# Patient Record
Sex: MALE | Race: WHITE | Employment: UNEMPLOYED | URBAN - METROPOLITAN AREA
[De-identification: names, ages, dates, MRNs, and addresses within clinical notes are randomized per-mention and may not be internally consistent; named-entity substitution may affect disease eponyms.]

---

## 2019-07-22 ENCOUNTER — HOSPITAL ENCOUNTER (INPATIENT)
Age: 69
LOS: 28 days | Discharge: NURSING, REHAB OR LONG-TERM CARE | DRG: 682 | End: 2019-08-20
Attending: EMERGENCY MEDICINE | Admitting: INTERNAL MEDICINE
Payer: MEDICARE

## 2019-07-22 DIAGNOSIS — E83.52 HYPERCALCEMIA: ICD-10-CM

## 2019-07-22 DIAGNOSIS — J96.01 ACUTE RESPIRATORY FAILURE WITH HYPOXIA (HCC): ICD-10-CM

## 2019-07-22 DIAGNOSIS — N17.9 AKI (ACUTE KIDNEY INJURY) (HCC): Primary | ICD-10-CM

## 2019-07-22 DIAGNOSIS — E87.5 HYPERKALEMIA: ICD-10-CM

## 2019-07-22 DIAGNOSIS — G93.40 ACUTE ENCEPHALOPATHY: ICD-10-CM

## 2019-07-22 LAB
ALBUMIN SERPL-MCNC: 4.8 G/DL (ref 3.4–5)
ALP BLD-CCNC: 88 U/L (ref 40–129)
ALT SERPL-CCNC: 19 U/L (ref 10–40)
ANION GAP SERPL CALCULATED.3IONS-SCNC: 22 MMOL/L (ref 3–16)
AST SERPL-CCNC: 17 U/L (ref 15–37)
BASE EXCESS VENOUS: 5 (ref -3–3)
BASOPHILS ABSOLUTE: 0 K/UL (ref 0–0.2)
BASOPHILS RELATIVE PERCENT: 0.2 %
BILIRUB SERPL-MCNC: <0.2 MG/DL (ref 0–1)
BILIRUBIN DIRECT: <0.2 MG/DL (ref 0–0.3)
BILIRUBIN, INDIRECT: ABNORMAL MG/DL (ref 0–1)
BUN BLDV-MCNC: 104 MG/DL (ref 7–20)
CALCIUM SERPL-MCNC: 13.1 MG/DL (ref 8.3–10.6)
CHLORIDE BLD-SCNC: 86 MMOL/L (ref 99–110)
CO2: 28 MMOL/L (ref 21–32)
CREAT SERPL-MCNC: 6.8 MG/DL (ref 0.8–1.3)
EOSINOPHILS ABSOLUTE: 0 K/UL (ref 0–0.6)
EOSINOPHILS RELATIVE PERCENT: 0 %
GFR AFRICAN AMERICAN: 10
GFR NON-AFRICAN AMERICAN: 8
GLUCOSE BLD-MCNC: 150 MG/DL (ref 70–99)
HCO3 VENOUS: 29.9 MMOL/L (ref 23–29)
HCT VFR BLD CALC: 36.7 % (ref 40.5–52.5)
HEMOGLOBIN: 12.4 G/DL (ref 13.5–17.5)
LACTATE: 1.47 MMOL/L (ref 0.4–2)
LIPASE: 32 U/L (ref 13–60)
LYMPHOCYTES ABSOLUTE: 0.6 K/UL (ref 1–5.1)
LYMPHOCYTES RELATIVE PERCENT: 5.8 %
MCH RBC QN AUTO: 32.3 PG (ref 26–34)
MCHC RBC AUTO-ENTMCNC: 33.7 G/DL (ref 31–36)
MCV RBC AUTO: 95.9 FL (ref 80–100)
MONOCYTES ABSOLUTE: 0.6 K/UL (ref 0–1.3)
MONOCYTES RELATIVE PERCENT: 5.7 %
NEUTROPHILS ABSOLUTE: 8.6 K/UL (ref 1.7–7.7)
NEUTROPHILS RELATIVE PERCENT: 88.3 %
O2 SAT, VEN: 24 %
PCO2, VEN: 51.2 MM HG (ref 40–50)
PDW BLD-RTO: 13.8 % (ref 12.4–15.4)
PERFORMED ON: ABNORMAL
PH VENOUS: 7.38 (ref 7.35–7.45)
PLATELET # BLD: 287 K/UL (ref 135–450)
PMV BLD AUTO: 7.5 FL (ref 5–10.5)
PO2, VEN: 18 MM HG
POC SAMPLE TYPE: ABNORMAL
POTASSIUM REFLEX MAGNESIUM: 5.9 MMOL/L (ref 3.5–5.1)
RBC # BLD: 3.83 M/UL (ref 4.2–5.9)
SODIUM BLD-SCNC: 136 MMOL/L (ref 136–145)
TCO2 CALC VENOUS: 31 MMOL/L
TOTAL PROTEIN: 8.5 G/DL (ref 6.4–8.2)
TROPONIN: <0.01 NG/ML
WBC # BLD: 9.7 K/UL (ref 4–11)

## 2019-07-22 PROCEDURE — 83605 ASSAY OF LACTIC ACID: CPT

## 2019-07-22 PROCEDURE — 83690 ASSAY OF LIPASE: CPT

## 2019-07-22 PROCEDURE — 93005 ELECTROCARDIOGRAM TRACING: CPT | Performed by: PHYSICIAN ASSISTANT

## 2019-07-22 PROCEDURE — 96361 HYDRATE IV INFUSION ADD-ON: CPT

## 2019-07-22 PROCEDURE — 82803 BLOOD GASES ANY COMBINATION: CPT

## 2019-07-22 PROCEDURE — 85025 COMPLETE CBC W/AUTO DIFF WBC: CPT

## 2019-07-22 PROCEDURE — 96375 TX/PRO/DX INJ NEW DRUG ADDON: CPT

## 2019-07-22 PROCEDURE — 80069 RENAL FUNCTION PANEL: CPT

## 2019-07-22 PROCEDURE — 84484 ASSAY OF TROPONIN QUANT: CPT

## 2019-07-22 PROCEDURE — 6360000002 HC RX W HCPCS: Performed by: PHYSICIAN ASSISTANT

## 2019-07-22 PROCEDURE — 36415 COLL VENOUS BLD VENIPUNCTURE: CPT

## 2019-07-22 PROCEDURE — 80076 HEPATIC FUNCTION PANEL: CPT

## 2019-07-22 PROCEDURE — 80048 BASIC METABOLIC PNL TOTAL CA: CPT

## 2019-07-22 PROCEDURE — 2580000003 HC RX 258: Performed by: PHYSICIAN ASSISTANT

## 2019-07-22 PROCEDURE — 99291 CRITICAL CARE FIRST HOUR: CPT

## 2019-07-22 RX ORDER — ONDANSETRON 2 MG/ML
4 INJECTION INTRAMUSCULAR; INTRAVENOUS EVERY 30 MIN PRN
Status: DISCONTINUED | OUTPATIENT
Start: 2019-07-22 | End: 2019-08-20 | Stop reason: HOSPADM

## 2019-07-22 RX ORDER — SODIUM CHLORIDE, SODIUM LACTATE, POTASSIUM CHLORIDE, AND CALCIUM CHLORIDE .6; .31; .03; .02 G/100ML; G/100ML; G/100ML; G/100ML
30 INJECTION, SOLUTION INTRAVENOUS ONCE
Status: DISCONTINUED | OUTPATIENT
Start: 2019-07-22 | End: 2019-07-23

## 2019-07-22 RX ADMIN — SODIUM CHLORIDE, POTASSIUM CHLORIDE, SODIUM LACTATE AND CALCIUM CHLORIDE 1000 ML: 600; 310; 30; 20 INJECTION, SOLUTION INTRAVENOUS at 23:37

## 2019-07-22 RX ADMIN — ONDANSETRON 4 MG: 2 INJECTION INTRAMUSCULAR; INTRAVENOUS at 22:24

## 2019-07-23 ENCOUNTER — APPOINTMENT (OUTPATIENT)
Dept: CT IMAGING | Age: 69
DRG: 682 | End: 2019-07-23
Payer: MEDICARE

## 2019-07-23 ENCOUNTER — APPOINTMENT (OUTPATIENT)
Dept: GENERAL RADIOLOGY | Age: 69
DRG: 682 | End: 2019-07-23
Payer: MEDICARE

## 2019-07-23 PROBLEM — N17.9 ACUTE KIDNEY FAILURE (HCC): Status: ACTIVE | Noted: 2019-07-23

## 2019-07-23 LAB
ALBUMIN SERPL-MCNC: 2.6 G/DL (ref 3.4–5)
ALBUMIN SERPL-MCNC: 3 G/DL (ref 3.4–5)
ALBUMIN SERPL-MCNC: 3.1 G/DL (ref 3.4–5)
ALBUMIN SERPL-MCNC: 3.5 G/DL (ref 3.4–5)
ALBUMIN SERPL-MCNC: 3.5 G/DL (ref 3.4–5)
ALBUMIN SERPL-MCNC: 3.8 G/DL (ref 3.4–5)
AMPHETAMINE SCREEN, URINE: ABNORMAL
ANION GAP SERPL CALCULATED.3IONS-SCNC: 11 MMOL/L (ref 3–16)
ANION GAP SERPL CALCULATED.3IONS-SCNC: 11 MMOL/L (ref 3–16)
ANION GAP SERPL CALCULATED.3IONS-SCNC: 13 MMOL/L (ref 3–16)
ANION GAP SERPL CALCULATED.3IONS-SCNC: 16 MMOL/L (ref 3–16)
ANION GAP SERPL CALCULATED.3IONS-SCNC: 18 MMOL/L (ref 3–16)
ANION GAP SERPL CALCULATED.3IONS-SCNC: 19 MMOL/L (ref 3–16)
BARBITURATE SCREEN URINE: ABNORMAL
BASE EXCESS ARTERIAL: -4 MMOL/L (ref -3–3)
BASOPHILS ABSOLUTE: 0 K/UL (ref 0–0.2)
BASOPHILS RELATIVE PERCENT: 0.3 %
BENZODIAZEPINE SCREEN, URINE: ABNORMAL
BILIRUBIN URINE: ABNORMAL
BLOOD, URINE: ABNORMAL
BUN BLDV-MCNC: 62 MG/DL (ref 7–20)
BUN BLDV-MCNC: 86 MG/DL (ref 7–20)
BUN BLDV-MCNC: 92 MG/DL (ref 7–20)
BUN BLDV-MCNC: 94 MG/DL (ref 7–20)
BUN BLDV-MCNC: 95 MG/DL (ref 7–20)
BUN BLDV-MCNC: 99 MG/DL (ref 7–20)
CALCIUM IONIZED: 1.44 MMOL/L (ref 1.12–1.32)
CALCIUM SERPL-MCNC: 10.5 MG/DL (ref 8.3–10.6)
CALCIUM SERPL-MCNC: 10.8 MG/DL (ref 8.3–10.6)
CALCIUM SERPL-MCNC: 10.9 MG/DL (ref 8.3–10.6)
CALCIUM SERPL-MCNC: 7 MG/DL (ref 8.3–10.6)
CALCIUM SERPL-MCNC: 9.3 MG/DL (ref 8.3–10.6)
CALCIUM SERPL-MCNC: 9.8 MG/DL (ref 8.3–10.6)
CANNABINOID SCREEN URINE: ABNORMAL
CARBOXYHEMOGLOBIN ARTERIAL: 2.4 % (ref 0–1.5)
CHLORIDE BLD-SCNC: 107 MMOL/L (ref 99–110)
CHLORIDE BLD-SCNC: 118 MMOL/L (ref 99–110)
CHLORIDE BLD-SCNC: 90 MMOL/L (ref 99–110)
CHLORIDE BLD-SCNC: 94 MMOL/L (ref 99–110)
CHLORIDE BLD-SCNC: 97 MMOL/L (ref 99–110)
CHLORIDE BLD-SCNC: 99 MMOL/L (ref 99–110)
CHLORIDE URINE RANDOM: <20 MMOL/L
CLARITY: CLEAR
CO2: 18 MMOL/L (ref 21–32)
CO2: 23 MMOL/L (ref 21–32)
CO2: 24 MMOL/L (ref 21–32)
CO2: 24 MMOL/L (ref 21–32)
CO2: 25 MMOL/L (ref 21–32)
CO2: 26 MMOL/L (ref 21–32)
COCAINE METABOLITE SCREEN URINE: ABNORMAL
COLOR: YELLOW
CREAT SERPL-MCNC: 2.3 MG/DL (ref 0.8–1.3)
CREAT SERPL-MCNC: 3.8 MG/DL (ref 0.8–1.3)
CREAT SERPL-MCNC: 4.1 MG/DL (ref 0.8–1.3)
CREAT SERPL-MCNC: 4.5 MG/DL (ref 0.8–1.3)
CREAT SERPL-MCNC: 4.6 MG/DL (ref 0.8–1.3)
CREAT SERPL-MCNC: 5.4 MG/DL (ref 0.8–1.3)
CREATININE URINE: 175.2 MG/DL (ref 39–259)
CRYSTALS, UA: ABNORMAL /HPF
D DIMER: 323 NG/ML DDU (ref 0–229)
D DIMER: 332 NG/ML DDU (ref 0–229)
EKG ATRIAL RATE: 95 BPM
EKG DIAGNOSIS: NORMAL
EKG P AXIS: 64 DEGREES
EKG P-R INTERVAL: 114 MS
EKG Q-T INTERVAL: 322 MS
EKG QRS DURATION: 90 MS
EKG QTC CALCULATION (BAZETT): 404 MS
EKG R AXIS: -28 DEGREES
EKG T AXIS: 52 DEGREES
EKG VENTRICULAR RATE: 95 BPM
EOSINOPHIL,URINE: NORMAL
EOSINOPHILS ABSOLUTE: 0 K/UL (ref 0–0.6)
EOSINOPHILS RELATIVE PERCENT: 0.1 %
EPITHELIAL CELLS, UA: ABNORMAL /HPF
ETHANOL: NORMAL MG/DL (ref 0–0.08)
GFR AFRICAN AMERICAN: 13
GFR AFRICAN AMERICAN: 15
GFR AFRICAN AMERICAN: 16
GFR AFRICAN AMERICAN: 18
GFR AFRICAN AMERICAN: 19
GFR AFRICAN AMERICAN: 34
GFR NON-AFRICAN AMERICAN: 11
GFR NON-AFRICAN AMERICAN: 13
GFR NON-AFRICAN AMERICAN: 13
GFR NON-AFRICAN AMERICAN: 15
GFR NON-AFRICAN AMERICAN: 16
GFR NON-AFRICAN AMERICAN: 28
GLUCOSE BLD-MCNC: 60 MG/DL (ref 70–99)
GLUCOSE BLD-MCNC: 72 MG/DL (ref 70–99)
GLUCOSE BLD-MCNC: 74 MG/DL (ref 70–99)
GLUCOSE BLD-MCNC: 75 MG/DL (ref 70–99)
GLUCOSE BLD-MCNC: 77 MG/DL (ref 70–99)
GLUCOSE BLD-MCNC: 87 MG/DL (ref 70–99)
GLUCOSE BLD-MCNC: 89 MG/DL (ref 70–99)
GLUCOSE BLD-MCNC: 92 MG/DL (ref 70–99)
GLUCOSE BLD-MCNC: 95 MG/DL (ref 70–99)
GLUCOSE URINE: NEGATIVE MG/DL
HCO3 ARTERIAL: 22 MMOL/L (ref 21–29)
HCT VFR BLD CALC: 27.6 % (ref 40.5–52.5)
HCT VFR BLD CALC: 29.6 % (ref 40.5–52.5)
HEMOGLOBIN, ART, EXTENDED: 9.2 G/DL
HEMOGLOBIN: 9.3 G/DL (ref 13.5–17.5)
HEMOGLOBIN: 9.8 G/DL (ref 13.5–17.5)
KETONES, URINE: ABNORMAL MG/DL
LACTIC ACID: 0.8 MMOL/L (ref 0.4–2)
LEUKOCYTE ESTERASE, URINE: NEGATIVE
LYMPHOCYTES ABSOLUTE: 0.8 K/UL (ref 1–5.1)
LYMPHOCYTES RELATIVE PERCENT: 7.4 %
Lab: ABNORMAL
MCH RBC QN AUTO: 31.5 PG (ref 26–34)
MCHC RBC AUTO-ENTMCNC: 33.1 G/DL (ref 31–36)
MCV RBC AUTO: 95.2 FL (ref 80–100)
METHADONE SCREEN, URINE: ABNORMAL
METHEMOGLOBIN ARTERIAL: 0.3 % (ref 0–1.4)
MICROSCOPIC EXAMINATION: YES
MONOCYTES ABSOLUTE: 1.1 K/UL (ref 0–1.3)
MONOCYTES RELATIVE PERCENT: 10.8 %
NEUTROPHILS ABSOLUTE: 8.5 K/UL (ref 1.7–7.7)
NEUTROPHILS RELATIVE PERCENT: 81.4 %
NITRITE, URINE: NEGATIVE
O2 SAT, ARTERIAL: 97 % (ref 93–100)
OPIATE SCREEN URINE: POSITIVE
OXYCODONE URINE: ABNORMAL
PARATHYROID HORMONE INTACT: 20.9 PG/ML (ref 14–72)
PCO2 ARTERIAL: 43.6 MMHG (ref 35–45)
PDW BLD-RTO: 13.8 % (ref 12.4–15.4)
PERFORMED ON: NORMAL
PH ARTERIAL: 7.31 (ref 7.35–7.45)
PH UA: 5
PH UA: 6 (ref 5–8)
PH VENOUS: 7.32 (ref 7.35–7.45)
PHENCYCLIDINE SCREEN URINE: ABNORMAL
PHOSPHORUS: 2.9 MG/DL (ref 2.5–4.9)
PHOSPHORUS: 3.8 MG/DL (ref 2.5–4.9)
PHOSPHORUS: 4 MG/DL (ref 2.5–4.9)
PHOSPHORUS: 4.4 MG/DL (ref 2.5–4.9)
PHOSPHORUS: 4.9 MG/DL (ref 2.5–4.9)
PHOSPHORUS: 5.1 MG/DL (ref 2.5–4.9)
PLATELET # BLD: 208 K/UL (ref 135–450)
PMV BLD AUTO: 7.2 FL (ref 5–10.5)
PO2 ARTERIAL: 91.8 MMHG (ref 75–108)
POTASSIUM SERPL-SCNC: 4.2 MMOL/L (ref 3.5–5.1)
POTASSIUM SERPL-SCNC: 4.9 MMOL/L (ref 3.5–5.1)
POTASSIUM SERPL-SCNC: 5.1 MMOL/L (ref 3.5–5.1)
POTASSIUM SERPL-SCNC: 5.1 MMOL/L (ref 3.5–5.1)
POTASSIUM SERPL-SCNC: 5.2 MMOL/L (ref 3.5–5.1)
POTASSIUM SERPL-SCNC: 5.6 MMOL/L (ref 3.5–5.1)
PROPOXYPHENE SCREEN: ABNORMAL
PROTEIN PROTEIN: 45.2 MG/DL
PROTEIN UA: 30 MG/DL
RBC # BLD: 3.11 M/UL (ref 4.2–5.9)
RBC UA: ABNORMAL /HPF (ref 0–2)
SODIUM BLD-SCNC: 134 MMOL/L (ref 136–145)
SODIUM BLD-SCNC: 136 MMOL/L (ref 136–145)
SODIUM BLD-SCNC: 137 MMOL/L (ref 136–145)
SODIUM BLD-SCNC: 138 MMOL/L (ref 136–145)
SODIUM BLD-SCNC: 141 MMOL/L (ref 136–145)
SODIUM BLD-SCNC: 147 MMOL/L (ref 136–145)
SODIUM URINE: 29 MMOL/L
SPECIFIC GRAVITY UA: 1.02 (ref 1–1.03)
TCO2 ARTERIAL: 23 MMOL/L
TROPONIN: 0.28 NG/ML
TROPONIN: 0.3 NG/ML
TROPONIN: 0.32 NG/ML
TSH REFLEX: 0.83 UIU/ML (ref 0.27–4.2)
URINE TYPE: ABNORMAL
UROBILINOGEN, URINE: 0.2 E.U./DL
WBC # BLD: 10.5 K/UL (ref 4–11)
WBC UA: ABNORMAL /HPF (ref 0–5)

## 2019-07-23 PROCEDURE — 6360000002 HC RX W HCPCS: Performed by: PHYSICIAN ASSISTANT

## 2019-07-23 PROCEDURE — 83605 ASSAY OF LACTIC ACID: CPT

## 2019-07-23 PROCEDURE — 2580000003 HC RX 258: Performed by: STUDENT IN AN ORGANIZED HEALTH CARE EDUCATION/TRAINING PROGRAM

## 2019-07-23 PROCEDURE — 2580000003 HC RX 258: Performed by: INTERNAL MEDICINE

## 2019-07-23 PROCEDURE — 84484 ASSAY OF TROPONIN QUANT: CPT

## 2019-07-23 PROCEDURE — 82043 UR ALBUMIN QUANTITATIVE: CPT

## 2019-07-23 PROCEDURE — 86701 HIV-1ANTIBODY: CPT

## 2019-07-23 PROCEDURE — 6360000002 HC RX W HCPCS: Performed by: INTERNAL MEDICINE

## 2019-07-23 PROCEDURE — 86702 HIV-2 ANTIBODY: CPT

## 2019-07-23 PROCEDURE — 87390 HIV-1 AG IA: CPT

## 2019-07-23 PROCEDURE — 84166 PROTEIN E-PHORESIS/URINE/CSF: CPT

## 2019-07-23 PROCEDURE — 82436 ASSAY OF URINE CHLORIDE: CPT

## 2019-07-23 PROCEDURE — 2500000003 HC RX 250 WO HCPCS: Performed by: STUDENT IN AN ORGANIZED HEALTH CARE EDUCATION/TRAINING PROGRAM

## 2019-07-23 PROCEDURE — 80307 DRUG TEST PRSMV CHEM ANLYZR: CPT

## 2019-07-23 PROCEDURE — 2000000000 HC ICU R&B

## 2019-07-23 PROCEDURE — 82542 COL CHROMOTOGRAPHY QUAL/QUAN: CPT

## 2019-07-23 PROCEDURE — 36600 WITHDRAWAL OF ARTERIAL BLOOD: CPT

## 2019-07-23 PROCEDURE — 84156 ASSAY OF PROTEIN URINE: CPT

## 2019-07-23 PROCEDURE — 85018 HEMOGLOBIN: CPT

## 2019-07-23 PROCEDURE — 2500000003 HC RX 250 WO HCPCS: Performed by: PHYSICIAN ASSISTANT

## 2019-07-23 PROCEDURE — 84443 ASSAY THYROID STIM HORMONE: CPT

## 2019-07-23 PROCEDURE — 85025 COMPLETE CBC W/AUTO DIFF WBC: CPT

## 2019-07-23 PROCEDURE — 36415 COLL VENOUS BLD VENIPUNCTURE: CPT

## 2019-07-23 PROCEDURE — 81001 URINALYSIS AUTO W/SCOPE: CPT

## 2019-07-23 PROCEDURE — 87040 BLOOD CULTURE FOR BACTERIA: CPT

## 2019-07-23 PROCEDURE — 83970 ASSAY OF PARATHORMONE: CPT

## 2019-07-23 PROCEDURE — 74176 CT ABD & PELVIS W/O CONTRAST: CPT

## 2019-07-23 PROCEDURE — 80069 RENAL FUNCTION PANEL: CPT

## 2019-07-23 PROCEDURE — 96365 THER/PROPH/DIAG IV INF INIT: CPT

## 2019-07-23 PROCEDURE — 71045 X-RAY EXAM CHEST 1 VIEW: CPT

## 2019-07-23 PROCEDURE — HZ89ZZZ MEDICATION MANAGEMENT FOR SUBSTANCE ABUSE TREATMENT, OTHER REPLACEMENT MEDICATION: ICD-10-PCS | Performed by: INTERNAL MEDICINE

## 2019-07-23 PROCEDURE — 85379 FIBRIN DEGRADATION QUANT: CPT

## 2019-07-23 PROCEDURE — 6370000000 HC RX 637 (ALT 250 FOR IP): Performed by: STUDENT IN AN ORGANIZED HEALTH CARE EDUCATION/TRAINING PROGRAM

## 2019-07-23 PROCEDURE — 85014 HEMATOCRIT: CPT

## 2019-07-23 PROCEDURE — 84155 ASSAY OF PROTEIN SERUM: CPT

## 2019-07-23 PROCEDURE — 2700000000 HC OXYGEN THERAPY PER DAY

## 2019-07-23 PROCEDURE — G0480 DRUG TEST DEF 1-7 CLASSES: HCPCS

## 2019-07-23 PROCEDURE — 6360000002 HC RX W HCPCS: Performed by: STUDENT IN AN ORGANIZED HEALTH CARE EDUCATION/TRAINING PROGRAM

## 2019-07-23 PROCEDURE — 94640 AIRWAY INHALATION TREATMENT: CPT

## 2019-07-23 PROCEDURE — 96375 TX/PRO/DX INJ NEW DRUG ADDON: CPT

## 2019-07-23 PROCEDURE — 87205 SMEAR GRAM STAIN: CPT

## 2019-07-23 PROCEDURE — 6370000000 HC RX 637 (ALT 250 FOR IP): Performed by: PHYSICIAN ASSISTANT

## 2019-07-23 PROCEDURE — 82570 ASSAY OF URINE CREATININE: CPT

## 2019-07-23 PROCEDURE — 94761 N-INVAS EAR/PLS OXIMETRY MLT: CPT

## 2019-07-23 PROCEDURE — 2580000003 HC RX 258: Performed by: PHYSICIAN ASSISTANT

## 2019-07-23 PROCEDURE — 84165 PROTEIN E-PHORESIS SERUM: CPT

## 2019-07-23 PROCEDURE — 70450 CT HEAD/BRAIN W/O DYE: CPT

## 2019-07-23 PROCEDURE — 93005 ELECTROCARDIOGRAM TRACING: CPT | Performed by: INTERNAL MEDICINE

## 2019-07-23 PROCEDURE — 82330 ASSAY OF CALCIUM: CPT

## 2019-07-23 PROCEDURE — 83883 ASSAY NEPHELOMETRY NOT SPEC: CPT

## 2019-07-23 PROCEDURE — 82803 BLOOD GASES ANY COMBINATION: CPT

## 2019-07-23 PROCEDURE — 84300 ASSAY OF URINE SODIUM: CPT

## 2019-07-23 PROCEDURE — 2500000003 HC RX 250 WO HCPCS: Performed by: INTERNAL MEDICINE

## 2019-07-23 PROCEDURE — 70490 CT SOFT TISSUE NECK W/O DYE: CPT

## 2019-07-23 RX ORDER — LORAZEPAM 1 MG/1
4 TABLET ORAL
Status: DISCONTINUED | OUTPATIENT
Start: 2019-07-23 | End: 2019-07-29

## 2019-07-23 RX ORDER — FLUMAZENIL 0.1 MG/ML
0.2 INJECTION, SOLUTION INTRAVENOUS ONCE
Status: COMPLETED | OUTPATIENT
Start: 2019-07-23 | End: 2019-07-23

## 2019-07-23 RX ORDER — DOCUSATE SODIUM 100 MG/1
100 CAPSULE, LIQUID FILLED ORAL 2 TIMES DAILY
Status: DISCONTINUED | OUTPATIENT
Start: 2019-07-23 | End: 2019-07-23

## 2019-07-23 RX ORDER — SODIUM CHLORIDE 0.9 % (FLUSH) 0.9 %
10 SYRINGE (ML) INJECTION PRN
Status: DISCONTINUED | OUTPATIENT
Start: 2019-07-23 | End: 2019-07-23 | Stop reason: SDUPTHER

## 2019-07-23 RX ORDER — SODIUM CHLORIDE 0.9 % (FLUSH) 0.9 %
10 SYRINGE (ML) INJECTION EVERY 12 HOURS SCHEDULED
Status: DISCONTINUED | OUTPATIENT
Start: 2019-07-23 | End: 2019-07-23 | Stop reason: SDUPTHER

## 2019-07-23 RX ORDER — LORAZEPAM 2 MG/ML
2 INJECTION INTRAMUSCULAR
Status: DISCONTINUED | OUTPATIENT
Start: 2019-07-23 | End: 2019-07-29

## 2019-07-23 RX ORDER — LORAZEPAM 2 MG/ML
4 INJECTION INTRAMUSCULAR
Status: DISCONTINUED | OUTPATIENT
Start: 2019-07-23 | End: 2019-07-29

## 2019-07-23 RX ORDER — LORAZEPAM 1 MG/1
2 TABLET ORAL
Status: DISCONTINUED | OUTPATIENT
Start: 2019-07-23 | End: 2019-07-29

## 2019-07-23 RX ORDER — LORAZEPAM 2 MG/ML
2 INJECTION INTRAMUSCULAR EVERY 4 HOURS PRN
Status: DISCONTINUED | OUTPATIENT
Start: 2019-07-23 | End: 2019-07-23

## 2019-07-23 RX ORDER — HALOPERIDOL 5 MG/ML
5 INJECTION INTRAMUSCULAR EVERY 4 HOURS PRN
Status: DISCONTINUED | OUTPATIENT
Start: 2019-07-23 | End: 2019-07-24

## 2019-07-23 RX ORDER — LORAZEPAM 2 MG/ML
3 INJECTION INTRAMUSCULAR
Status: DISCONTINUED | OUTPATIENT
Start: 2019-07-23 | End: 2019-07-29

## 2019-07-23 RX ORDER — SODIUM CHLORIDE 9 MG/ML
INJECTION, SOLUTION INTRAVENOUS CONTINUOUS
Status: DISCONTINUED | OUTPATIENT
Start: 2019-07-23 | End: 2019-07-24

## 2019-07-23 RX ORDER — NALOXONE HYDROCHLORIDE 0.4 MG/ML
0.4 INJECTION, SOLUTION INTRAMUSCULAR; INTRAVENOUS; SUBCUTANEOUS PRN
Status: DISCONTINUED | OUTPATIENT
Start: 2019-07-23 | End: 2019-07-23

## 2019-07-23 RX ORDER — LEVALBUTEROL 1.25 MG/.5ML
1.25 SOLUTION, CONCENTRATE RESPIRATORY (INHALATION) 2 TIMES DAILY
Status: DISCONTINUED | OUTPATIENT
Start: 2019-07-23 | End: 2019-07-25

## 2019-07-23 RX ORDER — SODIUM CHLORIDE 0.9 % (FLUSH) 0.9 %
10 SYRINGE (ML) INJECTION EVERY 12 HOURS SCHEDULED
Status: DISCONTINUED | OUTPATIENT
Start: 2019-07-23 | End: 2019-07-23

## 2019-07-23 RX ORDER — SODIUM CHLORIDE 0.9 % (FLUSH) 0.9 %
10 SYRINGE (ML) INJECTION PRN
Status: DISCONTINUED | OUTPATIENT
Start: 2019-07-23 | End: 2019-07-23

## 2019-07-23 RX ORDER — SIMVASTATIN 10 MG
10 TABLET ORAL NIGHTLY
Status: DISCONTINUED | OUTPATIENT
Start: 2019-07-23 | End: 2019-07-23

## 2019-07-23 RX ORDER — LORAZEPAM 2 MG/ML
2 INJECTION INTRAMUSCULAR EVERY 4 HOURS
Status: DISCONTINUED | OUTPATIENT
Start: 2019-07-23 | End: 2019-07-23

## 2019-07-23 RX ORDER — DEXTROSE MONOHYDRATE 50 MG/ML
100 INJECTION, SOLUTION INTRAVENOUS PRN
Status: DISCONTINUED | OUTPATIENT
Start: 2019-07-23 | End: 2019-08-20 | Stop reason: HOSPADM

## 2019-07-23 RX ORDER — 0.9 % SODIUM CHLORIDE 0.9 %
1000 INTRAVENOUS SOLUTION INTRAVENOUS ONCE
Status: COMPLETED | OUTPATIENT
Start: 2019-07-23 | End: 2019-07-23

## 2019-07-23 RX ORDER — NALOXONE HYDROCHLORIDE 0.4 MG/ML
INJECTION, SOLUTION INTRAMUSCULAR; INTRAVENOUS; SUBCUTANEOUS
Status: DISCONTINUED
Start: 2019-07-23 | End: 2019-07-23

## 2019-07-23 RX ORDER — LORAZEPAM 1 MG/1
1 TABLET ORAL
Status: DISCONTINUED | OUTPATIENT
Start: 2019-07-23 | End: 2019-07-29

## 2019-07-23 RX ORDER — LORAZEPAM 2 MG/ML
1 INJECTION INTRAMUSCULAR
Status: DISCONTINUED | OUTPATIENT
Start: 2019-07-23 | End: 2019-07-29

## 2019-07-23 RX ORDER — NICOTINE POLACRILEX 4 MG
15 LOZENGE BUCCAL PRN
Status: DISCONTINUED | OUTPATIENT
Start: 2019-07-23 | End: 2019-08-20 | Stop reason: HOSPADM

## 2019-07-23 RX ORDER — LORAZEPAM 1 MG/1
3 TABLET ORAL
Status: DISCONTINUED | OUTPATIENT
Start: 2019-07-23 | End: 2019-07-29

## 2019-07-23 RX ORDER — DEXTROSE MONOHYDRATE 25 G/50ML
12.5 INJECTION, SOLUTION INTRAVENOUS PRN
Status: DISCONTINUED | OUTPATIENT
Start: 2019-07-23 | End: 2019-08-20 | Stop reason: HOSPADM

## 2019-07-23 RX ORDER — SODIUM CHLORIDE 9 MG/ML
1000 INJECTION, SOLUTION INTRAVENOUS CONTINUOUS
Status: DISCONTINUED | OUTPATIENT
Start: 2019-07-23 | End: 2019-07-23

## 2019-07-23 RX ORDER — HEPARIN SODIUM 5000 [USP'U]/ML
5000 INJECTION, SOLUTION INTRAVENOUS; SUBCUTANEOUS EVERY 8 HOURS SCHEDULED
Status: DISCONTINUED | OUTPATIENT
Start: 2019-07-23 | End: 2019-07-24

## 2019-07-23 RX ORDER — LORAZEPAM 2 MG/ML
2 INJECTION INTRAMUSCULAR ONCE
Status: DISCONTINUED | OUTPATIENT
Start: 2019-07-23 | End: 2019-07-29

## 2019-07-23 RX ORDER — THIAMINE HYDROCHLORIDE 100 MG/ML
200 INJECTION, SOLUTION INTRAMUSCULAR; INTRAVENOUS DAILY
Status: DISCONTINUED | OUTPATIENT
Start: 2019-07-23 | End: 2019-07-23

## 2019-07-23 RX ORDER — LORAZEPAM 2 MG/ML
1 INJECTION INTRAMUSCULAR EVERY 4 HOURS PRN
Status: DISCONTINUED | OUTPATIENT
Start: 2019-07-23 | End: 2019-07-23

## 2019-07-23 RX ORDER — LORAZEPAM 2 MG/ML
0.5 INJECTION INTRAMUSCULAR ONCE
Status: DISCONTINUED | OUTPATIENT
Start: 2019-07-23 | End: 2019-07-23

## 2019-07-23 RX ORDER — PANTOPRAZOLE SODIUM 40 MG/1
40 TABLET, DELAYED RELEASE ORAL
Status: DISCONTINUED | OUTPATIENT
Start: 2019-07-23 | End: 2019-07-23

## 2019-07-23 RX ORDER — LORAZEPAM 2 MG/ML
1 INJECTION INTRAMUSCULAR EVERY 4 HOURS
Status: DISCONTINUED | OUTPATIENT
Start: 2019-07-23 | End: 2019-07-23

## 2019-07-23 RX ORDER — LEVALBUTEROL 1.25 MG/.5ML
1.25 SOLUTION, CONCENTRATE RESPIRATORY (INHALATION) 4 TIMES DAILY
Status: DISCONTINUED | OUTPATIENT
Start: 2019-07-23 | End: 2019-07-23

## 2019-07-23 RX ORDER — DEXTROSE MONOHYDRATE 25 G/50ML
50 INJECTION, SOLUTION INTRAVENOUS ONCE
Status: COMPLETED | OUTPATIENT
Start: 2019-07-23 | End: 2019-07-23

## 2019-07-23 RX ORDER — OLANZAPINE 10 MG/1
5 INJECTION, POWDER, LYOPHILIZED, FOR SOLUTION INTRAMUSCULAR
Status: DISCONTINUED | OUTPATIENT
Start: 2019-07-23 | End: 2019-07-23

## 2019-07-23 RX ADMIN — SODIUM BICARBONATE 25 MEQ: 84 INJECTION, SOLUTION INTRAVENOUS at 00:46

## 2019-07-23 RX ADMIN — NALOXONE HYDROCHLORIDE 0.4 MG: 0.4 INJECTION, SOLUTION INTRAMUSCULAR; INTRAVENOUS; SUBCUTANEOUS at 12:36

## 2019-07-23 RX ADMIN — DEXTROSE 50 % IN WATER (D50W) INTRAVENOUS SYRINGE 12.5 G: at 23:45

## 2019-07-23 RX ADMIN — DOCUSATE SODIUM 100 MG: 100 CAPSULE, LIQUID FILLED ORAL at 08:22

## 2019-07-23 RX ADMIN — SODIUM CHLORIDE 1000 ML: 9 INJECTION, SOLUTION INTRAVENOUS at 14:16

## 2019-07-23 RX ADMIN — DEXMEDETOMIDINE 0.2 MCG/KG/HR: 100 INJECTION, SOLUTION, CONCENTRATE INTRAVENOUS at 20:28

## 2019-07-23 RX ADMIN — THIAMINE HYDROCHLORIDE 200 MG: 100 INJECTION, SOLUTION INTRAMUSCULAR; INTRAVENOUS at 19:35

## 2019-07-23 RX ADMIN — FOLIC ACID: 5 INJECTION, SOLUTION INTRAMUSCULAR; INTRAVENOUS; SUBCUTANEOUS at 05:39

## 2019-07-23 RX ADMIN — CALCIUM GLUCONATE 1 G: 98 INJECTION, SOLUTION INTRAVENOUS at 00:47

## 2019-07-23 RX ADMIN — SODIUM CHLORIDE: 9 INJECTION, SOLUTION INTRAVENOUS at 15:16

## 2019-07-23 RX ADMIN — SODIUM CHLORIDE: 9 INJECTION, SOLUTION INTRAVENOUS at 10:14

## 2019-07-23 RX ADMIN — SODIUM CHLORIDE 1000 ML: 9 INJECTION, SOLUTION INTRAVENOUS at 01:30

## 2019-07-23 RX ADMIN — DEXTROSE 50 % IN WATER (D50W) INTRAVENOUS SYRINGE 50 ML: at 00:46

## 2019-07-23 RX ADMIN — LEVALBUTEROL HYDROCHLORIDE 1.25 MG: 1.25 SOLUTION, CONCENTRATE RESPIRATORY (INHALATION) at 23:13

## 2019-07-23 RX ADMIN — LORAZEPAM 4 MG: 2 INJECTION INTRAMUSCULAR; INTRAVENOUS at 10:48

## 2019-07-23 RX ADMIN — HALOPERIDOL LACTATE 5 MG: 5 INJECTION INTRAMUSCULAR at 13:30

## 2019-07-23 RX ADMIN — PANTOPRAZOLE SODIUM 40 MG: 40 TABLET, DELAYED RELEASE ORAL at 08:23

## 2019-07-23 RX ADMIN — INSULIN HUMAN 10 UNITS: 100 INJECTION, SOLUTION PARENTERAL at 00:46

## 2019-07-23 RX ADMIN — HEPARIN SODIUM 5000 UNITS: 5000 INJECTION INTRAVENOUS; SUBCUTANEOUS at 21:51

## 2019-07-23 RX ADMIN — DICLOFENAC 2 G: 10 GEL TOPICAL at 08:22

## 2019-07-23 RX ADMIN — SODIUM CHLORIDE 1000 ML: 9 INJECTION, SOLUTION INTRAVENOUS at 11:59

## 2019-07-23 RX ADMIN — FOLIC ACID: 5 INJECTION, SOLUTION INTRAMUSCULAR; INTRAVENOUS; SUBCUTANEOUS at 19:35

## 2019-07-23 RX ADMIN — HEPARIN SODIUM 5000 UNITS: 5000 INJECTION INTRAVENOUS; SUBCUTANEOUS at 08:23

## 2019-07-23 RX ADMIN — SODIUM CHLORIDE: 9 INJECTION, SOLUTION INTRAVENOUS at 03:24

## 2019-07-23 RX ADMIN — FLUMAZENIL 0.2 MG: 0.1 INJECTION INTRAVENOUS at 12:19

## 2019-07-23 ASSESSMENT — ENCOUNTER SYMPTOMS
NAUSEA: 1
ABDOMINAL PAIN: 0
DIARRHEA: 0
SHORTNESS OF BREATH: 1
COLOR CHANGE: 0
COUGH: 1
ABDOMINAL DISTENTION: 0
VOICE CHANGE: 1
RESPIRATORY NEGATIVE: 1
VOMITING: 1

## 2019-07-23 ASSESSMENT — PAIN DESCRIPTION - PROGRESSION
CLINICAL_PROGRESSION: NOT CHANGED

## 2019-07-23 ASSESSMENT — PAIN DESCRIPTION - DESCRIPTORS: DESCRIPTORS: ACHING;DISCOMFORT;TIGHTNESS

## 2019-07-23 ASSESSMENT — PAIN SCALES - GENERAL
PAINLEVEL_OUTOF10: 0
PAINLEVEL_OUTOF10: 3
PAINLEVEL_OUTOF10: 0

## 2019-07-23 ASSESSMENT — PAIN DESCRIPTION - FREQUENCY: FREQUENCY: CONTINUOUS

## 2019-07-23 ASSESSMENT — PAIN SCALES - WONG BAKER
WONGBAKER_NUMERICALRESPONSE: 0
WONGBAKER_NUMERICALRESPONSE: 0

## 2019-07-23 ASSESSMENT — PAIN DESCRIPTION - LOCATION: LOCATION: NOSE;NECK

## 2019-07-23 ASSESSMENT — PAIN DESCRIPTION - PAIN TYPE: TYPE: CHRONIC PAIN

## 2019-07-23 ASSESSMENT — PAIN DESCRIPTION - ONSET: ONSET: ON-GOING

## 2019-07-23 NOTE — PROGRESS NOTES
right vocal cord. Additionally, patient has had poor appetite over the last several months with associated weight loss. He follows closely with PCP and underwent MBS and fluoro esophagram which demonstrated a small sliding hiatal hernia and mild esophogeal dysmotility. He endorses drinking 1-2 vodka shots per day and states he smokes 1 pack of cigarettes daily \"for a long time. \" He denies CP, SOB (despite requiring 2L NC), abdominal pain, constipation or diarrhea.      Medications:     Scheduled Meds:   diclofenac sodium  2 g Topical BID    pantoprazole  40 mg Oral QAM AC    simvastatin  10 mg Oral Nightly    docusate sodium  100 mg Oral BID    heparin (porcine)  5,000 Units Subcutaneous 3 times per day    sodium chloride flush  10 mL Intravenous 2 times per day    levalbuterol  1.25 mg Nebulization BID    naloxone        LORazepam  0.5 mg Intravenous Once    sodium chloride  1,000 mL Intravenous Once     Continuous Infusions:   sodium chloride 150 mL/hr at 07/23/19 1014     PRN Meds:sodium chloride flush, LORazepam **OR** LORazepam **OR** LORazepam **OR** LORazepam **OR** LORazepam **OR** LORazepam **OR** LORazepam **OR** LORazepam, naloxone, haloperidol lactate, ondansetron    Objective:   Vitals:   T-max:  Patient Vitals for the past 8 hrs:   BP Temp Temp src Pulse Resp SpO2 Height   07/23/19 1351 (!) 71/43 97.8 °F (36.6 °C) Axillary 112 12 96 % --   07/23/19 1344 -- -- -- -- 18 96 % --   07/23/19 1236 (!) 97/58 -- -- -- -- -- --   07/23/19 1206 (!) 65/40 -- -- -- -- -- --   07/23/19 1154 (!) 62/39 -- -- -- -- -- --   07/23/19 1148 -- -- -- 106 -- -- --   07/23/19 1118 -- -- -- -- -- -- 5' 5\" (1.651 m)   07/23/19 1032 103/64 -- -- 120 -- -- --   07/23/19 0747 121/60 99 °F (37.2 °C) Oral -- 16 93 % --       Intake/Output Summary (Last 24 hours) at 7/23/2019 1443  Last data filed at 7/23/2019 1011  Gross per 24 hour   Intake 653.8 ml   Output 450 ml   Net 203.8 ml       Review of Systems   Constitutional: deformity. Neurological: No cranial nerve deficit or sensory deficit. Coordination normal.   Not oriented to person, place, or time   Skin: Skin is warm. Capillary refill takes less than 2 seconds. He is diaphoretic. No erythema. There is pallor. Psychiatric:   Agitated, thrashing, not responsive to name       LABS:    CBC:   Recent Labs     07/22/19 2147 07/23/19 0633 07/23/19  0748   WBC 9.7 10.5  --    HGB 12.4* 9.8* 9.3*   HCT 36.7* 29.6* 27.6*    208  --    MCV 95.9 95.2  --      Renal:    Recent Labs     07/23/19 0633 07/23/19 0748 07/23/19  1152    138 136   K 5.1 5.1 5.2*   CL 94* 97* 99   CO2 24 25 24   BUN 95* 94* 92*   CREATININE 4.6* 4.5* 4.1*   GLUCOSE 92 89 87   CALCIUM 10.8* 10.5 9.8   PHOS 4.9 4.4 3.8   ANIONGAP 19* 16 13     Hepatic:   Recent Labs     07/22/19 2148 07/23/19 0633 07/23/19  0748 07/23/19  1152   AST 17  --   --   --    ALT 19  --   --   --    BILITOT <0.2  --   --   --    BILIDIR <0.2  --   --   --    PROT 8.5*  --   --   --    LABALBU 4.8 3.8 3.5 3.1*   ALKPHOS 88  --   --   --      Troponin:   Recent Labs     07/22/19 2148 07/23/19  1254   TROPONINI <0.01 0.30*     BNP: No results for input(s): BNP in the last 72 hours. Lipids: No results for input(s): CHOL, HDL in the last 72 hours. Invalid input(s): LDLCALCU, TRIGLYCERIDE  ABGs:    Recent Labs     07/23/19  1344   PHART 7.313*   NUT3SGZ 43.6   PO2ART 91.8   FCI7JOA 22   BEART -4.0*   [id number] 97   [id number] 23       INR: No results for input(s): INR in the last 72 hours. Lactate:   Recent Labs     07/22/19 2147   LACTATE 1.47     Cultures:  -----------------------------------------------------------------  RAD:   CT SOFT TISSUE NECK WO CONTRAST   Final Result   Unremarkable CT neck          XR CHEST PORTABLE   Final Result     Normal chest x-ray. CT ABDOMEN PELVIS WO CONTRAST Additional Contrast? None   Final Result   No acute abnormality demonstrated in the abdomen or pelvis. Assessment/Plan:     Mr. Mari Perez is a 69 y/o M with PMHx significant for HTN, HLD, GERD, opioid dependence 2/2 cervical DDD, and alcohol abuse who presents to [facility] ED complaining of worsening fatigue, nausea/vomiting, poor appetite and voice hoarseness over the last several months. On admission, primary problems identified were hyperkalemia and hypercalcemia, which were treated with IVF and D50 + insulin. AVIVA also identified, treated with IVF. Pt became agitated on the floor, requiring restraints and sedation. BP's also hypotensive during somnolent episodes. Troponins were negative on admission, trended up to 0.30 during agitation episode. ABG showed 7.313  43.6  91.8  22. EKG showed sinus tachycardia. D-dimer elevated at 332. H+H repeat stable. Transferred to ICU. Acute alcohol withdrawal- Patient was agitated, hallucinating, had elevated CIWA scores 7/23. Require ativan and haldol with 3 point restraints for patient/staff safety. Patient reports drinking 1-2 shots daily. Unclear when patient had last drink. No history of EtOH withdrawal or seizures. - Rally pack given in ED  - CIWA protocol scored at 35 earlier (7/23)  - 4mg ativan given IV  - 2mg haldol given IM later following agitated episode  - 5L NC for support, switched to 10L high-flow NC  - Weaned down to 4L HFNC, satting 96%    Hypotension - patient's BP dropped to 62/39 following administration of ativan for presumed alcohol withdrawal.  Started IV bolus, BP improved to 97/58 following narcan dose. BP dropped again to 71/43 following a dose of IM haldol.  - 1L IVF given in ED  - 2L IV bolus given on floor  - Flumazenil given following benzos lowering BP  - Will continue to monitor    Acute Kidney Injury - likely multifactorial in etiology in setting of poor PO intake and nephrotoxic meds. Patient presents with fatigue, nausea/vomiting and forgetfulness which has progressively gotten worse over the last three days.  BMP was indicated a possible mass lesion on the right vocal cord. MBS and esophogram demonstrated a small sliding hiatal hernia and mild esophogeal dysmotility.  No significant abnormalities noted on MBS.   - CT neck WO contrast showed no abnormal pathology  - Will likely need further imaging studies with contrast, but will hold off for now in setting of AVIVA      HTN  - Hold home Lisinopril     GERD  - Protonix daily      Cervical DDD  - Holding home opiates as patient appears lethargic/altered on exam    Code Status: Full  FEN: NPO except ice chips, sips  PPX: Heparin  DISPO: ICU    Cindy Murguia MD, PGY-1  07/23/19  2:43 PM    This patient has been staffed and discussed with Berta Powers MD.

## 2019-07-23 NOTE — PLAN OF CARE
Problem: Pain:  Goal: Pain level will decrease  Description  Pain level will decrease  Outcome: Ongoing  Note:   No complaints of pain thus far this shift. Patient aware of diclofenac order. Declines it at this time. Goal: Control of acute pain  Description  Control of acute pain  Outcome: Ongoing  Goal: Control of chronic pain  Description  Control of chronic pain  Outcome: Ongoing     Problem: Falls - Risk of:  Goal: Will remain free from falls  Description  Will remain free from falls  Outcome: Ongoing  Note:   No falls thus far on this shift. Bed locked and in low position. Call light within reach. Pt encouraged to call out to voice any needs. Bedside table in reach. Bed alarm armed.    Goal: Absence of physical injury  Description  Absence of physical injury  Outcome: Ongoing

## 2019-07-23 NOTE — PROGRESS NOTES
Pt arrived with RN at bedside. This RN assuming care of the Pt. MD at bedside. Informed him of HR in the 130s, sating in the 80s. Pt breathing labored, appears to be purse lip breathing. Pt is tremoring at this time. Will continue to monitor.

## 2019-07-23 NOTE — CONSULTS
[address]               [address] [facility], [address] [place] [address]                                  CONSULTATION    PATIENT NAME: Chencho Villa                     :        1950  MED REC NO:   [id number]                          ROOM:       1167  ACCOUNT NO:   [de-identified]                           ADMIT DATE: 2019  PROVIDER:     Jin Gilmore MD    CONSULT DATE:  2019    REASON FOR ADMISSION:  The patient with mental status changes, possible  alcohol withdrawal.    REASON FOR CONSULTATION:  The patient with acute kidney injury with BUN  104, creatinine 6.8, potassium 5.9, calcium 13.1. Recent weight loss of  14 pounds. Baseline creatinine in 2018, creatinine was 1.2, and  calcium was 10.1. HISTORY OF PRESENTING COMPLAINT:  I was asked by Dr. Tho Jean-Baptiste and  also called from the ER regarding this 78-year-old  gentleman  whom I am seeing in the room where he just got Ativan because he was  agitated, pulling his lines, was trying to jump out of the window, and  is sedated and unable to give any details. Reviewed admission H and P and other details. He has a history of  hypertension, opioid dependence, alcoholism, cervical degenerative  disease for which apparently he has some surgery, presents with fatigue,  nausea, vomiting, poor appetite, hoarseness of the voice for several  months, recent 10 pounds of weight loss. Apparently, he has problem with the hoarseness and has videostroboscopy  with laryngoscopy in 2018 by ENT. Possible mass seen in the vocal  cord. Recently, he also has an esophagram.  Mild dysmotility was seen. The patient drinks lot of milk shakes mixing with vodka. In the ER, his vitals were stable, but has abnormal lab that described  above. I am seeing in his room where unable get any history.     REVIEW OF SYSTEMS:  Again, not much available except weight loss, but  apparently no fever, chills, [phone]    CC:

## 2019-07-23 NOTE — PROGRESS NOTES
ICU TRANSFER NOTE      1:42 PM2019  Admit Date: 2019   Hospital Day: 2                                                   PCP   Edgardo Schafer  : 1950                                                       CodeStatus:Full Code  MRN: [id number]                                                        Diet: Diet NPO Effective Now Exceptions are: Ice Chips, Sips with Meds     Vitals:    19 1236   BP: (!) 97/58   Pulse:    Resp:    Temp:    SpO2:      Scheduled Meds:   diclofenac sodium  2 g Topical BID    pantoprazole  40 mg Oral QAM AC    simvastatin  10 mg Oral Nightly    docusate sodium  100 mg Oral BID    heparin (porcine)  5,000 Units Subcutaneous 3 times per day    sodium chloride flush  10 mL Intravenous 2 times per day    levalbuterol  1.25 mg Nebulization BID    naloxone        LORazepam  0.5 mg Intravenous Once     Continuous Infusions:   sodium chloride 150 mL/hr at 19 1014     PRN Meds:sodium chloride flush, LORazepam **OR** LORazepam **OR** LORazepam **OR** LORazepam **OR** LORazepam **OR** LORazepam **OR** LORazepam **OR** LORazepam, naloxone, OLANZapine, haloperidol lactate, ondansetron    Physical Exam   Constitutional: He appears well-developed and well-nourished. He appears distressed. HENT:   Head: Normocephalic and atraumatic. Nose: Nose normal.   Eyes: Conjunctivae are normal. No scleral icterus. Neck: JVD present. No tracheal deviation present. Cardiovascular: Normal rate, regular rhythm and normal heart sounds. Exam reveals no gallop and no friction rub. No murmur heard. Pulmonary/Chest: He is in respiratory distress. Neurological:   Pt arousable but unable to follow any commands or answer questions. Skin: Skin is warm. He is diaphoretic. Scattered bruising    Psychiatric:   Pt restrained for agitation.          The objective and subjective findings as well as the treatment course in the [place] have been reviewed with the floor team. The

## 2019-07-23 NOTE — PROGRESS NOTES
BP 62/39. Notified Dr. Stephanie Mike, order for a normal saline bolus obtained.   One liter over an hour

## 2019-07-23 NOTE — CONSULTS
Nutrition Assessment    Type and Reason for Visit: Initial, Consult, Positive Nutrition Screen    Nutrition Recommendations:  1. PO Diet: Continue NPO, monitor ability to advance diet as tolerated  2. Suggest to continue folic acid, MVI and Thiamine as able  3. Encourage adequate po intake when able to tolerate diet  4. Monitor weight, labs and clinical progress      Nutrition Assessment: Consult received for other (anorexia, weight loss) and positive screen for decreased appetite/poor po intake and wt loss. Pt with non-significant (3%) wt loss x 1 month per H&P.  RD unable to interview pt at this time d/t lethargy. Pt is nutritionally compromised AEB poor appetite over the last several months with nausea and vomiting reported, most likely from ETOH abuse. Pt drinks a lot of milkshakes with Vodka noted. Pt is at risk for further compromise d/t NPO status. Will monitor ability to advance diet and complete NFPE as able. 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 Energy Intake-Less than or equal to 50% of estimated energy requirement, (since admit x 1 day)    2. Weight Loss-No significant weight loss,    3. Fat Loss-Unable to assess(not appropriate at this time),    4. Muscle Loss-Unable to assess(not appropriate at this time),    5. Fluid Accumulation-No significant fluid accumulation,    6.   Strength-Not measured    Nutrition Risk Level: High    Nutrient Needs:  · Estimated Daily Total Kcal: 5304-5601 (30-35)  · Estimated Daily Protein (g): 71-89 (1.2-1.5)  · Estimated Daily Total Fluid (ml/day): 5055-1699 (1 ml/kcal)    Nutrition Diagnosis:   · Problem: Inadequate oral intake  · Etiology: related to

## 2019-07-24 ENCOUNTER — APPOINTMENT (OUTPATIENT)
Dept: GENERAL RADIOLOGY | Age: 69
DRG: 682 | End: 2019-07-24
Payer: MEDICARE

## 2019-07-24 PROBLEM — F11.20 NARCOTIC DEPENDENCE (HCC): Status: ACTIVE | Noted: 2019-07-24

## 2019-07-24 PROBLEM — F10.931 ALCOHOL WITHDRAWAL SYNDROME, WITH DELIRIUM (HCC): Status: ACTIVE | Noted: 2019-07-24

## 2019-07-24 PROBLEM — G93.40 ACUTE ENCEPHALOPATHY: Status: ACTIVE | Noted: 2019-07-24

## 2019-07-24 LAB
ALBUMIN SERPL-MCNC: 2.7 G/DL (ref 3.1–4.9)
ALBUMIN SERPL-MCNC: 3 G/DL (ref 3.4–5)
ALBUMIN SERPL-MCNC: 3 G/DL (ref 3.4–5)
ALBUMIN SERPL-MCNC: 3.2 G/DL (ref 3.4–5)
ALBUMIN SERPL-MCNC: 3.2 G/DL (ref 3.4–5)
ALBUMIN SERPL-MCNC: 3.3 G/DL (ref 3.4–5)
ALBUMIN SERPL-MCNC: 3.3 G/DL (ref 3.4–5)
ALPHA-1-GLOBULIN: 0.2 G/DL (ref 0.2–0.4)
ALPHA-2-GLOBULIN: 0.7 G/DL (ref 0.4–1.1)
ANION GAP SERPL CALCULATED.3IONS-SCNC: 10 MMOL/L (ref 3–16)
ANION GAP SERPL CALCULATED.3IONS-SCNC: 11 MMOL/L (ref 3–16)
ANION GAP SERPL CALCULATED.3IONS-SCNC: 11 MMOL/L (ref 3–16)
ANION GAP SERPL CALCULATED.3IONS-SCNC: 13 MMOL/L (ref 3–16)
ANION GAP SERPL CALCULATED.3IONS-SCNC: 14 MMOL/L (ref 3–16)
ANION GAP SERPL CALCULATED.3IONS-SCNC: 15 MMOL/L (ref 3–16)
ANTI-XA UNFRAC HEPARIN: 0.72 IU/ML (ref 0.3–0.7)
ANTI-XA UNFRAC HEPARIN: <0.04 IU/ML (ref 0.3–0.7)
APTT: 27 SEC (ref 26–36)
BASOPHILS ABSOLUTE: 0 K/UL (ref 0–0.2)
BASOPHILS RELATIVE PERCENT: 0.3 %
BETA GLOBULIN: 0.7 G/DL (ref 0.9–1.6)
BUN BLDV-MCNC: 43 MG/DL (ref 7–20)
BUN BLDV-MCNC: 46 MG/DL (ref 7–20)
BUN BLDV-MCNC: 51 MG/DL (ref 7–20)
BUN BLDV-MCNC: 58 MG/DL (ref 7–20)
BUN BLDV-MCNC: 61 MG/DL (ref 7–20)
BUN BLDV-MCNC: 69 MG/DL (ref 7–20)
CALCIUM SERPL-MCNC: 9.1 MG/DL (ref 8.3–10.6)
CALCIUM SERPL-MCNC: 9.1 MG/DL (ref 8.3–10.6)
CALCIUM SERPL-MCNC: 9.2 MG/DL (ref 8.3–10.6)
CALCIUM SERPL-MCNC: 9.3 MG/DL (ref 8.3–10.6)
CALCIUM SERPL-MCNC: 9.3 MG/DL (ref 8.3–10.6)
CALCIUM SERPL-MCNC: 9.4 MG/DL (ref 8.3–10.6)
CHLORIDE BLD-SCNC: 111 MMOL/L (ref 99–110)
CHLORIDE BLD-SCNC: 112 MMOL/L (ref 99–110)
CHLORIDE BLD-SCNC: 112 MMOL/L (ref 99–110)
CHLORIDE BLD-SCNC: 113 MMOL/L (ref 99–110)
CO2: 20 MMOL/L (ref 21–32)
CO2: 21 MMOL/L (ref 21–32)
CO2: 22 MMOL/L (ref 21–32)
CO2: 24 MMOL/L (ref 21–32)
CREAT SERPL-MCNC: 1.4 MG/DL (ref 0.8–1.3)
CREAT SERPL-MCNC: 1.4 MG/DL (ref 0.8–1.3)
CREAT SERPL-MCNC: 1.7 MG/DL (ref 0.8–1.3)
CREAT SERPL-MCNC: 1.8 MG/DL (ref 0.8–1.3)
CREAT SERPL-MCNC: 2.1 MG/DL (ref 0.8–1.3)
CREAT SERPL-MCNC: 2.4 MG/DL (ref 0.8–1.3)
CREATININE URINE: 103.5 MG/DL (ref 39–259)
EKG ATRIAL RATE: 111 BPM
EKG ATRIAL RATE: 122 BPM
EKG DIAGNOSIS: NORMAL
EKG DIAGNOSIS: NORMAL
EKG P AXIS: 56 DEGREES
EKG P AXIS: 58 DEGREES
EKG P-R INTERVAL: 130 MS
EKG P-R INTERVAL: 148 MS
EKG Q-T INTERVAL: 314 MS
EKG Q-T INTERVAL: 322 MS
EKG QRS DURATION: 84 MS
EKG QRS DURATION: 90 MS
EKG QTC CALCULATION (BAZETT): 437 MS
EKG QTC CALCULATION (BAZETT): 447 MS
EKG R AXIS: -18 DEGREES
EKG R AXIS: -39 DEGREES
EKG T AXIS: 43 DEGREES
EKG T AXIS: 46 DEGREES
EKG VENTRICULAR RATE: 111 BPM
EKG VENTRICULAR RATE: 122 BPM
EOSINOPHILS ABSOLUTE: 0 K/UL (ref 0–0.6)
EOSINOPHILS RELATIVE PERCENT: 0.3 %
GAMMA GLOBULIN: 0.9 G/DL (ref 0.6–1.8)
GFR AFRICAN AMERICAN: 33
GFR AFRICAN AMERICAN: 38
GFR AFRICAN AMERICAN: 45
GFR AFRICAN AMERICAN: 49
GFR AFRICAN AMERICAN: >60
GFR AFRICAN AMERICAN: >60
GFR NON-AFRICAN AMERICAN: 27
GFR NON-AFRICAN AMERICAN: 31
GFR NON-AFRICAN AMERICAN: 38
GFR NON-AFRICAN AMERICAN: 40
GFR NON-AFRICAN AMERICAN: 50
GFR NON-AFRICAN AMERICAN: 50
GLUCOSE BLD-MCNC: 104 MG/DL (ref 70–99)
GLUCOSE BLD-MCNC: 106 MG/DL (ref 70–99)
GLUCOSE BLD-MCNC: 109 MG/DL (ref 70–99)
GLUCOSE BLD-MCNC: 113 MG/DL (ref 70–99)
GLUCOSE BLD-MCNC: 113 MG/DL (ref 70–99)
GLUCOSE BLD-MCNC: 116 MG/DL (ref 70–99)
GLUCOSE BLD-MCNC: 122 MG/DL (ref 70–99)
GLUCOSE BLD-MCNC: 134 MG/DL (ref 70–99)
GLUCOSE BLD-MCNC: 74 MG/DL (ref 70–99)
GLUCOSE BLD-MCNC: 80 MG/DL (ref 70–99)
GLUCOSE BLD-MCNC: 83 MG/DL (ref 70–99)
GLUCOSE BLD-MCNC: 94 MG/DL (ref 70–99)
GLUCOSE BLD-MCNC: 96 MG/DL (ref 70–99)
GLUCOSE BLD-MCNC: 98 MG/DL (ref 70–99)
HCT VFR BLD CALC: 27.2 % (ref 40.5–52.5)
HEMOGLOBIN: 9.1 G/DL (ref 13.5–17.5)
HIV AG/AB: NORMAL
HIV ANTIGEN: NORMAL
HIV-1 ANTIBODY: NORMAL
HIV-2 AB: NORMAL
INR BLD: 1.04 (ref 0.86–1.14)
LV EF: 50 %
LVEF MODALITY: NORMAL
LYMPHOCYTES ABSOLUTE: 0.6 K/UL (ref 1–5.1)
LYMPHOCYTES RELATIVE PERCENT: 9.1 %
MCH RBC QN AUTO: 32.2 PG (ref 26–34)
MCHC RBC AUTO-ENTMCNC: 33.6 G/DL (ref 31–36)
MCV RBC AUTO: 95.7 FL (ref 80–100)
MICROALBUMIN UR-MCNC: <1.2 MG/DL
MICROALBUMIN/CREAT UR-RTO: NORMAL MG/G (ref 0–30)
MONOCYTES ABSOLUTE: 0.7 K/UL (ref 0–1.3)
MONOCYTES RELATIVE PERCENT: 10.9 %
NEUTROPHILS ABSOLUTE: 5.2 K/UL (ref 1.7–7.7)
NEUTROPHILS RELATIVE PERCENT: 79.4 %
PDW BLD-RTO: 14 % (ref 12.4–15.4)
PERFORMED ON: ABNORMAL
PERFORMED ON: NORMAL
PHOSPHORUS: 2.1 MG/DL (ref 2.5–4.9)
PHOSPHORUS: 2.3 MG/DL (ref 2.5–4.9)
PHOSPHORUS: 2.5 MG/DL (ref 2.5–4.9)
PHOSPHORUS: 2.8 MG/DL (ref 2.5–4.9)
PHOSPHORUS: 2.9 MG/DL (ref 2.5–4.9)
PHOSPHORUS: 3 MG/DL (ref 2.5–4.9)
PLATELET # BLD: 182 K/UL (ref 135–450)
PMV BLD AUTO: 7.3 FL (ref 5–10.5)
POTASSIUM SERPL-SCNC: 4.1 MMOL/L (ref 3.5–5.1)
POTASSIUM SERPL-SCNC: 4.2 MMOL/L (ref 3.5–5.1)
POTASSIUM SERPL-SCNC: 4.2 MMOL/L (ref 3.5–5.1)
POTASSIUM SERPL-SCNC: 4.3 MMOL/L (ref 3.5–5.1)
POTASSIUM SERPL-SCNC: 4.5 MMOL/L (ref 3.5–5.1)
POTASSIUM SERPL-SCNC: 4.7 MMOL/L (ref 3.5–5.1)
PROTEIN PROTEIN: 0.04 G/DL
PROTEIN PROTEIN: 11 MG/DL
PROTEIN PROTEIN: 45.2 MG/DL
PROTEIN/CREAT RATIO: 0.1 MG/DL
PROTHROMBIN TIME: 11.8 SEC (ref 9.8–13)
RBC # BLD: 2.84 M/UL (ref 4.2–5.9)
SODIUM BLD-SCNC: 144 MMOL/L (ref 136–145)
SODIUM BLD-SCNC: 145 MMOL/L (ref 136–145)
SODIUM BLD-SCNC: 146 MMOL/L (ref 136–145)
SODIUM BLD-SCNC: 147 MMOL/L (ref 136–145)
SODIUM BLD-SCNC: 148 MMOL/L (ref 136–145)
SODIUM BLD-SCNC: 149 MMOL/L (ref 136–145)
SPE/IFE INTERPRETATION: NORMAL
TOTAL PROTEIN: 5.1 G/DL (ref 6.4–8.2)
TROPONIN: 0.67 NG/ML
TROPONIN: 0.85 NG/ML
TROPONIN: 0.92 NG/ML
TROPONIN: 1.16 NG/ML
URINE ELECTROPHORESIS INTERP: NORMAL
WBC # BLD: 6.6 K/UL (ref 4–11)

## 2019-07-24 PROCEDURE — 93306 TTE W/DOPPLER COMPLETE: CPT

## 2019-07-24 PROCEDURE — 6360000002 HC RX W HCPCS: Performed by: INTERNAL MEDICINE

## 2019-07-24 PROCEDURE — 93005 ELECTROCARDIOGRAM TRACING: CPT | Performed by: INTERNAL MEDICINE

## 2019-07-24 PROCEDURE — 02HV33Z INSERTION OF INFUSION DEVICE INTO SUPERIOR VENA CAVA, PERCUTANEOUS APPROACH: ICD-10-PCS | Performed by: INTERNAL MEDICINE

## 2019-07-24 PROCEDURE — 85730 THROMBOPLASTIN TIME PARTIAL: CPT

## 2019-07-24 PROCEDURE — 85025 COMPLETE CBC W/AUTO DIFF WBC: CPT

## 2019-07-24 PROCEDURE — 6360000002 HC RX W HCPCS

## 2019-07-24 PROCEDURE — 85520 HEPARIN ASSAY: CPT

## 2019-07-24 PROCEDURE — 2580000003 HC RX 258: Performed by: INTERNAL MEDICINE

## 2019-07-24 PROCEDURE — 2700000000 HC OXYGEN THERAPY PER DAY

## 2019-07-24 PROCEDURE — 2000000000 HC ICU R&B

## 2019-07-24 PROCEDURE — 71045 X-RAY EXAM CHEST 1 VIEW: CPT

## 2019-07-24 PROCEDURE — 93010 ELECTROCARDIOGRAM REPORT: CPT | Performed by: INTERNAL MEDICINE

## 2019-07-24 PROCEDURE — 99223 1ST HOSP IP/OBS HIGH 75: CPT | Performed by: INTERNAL MEDICINE

## 2019-07-24 PROCEDURE — 51702 INSERT TEMP BLADDER CATH: CPT

## 2019-07-24 PROCEDURE — 36556 INSERT NON-TUNNEL CV CATH: CPT

## 2019-07-24 PROCEDURE — 85610 PROTHROMBIN TIME: CPT

## 2019-07-24 PROCEDURE — 2500000003 HC RX 250 WO HCPCS: Performed by: INTERNAL MEDICINE

## 2019-07-24 PROCEDURE — 94640 AIRWAY INHALATION TREATMENT: CPT

## 2019-07-24 PROCEDURE — 84484 ASSAY OF TROPONIN QUANT: CPT

## 2019-07-24 PROCEDURE — 6370000000 HC RX 637 (ALT 250 FOR IP): Performed by: INTERNAL MEDICINE

## 2019-07-24 PROCEDURE — 80069 RENAL FUNCTION PANEL: CPT

## 2019-07-24 PROCEDURE — 99291 CRITICAL CARE FIRST HOUR: CPT | Performed by: INTERNAL MEDICINE

## 2019-07-24 RX ORDER — HEPARIN SODIUM 10000 [USP'U]/100ML
8 INJECTION, SOLUTION INTRAVENOUS CONTINUOUS
Status: DISCONTINUED | OUTPATIENT
Start: 2019-07-24 | End: 2019-07-25

## 2019-07-24 RX ORDER — HEPARIN SODIUM 5000 [USP'U]/ML
30 INJECTION, SOLUTION INTRAVENOUS; SUBCUTANEOUS PRN
Status: DISCONTINUED | OUTPATIENT
Start: 2019-07-24 | End: 2019-07-25

## 2019-07-24 RX ORDER — ASPIRIN 300 MG/1
300 SUPPOSITORY RECTAL EVERY 6 HOURS PRN
Status: DISCONTINUED | OUTPATIENT
Start: 2019-07-24 | End: 2019-08-20 | Stop reason: HOSPADM

## 2019-07-24 RX ORDER — DIMETHICONE, CAMPHOR (SYNTHETIC), MENTHOL, AND PHENOL 1.1; .5; .625; .5 G/100G; G/100G; G/100G; G/100G
OINTMENT TOPICAL PRN
Status: DISCONTINUED | OUTPATIENT
Start: 2019-07-24 | End: 2019-08-20 | Stop reason: HOSPADM

## 2019-07-24 RX ORDER — MIDAZOLAM HYDROCHLORIDE 1 MG/ML
INJECTION INTRAMUSCULAR; INTRAVENOUS
Status: COMPLETED
Start: 2019-07-24 | End: 2019-07-24

## 2019-07-24 RX ORDER — HEPARIN SODIUM 5000 [USP'U]/ML
60 INJECTION, SOLUTION INTRAVENOUS; SUBCUTANEOUS ONCE
Status: COMPLETED | OUTPATIENT
Start: 2019-07-24 | End: 2019-07-24

## 2019-07-24 RX ORDER — DEXTROSE AND SODIUM CHLORIDE 5; .9 G/100ML; G/100ML
INJECTION, SOLUTION INTRAVENOUS CONTINUOUS
Status: DISCONTINUED | OUTPATIENT
Start: 2019-07-24 | End: 2019-07-25

## 2019-07-24 RX ORDER — HEPARIN SODIUM 5000 [USP'U]/ML
60 INJECTION, SOLUTION INTRAVENOUS; SUBCUTANEOUS PRN
Status: DISCONTINUED | OUTPATIENT
Start: 2019-07-24 | End: 2019-07-25

## 2019-07-24 RX ORDER — ASPIRIN 81 MG/1
81 TABLET, CHEWABLE ORAL DAILY
Status: DISCONTINUED | OUTPATIENT
Start: 2019-07-24 | End: 2019-08-20 | Stop reason: HOSPADM

## 2019-07-24 RX ORDER — SODIUM CHLORIDE 9 MG/ML
INJECTION, SOLUTION INTRAVENOUS
Status: DISPENSED
Start: 2019-07-24 | End: 2019-07-24

## 2019-07-24 RX ADMIN — Medication 7.5 G: at 11:38

## 2019-07-24 RX ADMIN — DEXTROSE AND SODIUM CHLORIDE: 5; 900 INJECTION, SOLUTION INTRAVENOUS at 03:22

## 2019-07-24 RX ADMIN — DEXMEDETOMIDINE 0.8 MCG/KG/HR: 100 INJECTION, SOLUTION, CONCENTRATE INTRAVENOUS at 14:34

## 2019-07-24 RX ADMIN — MIDAZOLAM 2 MG: 1 INJECTION INTRAMUSCULAR; INTRAVENOUS at 10:32

## 2019-07-24 RX ADMIN — HEPARIN SODIUM 3700 UNITS: 5000 INJECTION INTRAVENOUS; SUBCUTANEOUS at 11:43

## 2019-07-24 RX ADMIN — THIAMINE HYDROCHLORIDE 200 MG: 100 INJECTION, SOLUTION INTRAMUSCULAR; INTRAVENOUS at 19:51

## 2019-07-24 RX ADMIN — HEPARIN SODIUM 5000 UNITS: 5000 INJECTION INTRAVENOUS; SUBCUTANEOUS at 05:26

## 2019-07-24 RX ADMIN — DEXMEDETOMIDINE 0.8 MCG/KG/HR: 100 INJECTION, SOLUTION, CONCENTRATE INTRAVENOUS at 23:15

## 2019-07-24 RX ADMIN — LEVALBUTEROL HYDROCHLORIDE 1.25 MG: 1.25 SOLUTION, CONCENTRATE RESPIRATORY (INHALATION) at 22:51

## 2019-07-24 RX ADMIN — LORAZEPAM 2 MG: 2 INJECTION INTRAMUSCULAR; INTRAVENOUS at 21:12

## 2019-07-24 RX ADMIN — LORAZEPAM 2 MG: 2 INJECTION INTRAMUSCULAR; INTRAVENOUS at 14:30

## 2019-07-24 RX ADMIN — HEPARIN SODIUM AND DEXTROSE 12 UNITS/KG/HR: 10000; 5 INJECTION INTRAVENOUS at 11:45

## 2019-07-24 RX ADMIN — ASPIRIN 300 MG: 300 SUPPOSITORY RECTAL at 11:38

## 2019-07-24 RX ADMIN — LORAZEPAM 2 MG: 2 INJECTION INTRAMUSCULAR; INTRAVENOUS at 21:45

## 2019-07-24 RX ADMIN — LEVALBUTEROL HYDROCHLORIDE 1.25 MG: 1.25 SOLUTION, CONCENTRATE RESPIRATORY (INHALATION) at 11:49

## 2019-07-24 ASSESSMENT — PAIN SCALES - GENERAL
PAINLEVEL_OUTOF10: 0

## 2019-07-24 ASSESSMENT — PAIN SCALES - WONG BAKER
WONGBAKER_NUMERICALRESPONSE: 0

## 2019-07-24 ASSESSMENT — ENCOUNTER SYMPTOMS
ABDOMINAL DISTENTION: 0
ABDOMINAL PAIN: 0
COUGH: 0
NAUSEA: 0
VOMITING: 0
SORE THROAT: 0
VOICE CHANGE: 1
WHEEZING: 0
CHEST TIGHTNESS: 0
SHORTNESS OF BREATH: 0
EYE PAIN: 0
TROUBLE SWALLOWING: 0
COLOR CHANGE: 0
DIARRHEA: 0
RHINORRHEA: 0

## 2019-07-24 ASSESSMENT — PAIN DESCRIPTION - PROGRESSION: CLINICAL_PROGRESSION: NOT CHANGED

## 2019-07-24 NOTE — CONSULTS
g, Intravenous, PRN, Jon Ramsey MD, 12.5 g at 07/23/19 7845    glucagon (rDNA) injection 1 mg, 1 mg, Intramuscular, PRN, Jon Ramsey MD    dextrose 5 % solution, 100 mL/hr, Intravenous, PRN, Jon Ramsey MD    ondansetron [facility]) injection 4 mg, 4 mg, Intravenous, Q30 Min PRN, Nury Garcia DO, 4 mg at 07/22/19 2224    Issues:  70 y.o. admitted with ARF, fatigue. Has had EtOH withdrawal and period of hypotension. Tn have elevated though no obvious ischemic abnl on ecg. Issues:  -Troponin elevation: suspect demand ischemia and likely underlying CAD  -SOB  -PVC  -Tob abuse  -Hyperlipidemia  -ARF  -EtOH abuse and withdrawal    Recs:  -Get echo  -Trend Trop to peak. Trop elevation may be demand but pt should get ischemic workup. Given hallucinations/etoh withdrawal and ARF, this will not be today.  -If EF is normal, would consider a lexiscan. If EF depressed or if there are wall motion abnl, would rec cath. -ASA 81 mg daily if ok with primary team.  -Check BNP  -Cont to f/u on icu/IM/nephrology recs. -Heparin gtt is reasonable for 48 hours but if there is concern for bleed or fall, I'd hold it. Sarahy Davidson MD, [facility]

## 2019-07-24 NOTE — PROGRESS NOTES
Axillary 112 20 95 % --   07/24/19 0335 -- -- -- -- -- -- 136 lb 11 oz (62 kg)   07/24/19 0300 134/66 -- -- 120 24 94 % --   07/24/19 0200 123/72 -- -- 112 18 97 % --       I/O last 3 completed shifts: In: 3416.5 [I.V.:3416.5]  Out: 2410 [Urine:2410]      GENERAL:  Cachectic gentleman, restrained, , not in any acute  distress. Ita Maki HEENT:  Head is normocephalic and atraumatic. Conjunctivae, no icterus. CARDIAC:  He is tachycardiac, but no rub, murmur, or gallop. NECK:  JVD while lying down not raised. CHEST:  Clear to auscultation. ABDOMEN:  Soft, nontender. Bowel sounds present. EXTREMITIES:  Bilateral lower extremities, he has no edema. NEUROLOGIC:  He is sedated, unarousable. Did not even try because he  got Ativan and restrained.        .l  Lab Results   Component Value Date    CREATININE 2.1 (H) 07/24/2019    BUN 61 (H) 07/24/2019     07/24/2019    K 4.5 07/24/2019     (H) 07/24/2019    CO2 21 07/24/2019     Lab Results   Component Value Date    WBC 6.6 07/24/2019    HGB 9.1 (L) 07/24/2019    HCT 27.2 (L) 07/24/2019    MCV 95.7 07/24/2019     07/24/2019            AGLUCOSE)Magnesium:  No results found for: MG  Phosphorus:    Lab Results   Component Value Date    PHOS 3.0 07/24/2019       Uric Acid:  No components found for: URIC    Active Problems:    Acute kidney failure (HCC)  Resolved Problems:    * No resolved hospital problems.  *

## 2019-07-24 NOTE — PLAN OF CARE
Problem: Pain:  Goal: Pain level will decrease  Description  Pain level will decrease  7/23/2019 2225 by Sol Iglesias RN  Outcome: Ongoing  7/23/2019 0933 by Pete Woodard RN  Outcome: Ongoing  Note:   No complaints of pain thus far this shift. Patient aware of diclofenac order. Declines it at this time. Problem: Pain:  Goal: Control of acute pain  Description  Control of acute pain  7/23/2019 2225 by Sol Iglesias RN  Outcome: Ongoing  7/23/2019 0933 by Pete Woodard RN  Outcome: Ongoing     Problem: Pain:  Goal: Control of chronic pain  Description  Control of chronic pain  7/23/2019 2225 by Sol Iglesias RN  Outcome: Ongoing  7/23/2019 0933 by Pete Woodard RN  Outcome: Ongoing     Problem: Falls - Risk of:  Goal: Will remain free from falls  Description  Will remain free from falls  7/23/2019 2225 by Sol Iglesias RN  Outcome: Ongoing  7/23/2019 0933 by Pete Woodard RN  Outcome: Ongoing  Note:   No falls thus far on this shift. Bed locked and in low position. Call light within reach. Pt encouraged to call out to voice any needs. Bedside table in reach. Bed alarm armed.       Problem: Falls - Risk of:  Goal: Absence of physical injury  Description  Absence of physical injury  7/23/2019 2225 by Sol Iglesias RN  Outcome: Ongoing  7/23/2019 0933 by Pete Woodard RN  Outcome: Ongoing     Problem: Restraint Use - Nonviolent/Non-Self-Destructive Behavior:  Goal: Absence of restraint indications  Description  Absence of restraint indications  Outcome: Ongoing     Problem: Restraint Use - Nonviolent/Non-Self-Destructive Behavior:  Goal: Absence of restraint-related injury  Description  Absence of restraint-related injury  Outcome: Ongoing     Problem: Nutrition  Goal: Optimal nutrition therapy  Description  Nutrition Problem: Inadequate oral intake  Intervention: Food and/or Nutrient Delivery: Continue NPO(for now)  Nutritional Goals: Pt will tolerate most appropriate form of nutrition therapy when

## 2019-07-24 NOTE — CONSULTS
COPD unclear. -Monitor respiratory status per protocol    Genitourinary/Fluid/Electrolytes: Pre-renal AVIVA likely 2/2 hypovoluemia. Cr 6.8 on admission. Improved to 2.1 with fluids. Hx decreased PO intake. Ca 13. 1 on admission. K 5.9 on admission.    -Con't NSD5W 100 ml/hr  -Ca improved with fluids  -Insulin and CaGulconate given in ED for hyperkalemia  -Con't fluid support and monitor electrolytes per protocol  -Renal following    Infectious disease: WBC 6.6 (trending down). Pt remains afebrile. Hypotension responding to fluids.     -Blood cultures pending    GI/Nutrition/Feeding: AMS. NPO for now. Heme/Onc: Hx of lung nodules and r vocal cord lesion. Hx of 14 lbs weight loss w/ decreased appetite in past month. Ca 13.1 improved with fluids likely 2/2 to hypovolemia. Endocrine: Glucose 60 likely 2/2 insulin treatment for hyperkalemia. No hx of diabetes or hypoglycemia.     -last glucose 80  -q4h glucose checks    DVT prophylaxis: SCD, Heparin gtt,               Code Status:Full Code  FEN: NSD5W  PPX:  SCD and heparin gtt  DISPO: ICU    This patient has been staffed and discussed with Irene Ratliff MD.  -----------------------------  Shola Azar MD, PGY-1  7/24/2019  8:49 AM   Patient examined, findings as discussed with Dr. Alyx Acevedo. Agree with assessment and plan as above. Agitation consistent with alcohol withdrawal primarily. Given heavy narcotic usage prior to hospitalization, narcotic replacement is indicated. AVIVA resolving with continued IV fluids.   Rising troponin consistent with demand, no indication of acute coronary ischemia or decompensated cardiac function  Time spent in critical care 45 minutes

## 2019-07-24 NOTE — ED PROVIDER NOTES
NEPHROLOGY  IP CONSULT TO HOSPITALIST    MEDICAL DECISION MAKING / ASSESSMENT / Becca Red is admitted to the Emergency Department for evaluation of his chief complaint as described in the history of present illness. Complete history and physical was performed by me and my attending. Nursing notes, past medical history, surgical history, family history and social history were reviewed and addressed in the HPI. Jessi Daigle is a 70 y.o. male who presents to the emergency department with a complaint of weakness, fatigue, nausea and vomiting. The patient has been experiencing just a couple of days of acute nausea vomiting with significantly increased fatigue and weakness. However, he reports that over the past couple of months he has been experiencing fatigue, generalized weakness, increasing worsening hoarse voice and sore throat which is been investigated by ENT, decreased active activity and decreased appetite. On presentation to the emergency department, he is cachectic appearing, and although he is answering questions appropriately, has a very odd, inappropriate affect. He is tachycardic, normotensive and afebrile. Initial oxygen saturations are 88% on room air, so the patient was placed on 2 L of oxygen. He does not demonstrate any air hunger or severe dyspnea. Physical examination demonstrates no focal areas of pain. He is generally weak. CBC demonstrates no leukocytosis with a very mild anemia. There is neutrophilic dominance. VBG demonstrates a lactate of 1.47 with respiratory alkalosis. His initial BMP is concerning demonstrating hypochloremia, hypercalcemia with potassium of 5.9, and anion gap of 22 and a BUN of 104 with creatinine of 6.8. This results in a GFR of 8. He does not have a history of renal dysfunction or failure. The patient also demonstrates hypercalcemia. Adjusted for his albumin, but the corrected calcium is 12.5.   Liver function test demonstrates slightly elevated protein level, though otherwise unremarkable. His urinalysis demonstrates uric acid crystals but no other significant abnormalities. The patient reports that he is produced minimal urine over the past couple of days. IV fluid bolus was administered. The patient's case was discussed with nephrology who agrees with the plan for IV fluid administration with continued monitoring of his electrolyte levels and kidney function. They do not feel that he requires emergent dialysis at this time, but will follow along during admission. In management of patient's hyperkalemia, the patient was administered sodium bicarbonate, calcium gluconate, insulin and D50. A repeat BMP is pending. Given his newly elevated calcium levels, chest x-ray and CT scan without contrast of the abdomen was performed to look for potential neoplastic sources. No significant abnormalities were noted. 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 This will continue to be followed during admission to the hospital.  Given the patient's profound AVIVA, hyperkalemia and hypercalcemia, the patient be admitted to the hospital service for further evaluation and management. I discussed this plan at length the patient who verbalizes understanding and is in agreement. The patient was seen and evaluated by myself and the physician assistant student, as well as the attending physician, Bre Lei MD, who agrees with my assessment, treatment and plan. Clinical Impression     1. AVIVA (acute kidney injury) ([address]    2. Hyperkalemia    3.  Hypercalcemia        Disposition     DISPOSITION Admitted 07/23/2019 02:05:28 AM       SHIRA Park  07/24/19 0157

## 2019-07-25 ENCOUNTER — APPOINTMENT (OUTPATIENT)
Dept: GENERAL RADIOLOGY | Age: 69
DRG: 682 | End: 2019-07-25
Payer: MEDICARE

## 2019-07-25 LAB
ALBUMIN SERPL-MCNC: 3 G/DL (ref 3.4–5)
ALBUMIN SERPL-MCNC: 3.1 G/DL (ref 3.4–5)
ALBUMIN SERPL-MCNC: 3.3 G/DL (ref 3.4–5)
ALBUMIN SERPL-MCNC: 3.4 G/DL (ref 3.4–5)
ANION GAP SERPL CALCULATED.3IONS-SCNC: 11 MMOL/L (ref 3–16)
ANION GAP SERPL CALCULATED.3IONS-SCNC: 12 MMOL/L (ref 3–16)
ANION GAP SERPL CALCULATED.3IONS-SCNC: 12 MMOL/L (ref 3–16)
ANION GAP SERPL CALCULATED.3IONS-SCNC: 13 MMOL/L (ref 3–16)
ANION GAP SERPL CALCULATED.3IONS-SCNC: 14 MMOL/L (ref 3–16)
ANION GAP SERPL CALCULATED.3IONS-SCNC: 16 MMOL/L (ref 3–16)
ANTI-XA UNFRAC HEPARIN: 0.39 IU/ML (ref 0.3–0.7)
ANTI-XA UNFRAC HEPARIN: 0.59 IU/ML (ref 0.3–0.7)
BASE EXCESS ARTERIAL: -1.4 MMOL/L (ref -3–3)
BASOPHILS ABSOLUTE: 0 K/UL (ref 0–0.2)
BASOPHILS RELATIVE PERCENT: 0.2 %
BUN BLDV-MCNC: 31 MG/DL (ref 7–20)
BUN BLDV-MCNC: 33 MG/DL (ref 7–20)
BUN BLDV-MCNC: 33 MG/DL (ref 7–20)
BUN BLDV-MCNC: 36 MG/DL (ref 7–20)
BUN BLDV-MCNC: 37 MG/DL (ref 7–20)
BUN BLDV-MCNC: 40 MG/DL (ref 7–20)
CALCIUM SERPL-MCNC: 9 MG/DL (ref 8.3–10.6)
CALCIUM SERPL-MCNC: 9.1 MG/DL (ref 8.3–10.6)
CALCIUM SERPL-MCNC: 9.2 MG/DL (ref 8.3–10.6)
CALCIUM SERPL-MCNC: 9.2 MG/DL (ref 8.3–10.6)
CALCIUM SERPL-MCNC: 9.3 MG/DL (ref 8.3–10.6)
CALCIUM SERPL-MCNC: 9.3 MG/DL (ref 8.3–10.6)
CARBOXYHEMOGLOBIN ARTERIAL: 1 % (ref 0–1.5)
CHLORIDE BLD-SCNC: 114 MMOL/L (ref 99–110)
CHLORIDE BLD-SCNC: 115 MMOL/L (ref 99–110)
CHLORIDE BLD-SCNC: 115 MMOL/L (ref 99–110)
CHLORIDE BLD-SCNC: 116 MMOL/L (ref 99–110)
CHLORIDE BLD-SCNC: 117 MMOL/L (ref 99–110)
CHLORIDE BLD-SCNC: 117 MMOL/L (ref 99–110)
CO2: 20 MMOL/L (ref 21–32)
CO2: 22 MMOL/L (ref 21–32)
CO2: 23 MMOL/L (ref 21–32)
CREAT SERPL-MCNC: 1.1 MG/DL (ref 0.8–1.3)
CREAT SERPL-MCNC: 1.1 MG/DL (ref 0.8–1.3)
CREAT SERPL-MCNC: 1.2 MG/DL (ref 0.8–1.3)
CREAT SERPL-MCNC: 1.3 MG/DL (ref 0.8–1.3)
EOSINOPHILS ABSOLUTE: 0 K/UL (ref 0–0.6)
EOSINOPHILS RELATIVE PERCENT: 0 %
GFR AFRICAN AMERICAN: >60
GFR NON-AFRICAN AMERICAN: 55
GFR NON-AFRICAN AMERICAN: 60
GFR NON-AFRICAN AMERICAN: >60
GFR NON-AFRICAN AMERICAN: >60
GLUCOSE BLD-MCNC: 141 MG/DL (ref 70–99)
GLUCOSE BLD-MCNC: 143 MG/DL (ref 70–99)
GLUCOSE BLD-MCNC: 149 MG/DL (ref 70–99)
GLUCOSE BLD-MCNC: 153 MG/DL (ref 70–99)
GLUCOSE BLD-MCNC: 153 MG/DL (ref 70–99)
GLUCOSE BLD-MCNC: 159 MG/DL (ref 70–99)
GLUCOSE BLD-MCNC: 161 MG/DL (ref 70–99)
GLUCOSE BLD-MCNC: 163 MG/DL (ref 70–99)
GLUCOSE BLD-MCNC: 172 MG/DL (ref 70–99)
GLUCOSE BLD-MCNC: 174 MG/DL (ref 70–99)
GLUCOSE BLD-MCNC: 208 MG/DL (ref 70–99)
HCO3 ARTERIAL: 21 MMOL/L (ref 21–29)
HCT VFR BLD CALC: 26.8 % (ref 40.5–52.5)
HEMOGLOBIN, ART, EXTENDED: 9.6 G/DL
HEMOGLOBIN: 9.1 G/DL (ref 13.5–17.5)
LYMPHOCYTES ABSOLUTE: 0.4 K/UL (ref 1–5.1)
LYMPHOCYTES RELATIVE PERCENT: 5.5 %
MAGNESIUM: 1.8 MG/DL (ref 1.8–2.4)
MCH RBC QN AUTO: 32.4 PG (ref 26–34)
MCHC RBC AUTO-ENTMCNC: 33.8 G/DL (ref 31–36)
MCV RBC AUTO: 95.9 FL (ref 80–100)
METHEMOGLOBIN ARTERIAL: 0.5 % (ref 0–1.4)
MONOCYTES ABSOLUTE: 0.8 K/UL (ref 0–1.3)
MONOCYTES RELATIVE PERCENT: 11.4 %
NEUTROPHILS ABSOLUTE: 6.1 K/UL (ref 1.7–7.7)
NEUTROPHILS RELATIVE PERCENT: 82.9 %
O2 SAT, ARTERIAL: 88 % (ref 93–100)
PCO2 ARTERIAL: 27.3 MMHG (ref 35–45)
PDW BLD-RTO: 14 % (ref 12.4–15.4)
PERFORMED ON: ABNORMAL
PH ARTERIAL: 7.49 (ref 7.35–7.45)
PHOSPHORUS: 1.6 MG/DL (ref 2.5–4.9)
PHOSPHORUS: 1.9 MG/DL (ref 2.5–4.9)
PHOSPHORUS: 1.9 MG/DL (ref 2.5–4.9)
PHOSPHORUS: 2.3 MG/DL (ref 2.5–4.9)
PHOSPHORUS: 3.2 MG/DL (ref 2.5–4.9)
PHOSPHORUS: 3.9 MG/DL (ref 2.5–4.9)
PLATELET # BLD: 176 K/UL (ref 135–450)
PMV BLD AUTO: 7.7 FL (ref 5–10.5)
PO2 ARTERIAL: 49.1 MMHG (ref 75–108)
POTASSIUM SERPL-SCNC: 3.3 MMOL/L (ref 3.5–5.1)
POTASSIUM SERPL-SCNC: 3.7 MMOL/L (ref 3.5–5.1)
POTASSIUM SERPL-SCNC: 3.7 MMOL/L (ref 3.5–5.1)
POTASSIUM SERPL-SCNC: 3.8 MMOL/L (ref 3.5–5.1)
POTASSIUM SERPL-SCNC: 3.8 MMOL/L (ref 3.5–5.1)
POTASSIUM SERPL-SCNC: 4.2 MMOL/L (ref 3.5–5.1)
PRO-BNP: ABNORMAL PG/ML (ref 0–124)
RBC # BLD: 2.79 M/UL (ref 4.2–5.9)
SODIUM BLD-SCNC: 149 MMOL/L (ref 136–145)
SODIUM BLD-SCNC: 149 MMOL/L (ref 136–145)
SODIUM BLD-SCNC: 151 MMOL/L (ref 136–145)
SODIUM BLD-SCNC: 152 MMOL/L (ref 136–145)
SODIUM BLD-SCNC: 152 MMOL/L (ref 136–145)
SODIUM BLD-SCNC: 153 MMOL/L (ref 136–145)
TCO2 ARTERIAL: 22 MMOL/L
TROPONIN: 0.83 NG/ML
TROPONIN: 0.93 NG/ML
WBC # BLD: 7.4 K/UL (ref 4–11)

## 2019-07-25 PROCEDURE — 87449 NOS EACH ORGANISM AG IA: CPT

## 2019-07-25 PROCEDURE — 2500000003 HC RX 250 WO HCPCS: Performed by: STUDENT IN AN ORGANIZED HEALTH CARE EDUCATION/TRAINING PROGRAM

## 2019-07-25 PROCEDURE — 36415 COLL VENOUS BLD VENIPUNCTURE: CPT

## 2019-07-25 PROCEDURE — 6360000002 HC RX W HCPCS: Performed by: INTERNAL MEDICINE

## 2019-07-25 PROCEDURE — 82803 BLOOD GASES ANY COMBINATION: CPT

## 2019-07-25 PROCEDURE — 83880 ASSAY OF NATRIURETIC PEPTIDE: CPT

## 2019-07-25 PROCEDURE — 99233 SBSQ HOSP IP/OBS HIGH 50: CPT | Performed by: INTERNAL MEDICINE

## 2019-07-25 PROCEDURE — 85520 HEPARIN ASSAY: CPT

## 2019-07-25 PROCEDURE — 74018 RADEX ABDOMEN 1 VIEW: CPT

## 2019-07-25 PROCEDURE — 6370000000 HC RX 637 (ALT 250 FOR IP): Performed by: INTERNAL MEDICINE

## 2019-07-25 PROCEDURE — 2500000003 HC RX 250 WO HCPCS: Performed by: INTERNAL MEDICINE

## 2019-07-25 PROCEDURE — 85025 COMPLETE CBC W/AUTO DIFF WBC: CPT

## 2019-07-25 PROCEDURE — 36592 COLLECT BLOOD FROM PICC: CPT

## 2019-07-25 PROCEDURE — 2000000000 HC ICU R&B

## 2019-07-25 PROCEDURE — 94640 AIRWAY INHALATION TREATMENT: CPT

## 2019-07-25 PROCEDURE — 2580000003 HC RX 258

## 2019-07-25 PROCEDURE — 80069 RENAL FUNCTION PANEL: CPT

## 2019-07-25 PROCEDURE — 84484 ASSAY OF TROPONIN QUANT: CPT

## 2019-07-25 PROCEDURE — 6360000002 HC RX W HCPCS: Performed by: STUDENT IN AN ORGANIZED HEALTH CARE EDUCATION/TRAINING PROGRAM

## 2019-07-25 PROCEDURE — 83735 ASSAY OF MAGNESIUM: CPT

## 2019-07-25 PROCEDURE — 2580000003 HC RX 258: Performed by: INTERNAL MEDICINE

## 2019-07-25 PROCEDURE — 31720 CLEARANCE OF AIRWAYS: CPT

## 2019-07-25 PROCEDURE — 2580000003 HC RX 258: Performed by: STUDENT IN AN ORGANIZED HEALTH CARE EDUCATION/TRAINING PROGRAM

## 2019-07-25 PROCEDURE — 71045 X-RAY EXAM CHEST 1 VIEW: CPT

## 2019-07-25 PROCEDURE — 99233 SBSQ HOSP IP/OBS HIGH 50: CPT | Performed by: NURSE PRACTITIONER

## 2019-07-25 RX ORDER — SODIUM CHLORIDE 9 MG/ML
INJECTION, SOLUTION INTRAVENOUS
Status: COMPLETED
Start: 2019-07-25 | End: 2019-07-25

## 2019-07-25 RX ORDER — DEXTROSE AND SODIUM CHLORIDE 5; .2 G/100ML; G/100ML
INJECTION, SOLUTION INTRAVENOUS CONTINUOUS
Status: DISCONTINUED | OUTPATIENT
Start: 2019-07-25 | End: 2019-07-25

## 2019-07-25 RX ORDER — POTASSIUM CHLORIDE 29.8 MG/ML
20 INJECTION INTRAVENOUS
Status: COMPLETED | OUTPATIENT
Start: 2019-07-25 | End: 2019-07-25

## 2019-07-25 RX ORDER — LEVALBUTEROL 1.25 MG/.5ML
1.25 SOLUTION, CONCENTRATE RESPIRATORY (INHALATION)
Status: DISCONTINUED | OUTPATIENT
Start: 2019-07-25 | End: 2019-07-30

## 2019-07-25 RX ORDER — IPRATROPIUM BROMIDE AND ALBUTEROL SULFATE 2.5; .5 MG/3ML; MG/3ML
1 SOLUTION RESPIRATORY (INHALATION) EVERY 4 HOURS
Status: DISCONTINUED | OUTPATIENT
Start: 2019-07-25 | End: 2019-07-25

## 2019-07-25 RX ORDER — FUROSEMIDE 10 MG/ML
20 INJECTION INTRAMUSCULAR; INTRAVENOUS ONCE
Status: COMPLETED | OUTPATIENT
Start: 2019-07-25 | End: 2019-07-25

## 2019-07-25 RX ORDER — MORPHINE SULFATE 15 MG/1
15 TABLET ORAL 2 TIMES DAILY
Status: DISCONTINUED | OUTPATIENT
Start: 2019-07-25 | End: 2019-07-31

## 2019-07-25 RX ADMIN — POTASSIUM PHOSPHATE, MONOBASIC AND POTASSIUM PHOSPHATE, DIBASIC 30 MMOL: 224; 236 INJECTION, SOLUTION, CONCENTRATE INTRAVENOUS at 02:02

## 2019-07-25 RX ADMIN — LEVALBUTEROL HYDROCHLORIDE 1.25 MG: 1.25 SOLUTION, CONCENTRATE RESPIRATORY (INHALATION) at 17:00

## 2019-07-25 RX ADMIN — LORAZEPAM 2 MG: 2 INJECTION INTRAMUSCULAR; INTRAVENOUS at 11:48

## 2019-07-25 RX ADMIN — LORAZEPAM 2 MG: 2 INJECTION INTRAMUSCULAR; INTRAVENOUS at 17:31

## 2019-07-25 RX ADMIN — LORAZEPAM 4 MG: 2 INJECTION INTRAMUSCULAR; INTRAVENOUS at 06:29

## 2019-07-25 RX ADMIN — LEVALBUTEROL HYDROCHLORIDE 1.25 MG: 1.25 SOLUTION, CONCENTRATE RESPIRATORY (INHALATION) at 22:09

## 2019-07-25 RX ADMIN — SODIUM CHLORIDE 250 ML: 9 INJECTION, SOLUTION INTRAVENOUS at 22:44

## 2019-07-25 RX ADMIN — POTASSIUM PHOSPHATE, MONOBASIC AND POTASSIUM PHOSPHATE, DIBASIC 20 MMOL: 224; 236 INJECTION, SOLUTION, CONCENTRATE INTRAVENOUS at 23:05

## 2019-07-25 RX ADMIN — LEVALBUTEROL HYDROCHLORIDE 1.25 MG: 1.25 SOLUTION, CONCENTRATE RESPIRATORY (INHALATION) at 12:45

## 2019-07-25 RX ADMIN — MORPHINE SULFATE 15 MG: 15 TABLET ORAL at 15:05

## 2019-07-25 RX ADMIN — THIAMINE HYDROCHLORIDE 200 MG: 100 INJECTION, SOLUTION INTRAMUSCULAR; INTRAVENOUS at 19:55

## 2019-07-25 RX ADMIN — POTASSIUM CHLORIDE 20 MEQ: 29.8 INJECTION, SOLUTION INTRAVENOUS at 23:44

## 2019-07-25 RX ADMIN — DEXMEDETOMIDINE 0.9 MCG/KG/HR: 100 INJECTION, SOLUTION, CONCENTRATE INTRAVENOUS at 08:23

## 2019-07-25 RX ADMIN — POTASSIUM CHLORIDE 20 MEQ: 29.8 INJECTION, SOLUTION INTRAVENOUS at 22:44

## 2019-07-25 RX ADMIN — LORAZEPAM 2 MG: 2 INJECTION INTRAMUSCULAR; INTRAVENOUS at 05:02

## 2019-07-25 RX ADMIN — DEXTROSE AND SODIUM CHLORIDE: 5; 200 INJECTION, SOLUTION INTRAVENOUS at 13:23

## 2019-07-25 RX ADMIN — DEXMEDETOMIDINE 0.9 MCG/KG/HR: 100 INJECTION, SOLUTION, CONCENTRATE INTRAVENOUS at 17:11

## 2019-07-25 RX ADMIN — LEVALBUTEROL HYDROCHLORIDE 1.25 MG: 1.25 SOLUTION, CONCENTRATE RESPIRATORY (INHALATION) at 08:43

## 2019-07-25 RX ADMIN — LORAZEPAM 2 MG: 2 INJECTION INTRAMUSCULAR; INTRAVENOUS at 03:20

## 2019-07-25 RX ADMIN — LORAZEPAM 4 MG: 2 INJECTION INTRAMUSCULAR; INTRAVENOUS at 20:57

## 2019-07-25 RX ADMIN — FUROSEMIDE 20 MG: 10 INJECTION, SOLUTION INTRAMUSCULAR; INTRAVENOUS at 17:43

## 2019-07-25 RX ADMIN — DEXMEDETOMIDINE 1 MCG/KG/HR: 100 INJECTION, SOLUTION, CONCENTRATE INTRAVENOUS at 23:58

## 2019-07-25 ASSESSMENT — PAIN SCALES - WONG BAKER
WONGBAKER_NUMERICALRESPONSE: 0

## 2019-07-25 ASSESSMENT — PAIN DESCRIPTION - PROGRESSION
CLINICAL_PROGRESSION: NOT CHANGED

## 2019-07-25 ASSESSMENT — PAIN SCALES - GENERAL
PAINLEVEL_OUTOF10: 0

## 2019-07-25 NOTE — PROGRESS NOTES
Admit Date: 7/22/2019    REASON FOR ADMISSION:    mental status changes, possible alcohol withdrawal.     REASON FOR CONSULTATION:     acute kidney injury with [id number], creatinine 6.8, potassium 5.9, calcium 13.1. Recent weight loss of 14 pounds. Baseline creatinine in 12/2018, creatinine was 1.2, and calcium was 10.1. INTERVAL HISTORY  Seen in  ICU sedated  Traumatic Hematuria due to resendiz which pt tried to pull   Still agitated   Creat 6.8-->2.1-->1.2  Urine 2290  Ca 13-->9      Acute kidney injury. Improving due to volume depletion  Peaked creatinine 6.8  baseline creatinine in 12/2018 1.2   urine  benign and concentrated    proteinuria  258 mg, 3 to 5 red blood   Urine sodium 29. in 05/2018 one time  creatinine  was 2.1   In 08/2015, creatinine ranged from 0.8 to 1.2. CT abdomen kidneys normal   CT in 2015 mention some bilateral mild to moderate cortical  thinning of the renal cortex, no mention in 07/2019 CT.  might also need a Resendiz catheter.      Hypercalcemia  Improved     due to volume depletion   calcium was 10.1 in 12/2018  His hypercalcemia could be due to excessive calcium intake because he  was drinking lot of milkshake as per history.     Hyperkalemia  Improved   Was 5.9 on admission     History of alcoholism   withdrawal  liver function tests are normal.   He does not have acute hepatitis     History of multiple lung nodules   on CT in 2015 with current weight loss little worry some. will need a CT scan of the chest to rule out any lung malignancy because long-term smoking and currentweight loss. CT of the abdomen already done, which does not show any acute  abnormality.     History of left adrenal adenoma   16 mm as seen on CT in 2015. CT this admission in 07/2019 did not mention that.     History of emphysema  as seen on previous CT of the chest in 2015.     History of abdominal aortic aneurysm   as seen on CT in 2015 was 3.4 cm.    This admission CT without contrast did not mention Conjunctivae, no icterus. CARDIAC:  He is tachycardiac, but no rub, murmur, or gallop. NECK:  JVD while lying down not raised. CHEST:  Clear to auscultation. ABDOMEN:  Soft, nontender. Bowel sounds present. EXTREMITIES:  Bilateral lower extremities, he has no edema. NEUROLOGIC:  He is sedated, unarousable. Did not even try because he  got Ativan and restrained.        .l  Lab Results   Component Value Date    CREATININE 1.2 07/25/2019    BUN 36 (H) 07/25/2019     (H) 07/25/2019    K 4.2 07/25/2019     (H) 07/25/2019    CO2 22 07/25/2019     Lab Results   Component Value Date    WBC 7.4 07/25/2019    HGB 9.1 (L) 07/25/2019    HCT 26.8 (L) 07/25/2019    MCV 95.9 07/25/2019     07/25/2019            AGLUCOSE)Magnesium:    Lab Results   Component Value Date    MG 1.80 07/25/2019     Phosphorus:    Lab Results   Component Value Date    PHOS 3.9 07/25/2019       Uric Acid:  No components found for: URIC    Active Problems:    Acute kidney failure (HCC)    Acute encephalopathy    Alcohol withdrawal syndrome, with delirium (HCC)    Narcotic dependence (HCC)    AVIVA (acute kidney injury) ([facility] [address]  Resolved Problems:    * No resolved hospital problems.  *

## 2019-07-25 NOTE — PROGRESS NOTES
Dr. Meche Rodas notified about phosphorus trending down, recent result 1.6. Dr. Meche Rodas also notified about pt's increase in PVC occurrence on the monitor. Reply to replace phosphorus, see MAR. Dr. Meche Rodas not concerned for the increase in PVCs.

## 2019-07-25 NOTE — PROGRESS NOTES
07/25/19 0300 (!) 136/98 -- -- 117 (!) 33 97 % -- --   07/25/19 0200 129/74 -- -- 95 30 98 % -- --   07/25/19 0130 (!) 145/70 -- -- 107 22 -- -- --       Intake/Output Summary (Last 24 hours) at 7/25/2019 0923  Last data filed at 7/25/2019 0600  Gross per 24 hour   Intake 5606 ml   Output 1365 ml   Net 4241 ml       Review of Systems. Unable to obtain. Physical Exam   Constitutional: He appears well-developed. He appears distressed. Malnourished. HENT:   Head: Normocephalic and atraumatic. Nose: Nose normal.   Eyes: Conjunctivae are normal. Right eye exhibits no discharge. Left eye exhibits no discharge. No scleral icterus. Neck: JVD present. Cardiovascular: Normal rate. Exam reveals no gallop and no friction rub. No murmur heard. More frequent PVCs. Heart sounds distant. Pulmonary/Chest: He is in respiratory distress. He has no wheezes. Increased rate. Expiratory rales. Abdominal: Soft. Bowel sounds are normal. He exhibits no distension. Neurological:   Pt lethargic and hard to arouse. Agitated. Not following commands. Skin: Skin is warm and dry. Scattered bruises.           LABS:    CBC:   Recent Labs     07/23/19  0633 07/23/19  0748 07/24/19  0401 07/25/19  0406   WBC 10.5  --  6.6 7.4   HGB 9.8* 9.3* 9.1* 9.1*   HCT 29.6* 27.6* 27.2* 26.8*     --  182 176   MCV 95.2  --  95.7 95.9     Renal:    Recent Labs     07/24/19  2024 07/25/19  0004 07/25/19  0406   * 149* 149*   K 4.1 3.7 3.7   * 114* 115*   CO2 22 23 23   BUN 43* 40* 37*   CREATININE 1.4* 1.3 1.3   GLUCOSE 134* 163* 149*   CALCIUM 9.4 9.2 9.3   MG  --  1.80  --    PHOS 2.3* 1.6* 1.9*   ANIONGAP 14 12 11     Hepatic:   Recent Labs     07/22/19  2148  07/23/19  1152  07/24/19 2024 07/25/19  0004 07/25/19  0406   AST 17  --   --   --   --   --   --    ALT 19  --   --   --   --   --   --    BILITOT <0.2  --   --   --   --   --   --    BILIDIR <0.2  --   --   --   --   --   --    PROT 8.5*  --  5.1*  --   --

## 2019-07-26 ENCOUNTER — APPOINTMENT (OUTPATIENT)
Dept: GENERAL RADIOLOGY | Age: 69
DRG: 682 | End: 2019-07-26
Payer: MEDICARE

## 2019-07-26 LAB
ALBUMIN SERPL-MCNC: 2.8 G/DL (ref 3.4–5)
ALBUMIN SERPL-MCNC: 2.8 G/DL (ref 3.4–5)
ALBUMIN SERPL-MCNC: 2.9 G/DL (ref 3.4–5)
ALBUMIN SERPL-MCNC: 3 G/DL (ref 3.4–5)
ALBUMIN SERPL-MCNC: 3 G/DL (ref 3.4–5)
ALBUMIN SERPL-MCNC: 3.2 G/DL (ref 3.4–5)
ANION GAP SERPL CALCULATED.3IONS-SCNC: 10 MMOL/L (ref 3–16)
ANION GAP SERPL CALCULATED.3IONS-SCNC: 11 MMOL/L (ref 3–16)
ANION GAP SERPL CALCULATED.3IONS-SCNC: 12 MMOL/L (ref 3–16)
ANION GAP SERPL CALCULATED.3IONS-SCNC: 13 MMOL/L (ref 3–16)
BASE EXCESS ARTERIAL: 4 (ref -3–3)
BASOPHILS ABSOLUTE: 0 K/UL (ref 0–0.2)
BASOPHILS RELATIVE PERCENT: 0.2 %
BUN BLDV-MCNC: 27 MG/DL (ref 7–20)
BUN BLDV-MCNC: 28 MG/DL (ref 7–20)
BUN BLDV-MCNC: 29 MG/DL (ref 7–20)
BUN BLDV-MCNC: 30 MG/DL (ref 7–20)
BUN BLDV-MCNC: 30 MG/DL (ref 7–20)
BUN BLDV-MCNC: 31 MG/DL (ref 7–20)
CALCIUM IONIZED: 1.27 MMOL/L (ref 1.12–1.32)
CALCIUM SERPL-MCNC: 8.6 MG/DL (ref 8.3–10.6)
CALCIUM SERPL-MCNC: 8.7 MG/DL (ref 8.3–10.6)
CALCIUM SERPL-MCNC: 8.8 MG/DL (ref 8.3–10.6)
CALCIUM SERPL-MCNC: 8.9 MG/DL (ref 8.3–10.6)
CALCIUM SERPL-MCNC: 9 MG/DL (ref 8.3–10.6)
CALCIUM SERPL-MCNC: 9.2 MG/DL (ref 8.3–10.6)
CHLORIDE BLD-SCNC: 114 MMOL/L (ref 99–110)
CHLORIDE BLD-SCNC: 115 MMOL/L (ref 99–110)
CHLORIDE BLD-SCNC: 117 MMOL/L (ref 99–110)
CHLORIDE BLD-SCNC: 118 MMOL/L (ref 99–110)
CO2: 23 MMOL/L (ref 21–32)
CO2: 24 MMOL/L (ref 21–32)
CO2: 25 MMOL/L (ref 21–32)
CREAT SERPL-MCNC: 1.1 MG/DL (ref 0.8–1.3)
CREAT SERPL-MCNC: 1.2 MG/DL (ref 0.8–1.3)
EOSINOPHILS ABSOLUTE: 0 K/UL (ref 0–0.6)
EOSINOPHILS RELATIVE PERCENT: 0 %
GFR AFRICAN AMERICAN: >60
GFR NON-AFRICAN AMERICAN: 60
GFR NON-AFRICAN AMERICAN: >60
GLUCOSE BLD-MCNC: 153 MG/DL (ref 70–99)
GLUCOSE BLD-MCNC: 172 MG/DL (ref 70–99)
GLUCOSE BLD-MCNC: 177 MG/DL (ref 70–99)
GLUCOSE BLD-MCNC: 187 MG/DL (ref 70–99)
GLUCOSE BLD-MCNC: 188 MG/DL (ref 70–99)
GLUCOSE BLD-MCNC: 196 MG/DL (ref 70–99)
GLUCOSE BLD-MCNC: 197 MG/DL (ref 70–99)
GLUCOSE BLD-MCNC: 204 MG/DL (ref 70–99)
GLUCOSE BLD-MCNC: 205 MG/DL (ref 70–99)
GLUCOSE BLD-MCNC: 223 MG/DL (ref 70–99)
HCO3 ARTERIAL: 26.1 MMOL/L (ref 21–29)
HCT VFR BLD CALC: 27.6 % (ref 40.5–52.5)
HEMOGLOBIN: 9.3 G/DL (ref 13.5–17.5)
KAPPA, FREE LIGHT CHAINS, SERUM: 41.17 MG/L (ref 3.3–19.4)
KAPPA/LAMBDA RATIO: 1.93 (ref 0.26–1.65)
KAPPA/LAMBDA TEST COMMENT: ABNORMAL
L. PNEUMOPHILA SEROGP 1 UR AG: NORMAL
LACTATE: 0.99 MMOL/L (ref 0.4–2)
LAMBDA, FREE LIGHT CHAINS, SERUM: 21.35 MG/L (ref 5.71–26.3)
LYMPHOCYTES ABSOLUTE: 0.4 K/UL (ref 1–5.1)
LYMPHOCYTES RELATIVE PERCENT: 4.5 %
MAGNESIUM: 1.7 MG/DL (ref 1.8–2.4)
MCH RBC QN AUTO: 32 PG (ref 26–34)
MCHC RBC AUTO-ENTMCNC: 33.5 G/DL (ref 31–36)
MCV RBC AUTO: 95.4 FL (ref 80–100)
MONOCYTES ABSOLUTE: 0.5 K/UL (ref 0–1.3)
MONOCYTES RELATIVE PERCENT: 5.6 %
NEUTROPHILS ABSOLUTE: 7.8 K/UL (ref 1.7–7.7)
NEUTROPHILS RELATIVE PERCENT: 89.7 %
O2 SAT, ARTERIAL: 96 % (ref 93–100)
PCO2 ARTERIAL: 31 MM HG (ref 35–45)
PDW BLD-RTO: 13.8 % (ref 12.4–15.4)
PERFORMED ON: ABNORMAL
PH ARTERIAL: 7.53 (ref 7.35–7.45)
PHOSPHORUS: 1.5 MG/DL (ref 2.5–4.9)
PHOSPHORUS: 1.7 MG/DL (ref 2.5–4.9)
PHOSPHORUS: 2.2 MG/DL (ref 2.5–4.9)
PHOSPHORUS: 2.8 MG/DL (ref 2.5–4.9)
PHOSPHORUS: 3 MG/DL (ref 2.5–4.9)
PHOSPHORUS: 3.3 MG/DL (ref 2.5–4.9)
PLATELET # BLD: 174 K/UL (ref 135–450)
PMV BLD AUTO: 7.7 FL (ref 5–10.5)
PO2 ARTERIAL: 69.2 MM HG (ref 75–108)
POC POTASSIUM: 3.9 MMOL/L (ref 3.5–5.1)
POC SAMPLE TYPE: ABNORMAL
POC SODIUM: 154 MMOL/L (ref 136–145)
POTASSIUM SERPL-SCNC: 3.5 MMOL/L (ref 3.5–5.1)
POTASSIUM SERPL-SCNC: 3.7 MMOL/L (ref 3.5–5.1)
POTASSIUM SERPL-SCNC: 3.8 MMOL/L (ref 3.5–5.1)
POTASSIUM SERPL-SCNC: 3.9 MMOL/L (ref 3.5–5.1)
POTASSIUM SERPL-SCNC: 4.2 MMOL/L (ref 3.5–5.1)
POTASSIUM SERPL-SCNC: 4.4 MMOL/L (ref 3.5–5.1)
RBC # BLD: 2.89 M/UL (ref 4.2–5.9)
SODIUM BLD-SCNC: 150 MMOL/L (ref 136–145)
SODIUM BLD-SCNC: 151 MMOL/L (ref 136–145)
SODIUM BLD-SCNC: 151 MMOL/L (ref 136–145)
SODIUM BLD-SCNC: 152 MMOL/L (ref 136–145)
SODIUM BLD-SCNC: 152 MMOL/L (ref 136–145)
SODIUM BLD-SCNC: 153 MMOL/L (ref 136–145)
STREP PNEUMONIAE ANTIGEN, URINE: NORMAL
TCO2 ARTERIAL: 27 MMOL/L
WBC # BLD: 8.7 K/UL (ref 4–11)

## 2019-07-26 PROCEDURE — 99232 SBSQ HOSP IP/OBS MODERATE 35: CPT | Performed by: INTERNAL MEDICINE

## 2019-07-26 PROCEDURE — 6360000002 HC RX W HCPCS: Performed by: INTERNAL MEDICINE

## 2019-07-26 PROCEDURE — 83735 ASSAY OF MAGNESIUM: CPT

## 2019-07-26 PROCEDURE — 2500000003 HC RX 250 WO HCPCS: Performed by: STUDENT IN AN ORGANIZED HEALTH CARE EDUCATION/TRAINING PROGRAM

## 2019-07-26 PROCEDURE — 71045 X-RAY EXAM CHEST 1 VIEW: CPT

## 2019-07-26 PROCEDURE — 94640 AIRWAY INHALATION TREATMENT: CPT

## 2019-07-26 PROCEDURE — 6360000002 HC RX W HCPCS: Performed by: STUDENT IN AN ORGANIZED HEALTH CARE EDUCATION/TRAINING PROGRAM

## 2019-07-26 PROCEDURE — 2580000003 HC RX 258: Performed by: INTERNAL MEDICINE

## 2019-07-26 PROCEDURE — 84295 ASSAY OF SERUM SODIUM: CPT

## 2019-07-26 PROCEDURE — 82330 ASSAY OF CALCIUM: CPT

## 2019-07-26 PROCEDURE — 31720 CLEARANCE OF AIRWAYS: CPT

## 2019-07-26 PROCEDURE — 2700000000 HC OXYGEN THERAPY PER DAY

## 2019-07-26 PROCEDURE — 80069 RENAL FUNCTION PANEL: CPT

## 2019-07-26 PROCEDURE — 82803 BLOOD GASES ANY COMBINATION: CPT

## 2019-07-26 PROCEDURE — 84132 ASSAY OF SERUM POTASSIUM: CPT

## 2019-07-26 PROCEDURE — 94761 N-INVAS EAR/PLS OXIMETRY MLT: CPT

## 2019-07-26 PROCEDURE — 6370000000 HC RX 637 (ALT 250 FOR IP): Performed by: STUDENT IN AN ORGANIZED HEALTH CARE EDUCATION/TRAINING PROGRAM

## 2019-07-26 PROCEDURE — 2000000000 HC ICU R&B

## 2019-07-26 PROCEDURE — 85025 COMPLETE CBC W/AUTO DIFF WBC: CPT

## 2019-07-26 PROCEDURE — 36415 COLL VENOUS BLD VENIPUNCTURE: CPT

## 2019-07-26 PROCEDURE — 6370000000 HC RX 637 (ALT 250 FOR IP): Performed by: INTERNAL MEDICINE

## 2019-07-26 PROCEDURE — 2580000003 HC RX 258: Performed by: STUDENT IN AN ORGANIZED HEALTH CARE EDUCATION/TRAINING PROGRAM

## 2019-07-26 PROCEDURE — 82947 ASSAY GLUCOSE BLOOD QUANT: CPT

## 2019-07-26 PROCEDURE — 83605 ASSAY OF LACTIC ACID: CPT

## 2019-07-26 PROCEDURE — 99233 SBSQ HOSP IP/OBS HIGH 50: CPT | Performed by: INTERNAL MEDICINE

## 2019-07-26 PROCEDURE — 2500000003 HC RX 250 WO HCPCS: Performed by: INTERNAL MEDICINE

## 2019-07-26 RX ORDER — FUROSEMIDE 10 MG/ML
40 INJECTION INTRAMUSCULAR; INTRAVENOUS ONCE
Status: COMPLETED | OUTPATIENT
Start: 2019-07-26 | End: 2019-07-26

## 2019-07-26 RX ORDER — FUROSEMIDE 10 MG/ML
20 INJECTION INTRAMUSCULAR; INTRAVENOUS ONCE
Status: COMPLETED | OUTPATIENT
Start: 2019-07-26 | End: 2019-07-26

## 2019-07-26 RX ORDER — FUROSEMIDE 10 MG/ML
40 INJECTION INTRAMUSCULAR; INTRAVENOUS ONCE
Status: COMPLETED | OUTPATIENT
Start: 2019-07-27 | End: 2019-07-27

## 2019-07-26 RX ORDER — ATORVASTATIN CALCIUM 20 MG/1
20 TABLET, FILM COATED ORAL NIGHTLY
Status: DISCONTINUED | OUTPATIENT
Start: 2019-07-26 | End: 2019-08-20 | Stop reason: HOSPADM

## 2019-07-26 RX ORDER — ACETAMINOPHEN 325 MG/1
650 TABLET ORAL EVERY 4 HOURS PRN
Status: DISCONTINUED | OUTPATIENT
Start: 2019-07-26 | End: 2019-08-03

## 2019-07-26 RX ADMIN — LORAZEPAM 2 MG: 2 INJECTION INTRAMUSCULAR; INTRAVENOUS at 14:18

## 2019-07-26 RX ADMIN — LEVALBUTEROL HYDROCHLORIDE 1.25 MG: 1.25 SOLUTION, CONCENTRATE RESPIRATORY (INHALATION) at 08:37

## 2019-07-26 RX ADMIN — FUROSEMIDE 20 MG: 10 INJECTION, SOLUTION INTRAMUSCULAR; INTRAVENOUS at 15:57

## 2019-07-26 RX ADMIN — THIAMINE HYDROCHLORIDE 500 MG: 100 INJECTION, SOLUTION INTRAMUSCULAR; INTRAVENOUS at 14:23

## 2019-07-26 RX ADMIN — LORAZEPAM 3 MG: 2 INJECTION INTRAMUSCULAR; INTRAVENOUS at 09:00

## 2019-07-26 RX ADMIN — DEXMEDETOMIDINE 1.4 MCG/KG/HR: 100 INJECTION, SOLUTION, CONCENTRATE INTRAVENOUS at 19:15

## 2019-07-26 RX ADMIN — THIAMINE HYDROCHLORIDE 500 MG: 100 INJECTION, SOLUTION INTRAMUSCULAR; INTRAVENOUS at 21:08

## 2019-07-26 RX ADMIN — LEVALBUTEROL HYDROCHLORIDE 1.25 MG: 1.25 SOLUTION, CONCENTRATE RESPIRATORY (INHALATION) at 16:10

## 2019-07-26 RX ADMIN — MORPHINE SULFATE 15 MG: 15 TABLET ORAL at 05:15

## 2019-07-26 RX ADMIN — LORAZEPAM 2 MG: 2 INJECTION INTRAMUSCULAR; INTRAVENOUS at 11:30

## 2019-07-26 RX ADMIN — LORAZEPAM 1 MG: 2 INJECTION INTRAMUSCULAR; INTRAVENOUS at 10:00

## 2019-07-26 RX ADMIN — LORAZEPAM 2 MG: 2 INJECTION INTRAMUSCULAR; INTRAVENOUS at 18:25

## 2019-07-26 RX ADMIN — ATORVASTATIN CALCIUM 20 MG: 20 TABLET, FILM COATED ORAL at 20:17

## 2019-07-26 RX ADMIN — DEXMEDETOMIDINE 1 MCG/KG/HR: 100 INJECTION, SOLUTION, CONCENTRATE INTRAVENOUS at 04:52

## 2019-07-26 RX ADMIN — LORAZEPAM 2 MG: 2 INJECTION INTRAMUSCULAR; INTRAVENOUS at 10:52

## 2019-07-26 RX ADMIN — LORAZEPAM 4 MG: 2 INJECTION INTRAMUSCULAR; INTRAVENOUS at 12:30

## 2019-07-26 RX ADMIN — LORAZEPAM 2 MG: 2 INJECTION INTRAMUSCULAR; INTRAVENOUS at 15:30

## 2019-07-26 RX ADMIN — FUROSEMIDE 40 MG: 10 INJECTION, SOLUTION INTRAMUSCULAR; INTRAVENOUS at 21:30

## 2019-07-26 RX ADMIN — LORAZEPAM 2 MG: 2 INJECTION INTRAMUSCULAR; INTRAVENOUS at 13:30

## 2019-07-26 RX ADMIN — LORAZEPAM 4 MG: 2 INJECTION INTRAMUSCULAR; INTRAVENOUS at 00:40

## 2019-07-26 RX ADMIN — LORAZEPAM 2 MG: 2 INJECTION INTRAMUSCULAR; INTRAVENOUS at 06:10

## 2019-07-26 RX ADMIN — DEXMEDETOMIDINE 1.2 MCG/KG/HR: 100 INJECTION, SOLUTION, CONCENTRATE INTRAVENOUS at 13:45

## 2019-07-26 RX ADMIN — ACETAMINOPHEN 650 MG: 325 TABLET ORAL at 23:57

## 2019-07-26 RX ADMIN — MORPHINE SULFATE 15 MG: 15 TABLET ORAL at 15:30

## 2019-07-26 RX ADMIN — ASPIRIN 81 MG 81 MG: 81 TABLET ORAL at 08:56

## 2019-07-26 RX ADMIN — LEVALBUTEROL HYDROCHLORIDE 1.25 MG: 1.25 SOLUTION, CONCENTRATE RESPIRATORY (INHALATION) at 20:52

## 2019-07-26 RX ADMIN — LORAZEPAM 1 MG: 2 INJECTION INTRAMUSCULAR; INTRAVENOUS at 23:29

## 2019-07-26 RX ADMIN — LEVALBUTEROL HYDROCHLORIDE 1.25 MG: 1.25 SOLUTION, CONCENTRATE RESPIRATORY (INHALATION) at 12:45

## 2019-07-26 RX ADMIN — POTASSIUM PHOSPHATE, MONOBASIC AND POTASSIUM PHOSPHATE, DIBASIC 20 MMOL: 224; 236 INJECTION, SOLUTION, CONCENTRATE INTRAVENOUS at 14:22

## 2019-07-26 ASSESSMENT — PAIN SCALES - WONG BAKER: WONGBAKER_NUMERICALRESPONSE: 0

## 2019-07-26 ASSESSMENT — PAIN DESCRIPTION - PROGRESSION: CLINICAL_PROGRESSION: NOT CHANGED

## 2019-07-26 NOTE — PROGRESS NOTES
examined, findings as discussed with Dr. Carlos Freeman. Agree with assessment and plan as above. Increased hypoxemia associated with some increased agitation, and chest x-ray shows increased pulmonary vascular congestion. Treating with diuretic therapy for pulmonary edema.   Continue Precedex for alcohol withdrawal syndrome

## 2019-07-26 NOTE — PROGRESS NOTES
Asked MD Evert Wilson to come by to see patient, he remains tachycardic, tachypnic, restless despite restarting precedex gtt. He notified me that he and Dr. Jes Cole had seen the patient minutes ago and were aware of his current vitals. Orders to increase precedex gtt to see if this shows improvement.  n

## 2019-07-27 ENCOUNTER — APPOINTMENT (OUTPATIENT)
Dept: GENERAL RADIOLOGY | Age: 69
DRG: 682 | End: 2019-07-27
Payer: MEDICARE

## 2019-07-27 PROBLEM — R09.02 HYPOXEMIA: Status: ACTIVE | Noted: 2019-07-27

## 2019-07-27 PROBLEM — I50.1 PULMONARY EDEMA CARDIAC CAUSE (HCC): Status: ACTIVE | Noted: 2019-07-27

## 2019-07-27 LAB
ALBUMIN SERPL-MCNC: 2.7 G/DL (ref 3.4–5)
ALBUMIN SERPL-MCNC: 2.9 G/DL (ref 3.4–5)
ALBUMIN SERPL-MCNC: 3 G/DL (ref 3.4–5)
ALBUMIN SERPL-MCNC: 3 G/DL (ref 3.4–5)
ALBUMIN SERPL-MCNC: 3.2 G/DL (ref 3.4–5)
ALBUMIN SERPL-MCNC: 3.2 G/DL (ref 3.4–5)
ANION GAP SERPL CALCULATED.3IONS-SCNC: 10 MMOL/L (ref 3–16)
ANION GAP SERPL CALCULATED.3IONS-SCNC: 13 MMOL/L (ref 3–16)
ANION GAP SERPL CALCULATED.3IONS-SCNC: 14 MMOL/L (ref 3–16)
ANION GAP SERPL CALCULATED.3IONS-SCNC: 14 MMOL/L (ref 3–16)
BASOPHILS ABSOLUTE: 0 K/UL (ref 0–0.2)
BASOPHILS RELATIVE PERCENT: 0.1 %
BILIRUBIN URINE: NEGATIVE
BLOOD, URINE: ABNORMAL
BUN BLDV-MCNC: 27 MG/DL (ref 7–20)
BUN BLDV-MCNC: 30 MG/DL (ref 7–20)
BUN BLDV-MCNC: 32 MG/DL (ref 7–20)
BUN BLDV-MCNC: 33 MG/DL (ref 7–20)
BUN BLDV-MCNC: 34 MG/DL (ref 7–20)
BUN BLDV-MCNC: 34 MG/DL (ref 7–20)
CALCIUM SERPL-MCNC: 8.4 MG/DL (ref 8.3–10.6)
CALCIUM SERPL-MCNC: 8.5 MG/DL (ref 8.3–10.6)
CALCIUM SERPL-MCNC: 8.6 MG/DL (ref 8.3–10.6)
CALCIUM SERPL-MCNC: 8.6 MG/DL (ref 8.3–10.6)
CALCIUM SERPL-MCNC: 8.7 MG/DL (ref 8.3–10.6)
CALCIUM SERPL-MCNC: 8.8 MG/DL (ref 8.3–10.6)
CHLORIDE BLD-SCNC: 108 MMOL/L (ref 99–110)
CHLORIDE BLD-SCNC: 109 MMOL/L (ref 99–110)
CHLORIDE BLD-SCNC: 110 MMOL/L (ref 99–110)
CHLORIDE BLD-SCNC: 111 MMOL/L (ref 99–110)
CHLORIDE BLD-SCNC: 112 MMOL/L (ref 99–110)
CHLORIDE BLD-SCNC: 112 MMOL/L (ref 99–110)
CLARITY: CLEAR
CO2: 26 MMOL/L (ref 21–32)
CO2: 27 MMOL/L (ref 21–32)
CO2: 27 MMOL/L (ref 21–32)
CO2: 28 MMOL/L (ref 21–32)
COLOR: YELLOW
CREAT SERPL-MCNC: 1.2 MG/DL (ref 0.8–1.3)
CREAT SERPL-MCNC: 1.2 MG/DL (ref 0.8–1.3)
CREAT SERPL-MCNC: 1.3 MG/DL (ref 0.8–1.3)
EOSINOPHILS ABSOLUTE: 0 K/UL (ref 0–0.6)
EOSINOPHILS RELATIVE PERCENT: 0 %
EPITHELIAL CELLS, UA: ABNORMAL /HPF
GFR AFRICAN AMERICAN: >60
GFR NON-AFRICAN AMERICAN: 55
GFR NON-AFRICAN AMERICAN: 60
GFR NON-AFRICAN AMERICAN: 60
GLUCOSE BLD-MCNC: 145 MG/DL (ref 70–99)
GLUCOSE BLD-MCNC: 151 MG/DL (ref 70–99)
GLUCOSE BLD-MCNC: 163 MG/DL (ref 70–99)
GLUCOSE BLD-MCNC: 188 MG/DL (ref 70–99)
GLUCOSE BLD-MCNC: 194 MG/DL (ref 70–99)
GLUCOSE BLD-MCNC: 216 MG/DL (ref 70–99)
GLUCOSE URINE: NEGATIVE MG/DL
HCT VFR BLD CALC: 27.2 % (ref 40.5–52.5)
HEMOGLOBIN: 9.2 G/DL (ref 13.5–17.5)
KETONES, URINE: NEGATIVE MG/DL
LEUKOCYTE ESTERASE, URINE: NEGATIVE
LYMPHOCYTES ABSOLUTE: 0.4 K/UL (ref 1–5.1)
LYMPHOCYTES RELATIVE PERCENT: 4.6 %
MAGNESIUM: 2.5 MG/DL (ref 1.8–2.4)
MCH RBC QN AUTO: 32.2 PG (ref 26–34)
MCHC RBC AUTO-ENTMCNC: 33.7 G/DL (ref 31–36)
MCV RBC AUTO: 95.6 FL (ref 80–100)
MICROSCOPIC EXAMINATION: YES
MONOCYTES ABSOLUTE: 0.9 K/UL (ref 0–1.3)
MONOCYTES RELATIVE PERCENT: 9.2 %
NEUTROPHILS ABSOLUTE: 8.3 K/UL (ref 1.7–7.7)
NEUTROPHILS RELATIVE PERCENT: 86.1 %
NITRITE, URINE: NEGATIVE
PDW BLD-RTO: 13.9 % (ref 12.4–15.4)
PH UA: 6 (ref 5–8)
PHOSPHORUS: 1.6 MG/DL (ref 2.5–4.9)
PHOSPHORUS: 1.9 MG/DL (ref 2.5–4.9)
PHOSPHORUS: 2.2 MG/DL (ref 2.5–4.9)
PHOSPHORUS: 2.2 MG/DL (ref 2.5–4.9)
PHOSPHORUS: 2.8 MG/DL (ref 2.5–4.9)
PHOSPHORUS: 3.9 MG/DL (ref 2.5–4.9)
PLATELET # BLD: 177 K/UL (ref 135–450)
PMV BLD AUTO: 8.6 FL (ref 5–10.5)
POTASSIUM SERPL-SCNC: 3.2 MMOL/L (ref 3.5–5.1)
POTASSIUM SERPL-SCNC: 3.3 MMOL/L (ref 3.5–5.1)
POTASSIUM SERPL-SCNC: 3.6 MMOL/L (ref 3.5–5.1)
POTASSIUM SERPL-SCNC: 3.7 MMOL/L (ref 3.5–5.1)
POTASSIUM SERPL-SCNC: 3.9 MMOL/L (ref 3.5–5.1)
POTASSIUM SERPL-SCNC: 3.9 MMOL/L (ref 3.5–5.1)
PROTEIN UA: ABNORMAL MG/DL
PTH RELATED PEPTIDE: 4.6 PMOL/L (ref 0–2.3)
RBC # BLD: 2.84 M/UL (ref 4.2–5.9)
RBC UA: ABNORMAL /HPF (ref 0–2)
SODIUM BLD-SCNC: 149 MMOL/L (ref 136–145)
SODIUM BLD-SCNC: 150 MMOL/L (ref 136–145)
SODIUM BLD-SCNC: 151 MMOL/L (ref 136–145)
SODIUM BLD-SCNC: 151 MMOL/L (ref 136–145)
SPECIFIC GRAVITY UA: 1.02 (ref 1–1.03)
URINE REFLEX TO CULTURE: ABNORMAL
URINE TYPE: ABNORMAL
UROBILINOGEN, URINE: 0.2 E.U./DL
WBC # BLD: 9.7 K/UL (ref 4–11)
WBC UA: ABNORMAL /HPF (ref 0–5)

## 2019-07-27 PROCEDURE — 6360000002 HC RX W HCPCS: Performed by: INTERNAL MEDICINE

## 2019-07-27 PROCEDURE — 94640 AIRWAY INHALATION TREATMENT: CPT

## 2019-07-27 PROCEDURE — 81001 URINALYSIS AUTO W/SCOPE: CPT

## 2019-07-27 PROCEDURE — 6370000000 HC RX 637 (ALT 250 FOR IP): Performed by: INTERNAL MEDICINE

## 2019-07-27 PROCEDURE — 94761 N-INVAS EAR/PLS OXIMETRY MLT: CPT

## 2019-07-27 PROCEDURE — 2500000003 HC RX 250 WO HCPCS: Performed by: STUDENT IN AN ORGANIZED HEALTH CARE EDUCATION/TRAINING PROGRAM

## 2019-07-27 PROCEDURE — 36415 COLL VENOUS BLD VENIPUNCTURE: CPT

## 2019-07-27 PROCEDURE — 99233 SBSQ HOSP IP/OBS HIGH 50: CPT | Performed by: INTERNAL MEDICINE

## 2019-07-27 PROCEDURE — 31720 CLEARANCE OF AIRWAYS: CPT

## 2019-07-27 PROCEDURE — 2700000000 HC OXYGEN THERAPY PER DAY

## 2019-07-27 PROCEDURE — 2580000003 HC RX 258: Performed by: STUDENT IN AN ORGANIZED HEALTH CARE EDUCATION/TRAINING PROGRAM

## 2019-07-27 PROCEDURE — 2000000000 HC ICU R&B

## 2019-07-27 PROCEDURE — 6360000002 HC RX W HCPCS: Performed by: STUDENT IN AN ORGANIZED HEALTH CARE EDUCATION/TRAINING PROGRAM

## 2019-07-27 PROCEDURE — 80069 RENAL FUNCTION PANEL: CPT

## 2019-07-27 PROCEDURE — 71045 X-RAY EXAM CHEST 1 VIEW: CPT

## 2019-07-27 PROCEDURE — 87040 BLOOD CULTURE FOR BACTERIA: CPT

## 2019-07-27 PROCEDURE — 83735 ASSAY OF MAGNESIUM: CPT

## 2019-07-27 PROCEDURE — 2580000003 HC RX 258: Performed by: INTERNAL MEDICINE

## 2019-07-27 PROCEDURE — 85025 COMPLETE CBC W/AUTO DIFF WBC: CPT

## 2019-07-27 RX ORDER — POTASSIUM CHLORIDE 29.8 MG/ML
20 INJECTION INTRAVENOUS
Status: COMPLETED | OUTPATIENT
Start: 2019-07-27 | End: 2019-07-27

## 2019-07-27 RX ORDER — FUROSEMIDE 10 MG/ML
20 INJECTION INTRAMUSCULAR; INTRAVENOUS 2 TIMES DAILY
Status: DISCONTINUED | OUTPATIENT
Start: 2019-07-27 | End: 2019-07-27

## 2019-07-27 RX ORDER — MAGNESIUM SULFATE IN WATER 40 MG/ML
4 INJECTION, SOLUTION INTRAVENOUS ONCE
Status: COMPLETED | OUTPATIENT
Start: 2019-07-27 | End: 2019-07-27

## 2019-07-27 RX ORDER — FUROSEMIDE 10 MG/ML
20 INJECTION INTRAMUSCULAR; INTRAVENOUS 2 TIMES DAILY
Status: DISCONTINUED | OUTPATIENT
Start: 2019-07-27 | End: 2019-07-28

## 2019-07-27 RX ADMIN — DEXMEDETOMIDINE 1.2 MCG/KG/HR: 100 INJECTION, SOLUTION, CONCENTRATE INTRAVENOUS at 10:25

## 2019-07-27 RX ADMIN — POTASSIUM CHLORIDE 20 MEQ: 29.8 INJECTION, SOLUTION INTRAVENOUS at 01:11

## 2019-07-27 RX ADMIN — ATORVASTATIN CALCIUM 20 MG: 20 TABLET, FILM COATED ORAL at 19:54

## 2019-07-27 RX ADMIN — LORAZEPAM 4 MG: 2 INJECTION INTRAMUSCULAR; INTRAVENOUS at 16:41

## 2019-07-27 RX ADMIN — LORAZEPAM 2 MG: 2 INJECTION INTRAMUSCULAR; INTRAVENOUS at 12:39

## 2019-07-27 RX ADMIN — FUROSEMIDE 40 MG: 10 INJECTION, SOLUTION INTRAMUSCULAR; INTRAVENOUS at 03:59

## 2019-07-27 RX ADMIN — POTASSIUM PHOSPHATE, MONOBASIC AND POTASSIUM PHOSPHATE, DIBASIC 20 MMOL: 224; 236 INJECTION, SOLUTION, CONCENTRATE INTRAVENOUS at 15:21

## 2019-07-27 RX ADMIN — THIAMINE HYDROCHLORIDE 500 MG: 100 INJECTION, SOLUTION INTRAMUSCULAR; INTRAVENOUS at 05:36

## 2019-07-27 RX ADMIN — LEVALBUTEROL HYDROCHLORIDE 1.25 MG: 1.25 SOLUTION, CONCENTRATE RESPIRATORY (INHALATION) at 20:41

## 2019-07-27 RX ADMIN — DEXMEDETOMIDINE 1.4 MCG/KG/HR: 100 INJECTION, SOLUTION, CONCENTRATE INTRAVENOUS at 21:05

## 2019-07-27 RX ADMIN — THIAMINE HYDROCHLORIDE 500 MG: 100 INJECTION, SOLUTION INTRAMUSCULAR; INTRAVENOUS at 14:40

## 2019-07-27 RX ADMIN — LEVALBUTEROL HYDROCHLORIDE 1.25 MG: 1.25 SOLUTION, CONCENTRATE RESPIRATORY (INHALATION) at 16:15

## 2019-07-27 RX ADMIN — MORPHINE SULFATE 15 MG: 15 TABLET ORAL at 14:36

## 2019-07-27 RX ADMIN — THIAMINE HYDROCHLORIDE 500 MG: 100 INJECTION, SOLUTION INTRAMUSCULAR; INTRAVENOUS at 20:10

## 2019-07-27 RX ADMIN — LEVALBUTEROL HYDROCHLORIDE 1.25 MG: 1.25 SOLUTION, CONCENTRATE RESPIRATORY (INHALATION) at 12:45

## 2019-07-27 RX ADMIN — LORAZEPAM 1 MG: 2 INJECTION INTRAMUSCULAR; INTRAVENOUS at 00:29

## 2019-07-27 RX ADMIN — LORAZEPAM 2 MG: 2 INJECTION INTRAMUSCULAR; INTRAVENOUS at 14:41

## 2019-07-27 RX ADMIN — LORAZEPAM 2 MG: 2 INJECTION INTRAMUSCULAR; INTRAVENOUS at 04:12

## 2019-07-27 RX ADMIN — POTASSIUM CHLORIDE 20 MEQ: 29.8 INJECTION, SOLUTION INTRAVENOUS at 07:55

## 2019-07-27 RX ADMIN — POTASSIUM CHLORIDE 20 MEQ: 29.8 INJECTION, SOLUTION INTRAVENOUS at 18:59

## 2019-07-27 RX ADMIN — POTASSIUM CHLORIDE 20 MEQ: 29.8 INJECTION, SOLUTION INTRAVENOUS at 20:10

## 2019-07-27 RX ADMIN — ASPIRIN 81 MG 81 MG: 81 TABLET ORAL at 07:55

## 2019-07-27 RX ADMIN — CHLOROTHIAZIDE SODIUM 250 MG: 500 INJECTION, POWDER, LYOPHILIZED, FOR SOLUTION INTRAVENOUS at 11:35

## 2019-07-27 RX ADMIN — LORAZEPAM 2 MG: 2 INJECTION INTRAMUSCULAR; INTRAVENOUS at 20:24

## 2019-07-27 RX ADMIN — MORPHINE SULFATE 15 MG: 15 TABLET ORAL at 03:15

## 2019-07-27 RX ADMIN — DEXMEDETOMIDINE 1.4 MCG/KG/HR: 100 INJECTION, SOLUTION, CONCENTRATE INTRAVENOUS at 05:00

## 2019-07-27 RX ADMIN — DEXMEDETOMIDINE 1.4 MCG/KG/HR: 100 INJECTION, SOLUTION, CONCENTRATE INTRAVENOUS at 00:01

## 2019-07-27 RX ADMIN — DEXMEDETOMIDINE 1.4 MCG/KG/HR: 100 INJECTION, SOLUTION, CONCENTRATE INTRAVENOUS at 16:27

## 2019-07-27 RX ADMIN — FUROSEMIDE 20 MG: 10 INJECTION, SOLUTION INTRAMUSCULAR; INTRAVENOUS at 13:47

## 2019-07-27 RX ADMIN — Medication 7.5 G: at 14:44

## 2019-07-27 RX ADMIN — MAGNESIUM SULFATE HEPTAHYDRATE 4 G: 40 INJECTION, SOLUTION INTRAVENOUS at 00:28

## 2019-07-27 RX ADMIN — POTASSIUM CHLORIDE 20 MEQ: 29.8 INJECTION, SOLUTION INTRAVENOUS at 06:16

## 2019-07-27 RX ADMIN — POTASSIUM CHLORIDE 20 MEQ: 29.8 INJECTION, SOLUTION INTRAVENOUS at 03:40

## 2019-07-27 RX ADMIN — LEVALBUTEROL HYDROCHLORIDE 1.25 MG: 1.25 SOLUTION, CONCENTRATE RESPIRATORY (INHALATION) at 07:56

## 2019-07-27 RX ADMIN — FUROSEMIDE 20 MG: 10 INJECTION, SOLUTION INTRAMUSCULAR; INTRAVENOUS at 17:55

## 2019-07-27 ASSESSMENT — PAIN SCALES - GENERAL: PAINLEVEL_OUTOF10: 4

## 2019-07-27 NOTE — PROGRESS NOTES
(hypertension) 08/01/2010    Lumbar radiculopathy 08/01/2010    BPH (benign prostatic hyperplasia) 08/01/2010    DJD (degenerative joint disease), cervical 08/01/2010    Allergic rhinitis, seasonal 08/01/2010    Hyperlipemia 08/01/2010    GERD (gastroesophageal reflux disease) 08/01/2010      Active Hospital Problems    Diagnosis Date Noted    Acute encephalopathy [G93.40] 07/24/2019    Alcohol withdrawal syndrome, with delirium ([address] [F10.231] 07/24/2019    Narcotic dependence ([address] [F11.20] 07/24/2019    AVIVA (acute kidney injury) ([address] [N17.9]     Acute kidney failure ([facility] [address] [N17.9] 07/23/2019       Assessment and Plan:     Troponin leak, possibly demand mediated in the setting of underlying CAD  LV ejection fraction 50%  Nonspecific EKG changes  Heparin on hold secondary to hematuria  Continue aspirin, statins  Not on ACE/beta-blocker secondary to soft blood pressure    Eventually, he will need ischemic evaluation once his clinical status is better. Thank you for allowing me to participate in the care of this patient. If you have any questions, please do not hesitate to contact me.     Isiah Will MD   Cardiac Electrophysiology  [address] [place] [address] [phone]  [facility]

## 2019-07-27 NOTE — PROGRESS NOTES
07/27/2019    MCV 95.6 07/27/2019     07/27/2019            AGLUCOSE)Magnesium:    Lab Results   Component Value Date    MG 1.70 07/26/2019     Phosphorus:    Lab Results   Component Value Date    PHOS 1.9 07/27/2019

## 2019-07-27 NOTE — PROGRESS NOTES
07/26/19  0441 07/27/19  0357   WBC 7.4 8.7 9.7   HGB 9.1* 9.3* 9.2*   HCT 26.8* 27.6* 27.2*    174 177     Recent Labs     07/27/19  0356 07/27/19  0835 07/27/19  1305   * 151* 150*   K 3.6 3.9 3.9   * 111* 112*   CO2 26 26 28   BUN 30* 32* 33*   CREATININE 1.3 1.2 1.3   CALCIUM 8.5 8.4 8.6   PHOS 2.2* 1.9* 1.6*     No results for input(s): AST, ALT, BILIDIR, BILITOT, ALKPHOS in the last 72 hours. No results for input(s): INR in the last 72 hours. Recent Labs     07/24/19 2024 07/25/19  0159 07/25/19  0831   TROPONINI 0.92* 0.83* 0.93*       Urinalysis:      Lab Results   Component Value Date    NITRU Negative 07/27/2019    WBCUA 0-2 07/27/2019    RBCUA 20-50 07/27/2019    BLOODU LARGE 07/27/2019    SPECGRAV 1.020 07/27/2019    GLUCOSEU Negative 07/27/2019       Radiology:  XR CHEST PORTABLE   Final Result      1. Persistent hazy perihilar, groundglass basilar consolidations and pleural effusions greater in the right lung   2. NG tube in place as well as a right IJ central venous catheter with the tip in the mid to distal SVC, no worsening               XR CHEST PORTABLE   Final Result      Introduction of NG tube with tip excluded from the inferior edge of the film. Moderate bilateral effusions and diffuse groundglass opacities consistent with edema, infection or aspiration. This appears worse since the previous study            XR ABDOMEN (KUB) (SINGLE AP VIEW)   Final Result      Nasogastric tube extending into the left upper quadrant. XR CHEST PORTABLE   Final Result      Persistent right basilar infiltrate. XR CHEST 1 VW   Final Result      Tip of the right IJ central venous catheter overlies lower SVC with no pneumothorax evident. CT HEAD WO CONTRAST   Final Result      1. No acute intracranial process.          XR CHEST PORTABLE   Final Result      Interval development of bilateral lower lobe opacities may relate to edema or pneumonia      CT SOFT TISSUE NECK WO

## 2019-07-27 NOTE — PLAN OF CARE
clean and dry, applying skin care cream as needed. Pillows used for repositioning q2hs. Will continue to monitor and assess for skin breakdown. Problem: Airway Clearance - Ineffective:  Goal: Ability to maintain a clear airway will improve  Description  Ability to maintain a clear airway will improve  Outcome: Ongoing  Note:   Pt lung sounds rhonchorous and coarse throughout. Oral suctioned multiple times, remains tachypneic and tachycardic. Currently on 15L high flow NC with O2 at 98%. Pt difficult to arouse, but does respond to pain. Does not verbalize at this time.  Will continue to monitor

## 2019-07-28 ENCOUNTER — APPOINTMENT (OUTPATIENT)
Dept: GENERAL RADIOLOGY | Age: 69
DRG: 682 | End: 2019-07-28
Payer: MEDICARE

## 2019-07-28 ENCOUNTER — APPOINTMENT (OUTPATIENT)
Dept: CT IMAGING | Age: 69
DRG: 682 | End: 2019-07-28
Payer: MEDICARE

## 2019-07-28 LAB
ALBUMIN SERPL-MCNC: 2.9 G/DL (ref 3.4–5)
ALBUMIN SERPL-MCNC: 2.9 G/DL (ref 3.4–5)
ALBUMIN SERPL-MCNC: 3 G/DL (ref 3.4–5)
ALBUMIN SERPL-MCNC: 3 G/DL (ref 3.4–5)
ALBUMIN SERPL-MCNC: 3.2 G/DL (ref 3.4–5)
ANION GAP SERPL CALCULATED.3IONS-SCNC: 11 MMOL/L (ref 3–16)
ANION GAP SERPL CALCULATED.3IONS-SCNC: 13 MMOL/L (ref 3–16)
ANION GAP SERPL CALCULATED.3IONS-SCNC: 14 MMOL/L (ref 3–16)
ANION GAP SERPL CALCULATED.3IONS-SCNC: 14 MMOL/L (ref 3–16)
ANION GAP SERPL CALCULATED.3IONS-SCNC: 15 MMOL/L (ref 3–16)
BANDED NEUTROPHILS RELATIVE PERCENT: 10 % (ref 0–7)
BASE EXCESS ARTERIAL: 3 (ref -3–3)
BASOPHILS ABSOLUTE: 0 K/UL (ref 0–0.2)
BASOPHILS RELATIVE PERCENT: 0 %
BLOOD CULTURE, ROUTINE: NORMAL
BUN BLDV-MCNC: 34 MG/DL (ref 7–20)
BUN BLDV-MCNC: 35 MG/DL (ref 7–20)
BUN BLDV-MCNC: 38 MG/DL (ref 7–20)
BUN BLDV-MCNC: 41 MG/DL (ref 7–20)
BUN BLDV-MCNC: 41 MG/DL (ref 7–20)
CALCIUM IONIZED: 1.17 MMOL/L (ref 1.12–1.32)
CALCIUM SERPL-MCNC: 8.3 MG/DL (ref 8.3–10.6)
CALCIUM SERPL-MCNC: 8.5 MG/DL (ref 8.3–10.6)
CALCIUM SERPL-MCNC: 8.7 MG/DL (ref 8.3–10.6)
CALCIUM SERPL-MCNC: 9 MG/DL (ref 8.3–10.6)
CALCIUM SERPL-MCNC: 9.4 MG/DL (ref 8.3–10.6)
CHLORIDE BLD-SCNC: 107 MMOL/L (ref 99–110)
CHLORIDE BLD-SCNC: 107 MMOL/L (ref 99–110)
CHLORIDE BLD-SCNC: 109 MMOL/L (ref 99–110)
CO2: 24 MMOL/L (ref 21–32)
CO2: 26 MMOL/L (ref 21–32)
CO2: 27 MMOL/L (ref 21–32)
CREAT SERPL-MCNC: 1.2 MG/DL (ref 0.8–1.3)
CREAT SERPL-MCNC: 1.3 MG/DL (ref 0.8–1.3)
CREAT SERPL-MCNC: 1.4 MG/DL (ref 0.8–1.3)
CULTURE, BLOOD 2: NORMAL
EOSINOPHILS ABSOLUTE: 0 K/UL (ref 0–0.6)
EOSINOPHILS RELATIVE PERCENT: 0 %
GFR AFRICAN AMERICAN: >60
GFR NON-AFRICAN AMERICAN: 50
GFR NON-AFRICAN AMERICAN: 55
GFR NON-AFRICAN AMERICAN: 60
GLUCOSE BLD-MCNC: 140 MG/DL (ref 70–99)
GLUCOSE BLD-MCNC: 154 MG/DL (ref 70–99)
GLUCOSE BLD-MCNC: 155 MG/DL (ref 70–99)
GLUCOSE BLD-MCNC: 163 MG/DL (ref 70–99)
GLUCOSE BLD-MCNC: 163 MG/DL (ref 70–99)
GLUCOSE BLD-MCNC: 172 MG/DL (ref 70–99)
HCO3 ARTERIAL: 25.9 MMOL/L (ref 21–29)
HCT VFR BLD CALC: 30.9 % (ref 40.5–52.5)
HEMOGLOBIN: 10.3 G/DL (ref 13.5–17.5)
LACTATE: 1.08 MMOL/L (ref 0.4–2)
LYMPHOCYTES ABSOLUTE: 0.5 K/UL (ref 1–5.1)
LYMPHOCYTES RELATIVE PERCENT: 5 %
MCH RBC QN AUTO: 32.4 PG (ref 26–34)
MCHC RBC AUTO-ENTMCNC: 33.2 G/DL (ref 31–36)
MCV RBC AUTO: 97.7 FL (ref 80–100)
MONOCYTES ABSOLUTE: 0.7 K/UL (ref 0–1.3)
MONOCYTES RELATIVE PERCENT: 7 %
NEUTROPHILS ABSOLUTE: 9.3 K/UL (ref 1.7–7.7)
NEUTROPHILS RELATIVE PERCENT: 78 %
O2 SAT, ARTERIAL: 97 % (ref 93–100)
PCO2 ARTERIAL: 33.5 MM HG (ref 35–45)
PDW BLD-RTO: 14.3 % (ref 12.4–15.4)
PERFORMED ON: ABNORMAL
PH ARTERIAL: 7.5 (ref 7.35–7.45)
PHOSPHORUS: 1.7 MG/DL (ref 2.5–4.9)
PHOSPHORUS: 2.3 MG/DL (ref 2.5–4.9)
PHOSPHORUS: 2.4 MG/DL (ref 2.5–4.9)
PHOSPHORUS: 2.8 MG/DL (ref 2.5–4.9)
PHOSPHORUS: 3 MG/DL (ref 2.5–4.9)
PLATELET # BLD: 227 K/UL (ref 135–450)
PMV BLD AUTO: 9.2 FL (ref 5–10.5)
PO2 ARTERIAL: 77 MM HG (ref 75–108)
POC POTASSIUM: 3.4 MMOL/L (ref 3.5–5.1)
POC SAMPLE TYPE: ABNORMAL
POC SODIUM: 148 MMOL/L (ref 136–145)
POTASSIUM SERPL-SCNC: 3.2 MMOL/L (ref 3.5–5.1)
POTASSIUM SERPL-SCNC: 3.6 MMOL/L (ref 3.5–5.1)
POTASSIUM SERPL-SCNC: 3.6 MMOL/L (ref 3.5–5.1)
POTASSIUM SERPL-SCNC: 3.8 MMOL/L (ref 3.5–5.1)
POTASSIUM SERPL-SCNC: 4 MMOL/L (ref 3.5–5.1)
RBC # BLD: 3.17 M/UL (ref 4.2–5.9)
SODIUM BLD-SCNC: 147 MMOL/L (ref 136–145)
SODIUM BLD-SCNC: 148 MMOL/L (ref 136–145)
SODIUM BLD-SCNC: 148 MMOL/L (ref 136–145)
TCO2 ARTERIAL: 27 MMOL/L
WBC # BLD: 10.6 K/UL (ref 4–11)

## 2019-07-28 PROCEDURE — 82803 BLOOD GASES ANY COMBINATION: CPT

## 2019-07-28 PROCEDURE — 84132 ASSAY OF SERUM POTASSIUM: CPT

## 2019-07-28 PROCEDURE — 99233 SBSQ HOSP IP/OBS HIGH 50: CPT | Performed by: INTERNAL MEDICINE

## 2019-07-28 PROCEDURE — 6360000002 HC RX W HCPCS: Performed by: INTERNAL MEDICINE

## 2019-07-28 PROCEDURE — 94640 AIRWAY INHALATION TREATMENT: CPT

## 2019-07-28 PROCEDURE — 85025 COMPLETE CBC W/AUTO DIFF WBC: CPT

## 2019-07-28 PROCEDURE — 31720 CLEARANCE OF AIRWAYS: CPT

## 2019-07-28 PROCEDURE — 80069 RENAL FUNCTION PANEL: CPT

## 2019-07-28 PROCEDURE — 2000000000 HC ICU R&B

## 2019-07-28 PROCEDURE — 82947 ASSAY GLUCOSE BLOOD QUANT: CPT

## 2019-07-28 PROCEDURE — 2500000003 HC RX 250 WO HCPCS: Performed by: STUDENT IN AN ORGANIZED HEALTH CARE EDUCATION/TRAINING PROGRAM

## 2019-07-28 PROCEDURE — 2580000003 HC RX 258: Performed by: INTERNAL MEDICINE

## 2019-07-28 PROCEDURE — 2700000000 HC OXYGEN THERAPY PER DAY

## 2019-07-28 PROCEDURE — 71045 X-RAY EXAM CHEST 1 VIEW: CPT

## 2019-07-28 PROCEDURE — 36592 COLLECT BLOOD FROM PICC: CPT

## 2019-07-28 PROCEDURE — 6370000000 HC RX 637 (ALT 250 FOR IP): Performed by: STUDENT IN AN ORGANIZED HEALTH CARE EDUCATION/TRAINING PROGRAM

## 2019-07-28 PROCEDURE — 2580000003 HC RX 258

## 2019-07-28 PROCEDURE — 36415 COLL VENOUS BLD VENIPUNCTURE: CPT

## 2019-07-28 PROCEDURE — 2580000003 HC RX 258: Performed by: STUDENT IN AN ORGANIZED HEALTH CARE EDUCATION/TRAINING PROGRAM

## 2019-07-28 PROCEDURE — 6360000002 HC RX W HCPCS: Performed by: STUDENT IN AN ORGANIZED HEALTH CARE EDUCATION/TRAINING PROGRAM

## 2019-07-28 PROCEDURE — 6370000000 HC RX 637 (ALT 250 FOR IP): Performed by: INTERNAL MEDICINE

## 2019-07-28 PROCEDURE — 83605 ASSAY OF LACTIC ACID: CPT

## 2019-07-28 PROCEDURE — 84295 ASSAY OF SERUM SODIUM: CPT

## 2019-07-28 PROCEDURE — 71250 CT THORAX DX C-: CPT

## 2019-07-28 PROCEDURE — 82330 ASSAY OF CALCIUM: CPT

## 2019-07-28 PROCEDURE — 94761 N-INVAS EAR/PLS OXIMETRY MLT: CPT

## 2019-07-28 PROCEDURE — 87040 BLOOD CULTURE FOR BACTERIA: CPT

## 2019-07-28 RX ORDER — FUROSEMIDE 10 MG/ML
40 INJECTION INTRAMUSCULAR; INTRAVENOUS 2 TIMES DAILY
Status: DISCONTINUED | OUTPATIENT
Start: 2019-07-28 | End: 2019-07-29

## 2019-07-28 RX ORDER — POTASSIUM CHLORIDE 7.45 MG/ML
10 INJECTION INTRAVENOUS
Status: DISCONTINUED | OUTPATIENT
Start: 2019-07-28 | End: 2019-07-28

## 2019-07-28 RX ORDER — HEPARIN SODIUM 5000 [USP'U]/ML
5000 INJECTION, SOLUTION INTRAVENOUS; SUBCUTANEOUS EVERY 8 HOURS SCHEDULED
Status: DISCONTINUED | OUTPATIENT
Start: 2019-07-28 | End: 2019-08-20 | Stop reason: HOSPADM

## 2019-07-28 RX ORDER — POTASSIUM CHLORIDE 29.8 MG/ML
20 INJECTION INTRAVENOUS ONCE
Status: COMPLETED | OUTPATIENT
Start: 2019-07-28 | End: 2019-07-28

## 2019-07-28 RX ORDER — POTASSIUM CHLORIDE 29.8 MG/ML
20 INJECTION INTRAVENOUS
Status: COMPLETED | OUTPATIENT
Start: 2019-07-28 | End: 2019-07-28

## 2019-07-28 RX ORDER — SODIUM CHLORIDE, SODIUM LACTATE, POTASSIUM CHLORIDE, CALCIUM CHLORIDE 600; 310; 30; 20 MG/100ML; MG/100ML; MG/100ML; MG/100ML
INJECTION, SOLUTION INTRAVENOUS
Status: COMPLETED
Start: 2019-07-28 | End: 2019-07-28

## 2019-07-28 RX ORDER — SODIUM CHLORIDE 9 MG/ML
INJECTION, SOLUTION INTRAVENOUS
Status: COMPLETED
Start: 2019-07-28 | End: 2019-07-28

## 2019-07-28 RX ORDER — SODIUM CHLORIDE, SODIUM LACTATE, POTASSIUM CHLORIDE, CALCIUM CHLORIDE 600; 310; 30; 20 MG/100ML; MG/100ML; MG/100ML; MG/100ML
INJECTION, SOLUTION INTRAVENOUS CONTINUOUS
Status: DISCONTINUED | OUTPATIENT
Start: 2019-07-28 | End: 2019-07-28

## 2019-07-28 RX ADMIN — LEVALBUTEROL HYDROCHLORIDE 1.25 MG: 1.25 SOLUTION, CONCENTRATE RESPIRATORY (INHALATION) at 21:00

## 2019-07-28 RX ADMIN — FUROSEMIDE 40 MG: 10 INJECTION, SOLUTION INTRAMUSCULAR; INTRAVENOUS at 08:25

## 2019-07-28 RX ADMIN — POTASSIUM CHLORIDE 20 MEQ: 29.8 INJECTION, SOLUTION INTRAVENOUS at 08:25

## 2019-07-28 RX ADMIN — LORAZEPAM 2 MG: 2 INJECTION INTRAMUSCULAR; INTRAVENOUS at 06:08

## 2019-07-28 RX ADMIN — LEVALBUTEROL HYDROCHLORIDE 1.25 MG: 1.25 SOLUTION, CONCENTRATE RESPIRATORY (INHALATION) at 13:10

## 2019-07-28 RX ADMIN — POTASSIUM CHLORIDE 20 MEQ: 29.8 INJECTION, SOLUTION INTRAVENOUS at 15:49

## 2019-07-28 RX ADMIN — FUROSEMIDE 40 MG: 10 INJECTION, SOLUTION INTRAMUSCULAR; INTRAVENOUS at 17:54

## 2019-07-28 RX ADMIN — LEVALBUTEROL HYDROCHLORIDE 1.25 MG: 1.25 SOLUTION, CONCENTRATE RESPIRATORY (INHALATION) at 16:17

## 2019-07-28 RX ADMIN — ASPIRIN 81 MG 81 MG: 81 TABLET ORAL at 07:53

## 2019-07-28 RX ADMIN — POTASSIUM CHLORIDE 20 MEQ: 29.8 INJECTION, SOLUTION INTRAVENOUS at 16:46

## 2019-07-28 RX ADMIN — HEPARIN SODIUM 5000 UNITS: 5000 INJECTION INTRAVENOUS; SUBCUTANEOUS at 22:07

## 2019-07-28 RX ADMIN — ATORVASTATIN CALCIUM 20 MG: 20 TABLET, FILM COATED ORAL at 19:38

## 2019-07-28 RX ADMIN — DEXMEDETOMIDINE 1.2 MCG/KG/HR: 100 INJECTION, SOLUTION, CONCENTRATE INTRAVENOUS at 07:26

## 2019-07-28 RX ADMIN — CHLOROTHIAZIDE SODIUM 500 MG: 500 INJECTION, POWDER, LYOPHILIZED, FOR SOLUTION INTRAVENOUS at 07:45

## 2019-07-28 RX ADMIN — LEVALBUTEROL HYDROCHLORIDE 1.25 MG: 1.25 SOLUTION, CONCENTRATE RESPIRATORY (INHALATION) at 08:45

## 2019-07-28 RX ADMIN — LORAZEPAM 2 MG: 2 INJECTION INTRAMUSCULAR; INTRAVENOUS at 01:49

## 2019-07-28 RX ADMIN — DEXMEDETOMIDINE 0.6 MCG/KG/HR: 100 INJECTION, SOLUTION, CONCENTRATE INTRAVENOUS at 20:00

## 2019-07-28 RX ADMIN — SODIUM CHLORIDE 250 ML: 9 INJECTION, SOLUTION INTRAVENOUS at 15:50

## 2019-07-28 RX ADMIN — MORPHINE SULFATE 15 MG: 15 TABLET ORAL at 02:46

## 2019-07-28 RX ADMIN — PIPERACILLIN SODIUM,TAZOBACTAM SODIUM 3.38 G: 3; .375 INJECTION, POWDER, FOR SOLUTION INTRAVENOUS at 23:29

## 2019-07-28 RX ADMIN — POTASSIUM CHLORIDE 20 MEQ: 29.8 INJECTION, SOLUTION INTRAVENOUS at 17:54

## 2019-07-28 RX ADMIN — THIAMINE HYDROCHLORIDE 500 MG: 100 INJECTION, SOLUTION INTRAMUSCULAR; INTRAVENOUS at 21:11

## 2019-07-28 RX ADMIN — HEPARIN SODIUM 5000 UNITS: 5000 INJECTION INTRAVENOUS; SUBCUTANEOUS at 15:34

## 2019-07-28 RX ADMIN — THIAMINE HYDROCHLORIDE 500 MG: 100 INJECTION, SOLUTION INTRAMUSCULAR; INTRAVENOUS at 05:27

## 2019-07-28 RX ADMIN — ACETAMINOPHEN 650 MG: 325 TABLET ORAL at 19:37

## 2019-07-28 RX ADMIN — SODIUM CHLORIDE, POTASSIUM CHLORIDE, SODIUM LACTATE AND CALCIUM CHLORIDE: 600; 310; 30; 20 INJECTION, SOLUTION INTRAVENOUS at 21:13

## 2019-07-28 RX ADMIN — DEXMEDETOMIDINE 1.4 MCG/KG/HR: 100 INJECTION, SOLUTION, CONCENTRATE INTRAVENOUS at 01:53

## 2019-07-28 RX ADMIN — LORAZEPAM 2 MG: 2 INJECTION INTRAMUSCULAR; INTRAVENOUS at 22:29

## 2019-07-28 RX ADMIN — LORAZEPAM 4 MG: 2 INJECTION INTRAMUSCULAR; INTRAVENOUS at 04:06

## 2019-07-28 RX ADMIN — THIAMINE HYDROCHLORIDE 500 MG: 100 INJECTION, SOLUTION INTRAMUSCULAR; INTRAVENOUS at 13:09

## 2019-07-28 RX ADMIN — ACETAMINOPHEN 650 MG: 325 TABLET ORAL at 07:53

## 2019-07-28 RX ADMIN — SODIUM CHLORIDE, SODIUM LACTATE, POTASSIUM CHLORIDE, CALCIUM CHLORIDE: 600; 310; 30; 20 INJECTION, SOLUTION INTRAVENOUS at 21:13

## 2019-07-28 RX ADMIN — MORPHINE SULFATE 15 MG: 15 TABLET ORAL at 15:34

## 2019-07-28 ASSESSMENT — PAIN SCALES - GENERAL
PAINLEVEL_OUTOF10: 1
PAINLEVEL_OUTOF10: 0
PAINLEVEL_OUTOF10: 0

## 2019-07-28 NOTE — PROGRESS NOTES
ICU Progress Note    Admit Date: 7/22/2019  Day:   Diet: DIET TUBE FEED CONTINUOUS/CYCLIC NPO; STANDARD WITH FIBER (Jevity 1.2); Nasogastric; Continuous; 20; 60; 24    CC: mental status changes, possible alcohol withdrawal.    Interval history:   Patient was seen and examined at bedside with team and attending. Overnight, he was tachypneic and become more short of breath and was placed on NRB at 15L. Patient continues to have intermittent episodes of fever, Tmax 102.8. Blood cultures were sent. GI and nephrology are following. HPI: Mr. Whitney Restrepo is a 69 yo M with PMHx significant for HTN, HLD, GERD, opioid dependence 2/2 cervical DDD, and alcohol abuse who presents to [facility] ED complaining of fatigue, nausea/vomiting, poor appetite and voice hoarseness over the last several months. Pt was originally admitted to the floor. One day after admission, pt had AMS and became hypotensive requiring IV boluses and 4 mg Ativan and 5 mg Haldol. Decision was then made to transfer to ICU. His troponin was 0.0 on admission and trended up to 1.1 during the first night in the ICU. EKG only showed increased number of PVCs w/o evidence of STEMI. Heparin gtt was started anyways and cardiology consulted. Cardiology suspected demand ischemia and recommended elective cath once AMS improves. Pt continued to be altered in the ICU requiring 2 mg Ativan q1h and a Precedex drip. Work-up for causes of encephalopathy negative.  Most likely diagnosis at this point (7/26) is AMS 2/2 alcohol withdrawal.    Medications:     Scheduled Meds:   furosemide  40 mg Intravenous BID    chlorothiazide (DIURIL) IVPB  500 mg Intravenous Q24H    heparin (porcine)  5,000 Units Subcutaneous 3 times per day    atorvastatin  20 mg Oral Nightly    thiamine (VITAMIN B1) IVPB  500 mg Intravenous Q8H    levalbuterol  1.25 mg Nebulization Q4H WA    morphine  15 mg Oral BID    aspirin  81 mg Oral Daily    LORazepam  2 mg Intramuscular Once     Continuous 9.2* 10.3*   HCT 27.6* 27.2* 30.9*    177 227   MCV 95.4 95.6 97.7     Renal:    Recent Labs     07/26/19  1638  07/27/19  1630  07/27/19  2345 07/28/19  0434 07/28/19  0905   *   < > 149*   < > 147* 148* 147*   K 3.9   < > 3.2*   < > 3.8 3.6 4.0   *   < > 109   < > 109 109 109   CO2 24   < > 27   < > 27 26 24   BUN 28*   < > 34*   < > 34* 35* 38*   CREATININE 1.1   < > 1.3   < > 1.3 1.2 1.3   GLUCOSE 177*   < > 163*   < > 154* 140* 172*   CALCIUM 8.8   < > 8.8   < > 8.5 8.7 8.3   MG 1.70*  --  2.50*  --   --   --   --    PHOS 2.2*   < > 2.8   < > 3.0 2.4* 2.3*   ANIONGAP 12   < > 13   < > 11 13 14    < > = values in this interval not displayed. Hepatic:   Recent Labs     07/27/19  2345 07/28/19  0434 07/28/19  0905   LABALBU 3.0* 3.2* 2.9*     Troponin:   No results for input(s): TROPONINI in the last 72 hours. BNP: No results for input(s): BNP in the last 72 hours. Lipids: No results for input(s): CHOL, HDL in the last 72 hours. Invalid input(s): LDLCALCU, TRIGLYCERIDE  ABGs:    Recent Labs     07/25/19  1140 07/26/19  1535 07/28/19  0421   PHART 7.493* 7.534* 7.496*   YLB8SMJ 27.3* 31.0* 33.5*   PO2ART 49.1* 69.2* 77.0   NYU3ZOA 21 26.1 25.9   BEART -1.4 4* 3   B3CKZARZ 88* 96 97   HMH3VRB 22 27 27       INR: No results for input(s): INR in the last 72 hours. Lactate:   Recent Labs     07/26/19  1535 07/28/19  0421   LACTATE 0.99 1.08     Cultures:  -----------------------------------------------------------------  RAD:   XR CHEST PORTABLE   Final Result   1. Persistent but improved central pulmonary vascular congestion with    diffusely increased interstitial markings still present. 2.  Mildly decreased right greater than left pleural effusions. 3.  Right greater than left basilar atelectasis versus consolidation. 4.  No pneumothorax radiographically evident. 5.  Tubes/lines as above. XR CHEST PORTABLE   Final Result      1.  Persistent hazy perihilar, groundglass not resolved. - Echo consistent with LVH, diastolic dysfunction/wall motion abnormalities. - Continue diuresis improving gas exchange   -Oxygen requirement is decreasing. Arterial blood gas reflects hyperventilation. He is not in danger of respiratory failure and need for ventilator support. 4. Hypernatremia  Improving. Nephrology on board. - We have given 250 mg thiazide today, continue free water per NGT. Code Status: Full code  FEN: DIET TUBE FEED CONTINUOUS/CYCLIC NPO; STANDARD WITH FIBER (Jevity 1.2); Nasogastric; Continuous; 20; 60; 24  PPX: SCD  DISPO: Megan Lewis MD, PGY-1  Contact via [employer].   07/28/19  10:23 AM    This patient has been staffed and discussed with Dr. Crow Diaz. Patient examined, findings as discussed with Dr. Arsenio Chappell. Agree with assessment and plan as edited above.

## 2019-07-28 NOTE — PROGRESS NOTES
[facility]   Cardiology Progress Note     Admit Date: 2019     Reason for follow up: CHF, NSTEMI    HPI and Interval History:   Patient seen and examined. Clinical notes reviewed. Telemetry reviewed. Sinus tachycardia. He is tachypneic more short of breath  When I saw him, he was in NRB    Review of System: Limited due to clinical condition    Physical Examination:  Vitals:    19 1100   BP: 100/66   Pulse: 124   Resp: (!) 49   Temp: 99.8 °F (37.7 °C)   SpO2: 96%        Intake/Output Summary (Last 24 hours) at 2019 1215  Last data filed at 2019 1132  Gross per 24 hour   Intake 3813.06 ml   Output 4340 ml   Net -526.94 ml     In: 2745.1 [I.V.:693.2; NG/GT:1813]  Out: 3290    Wt Readings from Last 3 Encounters:   19 128 lb 1.4 oz (58.1 kg)   08/19/10 131 lb 9.6 oz (59.7 kg)   04/20/10 135 lb (61.2 kg)     Temp  Av.9 °F (37.7 °C)  Min: 98 °F (36.7 °C)  Max: 102.8 °F (39.3 °C)  Pulse  Av.9  Min: 112  Max: 148  BP  Min: 91/62  Max: 155/83  SpO2  Av.2 %  Min: 87 %  Max: 98 %    · Telemetry: Sinus tachy  · Constitutional: Alert. Oriented to person, place, and time. No distress. · Head: Normocephalic and atraumatic. · Mouth/Throat: Lips appear moist. Oropharynx is clear and moist.  · Eyes: Conjunctivae normal. EOM are normal.   · Neck: Neck supple. No lymphadenopathy. No rigidity. No JVD present. · Cardiovascular: Tachycardia, regular rhythm. Normal S1&S2. Carotid pulse 2+ bilaterally. · Pulmonary/Chest: Bilateral scattered crackles   · Abdominal: Soft. Normal bowel sounds present. No distension, No tenderness. No splenomegaly. No hernia. · Musculoskeletal: No tenderness. No edema    · Lymphadenopathy: Has no cervical adenopathy. · Neurological: Alert and oriented. Cranial nerve II-XII grossly intact, No gross deficit to touch. · Skin: Skin is warm and dry. No rash, lesions, ulcerations noted. · Psychiatric: No anxiety nor agitation.     Labs, diagnostic

## 2019-07-29 ENCOUNTER — APPOINTMENT (OUTPATIENT)
Dept: GENERAL RADIOLOGY | Age: 69
DRG: 682 | End: 2019-07-29
Payer: MEDICARE

## 2019-07-29 LAB
ABO/RH: NORMAL
ALBUMIN SERPL-MCNC: 1.7 G/DL (ref 3.4–5)
ALBUMIN SERPL-MCNC: 1.8 G/DL (ref 3.4–5)
ALBUMIN SERPL-MCNC: 1.9 G/DL (ref 3.4–5)
ALBUMIN SERPL-MCNC: 2.6 G/DL (ref 3.4–5)
ANION GAP SERPL CALCULATED.3IONS-SCNC: 14 MMOL/L (ref 3–16)
ANION GAP SERPL CALCULATED.3IONS-SCNC: 15 MMOL/L (ref 3–16)
ANION GAP SERPL CALCULATED.3IONS-SCNC: 16 MMOL/L (ref 3–16)
ANION GAP SERPL CALCULATED.3IONS-SCNC: 17 MMOL/L (ref 3–16)
ANISOCYTOSIS: ABNORMAL
ANTIBODY SCREEN: NORMAL
APPEARANCE BAL (LAVAGE): ABNORMAL
BANDED NEUTROPHILS RELATIVE PERCENT: 7 % (ref 0–7)
BASE EXCESS ARTERIAL: 2 (ref -3–3)
BASE EXCESS ARTERIAL: 4 (ref -3–3)
BASE EXCESS ARTERIAL: 5 (ref -3–3)
BASOPHILS ABSOLUTE: 0 K/UL (ref 0–0.2)
BASOPHILS RELATIVE PERCENT: 0 %
BUN BLDV-MCNC: 51 MG/DL (ref 7–20)
BUN BLDV-MCNC: 55 MG/DL (ref 7–20)
CALCIUM IONIZED: 1.11 MMOL/L (ref 1.12–1.32)
CALCIUM SERPL-MCNC: 7.6 MG/DL (ref 8.3–10.6)
CALCIUM SERPL-MCNC: 7.8 MG/DL (ref 8.3–10.6)
CALCIUM SERPL-MCNC: 7.8 MG/DL (ref 8.3–10.6)
CALCIUM SERPL-MCNC: 8.8 MG/DL (ref 8.3–10.6)
CHLORIDE BLD-SCNC: 103 MMOL/L (ref 99–110)
CHLORIDE BLD-SCNC: 104 MMOL/L (ref 99–110)
CHLORIDE BLD-SCNC: 105 MMOL/L (ref 99–110)
CHLORIDE BLD-SCNC: 107 MMOL/L (ref 99–110)
CLOT EVALUATION BAL: ABNORMAL
CO2: 21 MMOL/L (ref 21–32)
CO2: 23 MMOL/L (ref 21–32)
CO2: 24 MMOL/L (ref 21–32)
CO2: 28 MMOL/L (ref 21–32)
COLOR LAVAGE: ABNORMAL
CREAT SERPL-MCNC: 1.8 MG/DL (ref 0.8–1.3)
CREAT SERPL-MCNC: 2.4 MG/DL (ref 0.8–1.3)
CREAT SERPL-MCNC: 2.6 MG/DL (ref 0.8–1.3)
CREAT SERPL-MCNC: 2.6 MG/DL (ref 0.8–1.3)
EKG ATRIAL RATE: 149 BPM
EKG DIAGNOSIS: NORMAL
EKG P AXIS: 80 DEGREES
EKG P-R INTERVAL: 122 MS
EKG Q-T INTERVAL: 318 MS
EKG QRS DURATION: 78 MS
EKG QTC CALCULATION (BAZETT): 500 MS
EKG R AXIS: -25 DEGREES
EKG T AXIS: 64 DEGREES
EKG VENTRICULAR RATE: 149 BPM
EOSINOPHILS ABSOLUTE: 0 K/UL (ref 0–0.6)
EOSINOPHILS RELATIVE PERCENT: 0 %
GFR AFRICAN AMERICAN: 30
GFR AFRICAN AMERICAN: 30
GFR AFRICAN AMERICAN: 33
GFR AFRICAN AMERICAN: 45
GFR NON-AFRICAN AMERICAN: 25
GFR NON-AFRICAN AMERICAN: 25
GFR NON-AFRICAN AMERICAN: 27
GFR NON-AFRICAN AMERICAN: 38
GLUCOSE BLD-MCNC: 114 MG/DL (ref 70–99)
GLUCOSE BLD-MCNC: 119 MG/DL (ref 70–99)
GLUCOSE BLD-MCNC: 120 MG/DL (ref 70–99)
GLUCOSE BLD-MCNC: 124 MG/DL (ref 70–99)
GLUCOSE BLD-MCNC: 143 MG/DL (ref 70–99)
GLUCOSE BLD-MCNC: 203 MG/DL (ref 70–99)
HCO3 ARTERIAL: 27.6 MMOL/L (ref 21–29)
HCO3 ARTERIAL: 28.8 MMOL/L (ref 21–29)
HCO3 ARTERIAL: 29.1 MMOL/L (ref 21–29)
HCO3 ARTERIAL: 31.5 MMOL/L (ref 21–29)
HCO3 ARTERIAL: 31.8 MMOL/L (ref 21–29)
HCT VFR BLD CALC: 22.2 % (ref 40.5–52.5)
HCT VFR BLD CALC: 23.1 % (ref 40.5–52.5)
HCT VFR BLD CALC: 29.8 % (ref 40.5–52.5)
HEMOGLOBIN: 10 G/DL (ref 13.5–17.5)
HEMOGLOBIN: 7.4 G/DL (ref 13.5–17.5)
HEMOGLOBIN: 7.6 G/DL (ref 13.5–17.5)
LACTATE: 1.16 MMOL/L (ref 0.4–2)
LACTATE: 1.54 MMOL/L (ref 0.4–2)
LACTATE: 2.08 MMOL/L (ref 0.4–2)
LYMPHOCYTES ABSOLUTE: 0.9 K/UL (ref 1–5.1)
LYMPHOCYTES RELATIVE PERCENT: 11 %
LYMPHOCYTES, BAL: 1 % (ref 5–10)
MCH RBC QN AUTO: 31.9 PG (ref 26–34)
MCHC RBC AUTO-ENTMCNC: 33.4 G/DL (ref 31–36)
MCV RBC AUTO: 95.6 FL (ref 80–100)
MONOCYTES ABSOLUTE: 0.2 K/UL (ref 0–1.3)
MONOCYTES RELATIVE PERCENT: 2 %
MONOCYTES, BAL: 1 %
NEUTROPHILS ABSOLUTE: 7.2 K/UL (ref 1.7–7.7)
NEUTROPHILS RELATIVE PERCENT: 80 %
NUMBER OF CELLS COUNTED BAL (LAVAGE): 200
O2 SAT, ARTERIAL: 87 % (ref 93–100)
O2 SAT, ARTERIAL: 93 % (ref 93–100)
O2 SAT, ARTERIAL: 96 % (ref 93–100)
O2 SAT, ARTERIAL: 97 % (ref 93–100)
O2 SAT, ARTERIAL: 97 % (ref 93–100)
PCO2 ARTERIAL: 31.7 MM HG (ref 35–45)
PCO2 ARTERIAL: 47.5 MM HG (ref 35–45)
PCO2 ARTERIAL: 63.3 MM HG (ref 35–45)
PCO2 ARTERIAL: 68.5 MM HG (ref 35–45)
PCO2 ARTERIAL: 70.2 MM HG (ref 35–45)
PDW BLD-RTO: 14.5 % (ref 12.4–15.4)
PERFORMED ON: ABNORMAL
PH ARTERIAL: 7.26 (ref 7.35–7.45)
PH ARTERIAL: 7.27 (ref 7.35–7.45)
PH ARTERIAL: 7.27 (ref 7.35–7.45)
PH ARTERIAL: 7.39 (ref 7.35–7.45)
PH ARTERIAL: 7.55 (ref 7.35–7.45)
PHOSPHORUS: 3.9 MG/DL (ref 2.5–4.9)
PHOSPHORUS: 4.3 MG/DL (ref 2.5–4.9)
PHOSPHORUS: 5.1 MG/DL (ref 2.5–4.9)
PHOSPHORUS: 5.5 MG/DL (ref 2.5–4.9)
PLATELET # BLD: 265 K/UL (ref 135–450)
PMV BLD AUTO: 9.7 FL (ref 5–10.5)
PO2 ARTERIAL: 105.3 MM HG (ref 75–108)
PO2 ARTERIAL: 109.5 MM HG (ref 75–108)
PO2 ARTERIAL: 44.7 MM HG (ref 75–108)
PO2 ARTERIAL: 67.8 MM HG (ref 75–108)
PO2 ARTERIAL: 93.3 MM HG (ref 75–108)
POC POTASSIUM: 5.6 MMOL/L (ref 3.5–5.1)
POC SAMPLE TYPE: ABNORMAL
POC SODIUM: 145 MMOL/L (ref 136–145)
POTASSIUM SERPL-SCNC: 3.5 MMOL/L (ref 3.5–5.1)
POTASSIUM SERPL-SCNC: 3.6 MMOL/L (ref 3.5–5.1)
POTASSIUM SERPL-SCNC: 4.1 MMOL/L (ref 3.5–5.1)
POTASSIUM SERPL-SCNC: 4.3 MMOL/L (ref 3.5–5.1)
RBC # BLD: 3.12 M/UL (ref 4.2–5.9)
RBC, BAL: 122 /CUMM
SEGMENTED NEUTROPHILS, BAL: 99 % (ref 5–10)
SODIUM BLD-SCNC: 141 MMOL/L (ref 136–145)
SODIUM BLD-SCNC: 143 MMOL/L (ref 136–145)
SODIUM BLD-SCNC: 144 MMOL/L (ref 136–145)
SODIUM BLD-SCNC: 149 MMOL/L (ref 136–145)
TCO2 ARTERIAL: 29 MMOL/L
TCO2 ARTERIAL: 31 MMOL/L
TCO2 ARTERIAL: 31 MMOL/L
TCO2 ARTERIAL: 34 MMOL/L
TCO2 ARTERIAL: 34 MMOL/L
WBC # BLD: 8.3 K/UL (ref 4–11)
WBC/EPI CELLS BAL: ABNORMAL /CUMM

## 2019-07-29 PROCEDURE — 2580000003 HC RX 258: Performed by: INTERNAL MEDICINE

## 2019-07-29 PROCEDURE — 87633 RESP VIRUS 12-25 TARGETS: CPT

## 2019-07-29 PROCEDURE — 6360000002 HC RX W HCPCS: Performed by: INTERNAL MEDICINE

## 2019-07-29 PROCEDURE — 2580000003 HC RX 258: Performed by: SURGERY

## 2019-07-29 PROCEDURE — 2580000003 HC RX 258

## 2019-07-29 PROCEDURE — 6360000002 HC RX W HCPCS: Performed by: STUDENT IN AN ORGANIZED HEALTH CARE EDUCATION/TRAINING PROGRAM

## 2019-07-29 PROCEDURE — P9016 RBC LEUKOCYTES REDUCED: HCPCS

## 2019-07-29 PROCEDURE — 84132 ASSAY OF SERUM POTASSIUM: CPT

## 2019-07-29 PROCEDURE — 2500000003 HC RX 250 WO HCPCS: Performed by: INTERNAL MEDICINE

## 2019-07-29 PROCEDURE — 94770 HC ETCO2 MONITOR DAILY: CPT

## 2019-07-29 PROCEDURE — 2000000000 HC ICU R&B

## 2019-07-29 PROCEDURE — 2500000003 HC RX 250 WO HCPCS: Performed by: STUDENT IN AN ORGANIZED HEALTH CARE EDUCATION/TRAINING PROGRAM

## 2019-07-29 PROCEDURE — 89051 BODY FLUID CELL COUNT: CPT

## 2019-07-29 PROCEDURE — 0BH17EZ INSERTION OF ENDOTRACHEAL AIRWAY INTO TRACHEA, VIA NATURAL OR ARTIFICIAL OPENING: ICD-10-PCS | Performed by: INTERNAL MEDICINE

## 2019-07-29 PROCEDURE — 87641 MR-STAPH DNA AMP PROBE: CPT

## 2019-07-29 PROCEDURE — 85025 COMPLETE CBC W/AUTO DIFF WBC: CPT

## 2019-07-29 PROCEDURE — 82803 BLOOD GASES ANY COMBINATION: CPT

## 2019-07-29 PROCEDURE — 6370000000 HC RX 637 (ALT 250 FOR IP): Performed by: INTERNAL MEDICINE

## 2019-07-29 PROCEDURE — 36620 INSERTION CATHETER ARTERY: CPT

## 2019-07-29 PROCEDURE — 87486 CHLMYD PNEUM DNA AMP PROBE: CPT

## 2019-07-29 PROCEDURE — 94750 HC PULMONARY COMPLIANCE STUDY: CPT

## 2019-07-29 PROCEDURE — 36415 COLL VENOUS BLD VENIPUNCTURE: CPT

## 2019-07-29 PROCEDURE — 36600 WITHDRAWAL OF ARTERIAL BLOOD: CPT

## 2019-07-29 PROCEDURE — 87581 M.PNEUMON DNA AMP PROBE: CPT

## 2019-07-29 PROCEDURE — 31645 BRNCHSC W/THER ASPIR 1ST: CPT | Performed by: INTERNAL MEDICINE

## 2019-07-29 PROCEDURE — 94640 AIRWAY INHALATION TREATMENT: CPT

## 2019-07-29 PROCEDURE — 87541 LEGION PNEUMO DNA AMP PROB: CPT

## 2019-07-29 PROCEDURE — 36592 COLLECT BLOOD FROM PICC: CPT

## 2019-07-29 PROCEDURE — 83605 ASSAY OF LACTIC ACID: CPT

## 2019-07-29 PROCEDURE — 93010 ELECTROCARDIOGRAM REPORT: CPT | Performed by: INTERNAL MEDICINE

## 2019-07-29 PROCEDURE — 94002 VENT MGMT INPAT INIT DAY: CPT

## 2019-07-29 PROCEDURE — C9113 INJ PANTOPRAZOLE SODIUM, VIA: HCPCS | Performed by: SURGERY

## 2019-07-29 PROCEDURE — 5A1955Z RESPIRATORY VENTILATION, GREATER THAN 96 CONSECUTIVE HOURS: ICD-10-PCS | Performed by: INTERNAL MEDICINE

## 2019-07-29 PROCEDURE — 31500 INSERT EMERGENCY AIRWAY: CPT

## 2019-07-29 PROCEDURE — C9113 INJ PANTOPRAZOLE SODIUM, VIA: HCPCS | Performed by: STUDENT IN AN ORGANIZED HEALTH CARE EDUCATION/TRAINING PROGRAM

## 2019-07-29 PROCEDURE — 2580000003 HC RX 258: Performed by: STUDENT IN AN ORGANIZED HEALTH CARE EDUCATION/TRAINING PROGRAM

## 2019-07-29 PROCEDURE — 71045 X-RAY EXAM CHEST 1 VIEW: CPT

## 2019-07-29 PROCEDURE — 2500000003 HC RX 250 WO HCPCS

## 2019-07-29 PROCEDURE — 6360000002 HC RX W HCPCS

## 2019-07-29 PROCEDURE — 82947 ASSAY GLUCOSE BLOOD QUANT: CPT

## 2019-07-29 PROCEDURE — 0BC68ZZ EXTIRPATION OF MATTER FROM RIGHT LOWER LOBE BRONCHUS, VIA NATURAL OR ARTIFICIAL OPENING ENDOSCOPIC: ICD-10-PCS | Performed by: INTERNAL MEDICINE

## 2019-07-29 PROCEDURE — 6360000002 HC RX W HCPCS: Performed by: SURGERY

## 2019-07-29 PROCEDURE — 94761 N-INVAS EAR/PLS OXIMETRY MLT: CPT

## 2019-07-29 PROCEDURE — 84295 ASSAY OF SERUM SODIUM: CPT

## 2019-07-29 PROCEDURE — 86900 BLOOD TYPING SEROLOGIC ABO: CPT

## 2019-07-29 PROCEDURE — 87798 DETECT AGENT NOS DNA AMP: CPT

## 2019-07-29 PROCEDURE — 86901 BLOOD TYPING SEROLOGIC RH(D): CPT

## 2019-07-29 PROCEDURE — 86923 COMPATIBILITY TEST ELECTRIC: CPT

## 2019-07-29 PROCEDURE — 86850 RBC ANTIBODY SCREEN: CPT

## 2019-07-29 PROCEDURE — 0B9F8ZX DRAINAGE OF RIGHT LOWER LUNG LOBE, VIA NATURAL OR ARTIFICIAL OPENING ENDOSCOPIC, DIAGNOSTIC: ICD-10-PCS | Performed by: INTERNAL MEDICINE

## 2019-07-29 PROCEDURE — 93005 ELECTROCARDIOGRAM TRACING: CPT | Performed by: STUDENT IN AN ORGANIZED HEALTH CARE EDUCATION/TRAINING PROGRAM

## 2019-07-29 PROCEDURE — 99233 SBSQ HOSP IP/OBS HIGH 50: CPT | Performed by: INTERNAL MEDICINE

## 2019-07-29 PROCEDURE — 87651 STREP A DNA AMP PROBE: CPT

## 2019-07-29 PROCEDURE — 85018 HEMOGLOBIN: CPT

## 2019-07-29 PROCEDURE — 82330 ASSAY OF CALCIUM: CPT

## 2019-07-29 PROCEDURE — 31624 DX BRONCHOSCOPE/LAVAGE: CPT | Performed by: INTERNAL MEDICINE

## 2019-07-29 PROCEDURE — 80069 RENAL FUNCTION PANEL: CPT

## 2019-07-29 PROCEDURE — 85014 HEMATOCRIT: CPT

## 2019-07-29 PROCEDURE — 2700000000 HC OXYGEN THERAPY PER DAY

## 2019-07-29 RX ORDER — SODIUM CHLORIDE, SODIUM LACTATE, POTASSIUM CHLORIDE, CALCIUM CHLORIDE 600; 310; 30; 20 MG/100ML; MG/100ML; MG/100ML; MG/100ML
INJECTION, SOLUTION INTRAVENOUS
Status: DISPENSED
Start: 2019-07-29 | End: 2019-07-29

## 2019-07-29 RX ORDER — CHLORHEXIDINE GLUCONATE 0.12 MG/ML
15 RINSE ORAL 2 TIMES DAILY
Status: DISCONTINUED | OUTPATIENT
Start: 2019-07-29 | End: 2019-08-20 | Stop reason: HOSPADM

## 2019-07-29 RX ORDER — SODIUM CHLORIDE, SODIUM LACTATE, POTASSIUM CHLORIDE, AND CALCIUM CHLORIDE .6; .31; .03; .02 G/100ML; G/100ML; G/100ML; G/100ML
1000 INJECTION, SOLUTION INTRAVENOUS ONCE
Status: COMPLETED | OUTPATIENT
Start: 2019-07-29 | End: 2019-07-29

## 2019-07-29 RX ORDER — PROPOFOL 10 MG/ML
INJECTION, EMULSION INTRAVENOUS
Status: DISCONTINUED
Start: 2019-07-29 | End: 2019-07-29

## 2019-07-29 RX ORDER — SODIUM CHLORIDE, SODIUM LACTATE, POTASSIUM CHLORIDE, AND CALCIUM CHLORIDE .6; .31; .03; .02 G/100ML; G/100ML; G/100ML; G/100ML
500 INJECTION, SOLUTION INTRAVENOUS ONCE
Status: COMPLETED | OUTPATIENT
Start: 2019-07-29 | End: 2019-07-29

## 2019-07-29 RX ORDER — PROPOFOL 10 MG/ML
10 INJECTION, EMULSION INTRAVENOUS
Status: DISCONTINUED | OUTPATIENT
Start: 2019-07-29 | End: 2019-07-30

## 2019-07-29 RX ORDER — PANTOPRAZOLE SODIUM 40 MG/1
40 GRANULE, DELAYED RELEASE ORAL
Status: DISCONTINUED | OUTPATIENT
Start: 2019-07-30 | End: 2019-07-29

## 2019-07-29 RX ORDER — PANTOPRAZOLE SODIUM 40 MG/10ML
40 INJECTION, POWDER, LYOPHILIZED, FOR SOLUTION INTRAVENOUS 2 TIMES DAILY
Status: DISCONTINUED | OUTPATIENT
Start: 2019-07-29 | End: 2019-07-29

## 2019-07-29 RX ORDER — SODIUM CHLORIDE, SODIUM LACTATE, POTASSIUM CHLORIDE, CALCIUM CHLORIDE 600; 310; 30; 20 MG/100ML; MG/100ML; MG/100ML; MG/100ML
INJECTION, SOLUTION INTRAVENOUS CONTINUOUS
Status: DISCONTINUED | OUTPATIENT
Start: 2019-07-29 | End: 2019-07-31

## 2019-07-29 RX ORDER — NOREPINEPHRINE BITARTRATE 1 MG/ML
INJECTION, SOLUTION INTRAVENOUS
Status: DISCONTINUED
Start: 2019-07-29 | End: 2019-07-29

## 2019-07-29 RX ORDER — SODIUM CHLORIDE 9 MG/ML
INJECTION, SOLUTION INTRAVENOUS
Status: COMPLETED
Start: 2019-07-29 | End: 2019-07-29

## 2019-07-29 RX ORDER — ETOMIDATE 2 MG/ML
INJECTION INTRAVENOUS
Status: COMPLETED
Start: 2019-07-29 | End: 2019-07-29

## 2019-07-29 RX ORDER — SODIUM CHLORIDE 9 MG/ML
INJECTION, SOLUTION INTRAVENOUS
Status: DISCONTINUED
Start: 2019-07-29 | End: 2019-07-29

## 2019-07-29 RX ORDER — METOPROLOL TARTRATE 5 MG/5ML
INJECTION INTRAVENOUS
Status: COMPLETED
Start: 2019-07-29 | End: 2019-07-29

## 2019-07-29 RX ORDER — PROPOFOL 10 MG/ML
INJECTION, EMULSION INTRAVENOUS
Status: COMPLETED
Start: 2019-07-29 | End: 2019-07-29

## 2019-07-29 RX ORDER — METOPROLOL TARTRATE 5 MG/5ML
5 INJECTION INTRAVENOUS ONCE
Status: COMPLETED | OUTPATIENT
Start: 2019-07-29 | End: 2019-07-29

## 2019-07-29 RX ORDER — 0.9 % SODIUM CHLORIDE 0.9 %
250 INTRAVENOUS SOLUTION INTRAVENOUS ONCE
Status: COMPLETED | OUTPATIENT
Start: 2019-07-29 | End: 2019-07-30

## 2019-07-29 RX ORDER — PANTOPRAZOLE SODIUM 40 MG/10ML
40 INJECTION, POWDER, LYOPHILIZED, FOR SOLUTION INTRAVENOUS DAILY
Status: DISCONTINUED | OUTPATIENT
Start: 2019-07-29 | End: 2019-07-29 | Stop reason: ALTCHOICE

## 2019-07-29 RX ORDER — DEXTROSE MONOHYDRATE 50 MG/ML
INJECTION, SOLUTION INTRAVENOUS
Status: DISCONTINUED
Start: 2019-07-29 | End: 2019-07-29

## 2019-07-29 RX ADMIN — METOPROLOL TARTRATE 5 MG: 5 INJECTION INTRAVENOUS at 04:12

## 2019-07-29 RX ADMIN — PIPERACILLIN SODIUM,TAZOBACTAM SODIUM 3.38 G: 3; .375 INJECTION, POWDER, FOR SOLUTION INTRAVENOUS at 08:02

## 2019-07-29 RX ADMIN — SODIUM CHLORIDE 250 ML: 9 INJECTION, SOLUTION INTRAVENOUS at 20:30

## 2019-07-29 RX ADMIN — SODIUM CHLORIDE, POTASSIUM CHLORIDE, SODIUM LACTATE AND CALCIUM CHLORIDE: 600; 310; 30; 20 INJECTION, SOLUTION INTRAVENOUS at 21:59

## 2019-07-29 RX ADMIN — HEPARIN SODIUM 5000 UNITS: 5000 INJECTION INTRAVENOUS; SUBCUTANEOUS at 21:37

## 2019-07-29 RX ADMIN — SODIUM CHLORIDE, POTASSIUM CHLORIDE, SODIUM LACTATE AND CALCIUM CHLORIDE 1000 ML: 600; 310; 30; 20 INJECTION, SOLUTION INTRAVENOUS at 09:24

## 2019-07-29 RX ADMIN — PROPOFOL 50 MCG/KG/MIN: 10 INJECTION, EMULSION INTRAVENOUS at 05:07

## 2019-07-29 RX ADMIN — LEVALBUTEROL HYDROCHLORIDE 1.25 MG: 1.25 SOLUTION, CONCENTRATE RESPIRATORY (INHALATION) at 16:02

## 2019-07-29 RX ADMIN — SODIUM CHLORIDE, POTASSIUM CHLORIDE, SODIUM LACTATE AND CALCIUM CHLORIDE: 600; 310; 30; 20 INJECTION, SOLUTION INTRAVENOUS at 09:24

## 2019-07-29 RX ADMIN — NOREPINEPHRINE BITARTRATE 18 MCG/MIN: 1 INJECTION INTRAVENOUS at 17:16

## 2019-07-29 RX ADMIN — PHENYLEPHRINE HYDROCHLORIDE 100 MCG/MIN: 10 INJECTION INTRAVENOUS at 09:48

## 2019-07-29 RX ADMIN — SODIUM CHLORIDE: 9 INJECTION, SOLUTION INTRAVENOUS at 09:57

## 2019-07-29 RX ADMIN — DEXMEDETOMIDINE 1.4 MCG/KG/HR: 100 INJECTION, SOLUTION, CONCENTRATE INTRAVENOUS at 12:44

## 2019-07-29 RX ADMIN — VASOPRESSIN 0.04 UNITS/MIN: 20 INJECTION INTRAVENOUS at 07:22

## 2019-07-29 RX ADMIN — PROPOFOL 40 MCG/KG/MIN: 10 INJECTION, EMULSION INTRAVENOUS at 18:34

## 2019-07-29 RX ADMIN — LORAZEPAM 2 MG: 2 INJECTION INTRAMUSCULAR; INTRAVENOUS at 02:45

## 2019-07-29 RX ADMIN — METRONIDAZOLE 500 MG: 500 INJECTION, SOLUTION INTRAVENOUS at 14:22

## 2019-07-29 RX ADMIN — METRONIDAZOLE 500 MG: 500 INJECTION, SOLUTION INTRAVENOUS at 21:37

## 2019-07-29 RX ADMIN — ATORVASTATIN CALCIUM 20 MG: 20 TABLET, FILM COATED ORAL at 21:37

## 2019-07-29 RX ADMIN — PANTOPRAZOLE SODIUM 40 MG: 40 INJECTION, POWDER, LYOPHILIZED, FOR SOLUTION INTRAVENOUS at 08:03

## 2019-07-29 RX ADMIN — SODIUM CHLORIDE, POTASSIUM CHLORIDE, SODIUM LACTATE AND CALCIUM CHLORIDE 1000 ML: 600; 310; 30; 20 INJECTION, SOLUTION INTRAVENOUS at 08:19

## 2019-07-29 RX ADMIN — METOPROLOL TARTRATE 5 MG: 5 INJECTION, SOLUTION INTRAVENOUS at 04:12

## 2019-07-29 RX ADMIN — CEFEPIME HYDROCHLORIDE 2 G: 2 INJECTION, POWDER, FOR SOLUTION INTRAVENOUS at 13:51

## 2019-07-29 RX ADMIN — LEVALBUTEROL HYDROCHLORIDE 1.25 MG: 1.25 SOLUTION, CONCENTRATE RESPIRATORY (INHALATION) at 07:55

## 2019-07-29 RX ADMIN — SODIUM CHLORIDE, POTASSIUM CHLORIDE, SODIUM LACTATE AND CALCIUM CHLORIDE 1000 ML: 600; 310; 30; 20 INJECTION, SOLUTION INTRAVENOUS at 18:25

## 2019-07-29 RX ADMIN — ETOMIDATE 20 MG: 2 INJECTION, SOLUTION INTRAVENOUS at 04:11

## 2019-07-29 RX ADMIN — DEXMEDETOMIDINE 1.4 MCG/KG/HR: 100 INJECTION, SOLUTION, CONCENTRATE INTRAVENOUS at 22:58

## 2019-07-29 RX ADMIN — SODIUM CHLORIDE 8 MG/HR: 9 INJECTION, SOLUTION INTRAVENOUS at 21:54

## 2019-07-29 RX ADMIN — PROPOFOL 40 MCG/KG/MIN: 10 INJECTION, EMULSION INTRAVENOUS at 13:50

## 2019-07-29 RX ADMIN — PHENYLEPHRINE HYDROCHLORIDE 300 MCG/MIN: 10 INJECTION INTRAVENOUS at 06:15

## 2019-07-29 RX ADMIN — THIAMINE HYDROCHLORIDE 500 MG: 100 INJECTION, SOLUTION INTRAMUSCULAR; INTRAVENOUS at 07:35

## 2019-07-29 RX ADMIN — VASOPRESSIN 0.04 UNITS/MIN: 20 INJECTION INTRAVENOUS at 22:09

## 2019-07-29 RX ADMIN — HEPARIN SODIUM 5000 UNITS: 5000 INJECTION INTRAVENOUS; SUBCUTANEOUS at 14:13

## 2019-07-29 RX ADMIN — VANCOMYCIN HYDROCHLORIDE 1000 MG: 10 INJECTION, POWDER, LYOPHILIZED, FOR SOLUTION INTRAVENOUS at 00:16

## 2019-07-29 RX ADMIN — SODIUM CHLORIDE, POTASSIUM CHLORIDE, SODIUM LACTATE AND CALCIUM CHLORIDE 1000 ML: 600; 310; 30; 20 INJECTION, SOLUTION INTRAVENOUS at 14:22

## 2019-07-29 RX ADMIN — LEVALBUTEROL HYDROCHLORIDE 1.25 MG: 1.25 SOLUTION, CONCENTRATE RESPIRATORY (INHALATION) at 20:42

## 2019-07-29 RX ADMIN — DEXMEDETOMIDINE 1.4 MCG/KG/HR: 100 INJECTION, SOLUTION, CONCENTRATE INTRAVENOUS at 02:52

## 2019-07-29 RX ADMIN — PROPOFOL 30 MCG/KG/MIN: 10 INJECTION, EMULSION INTRAVENOUS at 03:52

## 2019-07-29 RX ADMIN — SODIUM CHLORIDE 1 MCG/KG/MIN: 9 INJECTION, SOLUTION INTRAVENOUS at 03:53

## 2019-07-29 RX ADMIN — Medication 15 ML: at 21:37

## 2019-07-29 RX ADMIN — HEPARIN SODIUM 5000 UNITS: 5000 INJECTION INTRAVENOUS; SUBCUTANEOUS at 06:43

## 2019-07-29 RX ADMIN — PROPOFOL 45 MCG/KG/MIN: 10 INJECTION, EMULSION INTRAVENOUS at 06:20

## 2019-07-29 RX ADMIN — NOREPINEPHRINE BITARTRATE 30 MCG/MIN: 1 INJECTION INTRAVENOUS at 06:45

## 2019-07-29 RX ADMIN — DEXMEDETOMIDINE 1.4 MCG/KG/HR: 100 INJECTION, SOLUTION, CONCENTRATE INTRAVENOUS at 17:43

## 2019-07-29 RX ADMIN — PHENYLEPHRINE HYDROCHLORIDE 125 MCG/MIN: 10 INJECTION INTRAVENOUS at 02:55

## 2019-07-29 RX ADMIN — LEVALBUTEROL HYDROCHLORIDE 1.25 MG: 1.25 SOLUTION, CONCENTRATE RESPIRATORY (INHALATION) at 12:06

## 2019-07-29 RX ADMIN — SODIUM CHLORIDE, POTASSIUM CHLORIDE, SODIUM LACTATE AND CALCIUM CHLORIDE 500 ML: 600; 310; 30; 20 INJECTION, SOLUTION INTRAVENOUS at 05:55

## 2019-07-29 RX ADMIN — VASOPRESSIN 0.04 UNITS/MIN: 20 INJECTION INTRAVENOUS at 14:25

## 2019-07-29 RX ADMIN — DEXMEDETOMIDINE 1.4 MCG/KG/HR: 100 INJECTION, SOLUTION, CONCENTRATE INTRAVENOUS at 08:03

## 2019-07-29 ASSESSMENT — PULMONARY FUNCTION TESTS
PIF_VALUE: 28
PIF_VALUE: 28
PIF_VALUE: 26
PIF_VALUE: 27
PIF_VALUE: 28
PIF_VALUE: 29
PIF_VALUE: 28
PIF_VALUE: 29
PIF_VALUE: 28
PIF_VALUE: 28
PIF_VALUE: 27
PIF_VALUE: 27
PIF_VALUE: 28
PIF_VALUE: 26
PIF_VALUE: 29
PIF_VALUE: 27
PIF_VALUE: 29
PIF_VALUE: 27
PIF_VALUE: 28
PIF_VALUE: 30
PIF_VALUE: 28
PIF_VALUE: 22
PIF_VALUE: 27
PIF_VALUE: 27
PIF_VALUE: 30
PIF_VALUE: 26
PIF_VALUE: 28
PIF_VALUE: 27
PIF_VALUE: 29
PIF_VALUE: 26
PIF_VALUE: 26
PIF_VALUE: 31
PIF_VALUE: 28
PIF_VALUE: 27
PIF_VALUE: 29
PIF_VALUE: 29
PIF_VALUE: 27
PIF_VALUE: 27
PIF_VALUE: 28
PIF_VALUE: 28
PIF_VALUE: 27
PIF_VALUE: 29
PIF_VALUE: 28
PIF_VALUE: 28
PIF_VALUE: 29
PIF_VALUE: 28
PIF_VALUE: 32

## 2019-07-29 NOTE — CONSULTS
Clinical Pharmacy Consult Note    Admit date: 7/22/2019    Subjective/Objective:  71 yo male with hx of HTN, HLD, opioid dependence d/t cervical DDD, and alcohol abuse is admitted with AVIVA, alcohol withdrawal and now multifocal pneumonia. Pharmacy is consulted to dose Vancomycin per Yesika Sharp. Pertinent Medications:  Zosyn 3.375 gm every 8 hours ext. Infusion - day #1  Vancomycin 1000 mg IVx1    PERTINENT LABS:  Recent Labs     07/28/19  1348 07/28/19  1954   * 147*   K 3.2* 3.6    107   CO2 26 26   PHOS 2.8 1.7*   BUN 41* 41*   CREATININE 1.4* 1.3       Estimated Creatinine Clearance: 44 mL/min (based on SCr of 1.3 mg/dL). Recent Labs     07/27/19  0357 07/28/19  0434   WBC 9.7 10.6   HGB 9.2* 10.3*   HCT 27.2* 30.9*   MCV 95.6 97.7    227       Height:  5' 5\" (165.1 cm)  Weight: 128 lb 1.4 oz (58.1 kg)    Micro:   7/28/19 - Blood cx is pending  7/23/19 - Blood cx 2/2 no growth after 5 days  7/27/19 - No growth to date  7/25/19 - Legionella antgen urine - Negative      Assessment/Plan:  Vancomycin  · Will start therapy with vancomycin 1000 mg IVx1. Subsequently doses will be given intermittently d/t AVIVA  · Clinical pharmacist will follow-up in AM.  · Renal function will be monitored closely and dosing will be adjusted as appropriate. Please call with any questions. Thank you for consulting pharmacy!   Alma Medina Rph    7/28/2019 11:04 PM

## 2019-07-29 NOTE — PROGRESS NOTES
Pt continues with a RR in the 40s with vent settings at 12 RR. Using accessory muscles to breathe. Skin appears mottled and cold. At 0320 Nimbex bolus of 9 mg given and gtt started at 1 mcg/kg/min.

## 2019-07-29 NOTE — PROGRESS NOTES
or aspiration. This appears worse since the previous study            XR ABDOMEN (KUB) (SINGLE AP VIEW)   Final Result      Nasogastric tube extending into the left upper quadrant. XR CHEST PORTABLE   Final Result      Persistent right basilar infiltrate. XR CHEST 1 VW   Final Result      Tip of the right IJ central venous catheter overlies lower SVC with no pneumothorax evident. CT HEAD WO CONTRAST   Final Result      1. No acute intracranial process. XR CHEST PORTABLE   Final Result      Interval development of bilateral lower lobe opacities may relate to edema or pneumonia      CT SOFT TISSUE NECK WO CONTRAST   Final Result   Unremarkable CT neck          XR CHEST PORTABLE   Final Result     Normal chest x-ray. CT ABDOMEN PELVIS WO CONTRAST Additional Contrast? None   Final Result   No acute abnormality demonstrated in the abdomen or pelvis. Assessment/Plan:    Active Hospital Problems    Diagnosis    Hypoxemia [R09.02]    Pulmonary edema cardiac cause (HCC) [I50.1]    Acute encephalopathy [G93.40]    Alcohol withdrawal syndrome, with delirium ([address] [F10.231]    Narcotic dependence ([address] [F11.20]    AVIVA (acute kidney injury) ([address] [N17.9]    Acute kidney failure ([address] [N17.9]     1. Alcohol withdrawal with DT: Continue monitoring in ICU - requiring ativan , continue thiamine     2. Metabolic encephalopathy in the setting of alcohol withdrawal DT requiring Precedex and Ativan    3. AVIVA with hyperkalemia on admission: Managed by renal     4. Hypoxic resp failure likely from volume overload , continue with diuresis     5.elevated troponin: likely demand ischemia  Wall motion abnormalities on ECHO noted   Cardiology following   Ischemic evaluation once clinically stable     6. Hypernatremia: managed by renal     7.  Fevers : thought to be from alcohol withdrawal  But if resp status not improving on diuresis and fevers , we need to consider starting antibiotics for possible pneumonia        DVT Prophylaxis: SCD's  Diet: DIET TUBE FEED CONTINUOUS/CYCLIC NPO; STANDARD WITH FIBER (Jevity 1.2); Nasogastric; Continuous; 20; 60; 24  Code Status: Full Code      Dispo - ICU mgmt.      Alta Aguilar MD   [facility]  Cell: [phone]

## 2019-07-29 NOTE — PROGRESS NOTES
0600  Gross per 24 hour   Intake 3819.21 ml   Output 2615 ml   Net 1204.21 ml       Physical Exam Performed:    /66   Pulse 120   Temp 99.8 °F (37.7 °C) (Axillary)   Resp 26   Ht 5' 5\" (1.651 m)   Wt 128 lb 1.4 oz (58.1 kg)   SpO2 98%   BMI 21.31 kg/m²     General appearance: NAD  HEENT: Pupils equal, round, and reactive to light. Conjunctivae/corneas clear. Neck: Supple, with full range of motion. No jugular venous distention. Trachea midline. Respiratory:  Normal respiratory effort. Clear to auscultation, bilaterally without Rales/Wheezes/Rhonchi. Cardiovascular: tachycardic  Abdomen: Soft, non-tender, non-distended with normal bowel sounds. Neuro: Unable to assess  Psych: Unable to assess due to sedated status     Labs:   Recent Labs     07/27/19  0357 07/28/19  0434 07/29/19  0338   WBC 9.7 10.6 8.3   HGB 9.2* 10.3* 10.0*   HCT 27.2* 30.9* 29.8*    227 265     Recent Labs     07/28/19  1348 07/28/19  1954 07/29/19  0338   * 147* 149*   K 3.2* 3.6 3.6    107 107   CO2 26 26 28   BUN 41* 41* 51*   CREATININE 1.4* 1.3 1.8*   CALCIUM 9.0 9.4 8.8   PHOS 2.8 1.7* 4.3     No results for input(s): AST, ALT, BILIDIR, BILITOT, ALKPHOS in the last 72 hours. No results for input(s): INR in the last 72 hours. No results for input(s): Kezia Sarmad in the last 72 hours. Urinalysis:      Lab Results   Component Value Date    NITRU Negative 07/27/2019    WBCUA 0-2 07/27/2019    RBCUA 20-50 07/27/2019    BLOODU LARGE 07/27/2019    SPECGRAV 1.020 07/27/2019    GLUCOSEU Negative 07/27/2019       Radiology:  XR CHEST PORTABLE   Final Result      New consolidation in the right lower lung compatible with atelectasis or pneumonia. Aspiration is in the differential diagnosis. Prominent interstitial markings in a perihilar distribution again noted. Stable cardiac mediastinal silhouette. Lines and tubes without change.       XR CHEST PORTABLE   Final Result   Addendum 1 of 1 Patient: Tessie Travis  Time Out: 02:22   Exam(s): FILM CXR 1 VIEW        ADDENDUM - Added by Laureano Walker MD on 7/29/2019 2:22 AM (-07:00)   IMPRESSION:        ET tube terminates 6.1 cm  FROM  the fransico.   ----------------------------------------------------------------------       EXAM:     XR Chest, 1 View       CLINICAL HISTORY:      Reason for exam: intubation. Reason for exam:->intubation. TECHNIQUE:     Frontal view of the chest.       COMPARISON:       Chest x-ray 7/20/19       IMPRESSION:        ET tube terminates 6.1 cm in the fransico. Final      CT CHEST WO CONTRAST   Final Result      1. Small bilateral pleural effusions. Multifocal pneumonia in the right upper and bilateral lower lobes. 2. 13 mm right upper lobe spiculated nodule suspicious for malignancy. Recommend PET/CT or biopsy. Additional nonspecific areas of nodular consolidation in the right lower lobe. Qzxv      XR CHEST PORTABLE   Final Result   1. Persistent but improved central pulmonary vascular congestion with    diffusely increased interstitial markings still present. 2.  Mildly decreased right greater than left pleural effusions. 3.  Right greater than left basilar atelectasis versus consolidation. 4.  No pneumothorax radiographically evident. 5.  Tubes/lines as above. XR CHEST PORTABLE   Final Result      1. Persistent hazy perihilar, groundglass basilar consolidations and pleural effusions greater in the right lung   2. NG tube in place as well as a right IJ central venous catheter with the tip in the mid to distal SVC, no worsening               XR CHEST PORTABLE   Final Result      Introduction of NG tube with tip excluded from the inferior edge of the film. Moderate bilateral effusions and diffuse groundglass opacities consistent with edema, infection or aspiration.  This appears worse since the previous study            XR ABDOMEN (KUB) (SINGLE AP VIEW)   Final Result      Nasogastric tube extending into the left upper quadrant. XR CHEST PORTABLE   Final Result      Persistent right basilar infiltrate. XR CHEST 1 VW   Final Result      Tip of the right IJ central venous catheter overlies lower SVC with no pneumothorax evident. CT HEAD WO CONTRAST   Final Result      1. No acute intracranial process. XR CHEST PORTABLE   Final Result      Interval development of bilateral lower lobe opacities may relate to edema or pneumonia      CT SOFT TISSUE NECK WO CONTRAST   Final Result   Unremarkable CT neck          XR CHEST PORTABLE   Final Result     Normal chest x-ray. CT ABDOMEN PELVIS WO CONTRAST Additional Contrast? None   Final Result   No acute abnormality demonstrated in the abdomen or pelvis. Assessment/Plan:    Active Hospital Problems    Diagnosis    Hypoxemia [R09.02]    Pulmonary edema cardiac cause (HCC) [I50.1]    Acute encephalopathy [G93.40]    Alcohol withdrawal syndrome, with delirium ([address] [F10.231]    Narcotic dependence ([address] [F11.20]    AVIVA (acute kidney injury) ([address] [N17.9]    Acute kidney failure ([address] [N17.9]     1. Alcohol withdrawal with DT: Continue monitoring in ICU -   Continue CIWA protocol, continue high-dose 500 mg every 8 hours timing intravenous    2. Metabolic encephalopathy in the setting of alcohol withdrawal DT  Continue precedex    3. Acute hypoxic resp failure likely from aspiration pneumonia likely due to gram-negative/anaerobic organisms, fluid overload  -CT chest without contrast with small bilateral effusions, multifocal pneumonia in the right upper and bilateral lobes concerning for aspiration  - fever 102.8 overnight, started on broad spectrum coverage with Vancomycin and Zosyn  - Plan for bronchoscopy for further evaluation per pulmonary    4. Septic Shock in the setting of underlying aspiration pneumonia  - On 3 pressors, weaning for MAP 65  - Check Cortisol level in am    5.  AVIVA; Cr worse today to 2.4 this

## 2019-07-29 NOTE — PROGRESS NOTES
was  agitated, pulling his lines, was trying to jump out of the window, and  is sedated and unable to give any details.     Reviewed admission H and P and other details. He has a history of  hypertension, opioid dependence, alcoholism, cervical degenerative  disease for which apparently he has some surgery, presents with fatigue,  nausea, vomiting, poor appetite, hoarseness of the voice for several  months, recent 10 pounds of weight loss.     Apparently, he has problem with the hoarseness and has videostroboscopy  with laryngoscopy in 07/2018 by ENT. Possible mass seen in the vocal  cord. Recently, he also has an esophagram.  Mild dysmotility was seen. The patient drinks lot of milk shakes mixing with vodka.     In the ER, his vitals were stable, but has abnormal lab that described  above.     I am seeing in his room where unable get any history. Interval History :     And intubated  On ventilation  Also sedated \    Seen with - no family  ROS -     Unable to obtain ROS due to intubation      Vitals:    07/29/19 1000   BP: (!) 128/59   Pulse: 107   Resp: 26   Temp:    SpO2: 100%         I/O last 3 completed shifts: In: 3909.2 [I.V.:2024.2; NG/GT:1885]  Out: 7628 [Urine:2615]      General appearance: unresponsive, intubated   HEENT: Lips- normal, teeth- ok , oral mucosa- moist  Neck : Mass- no, appears symmetrical, JVD- not raised  Respiratory: Respiratory effort-  On ventilation- FiO2- 100 %  wheeze- no, crackles - no  Cardiovascular:  Ausculation- No M/R/G, Edema none  Abdomen: visible mass- no, distention- no, scar- no, tenderness- unable to assess                           hepatosplenomegaly-  No--  Musculoskeletal:  clubbing no,cyanosis- no , digital ischemia- no                           muscle strength- patient unable to co-operate     , tone - patient unable to co-operate   Skin: rashes- no , ulcers- no, induration- no, tightening - no  Psychiatric:   Intubated, unable to assess  Has resendiz- Ramiro ml urine     .l  Lab Results   Component Value Date    CREATININE 2.4 (H) 07/29/2019    BUN 55 (H) 07/29/2019     07/29/2019    K 3.5 07/29/2019     07/29/2019    CO2 24 07/29/2019     Lab Results   Component Value Date    WBC 8.3 07/29/2019    HGB 10.0 (L) 07/29/2019    HCT 29.8 (L) 07/29/2019    MCV 95.6 07/29/2019     07/29/2019

## 2019-07-29 NOTE — PROGRESS NOTES
cisatracurium (NIMBEX) infusion Stopped (07/29/19 1408)    propofol 40 mcg/kg/min (07/29/19 1350)    norepinephrine 20 mcg/min (07/29/19 1511)    vasopressin (Septic Shock) infusion 0.04 Units/min (07/29/19 1425)    lactated ringers      lactated ringers 100 mL/hr at 07/29/19 0924    dexmedetomidine (PRECEDEX) IV infusion 1.4 mcg/kg/hr (07/29/19 1244)    dextrose       PRN Meds:acetaminophen, medicated lip ointment, aspirin, glucose, dextrose, glucagon (rDNA), dextrose, ondansetron    Objective:   Vitals:   T-max:  Patient Vitals for the past 8 hrs:   BP Temp Temp src Pulse Resp SpO2   07/29/19 1500 116/76 -- -- 100 26 100 %   07/29/19 1430 -- -- -- 95 26 100 %   07/29/19 1415 -- -- -- 106 26 98 %   07/29/19 1400 (!) 100/58 -- -- 111 26 100 %   07/29/19 1345 -- -- -- 112 26 100 %   07/29/19 1330 -- -- -- 110 26 100 %   07/29/19 1315 -- -- -- 111 26 100 %   07/29/19 1300 120/71 -- -- 111 26 100 %   07/29/19 1245 -- -- -- 119 26 99 %   07/29/19 1230 -- -- -- 113 26 100 %   07/29/19 1215 -- -- -- 116 26 100 %   07/29/19 1202 -- -- -- 109 26 100 %   07/29/19 1200 (!) 125/58 99 °F (37.2 °C) Axillary 106 20 100 %   07/29/19 1145 -- -- -- 110 26 98 %   07/29/19 1130 -- -- -- 111 26 98 %   07/29/19 1115 -- -- -- 112 26 98 %   07/29/19 1100 (!) 91/54 -- -- 112 26 97 %   07/29/19 1045 -- -- -- 110 26 99 %   07/29/19 1030 -- -- -- 106 26 100 %   07/29/19 1015 -- -- -- 106 26 100 %   07/29/19 1000 (!) 128/59 -- -- 107 26 100 %   07/29/19 0945 -- -- -- 110 26 99 %   07/29/19 0930 -- -- -- 118 26 99 %   07/29/19 0915 -- -- -- 119 26 99 %   07/29/19 0900 109/66 -- -- 120 26 98 %   07/29/19 0845 -- -- -- 117 26 --   07/29/19 0830 -- -- -- 99 26 --   07/29/19 0815 -- -- -- 137 26 --   07/29/19 0800 84/66 99.8 °F (37.7 °C) Axillary 130 26 92 %   07/29/19 0755 -- -- -- -- 26 --   07/29/19 0752 -- -- -- 138 26 95 %   07/29/19 0745 -- -- -- 127 26 --       Intake/Output Summary (Last 24 hours) at 7/29/2019 1531  Last data filed at 7/29/2019 1449  Gross per 24 hour   Intake 7002.21 ml   Output 1194 ml   Net 5808.21 ml       Review of systems unable to be done. Patient Sedated and on ventilator    Physical Exam   Constitutional: He appears well-developed and well-nourished. HENT:   Head: Normocephalic and atraumatic. Neck: Normal range of motion. Neck supple. Cardiovascular: Tachycardia present. Pulmonary/Chest: He has no wheezes. He has no rales. On ventilator   Abdominal: Soft. He exhibits distension (mild). Musculoskeletal:   Paralyzed     Neurological:   Sedated     Skin: Skin is warm and dry. LABS:    CBC:   Recent Labs     07/27/19  0357 07/28/19  0434 07/29/19  0338 07/29/19  1440   WBC 9.7 10.6 8.3  --    HGB 9.2* 10.3* 10.0* 7.4*   HCT 27.2* 30.9* 29.8* 22.2*    227 265  --    MCV 95.6 97.7 95.6  --      Renal:    Recent Labs     07/26/19  1638  07/27/19  1630  07/29/19  0338 07/29/19  1005 07/29/19  1440   *   < > 149*   < > 149* 144 143   K 3.9   < > 3.2*   < > 3.6 3.5 4.1   *   < > 109   < > 107 105 104   CO2 24   < > 27   < > 28 24 23   BUN 28*   < > 34*   < > 51* 55* 55*   CREATININE 1.1   < > 1.3   < > 1.8* 2.4* 2.6*   GLUCOSE 177*   < > 163*   < > 124* 119* 143*   CALCIUM 8.8   < > 8.8   < > 8.8 7.8* 7.6*   MG 1.70*  --  2.50*  --   --   --   --    PHOS 2.2*   < > 2.8   < > 4.3 3.9 5.1*   ANIONGAP 12   < > 13   < > 14 15 16    < > = values in this interval not displayed. Hepatic:   Recent Labs     07/29/19  0338 07/29/19  1005 07/29/19  1440   LABALBU 2.6* 1.9* 1.7*     Troponin: No results for input(s): TROPONINI in the last 72 hours. BNP: No results for input(s): BNP in the last 72 hours. Lipids: No results for input(s): CHOL, HDL in the last 72 hours.     Invalid input(s): LDLCALCU, TRIGLYCERIDE  ABGs:    Recent Labs     07/29/19  0336 07/29/19  0342 07/29/19  0553   PHART 7.264* 7.266* 7.270*   CKU1QVT 70.2* 63.3* 68.5*   PO2ART 105.3 109.5* 93.3   MVW2GKI 31.8* 28.8 31.5* evident. 5.  Tubes/lines as above. XR CHEST PORTABLE   Final Result      1. Persistent hazy perihilar, groundglass basilar consolidations and pleural effusions greater in the right lung   2. NG tube in place as well as a right IJ central venous catheter with the tip in the mid to distal SVC, no worsening               XR CHEST PORTABLE   Final Result      Introduction of NG tube with tip excluded from the inferior edge of the film. Moderate bilateral effusions and diffuse groundglass opacities consistent with edema, infection or aspiration. This appears worse since the previous study            XR ABDOMEN (KUB) (SINGLE AP VIEW)   Final Result      Nasogastric tube extending into the left upper quadrant. XR CHEST PORTABLE   Final Result      Persistent right basilar infiltrate. XR CHEST 1 VW   Final Result      Tip of the right IJ central venous catheter overlies lower SVC with no pneumothorax evident. CT HEAD WO CONTRAST   Final Result      1. No acute intracranial process. XR CHEST PORTABLE   Final Result      Interval development of bilateral lower lobe opacities may relate to edema or pneumonia      CT SOFT TISSUE NECK WO CONTRAST   Final Result   Unremarkable CT neck          XR CHEST PORTABLE   Final Result     Normal chest x-ray. CT ABDOMEN PELVIS WO CONTRAST Additional Contrast? None   Final Result   No acute abnormality demonstrated in the abdomen or pelvis. Assessment/Plan:    Mr. Marcos Santos is a 69 yo M with PMHx significant for HTN, HLD, GERD, opioid dependence 2/2 cervical DDD, and alcohol abuse who presents to [facility] ED complaining of fatigue, nausea/vomiting, poor appetite and voice hoarseness over the last several months. Pt was originally admitted to the floor. One day after admission, pt had AMS and became hypotensive requiring IV boluses and 4 mg Ativan and 5 mg Haldol.   Patient being managed in ICU for acute respiratory failure and

## 2019-07-29 NOTE — PROGRESS NOTES
Pt desaturated to 85%, OT suctioned by this RN and RT. Placed on NRB and 15L high flow. Increasingly tachypneic and tachycardic. Increased WOB. ICU residents called into room. Pt continuing to desaurate to low 80s, requiring increased oxygen requirements. ABG obtained, showing pt to be extremely hypoxic. Results for Nely Bojorquez (MRN [id number]) as of 7/29/2019 05:46   Ref. Range 7/29/2019 01:18   pH, Arterial Latest Ref Range: 7.350 - 7.450  7.547 (H)   pCO2, Arterial Latest Ref Range: 35.0 - 45.0 mm Hg 31.7 (L)   pO2, Arterial Latest Ref Range: 75.0 - 108.0 mm Hg 44.7 (L)   HCO3, Arterial Latest Ref Range: 21.0 - 29.0 mmol/L 27.6   TCO2 (calc), Art Latest Ref Range: Not Established mmol/L 29   Base Excess, Arterial Latest Ref Range: -3 - 3  5 (H)   O2 Sat, Arterial Latest Ref Range: 93 - 100 % 87 (L)   Sample Type Unknown ART     Decision made to intubate pt:  0132 - 20mg Etomidate given  0133 - First intubation attempt  0134 - End tidal and breath sounds heard bilaterally. ETT 24 at teeth. Propofol gtt started for sedation, and precedex maxed at 1.4 mcg/kg/min. ICU residents updated son via telephone.  Consented for procedures and POC

## 2019-07-29 NOTE — PROGRESS NOTES
CC: fatigue, nausea/vomiting, poor appetite, and hoarseness    HPI update: Increased tachypnea, sob, secretions. O2 reqs up. Intubated. PE:  Blood pressure 116/76, pulse 100, temperature 99 °F (37.2 °C), temperature source Axillary, resp. rate 26, height 5' 5\" (1.651 m), weight 128 lb 1.4 oz (58.1 kg), SpO2 100 %. General (appearance):  No acute distress. Intubated. Eyes: Anicteric. EOMI. Neck: Supple. No JVD  Ears/Nose/Mouth/Thorat: No cyanosis  CV: Reg tachycardia. 1-2/6 CALROS A   Respiratory:  Clear anteriorly. No r/r  GI: abd s/nt/nd. No peritoneal signs  Skin: Warm, dry. No rashes  Neuro/Psych: Sedated/intubated. Ext:  No c/c. No pitting edema  Pulses:  2+ carotid. No bruits audible    Lab Results   Component Value Date    WBC 8.3 07/29/2019    HGB 7.4 (L) 07/29/2019    HCT 22.2 (L) 07/29/2019    MCV 95.6 07/29/2019     07/29/2019     Lab Results   Component Value Date     07/29/2019    K 3.5 07/29/2019     07/29/2019    CO2 24 07/29/2019    BUN 55 (H) 07/29/2019    CREATININE 2.4 (H) 07/29/2019    GLUCOSE 119 (H) 07/29/2019    CALCIUM 7.8 (L) 07/29/2019    PROT 5.1 (L) 07/23/2019    LABALBU 1.9 (L) 07/29/2019    BILITOT <0.2 07/22/2019    ALKPHOS 88 07/22/2019    AST 17 07/22/2019    ALT 19 07/22/2019    LABGLOM 27 (A) 07/29/2019    GFRAA 33 (A) 07/29/2019     Lab Results   Component Value Date    INR 1.04 07/24/2019    PROTIME 11.8 07/24/2019     Lab Results   Component Value Date    TROPONINI 0.93 ([facility]) 07/25/2019 7/24/2019 CXR: Bibasilar pleuroparenchymal disease. Central line noted.     7/2019 TTE: Pending    7/22/2019 ECG: NSR    7/23/2019 ECG: S tach, PVC    7/23/2019 CT Head:  No acute intracranial process.             Current Facility-Administered Medications:     phenylephrine (SAEED-SYNEPHRINE) 50 mg in dextrose 5 % 250 mL infusion, 100 mcg/min, Intravenous, Continuous, Bebe Eisenmenger, MD, Stopped at 07/29/19 1433    cisatracurium besylate (NIMBEX) 200 mg in sodium chloride 0.9 mg at 07/28/19 1937    levalbuterol (XOPENEX) nebulizer solution 1.25 mg, 1.25 mg, Nebulization, Q4H WA, Radha Metz MD, 1.25 mg at 07/29/19 1206    morphine (MSIR) tablet 15 mg, 15 mg, Oral, BID, Goldy Alston MD, Stopped at 07/29/19 0351    medicated lip ointment (BLISTEX), , Topical, PRN, Goldy Alston MD, 7.5 g at 07/27/19 1444    aspirin suppository 300 mg, 300 mg, Rectal, Q6H PRN, Goldy Alston MD, 300 mg at 07/24/19 1138    aspirin chewable tablet 81 mg, 81 mg, Oral, Daily, Goldy Alston MD, 81 mg at 07/28/19 0753    glucose (GLUTOSE) 40 % oral gel 15 g, 15 g, Oral, PRN, Aminah Singletary MD    dextrose 50 % IV solution, 12.5 g, Intravenous, PRN, Aminah Singletary MD, 12.5 g at 07/23/19 2345    glucagon (rDNA) injection 1 mg, 1 mg, Intramuscular, PRN, Aminah Singletary MD    dextrose 5 % solution, 100 mL/hr, Intravenous, PRN, Aminah Singletary MD    ondansetron [facility]) injection 4 mg, 4 mg, Intravenous, Q30 Min PRN, Nury Garcia DO, 4 mg at 07/22/19 2224    7/24/2019 Echo: Left ventricular cavity size is normal. There is mild concentric left ventricular hypertrophy. Left ventricular function is borderline normal with LVEF 50%. The basal inferior and inferolateral walls are hypokinetic. Abnormal septal motion is present. Diastolic filling parameters suggest grade I diastolic dysfunction. IVC size is dilated (>2.1 cm) and collapses < 50% with respiration consistent with elevated RA pressure (15 mmHg). Estimated pulmonary artery systolic pressure is at 44 mmHg assuming a right atrial pressure of 15 mmHg. ECG from 7-: Sinus tach. PVC. Nonspecific ST and T wave abnl    7/29/2019 CXR:  New consolidation in the right lower lung compatible with atelectasis or pneumonia. Aspiration is in the differential diagnosis.       Prominent interstitial markings in a perihilar distribution again noted.       Stable cardiac mediastinal silhouette.       Lines and tubes without change. Issues:  70 y.o. admitted with ARF, fatigue. Has had EtOH withdrawal and period of hypotension. Tn have elevated though no obvious ischemic abnl on ecg. Issues:  -Troponin elevation: suspect demand ischemia and likely underlying CAD  -SOB  -PVC  -Tob abuse  -Hyperlipidemia  -ARF  -EtOH abuse and withdrawal  -Respir failure, intubated 7/29/2019   -Sepsis    Recs:  -Needs cath when stablized. Elective at this point given the myriad of issues above. -ASA  -Statin  -While pressor dependent (or near it), will hold off on bb/ace/arb  -Treat sepsis, pressors as needed  -Pulmonary w/u sig respiratory decomp  -Critically ill    At least 35 minutes was spent with the patient/patient and family, over half of which time was spent on counseling and coordinating care for the above plan. Sheryl Gordillo MD, [facility]

## 2019-07-29 NOTE — PROGRESS NOTES
After being intubated/sedated/paralyzed pt with increased tachycardia up to 170s at the highest. ICU residents at bedside. Given 5 mg Metoprolol.

## 2019-07-30 ENCOUNTER — APPOINTMENT (OUTPATIENT)
Dept: CT IMAGING | Age: 69
DRG: 682 | End: 2019-07-30
Payer: MEDICARE

## 2019-07-30 LAB
ALBUMIN SERPL-MCNC: 1.6 G/DL (ref 3.4–5)
ALBUMIN SERPL-MCNC: 1.7 G/DL (ref 3.4–5)
ALBUMIN SERPL-MCNC: 1.7 G/DL (ref 3.4–5)
ALBUMIN SERPL-MCNC: 1.8 G/DL (ref 3.4–5)
ALBUMIN SERPL-MCNC: 1.8 G/DL (ref 3.4–5)
ALBUMIN SERPL-MCNC: 1.9 G/DL (ref 3.4–5)
ANION GAP SERPL CALCULATED.3IONS-SCNC: 11 MMOL/L (ref 3–16)
ANION GAP SERPL CALCULATED.3IONS-SCNC: 14 MMOL/L (ref 3–16)
ANION GAP SERPL CALCULATED.3IONS-SCNC: 14 MMOL/L (ref 3–16)
ANION GAP SERPL CALCULATED.3IONS-SCNC: 16 MMOL/L (ref 3–16)
ANION GAP SERPL CALCULATED.3IONS-SCNC: 17 MMOL/L (ref 3–16)
ANION GAP SERPL CALCULATED.3IONS-SCNC: 18 MMOL/L (ref 3–16)
BANDED NEUTROPHILS RELATIVE PERCENT: 11 % (ref 0–7)
BASE EXCESS ARTERIAL: -0.8 MMOL/L (ref -3–3)
BASOPHILS ABSOLUTE: 0 K/UL (ref 0–0.2)
BASOPHILS RELATIVE PERCENT: 0 %
BUN BLDV-MCNC: 53 MG/DL (ref 7–20)
BUN BLDV-MCNC: 54 MG/DL (ref 7–20)
BUN BLDV-MCNC: 54 MG/DL (ref 7–20)
BUN BLDV-MCNC: 57 MG/DL (ref 7–20)
BUN BLDV-MCNC: 57 MG/DL (ref 7–20)
BUN BLDV-MCNC: 58 MG/DL (ref 7–20)
CALCIUM SERPL-MCNC: 7.4 MG/DL (ref 8.3–10.6)
CALCIUM SERPL-MCNC: 7.5 MG/DL (ref 8.3–10.6)
CALCIUM SERPL-MCNC: 7.6 MG/DL (ref 8.3–10.6)
CALCIUM SERPL-MCNC: 7.7 MG/DL (ref 8.3–10.6)
CALCIUM SERPL-MCNC: 7.7 MG/DL (ref 8.3–10.6)
CALCIUM SERPL-MCNC: 7.9 MG/DL (ref 8.3–10.6)
CARBOXYHEMOGLOBIN ARTERIAL: 1.9 % (ref 0–1.5)
CHLORIDE BLD-SCNC: 103 MMOL/L (ref 99–110)
CHLORIDE BLD-SCNC: 103 MMOL/L (ref 99–110)
CHLORIDE BLD-SCNC: 104 MMOL/L (ref 99–110)
CHLORIDE BLD-SCNC: 104 MMOL/L (ref 99–110)
CHLORIDE BLD-SCNC: 105 MMOL/L (ref 99–110)
CHLORIDE BLD-SCNC: 107 MMOL/L (ref 99–110)
CO2: 21 MMOL/L (ref 21–32)
CO2: 22 MMOL/L (ref 21–32)
CO2: 22 MMOL/L (ref 21–32)
CO2: 23 MMOL/L (ref 21–32)
CO2: 23 MMOL/L (ref 21–32)
CO2: 25 MMOL/L (ref 21–32)
CORTISOL TOTAL: 34.6 UG/DL
CREAT SERPL-MCNC: 1.6 MG/DL (ref 0.8–1.3)
CREAT SERPL-MCNC: 1.6 MG/DL (ref 0.8–1.3)
CREAT SERPL-MCNC: 1.7 MG/DL (ref 0.8–1.3)
CREAT SERPL-MCNC: 1.9 MG/DL (ref 0.8–1.3)
CREAT SERPL-MCNC: 2 MG/DL (ref 0.8–1.3)
CREAT SERPL-MCNC: 2.2 MG/DL (ref 0.8–1.3)
EOSINOPHILS ABSOLUTE: 0 K/UL (ref 0–0.6)
EOSINOPHILS RELATIVE PERCENT: 0 %
GFR AFRICAN AMERICAN: 36
GFR AFRICAN AMERICAN: 40
GFR AFRICAN AMERICAN: 43
GFR AFRICAN AMERICAN: 49
GFR AFRICAN AMERICAN: 52
GFR AFRICAN AMERICAN: 52
GFR NON-AFRICAN AMERICAN: 30
GFR NON-AFRICAN AMERICAN: 33
GFR NON-AFRICAN AMERICAN: 35
GFR NON-AFRICAN AMERICAN: 40
GFR NON-AFRICAN AMERICAN: 43
GFR NON-AFRICAN AMERICAN: 43
GLUCOSE BLD-MCNC: 110 MG/DL (ref 70–99)
GLUCOSE BLD-MCNC: 111 MG/DL (ref 70–99)
GLUCOSE BLD-MCNC: 115 MG/DL (ref 70–99)
GLUCOSE BLD-MCNC: 116 MG/DL (ref 70–99)
GLUCOSE BLD-MCNC: 118 MG/DL (ref 70–99)
GLUCOSE BLD-MCNC: 127 MG/DL (ref 70–99)
GLUCOSE BLD-MCNC: 202 MG/DL (ref 70–99)
GLUCOSE BLD-MCNC: 69 MG/DL (ref 70–99)
GLUCOSE BLD-MCNC: 78 MG/DL (ref 70–99)
GLUCOSE BLD-MCNC: 88 MG/DL (ref 70–99)
GLUCOSE BLD-MCNC: 93 MG/DL (ref 70–99)
HCO3 ARTERIAL: 22 MMOL/L (ref 21–29)
HCT VFR BLD CALC: 23.8 % (ref 40.5–52.5)
HEMOGLOBIN, ART, EXTENDED: 8.2 G/DL
HEMOGLOBIN: 8.2 G/DL (ref 13.5–17.5)
LYMPHOCYTES ABSOLUTE: 0.2 K/UL (ref 1–5.1)
LYMPHOCYTES RELATIVE PERCENT: 1 %
MCH RBC QN AUTO: 31.6 PG (ref 26–34)
MCHC RBC AUTO-ENTMCNC: 34.4 G/DL (ref 31–36)
MCV RBC AUTO: 91.8 FL (ref 80–100)
METAMYELOCYTES RELATIVE PERCENT: 1 %
METHEMOGLOBIN ARTERIAL: 0.4 % (ref 0–1.4)
MONOCYTES ABSOLUTE: 0.5 K/UL (ref 0–1.3)
MONOCYTES RELATIVE PERCENT: 3 %
NEUTROPHILS ABSOLUTE: 17.6 K/UL (ref 1.7–7.7)
NEUTROPHILS RELATIVE PERCENT: 84 %
O2 SAT, ARTERIAL: 99 % (ref 93–100)
PCO2 ARTERIAL: 32.8 MMHG (ref 35–45)
PDW BLD-RTO: 14.6 % (ref 12.4–15.4)
PERFORMED ON: ABNORMAL
PERFORMED ON: NORMAL
PH ARTERIAL: 7.45 (ref 7.35–7.45)
PHOSPHORUS: 3.1 MG/DL (ref 2.5–4.9)
PHOSPHORUS: 3.4 MG/DL (ref 2.5–4.9)
PHOSPHORUS: 4 MG/DL (ref 2.5–4.9)
PHOSPHORUS: 4.7 MG/DL (ref 2.5–4.9)
PHOSPHORUS: 5 MG/DL (ref 2.5–4.9)
PHOSPHORUS: 5.3 MG/DL (ref 2.5–4.9)
PLATELET # BLD: 172 K/UL (ref 135–450)
PMV BLD AUTO: 10.2 FL (ref 5–10.5)
PO2 ARTERIAL: 120 MMHG (ref 75–108)
POTASSIUM SERPL-SCNC: 2.9 MMOL/L (ref 3.5–5.1)
POTASSIUM SERPL-SCNC: 3.5 MMOL/L (ref 3.5–5.1)
POTASSIUM SERPL-SCNC: 3.6 MMOL/L (ref 3.5–5.1)
POTASSIUM SERPL-SCNC: 3.7 MMOL/L (ref 3.5–5.1)
POTASSIUM SERPL-SCNC: 3.9 MMOL/L (ref 3.5–5.1)
POTASSIUM SERPL-SCNC: 4.2 MMOL/L (ref 3.5–5.1)
RBC # BLD: 2.59 M/UL (ref 4.2–5.9)
SODIUM BLD-SCNC: 140 MMOL/L (ref 136–145)
SODIUM BLD-SCNC: 142 MMOL/L (ref 136–145)
SODIUM BLD-SCNC: 143 MMOL/L (ref 136–145)
SODIUM BLD-SCNC: 143 MMOL/L (ref 136–145)
TCO2 ARTERIAL: 23 MMOL/L
VANCOMYCIN RANDOM: 6.1 UG/ML
WBC # BLD: 18.3 K/UL (ref 4–11)

## 2019-07-30 PROCEDURE — 2500000003 HC RX 250 WO HCPCS: Performed by: STUDENT IN AN ORGANIZED HEALTH CARE EDUCATION/TRAINING PROGRAM

## 2019-07-30 PROCEDURE — 2580000003 HC RX 258: Performed by: INTERNAL MEDICINE

## 2019-07-30 PROCEDURE — 2000000000 HC ICU R&B

## 2019-07-30 PROCEDURE — 6360000002 HC RX W HCPCS: Performed by: INTERNAL MEDICINE

## 2019-07-30 PROCEDURE — 6370000000 HC RX 637 (ALT 250 FOR IP): Performed by: INTERNAL MEDICINE

## 2019-07-30 PROCEDURE — 99291 CRITICAL CARE FIRST HOUR: CPT | Performed by: INTERNAL MEDICINE

## 2019-07-30 PROCEDURE — 2580000003 HC RX 258: Performed by: SURGERY

## 2019-07-30 PROCEDURE — 82533 TOTAL CORTISOL: CPT

## 2019-07-30 PROCEDURE — 80202 ASSAY OF VANCOMYCIN: CPT

## 2019-07-30 PROCEDURE — 94770 HC ETCO2 MONITOR DAILY: CPT

## 2019-07-30 PROCEDURE — 80069 RENAL FUNCTION PANEL: CPT

## 2019-07-30 PROCEDURE — 85025 COMPLETE CBC W/AUTO DIFF WBC: CPT

## 2019-07-30 PROCEDURE — 2500000003 HC RX 250 WO HCPCS: Performed by: INTERNAL MEDICINE

## 2019-07-30 PROCEDURE — 94003 VENT MGMT INPAT SUBQ DAY: CPT

## 2019-07-30 PROCEDURE — 6360000002 HC RX W HCPCS

## 2019-07-30 PROCEDURE — 2580000003 HC RX 258: Performed by: STUDENT IN AN ORGANIZED HEALTH CARE EDUCATION/TRAINING PROGRAM

## 2019-07-30 PROCEDURE — 99232 SBSQ HOSP IP/OBS MODERATE 35: CPT | Performed by: INTERNAL MEDICINE

## 2019-07-30 PROCEDURE — 6360000002 HC RX W HCPCS: Performed by: SURGERY

## 2019-07-30 PROCEDURE — 82803 BLOOD GASES ANY COMBINATION: CPT

## 2019-07-30 PROCEDURE — 0DJ08ZZ INSPECTION OF UPPER INTESTINAL TRACT, VIA NATURAL OR ARTIFICIAL OPENING ENDOSCOPIC: ICD-10-PCS | Performed by: INTERNAL MEDICINE

## 2019-07-30 PROCEDURE — 94640 AIRWAY INHALATION TREATMENT: CPT

## 2019-07-30 PROCEDURE — C9113 INJ PANTOPRAZOLE SODIUM, VIA: HCPCS | Performed by: INTERNAL MEDICINE

## 2019-07-30 PROCEDURE — C9113 INJ PANTOPRAZOLE SODIUM, VIA: HCPCS | Performed by: SURGERY

## 2019-07-30 PROCEDURE — 6360000002 HC RX W HCPCS: Performed by: STUDENT IN AN ORGANIZED HEALTH CARE EDUCATION/TRAINING PROGRAM

## 2019-07-30 PROCEDURE — 94750 HC PULMONARY COMPLIANCE STUDY: CPT

## 2019-07-30 PROCEDURE — 36415 COLL VENOUS BLD VENIPUNCTURE: CPT

## 2019-07-30 PROCEDURE — 94761 N-INVAS EAR/PLS OXIMETRY MLT: CPT

## 2019-07-30 PROCEDURE — 3609017100 HC EGD: Performed by: INTERNAL MEDICINE

## 2019-07-30 PROCEDURE — 70450 CT HEAD/BRAIN W/O DYE: CPT

## 2019-07-30 PROCEDURE — 37799 UNLISTED PX VASCULAR SURGERY: CPT

## 2019-07-30 PROCEDURE — 2700000000 HC OXYGEN THERAPY PER DAY

## 2019-07-30 RX ORDER — LEVALBUTEROL 1.25 MG/.5ML
1.25 SOLUTION, CONCENTRATE RESPIRATORY (INHALATION)
Status: DISCONTINUED | OUTPATIENT
Start: 2019-07-30 | End: 2019-08-11

## 2019-07-30 RX ORDER — POTASSIUM CHLORIDE 7.45 MG/ML
40 INJECTION INTRAVENOUS ONCE
Status: DISCONTINUED | OUTPATIENT
Start: 2019-07-30 | End: 2019-07-30

## 2019-07-30 RX ORDER — POTASSIUM CHLORIDE 29.8 MG/ML
20 INJECTION INTRAVENOUS
Status: COMPLETED | OUTPATIENT
Start: 2019-07-30 | End: 2019-07-30

## 2019-07-30 RX ORDER — SODIUM CHLORIDE 9 MG/ML
INJECTION, SOLUTION INTRAVENOUS
Status: COMPLETED
Start: 2019-07-30 | End: 2019-07-30

## 2019-07-30 RX ORDER — POTASSIUM CHLORIDE 29.8 MG/ML
INJECTION INTRAVENOUS
Status: DISCONTINUED
Start: 2019-07-30 | End: 2019-07-31

## 2019-07-30 RX ORDER — CASTOR OIL AND BALSAM, PERU 788; 87 MG/G; MG/G
OINTMENT TOPICAL 2 TIMES DAILY
Status: DISCONTINUED | OUTPATIENT
Start: 2019-07-30 | End: 2019-08-20 | Stop reason: HOSPADM

## 2019-07-30 RX ORDER — PANTOPRAZOLE SODIUM 40 MG/10ML
40 INJECTION, POWDER, LYOPHILIZED, FOR SOLUTION INTRAVENOUS 2 TIMES DAILY
Status: DISCONTINUED | OUTPATIENT
Start: 2019-07-30 | End: 2019-08-18

## 2019-07-30 RX ADMIN — MORPHINE SULFATE 15 MG: 15 TABLET ORAL at 02:56

## 2019-07-30 RX ADMIN — VASOPRESSIN 0.04 UNITS/MIN: 20 INJECTION INTRAVENOUS at 08:13

## 2019-07-30 RX ADMIN — DEXMEDETOMIDINE 0.2 MCG/KG/HR: 100 INJECTION, SOLUTION, CONCENTRATE INTRAVENOUS at 20:31

## 2019-07-30 RX ADMIN — CASTOR OIL AND BALSAM, PERU: 788; 87 OINTMENT TOPICAL at 21:25

## 2019-07-30 RX ADMIN — Medication 15 ML: at 08:28

## 2019-07-30 RX ADMIN — LEVALBUTEROL HYDROCHLORIDE 1.25 MG: 1.25 SOLUTION, CONCENTRATE RESPIRATORY (INHALATION) at 08:24

## 2019-07-30 RX ADMIN — POTASSIUM CHLORIDE 20 MEQ: 400 INJECTION, SOLUTION INTRAVENOUS at 15:11

## 2019-07-30 RX ADMIN — HEPARIN SODIUM 5000 UNITS: 5000 INJECTION INTRAVENOUS; SUBCUTANEOUS at 20:41

## 2019-07-30 RX ADMIN — ATORVASTATIN CALCIUM 20 MG: 20 TABLET, FILM COATED ORAL at 20:40

## 2019-07-30 RX ADMIN — SODIUM CHLORIDE, POTASSIUM CHLORIDE, SODIUM LACTATE AND CALCIUM CHLORIDE: 600; 310; 30; 20 INJECTION, SOLUTION INTRAVENOUS at 21:16

## 2019-07-30 RX ADMIN — LEVALBUTEROL HYDROCHLORIDE 1.25 MG: 1.25 SOLUTION, CONCENTRATE RESPIRATORY (INHALATION) at 16:24

## 2019-07-30 RX ADMIN — PANTOPRAZOLE SODIUM 40 MG: 40 INJECTION, POWDER, LYOPHILIZED, FOR SOLUTION INTRAVENOUS at 20:41

## 2019-07-30 RX ADMIN — SODIUM CHLORIDE 8 MG/HR: 9 INJECTION, SOLUTION INTRAVENOUS at 06:58

## 2019-07-30 RX ADMIN — LEVALBUTEROL HYDROCHLORIDE 1.25 MG: 1.25 SOLUTION, CONCENTRATE RESPIRATORY (INHALATION) at 21:10

## 2019-07-30 RX ADMIN — Medication 15 ML: at 20:41

## 2019-07-30 RX ADMIN — DEXTROSE 50 % IN WATER (D50W) INTRAVENOUS SYRINGE 12.5 G: at 12:27

## 2019-07-30 RX ADMIN — SODIUM CHLORIDE, POTASSIUM CHLORIDE, SODIUM LACTATE AND CALCIUM CHLORIDE: 600; 310; 30; 20 INJECTION, SOLUTION INTRAVENOUS at 08:13

## 2019-07-30 RX ADMIN — LEVALBUTEROL HYDROCHLORIDE 1.25 MG: 1.25 SOLUTION, CONCENTRATE RESPIRATORY (INHALATION) at 11:39

## 2019-07-30 RX ADMIN — HEPARIN SODIUM 5000 UNITS: 5000 INJECTION INTRAVENOUS; SUBCUTANEOUS at 15:14

## 2019-07-30 RX ADMIN — VANCOMYCIN HYDROCHLORIDE 1000 MG: 10 INJECTION, POWDER, LYOPHILIZED, FOR SOLUTION INTRAVENOUS at 08:57

## 2019-07-30 RX ADMIN — POTASSIUM CHLORIDE 20 MEQ: 400 INJECTION, SOLUTION INTRAVENOUS at 16:28

## 2019-07-30 RX ADMIN — METRONIDAZOLE 500 MG: 500 INJECTION, SOLUTION INTRAVENOUS at 14:44

## 2019-07-30 RX ADMIN — DEXMEDETOMIDINE 0.8 MCG/KG/HR: 100 INJECTION, SOLUTION, CONCENTRATE INTRAVENOUS at 05:02

## 2019-07-30 RX ADMIN — CEFEPIME HYDROCHLORIDE 2 G: 2 INJECTION, POWDER, FOR SOLUTION INTRAVENOUS at 15:50

## 2019-07-30 RX ADMIN — METRONIDAZOLE 500 MG: 500 INJECTION, SOLUTION INTRAVENOUS at 05:22

## 2019-07-30 RX ADMIN — ASPIRIN 81 MG 81 MG: 81 TABLET ORAL at 15:20

## 2019-07-30 RX ADMIN — HEPARIN SODIUM 5000 UNITS: 5000 INJECTION INTRAVENOUS; SUBCUTANEOUS at 05:32

## 2019-07-30 RX ADMIN — METRONIDAZOLE 500 MG: 500 INJECTION, SOLUTION INTRAVENOUS at 21:55

## 2019-07-30 RX ADMIN — PROPOFOL 5 MCG/KG/MIN: 10 INJECTION, EMULSION INTRAVENOUS at 03:08

## 2019-07-30 RX ADMIN — MORPHINE SULFATE 15 MG: 15 TABLET ORAL at 18:09

## 2019-07-30 RX ADMIN — POTASSIUM CHLORIDE 20 MEQ: 400 INJECTION, SOLUTION INTRAVENOUS at 13:41

## 2019-07-30 ASSESSMENT — PULMONARY FUNCTION TESTS
PIF_VALUE: 23
PIF_VALUE: 24
PIF_VALUE: 32
PIF_VALUE: 26
PIF_VALUE: 24
PIF_VALUE: 26
PIF_VALUE: 25
PIF_VALUE: 26
PIF_VALUE: 23
PIF_VALUE: 24
PIF_VALUE: 26
PIF_VALUE: 26
PIF_VALUE: 24
PIF_VALUE: 24
PIF_VALUE: 26
PIF_VALUE: 23
PIF_VALUE: 24
PIF_VALUE: 26
PIF_VALUE: 27
PIF_VALUE: 28
PIF_VALUE: 24
PIF_VALUE: 26
PIF_VALUE: 23
PIF_VALUE: 26
PIF_VALUE: 24

## 2019-07-30 ASSESSMENT — PAIN SCALES - GENERAL: PAINLEVEL_OUTOF10: 6

## 2019-07-30 NOTE — PROGRESS NOTES
appropriate for patient's renal function and indication. Metronidazole 500 mg q8h  · Day 1  · Dose appropriate for patient's indication.      (2) Prophylaxis  · DVT:  Heparin 5000 units q8h  · SUP:  Pantoprazole infusion    (3) Vaccinations  · Pneumonia: Current    Edsary Parra PharmD, [id number] [facility]  Phone: [phone]  7/30/2019 7:21 AM

## 2019-07-30 NOTE — PROGRESS NOTES
Hospitalist Progress Note      PCP: Erica Garcia    Date of Admission: 7/22/2019    Chief Complaint: fatigue, N/V    Hospital Course: Pt. Is a 71 yo alcoholic male who presented with acute renal failure and went into delirium tremens. Transferred to ICU for hypoxic resp failure likely from volume overload - CHF exacerbation.       Subjective:   Patient seen and examined  Weaned off pressors, and down to Levophed only now  Off sedation, moves extremities on painful stimuli  Afebrile x 24 hours, WBC went up to 18.3k    Medications:  Reviewed    Infusion Medications    phenylephrine (SAEED-SYNEPHRINE) 50mg/250mL infusion Stopped (07/29/19 1433)    propofol 5 mcg/kg/min (07/30/19 0308)    norepinephrine 2 mcg/min (07/30/19 0615)    vasopressin (Septic Shock) infusion 0.04 Units/min (07/30/19 0504)    lactated ringers 100 mL/hr at 07/30/19 0654    pantoprozole (PROTONIX) infusion 8 mg/hr (07/30/19 0658)    dexmedetomidine (PRECEDEX) IV infusion 0.4 mcg/kg/hr (07/30/19 7244)    dextrose       Scheduled Medications    levalbuterol  1.25 mg Nebulization 6 times per day    vancomycin  1,000 mg Intravenous Q24H    vancomycin (VANCOCIN) intermittent dosing (placeholder)   Other See Admin Instructions    metroNIDAZOLE  500 mg Intravenous Q8H    cefepime  2 g Intravenous Q24H    sodium chloride  250 mL Intravenous Once    chlorhexidine  15 mL Mouth/Throat BID    heparin (porcine)  5,000 Units Subcutaneous 3 times per day    atorvastatin  20 mg Oral Nightly    morphine  15 mg Oral BID    aspirin  81 mg Oral Daily     PRN Meds: acetaminophen, medicated lip ointment, aspirin, glucose, dextrose, glucagon (rDNA), dextrose, ondansetron      Intake/Output Summary (Last 24 hours) at 7/30/2019 0809  Last data filed at 7/30/2019 0700  Gross per 24 hour   Intake 9590.43 ml   Output 2414 ml   Net 7176.43 ml       Physical Exam Performed:    /70   Pulse 69   Temp 98.5 °F (36.9 °C) (Axillary)   Resp 26 Result      Persistent right basilar infiltrate. XR CHEST 1 VW   Final Result      Tip of the right IJ central venous catheter overlies lower SVC with no pneumothorax evident. CT HEAD WO CONTRAST   Final Result      1. No acute intracranial process. XR CHEST PORTABLE   Final Result      Interval development of bilateral lower lobe opacities may relate to edema or pneumonia      CT SOFT TISSUE NECK WO CONTRAST   Final Result   Unremarkable CT neck          XR CHEST PORTABLE   Final Result     Normal chest x-ray. CT ABDOMEN PELVIS WO CONTRAST Additional Contrast? None   Final Result   No acute abnormality demonstrated in the abdomen or pelvis. Assessment/Plan:    Active Hospital Problems    Diagnosis    Hypoxemia [R09.02]    Pulmonary edema cardiac cause (HCC) [I50.1]    Acute encephalopathy [G93.40]    Alcohol withdrawal syndrome, with delirium ([address] [F10.231]    Narcotic dependence ([address] [F11.20]    AVIVA (acute kidney injury) ([address] [N17.9]    Acute kidney failure ([address] [N17.9]     1. Alcohol withdrawal with DT POA  - S/p CIWA protocol and high dose thiamine    2. Metabolic encephalopathy in the setting of alcohol withdrawal DT  Off sedation, he is calm in bed on my evaluation today    3. Coffee ground aspiration from NG tube:  - GI consulted for further evaluation  - Continue PPI bid  - s/p 1 units pRBC Hgb up to 8.2  - Monitor Hgb trend, Transfuse for Hgb < 7.0    4. Acute hypoxic resp failure likely from aspiration pneumonia likely due to gram-negative/anaerobic organisms, fluid overload  -CT chest without contrast with small bilateral effusions, multifocal pneumonia in the right upper and bilateral lobes concerning for aspiration  - On broad spectrum coverage with Vancomycin and Zosyn  - s/p bronchoscopy, BAL with Increased Neutrophils, diatherix sent result pending    5.  Septic/Hemmorhagic Shock in the setting of underlying aspiration pneumonia  - weaning off pressors for MAP 65  - Normal cortisol level    6. AVIVA; Cr down trended to 2.0 this am  - nephrology following  - change zosyn to cefepime and metronidazole  - Monitor UOP, for now 30 mL/hour after IVF resuscitation    7. Electrolyte abnormalities  Replacing as needed    8. Hx of Luing nodules:  Right upper lobe 1.3 cm spiculated nodule concerning for malignancy  - will need further work up once more clinically stable    9.  Elevated troponin: likely demand ischemia  Wall motion abnormalities on ECHO noted   Cardiology following   Ischemic evaluation once clinically stable      DVT Prophylaxis: SCD's  Diet: Diet NPO, After Midnight  Code Status: Full Code    Discussed with Dr. Amber Keating  Discussed with nursing    Dispo - Continue ICU level of care    [facility]

## 2019-07-30 NOTE — PROGRESS NOTES
Pt remains unresponsive to any stimuli despite continuing to decrease sedation. Pupils 1 with sluggish reaction. Propofol and Precedex turned off at this time. Will monitor closely.

## 2019-07-30 NOTE — PROGRESS NOTES
physicians, monitoring vital signs, telemetry, continuous pulse oximetry, and clinical response to IV medications, documentation time, review and interpretation of laboratory and radiological data, review of nursing notes and old record review.  This time excludes any time that may have been spent performing procedures for life threatening organ failure.

## 2019-07-30 NOTE — PROGRESS NOTES
07/30/19 0824    vancomycin (VANCOCIN) 1,000 mg in dextrose 5 % 250 mL IVPB, 1,000 mg, Intravenous, Q24H, Yumiko Corcoran MD, Stopped at 07/30/19 1000    propofol injection, 10 mcg/kg/min (Order-Specific), Intravenous, Titrated, Yumiko Corcoran MD, Stopped at 07/30/19 1101    vancomycin (VANCOCIN) intermittent dosing (placeholder), , Other, See Admin Instructions, Yumiko Corcoran MD    norepinephrine (LEVOPHED) 16 mg in dextrose 5 % 250 mL infusion, 2 mcg/min, Intravenous, Continuous, Jessie Guevara MD, Last Rate: 3.8 mL/hr at 07/30/19 1113, 4 mcg/min at 07/30/19 1113    lactated ringers infusion, , Intravenous, Continuous, Claudette Rivera MD, Last Rate: 100 mL/hr at 07/30/19 0813    metronidazole (FLAGYL) 500 mg in NaCl 100 mL IVPB premix, 500 mg, Intravenous, Q8H, Gregory Villagran MD, Stopped at 07/30/19 3926    cefepime (MAXIPIME) 2 g IVPB minibag, 2 g, Intravenous, Q24H, Gregory Villagran MD, Stopped at 07/29/19 1426    pantoprazole (PROTONIX) 80 mg in sodium chloride 0.9 % 100 mL infusion, 8 mg/hr, Intravenous, Continuous, Agata Dupree MD, Last Rate: 10 mL/hr at 07/30/19 0658, 8 mg/hr at 07/30/19 0658    chlorhexidine (PERIDEX) 0.12 % solution 15 mL, 15 mL, Mouth/Throat, BID, Renetta Sam MD, 15 mL at 07/30/19 9718    heparin (porcine) injection 5,000 Units, 5,000 Units, Subcutaneous, 3 times per day, Ana Jarquin MD, 5,000 Units at 07/30/19 0532    atorvastatin (LIPITOR) tablet 20 mg, 20 mg, Oral, Nightly, Fidencio Smalls MD, 20 mg at 07/29/19 2137    dexmedetomidine (PRECEDEX) 400 mcg in sodium chloride 0.9 % 100 mL infusion, 0.2 mcg/kg/hr, Intravenous, Continuous, Yumiko Corcoran MD, Stopped at 07/30/19 1100    acetaminophen (TYLENOL) tablet 650 mg, 650 mg, Oral, Q4H PRN, Siddharth Atkins DO, 650 mg at 07/28/19 1937    morphine (MSIR) tablet 15 mg, 15 mg, Oral, BID, Yumiko Corcoran MD, 15 mg at 07/30/19 0256    medicated lip ointment (BLISTEX), , Topical, PRN, Yumiko Corcoran MD, 7.5 g at 07/27/19 -Sepsis    Recs:  -Needs cath when stablized. Elective at this point given the myriad of issues above. Given this hospitalization, would likely be done as an outpt. -ASA if ok but likely have to hold with coffee ground emesis  -Statin if ok  -While pressor dependent (or near it), will hold off on bb/ace/arb  -Treat sepsis, pressors as needed  -Pulmonary w/u sig respiratory decomp  -Critically ill    I have little to offer, and his workup at this point will be as an outpt in all likelihood, if he gets there. Please call me if I can be of further assistance. D/W ICU team    Palak JULIO.  Verena Torres MD, [facility]

## 2019-07-30 NOTE — PLAN OF CARE
Problem: Restraint Use - Nonviolent/Non-Self-Destructive Behavior:  Goal: Absence of restraint indications  Description  Absence of restraint indications  Outcome: Ongoing    Patient resting in bed without distress, remains in bilateral soft wrist restraints for vent compliance
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
## 2019-07-30 NOTE — PROCEDURES
Retroflexion did not show a hiatal hernia. Esophagus: GE junction at 38cms with LA Grade C erosive esophagitis to 26cms. I ensured the end of the NG was in mid body at the end of the procedure    I noted there was an ulcer and swollen lip at lower left lip. Estimated blood loss none    Plan:  BID PPI  Repeat EGD in 8 weeks to ensure no underlying Gaming's.

## 2019-07-31 LAB
ALBUMIN SERPL-MCNC: 1.7 G/DL (ref 3.4–5)
ALBUMIN SERPL-MCNC: 1.8 G/DL (ref 3.4–5)
ALBUMIN SERPL-MCNC: 1.9 G/DL (ref 3.4–5)
ALBUMIN SERPL-MCNC: 2 G/DL (ref 3.4–5)
ALBUMIN SERPL-MCNC: 2 G/DL (ref 3.4–5)
AMMONIA: 17 UMOL/L (ref 16–60)
ANION GAP SERPL CALCULATED.3IONS-SCNC: 11 MMOL/L (ref 3–16)
ANION GAP SERPL CALCULATED.3IONS-SCNC: 12 MMOL/L (ref 3–16)
ANION GAP SERPL CALCULATED.3IONS-SCNC: 12 MMOL/L (ref 3–16)
ANION GAP SERPL CALCULATED.3IONS-SCNC: 13 MMOL/L (ref 3–16)
ANION GAP SERPL CALCULATED.3IONS-SCNC: 13 MMOL/L (ref 3–16)
BASOPHILS ABSOLUTE: 0 K/UL (ref 0–0.2)
BASOPHILS RELATIVE PERCENT: 0.3 %
BUN BLDV-MCNC: 37 MG/DL (ref 7–20)
BUN BLDV-MCNC: 44 MG/DL (ref 7–20)
BUN BLDV-MCNC: 48 MG/DL (ref 7–20)
BUN BLDV-MCNC: 49 MG/DL (ref 7–20)
BUN BLDV-MCNC: 52 MG/DL (ref 7–20)
CALCIUM SERPL-MCNC: 7.7 MG/DL (ref 8.3–10.6)
CALCIUM SERPL-MCNC: 7.8 MG/DL (ref 8.3–10.6)
CALCIUM SERPL-MCNC: 7.8 MG/DL (ref 8.3–10.6)
CALCIUM SERPL-MCNC: 8 MG/DL (ref 8.3–10.6)
CALCIUM SERPL-MCNC: 8.1 MG/DL (ref 8.3–10.6)
CHLORIDE BLD-SCNC: 107 MMOL/L (ref 99–110)
CHLORIDE BLD-SCNC: 109 MMOL/L (ref 99–110)
CHLORIDE BLD-SCNC: 109 MMOL/L (ref 99–110)
CHLORIDE BLD-SCNC: 110 MMOL/L (ref 99–110)
CHLORIDE BLD-SCNC: 111 MMOL/L (ref 99–110)
CO2: 22 MMOL/L (ref 21–32)
CO2: 22 MMOL/L (ref 21–32)
CO2: 23 MMOL/L (ref 21–32)
CO2: 24 MMOL/L (ref 21–32)
CO2: 24 MMOL/L (ref 21–32)
CREAT SERPL-MCNC: 1.2 MG/DL (ref 0.8–1.3)
CREAT SERPL-MCNC: 1.2 MG/DL (ref 0.8–1.3)
CREAT SERPL-MCNC: 1.4 MG/DL (ref 0.8–1.3)
CREAT SERPL-MCNC: 1.4 MG/DL (ref 0.8–1.3)
CREAT SERPL-MCNC: 1.5 MG/DL (ref 0.8–1.3)
EOSINOPHILS ABSOLUTE: 0.1 K/UL (ref 0–0.6)
EOSINOPHILS RELATIVE PERCENT: 0.4 %
FOLATE: 13.4 NG/ML (ref 4.78–24.2)
GFR AFRICAN AMERICAN: 56
GFR AFRICAN AMERICAN: >60
GFR NON-AFRICAN AMERICAN: 46
GFR NON-AFRICAN AMERICAN: 50
GFR NON-AFRICAN AMERICAN: 50
GFR NON-AFRICAN AMERICAN: 60
GFR NON-AFRICAN AMERICAN: 60
GLUCOSE BLD-MCNC: 103 MG/DL (ref 70–99)
GLUCOSE BLD-MCNC: 119 MG/DL (ref 70–99)
GLUCOSE BLD-MCNC: 132 MG/DL (ref 70–99)
GLUCOSE BLD-MCNC: 148 MG/DL (ref 70–99)
GLUCOSE BLD-MCNC: 171 MG/DL (ref 70–99)
GLUCOSE BLD-MCNC: 67 MG/DL (ref 70–99)
GLUCOSE BLD-MCNC: 67 MG/DL (ref 70–99)
GLUCOSE BLD-MCNC: 72 MG/DL (ref 70–99)
GLUCOSE BLD-MCNC: 79 MG/DL (ref 70–99)
GLUCOSE BLD-MCNC: 79 MG/DL (ref 70–99)
GLUCOSE BLD-MCNC: 88 MG/DL (ref 70–99)
GLUCOSE BLD-MCNC: 88 MG/DL (ref 70–99)
GLUCOSE BLD-MCNC: 89 MG/DL (ref 70–99)
HCT VFR BLD CALC: 22.8 % (ref 40.5–52.5)
HEMOGLOBIN: 7.8 G/DL (ref 13.5–17.5)
LYMPHOCYTES ABSOLUTE: 0.4 K/UL (ref 1–5.1)
LYMPHOCYTES RELATIVE PERCENT: 2.7 %
MCH RBC QN AUTO: 31 PG (ref 26–34)
MCHC RBC AUTO-ENTMCNC: 34.2 G/DL (ref 31–36)
MCV RBC AUTO: 90.6 FL (ref 80–100)
MONOCYTES ABSOLUTE: 0.5 K/UL (ref 0–1.3)
MONOCYTES RELATIVE PERCENT: 3.3 %
NEUTROPHILS ABSOLUTE: 15 K/UL (ref 1.7–7.7)
NEUTROPHILS RELATIVE PERCENT: 93.3 %
PDW BLD-RTO: 14.6 % (ref 12.4–15.4)
PERFORMED ON: ABNORMAL
PERFORMED ON: NORMAL
PHOSPHORUS: 2 MG/DL (ref 2.5–4.9)
PHOSPHORUS: 2.1 MG/DL (ref 2.5–4.9)
PHOSPHORUS: 2.2 MG/DL (ref 2.5–4.9)
PHOSPHORUS: 2.3 MG/DL (ref 2.5–4.9)
PHOSPHORUS: 2.6 MG/DL (ref 2.5–4.9)
PLATELET # BLD: 200 K/UL (ref 135–450)
PMV BLD AUTO: 10.2 FL (ref 5–10.5)
POTASSIUM SERPL-SCNC: 3.1 MMOL/L (ref 3.5–5.1)
POTASSIUM SERPL-SCNC: 3.2 MMOL/L (ref 3.5–5.1)
POTASSIUM SERPL-SCNC: 3.4 MMOL/L (ref 3.5–5.1)
POTASSIUM SERPL-SCNC: 3.4 MMOL/L (ref 3.5–5.1)
POTASSIUM SERPL-SCNC: 3.8 MMOL/L (ref 3.5–5.1)
RBC # BLD: 2.52 M/UL (ref 4.2–5.9)
SODIUM BLD-SCNC: 143 MMOL/L (ref 136–145)
SODIUM BLD-SCNC: 144 MMOL/L (ref 136–145)
SODIUM BLD-SCNC: 144 MMOL/L (ref 136–145)
SODIUM BLD-SCNC: 145 MMOL/L (ref 136–145)
SODIUM BLD-SCNC: 146 MMOL/L (ref 136–145)
VITAMIN B-12: >2000 PG/ML (ref 211–911)
WBC # BLD: 16.1 K/UL (ref 4–11)

## 2019-07-31 PROCEDURE — 2500000003 HC RX 250 WO HCPCS: Performed by: INTERNAL MEDICINE

## 2019-07-31 PROCEDURE — 85025 COMPLETE CBC W/AUTO DIFF WBC: CPT

## 2019-07-31 PROCEDURE — 6360000002 HC RX W HCPCS: Performed by: NURSE PRACTITIONER

## 2019-07-31 PROCEDURE — 82140 ASSAY OF AMMONIA: CPT

## 2019-07-31 PROCEDURE — 95819 EEG AWAKE AND ASLEEP: CPT

## 2019-07-31 PROCEDURE — 36415 COLL VENOUS BLD VENIPUNCTURE: CPT

## 2019-07-31 PROCEDURE — 82607 VITAMIN B-12: CPT

## 2019-07-31 PROCEDURE — 94770 HC ETCO2 MONITOR DAILY: CPT

## 2019-07-31 PROCEDURE — 99291 CRITICAL CARE FIRST HOUR: CPT | Performed by: INTERNAL MEDICINE

## 2019-07-31 PROCEDURE — 6370000000 HC RX 637 (ALT 250 FOR IP): Performed by: INTERNAL MEDICINE

## 2019-07-31 PROCEDURE — 6360000002 HC RX W HCPCS: Performed by: INTERNAL MEDICINE

## 2019-07-31 PROCEDURE — 94640 AIRWAY INHALATION TREATMENT: CPT

## 2019-07-31 PROCEDURE — 37799 UNLISTED PX VASCULAR SURGERY: CPT

## 2019-07-31 PROCEDURE — 2580000003 HC RX 258: Performed by: INTERNAL MEDICINE

## 2019-07-31 PROCEDURE — 6360000002 HC RX W HCPCS: Performed by: STUDENT IN AN ORGANIZED HEALTH CARE EDUCATION/TRAINING PROGRAM

## 2019-07-31 PROCEDURE — 6370000000 HC RX 637 (ALT 250 FOR IP): Performed by: STUDENT IN AN ORGANIZED HEALTH CARE EDUCATION/TRAINING PROGRAM

## 2019-07-31 PROCEDURE — 87040 BLOOD CULTURE FOR BACTERIA: CPT

## 2019-07-31 PROCEDURE — 2580000003 HC RX 258: Performed by: STUDENT IN AN ORGANIZED HEALTH CARE EDUCATION/TRAINING PROGRAM

## 2019-07-31 PROCEDURE — 94003 VENT MGMT INPAT SUBQ DAY: CPT

## 2019-07-31 PROCEDURE — 82746 ASSAY OF FOLIC ACID SERUM: CPT

## 2019-07-31 PROCEDURE — 80069 RENAL FUNCTION PANEL: CPT

## 2019-07-31 PROCEDURE — 2000000000 HC ICU R&B

## 2019-07-31 PROCEDURE — 36592 COLLECT BLOOD FROM PICC: CPT

## 2019-07-31 PROCEDURE — 94761 N-INVAS EAR/PLS OXIMETRY MLT: CPT

## 2019-07-31 PROCEDURE — 94750 HC PULMONARY COMPLIANCE STUDY: CPT

## 2019-07-31 PROCEDURE — 2700000000 HC OXYGEN THERAPY PER DAY

## 2019-07-31 PROCEDURE — C9113 INJ PANTOPRAZOLE SODIUM, VIA: HCPCS | Performed by: INTERNAL MEDICINE

## 2019-07-31 PROCEDURE — 2580000003 HC RX 258: Performed by: NURSE PRACTITIONER

## 2019-07-31 PROCEDURE — 6360000002 HC RX W HCPCS

## 2019-07-31 PROCEDURE — 2500000003 HC RX 250 WO HCPCS: Performed by: STUDENT IN AN ORGANIZED HEALTH CARE EDUCATION/TRAINING PROGRAM

## 2019-07-31 RX ORDER — POTASSIUM CHLORIDE 29.8 MG/ML
20 INJECTION INTRAVENOUS
Status: COMPLETED | OUTPATIENT
Start: 2019-08-01 | End: 2019-08-01

## 2019-07-31 RX ORDER — POTASSIUM CHLORIDE 29.8 MG/ML
20 INJECTION INTRAVENOUS
Status: COMPLETED | OUTPATIENT
Start: 2019-07-31 | End: 2019-07-31

## 2019-07-31 RX ORDER — LEVETIRACETAM 5 MG/ML
500 INJECTION INTRAVASCULAR EVERY 12 HOURS
Status: DISCONTINUED | OUTPATIENT
Start: 2019-07-31 | End: 2019-07-31 | Stop reason: CLARIF

## 2019-07-31 RX ADMIN — CASTOR OIL AND BALSAM, PERU: 788; 87 OINTMENT TOPICAL at 21:03

## 2019-07-31 RX ADMIN — CASTOR OIL AND BALSAM, PERU: 788; 87 OINTMENT TOPICAL at 08:17

## 2019-07-31 RX ADMIN — HEPARIN SODIUM 5000 UNITS: 5000 INJECTION INTRAVENOUS; SUBCUTANEOUS at 20:40

## 2019-07-31 RX ADMIN — Medication 15 ML: at 19:50

## 2019-07-31 RX ADMIN — POTASSIUM CHLORIDE 20 MEQ: 29.8 INJECTION, SOLUTION INTRAVENOUS at 08:22

## 2019-07-31 RX ADMIN — ASPIRIN 81 MG 81 MG: 81 TABLET ORAL at 08:22

## 2019-07-31 RX ADMIN — POTASSIUM CHLORIDE 20 MEQ: 29.8 INJECTION, SOLUTION INTRAVENOUS at 04:12

## 2019-07-31 RX ADMIN — POTASSIUM CHLORIDE 20 MEQ: 29.8 INJECTION, SOLUTION INTRAVENOUS at 17:15

## 2019-07-31 RX ADMIN — PANTOPRAZOLE SODIUM 40 MG: 40 INJECTION, POWDER, LYOPHILIZED, FOR SOLUTION INTRAVENOUS at 08:22

## 2019-07-31 RX ADMIN — HEPARIN SODIUM 5000 UNITS: 5000 INJECTION INTRAVENOUS; SUBCUTANEOUS at 05:57

## 2019-07-31 RX ADMIN — LEVALBUTEROL HYDROCHLORIDE 1.25 MG: 1.25 SOLUTION, CONCENTRATE RESPIRATORY (INHALATION) at 12:40

## 2019-07-31 RX ADMIN — METRONIDAZOLE 500 MG: 500 INJECTION, SOLUTION INTRAVENOUS at 05:57

## 2019-07-31 RX ADMIN — Medication 15 ML: at 08:22

## 2019-07-31 RX ADMIN — DEXTROSE 50 % IN WATER (D50W) INTRAVENOUS SYRINGE 12.5 G: at 08:17

## 2019-07-31 RX ADMIN — ATORVASTATIN CALCIUM 20 MG: 20 TABLET, FILM COATED ORAL at 20:40

## 2019-07-31 RX ADMIN — LEVETIRACETAM 500 MG: 100 INJECTION, SOLUTION INTRAVENOUS at 17:24

## 2019-07-31 RX ADMIN — LEVALBUTEROL HYDROCHLORIDE 1.25 MG: 1.25 SOLUTION, CONCENTRATE RESPIRATORY (INHALATION) at 08:12

## 2019-07-31 RX ADMIN — PANTOPRAZOLE SODIUM 40 MG: 40 INJECTION, POWDER, LYOPHILIZED, FOR SOLUTION INTRAVENOUS at 20:40

## 2019-07-31 RX ADMIN — VANCOMYCIN HYDROCHLORIDE 1000 MG: 10 INJECTION, POWDER, LYOPHILIZED, FOR SOLUTION INTRAVENOUS at 08:14

## 2019-07-31 RX ADMIN — LEVALBUTEROL HYDROCHLORIDE 1.25 MG: 1.25 SOLUTION, CONCENTRATE RESPIRATORY (INHALATION) at 04:15

## 2019-07-31 RX ADMIN — POTASSIUM CHLORIDE 20 MEQ: 29.8 INJECTION, SOLUTION INTRAVENOUS at 02:47

## 2019-07-31 RX ADMIN — LEVALBUTEROL HYDROCHLORIDE 1.25 MG: 1.25 SOLUTION, CONCENTRATE RESPIRATORY (INHALATION) at 00:30

## 2019-07-31 RX ADMIN — DEXMEDETOMIDINE 0.5 MCG/KG/HR: 100 INJECTION, SOLUTION, CONCENTRATE INTRAVENOUS at 02:50

## 2019-07-31 RX ADMIN — MORPHINE SULFATE 15 MG: 15 TABLET ORAL at 03:33

## 2019-07-31 RX ADMIN — MEROPENEM 1 G: 1 INJECTION, POWDER, FOR SOLUTION INTRAVENOUS at 14:29

## 2019-07-31 RX ADMIN — SODIUM CHLORIDE, POTASSIUM CHLORIDE, SODIUM LACTATE AND CALCIUM CHLORIDE: 600; 310; 30; 20 INJECTION, SOLUTION INTRAVENOUS at 08:11

## 2019-07-31 RX ADMIN — POTASSIUM CHLORIDE 20 MEQ: 29.8 INJECTION, SOLUTION INTRAVENOUS at 16:04

## 2019-07-31 RX ADMIN — POTASSIUM CHLORIDE 20 MEQ: 29.8 INJECTION, SOLUTION INTRAVENOUS at 05:57

## 2019-07-31 RX ADMIN — LEVALBUTEROL HYDROCHLORIDE 1.25 MG: 1.25 SOLUTION, CONCENTRATE RESPIRATORY (INHALATION) at 16:16

## 2019-07-31 RX ADMIN — ACETAMINOPHEN 650 MG: 325 TABLET ORAL at 20:54

## 2019-07-31 RX ADMIN — SODIUM PHOSPHATE, MONOBASIC, MONOHYDRATE 20 MMOL: 276; 142 INJECTION, SOLUTION INTRAVENOUS at 15:48

## 2019-07-31 RX ADMIN — LEVALBUTEROL HYDROCHLORIDE 1.25 MG: 1.25 SOLUTION, CONCENTRATE RESPIRATORY (INHALATION) at 19:50

## 2019-07-31 RX ADMIN — HEPARIN SODIUM 5000 UNITS: 5000 INJECTION INTRAVENOUS; SUBCUTANEOUS at 14:29

## 2019-07-31 ASSESSMENT — PAIN SCALES - GENERAL
PAINLEVEL_OUTOF10: 3
PAINLEVEL_OUTOF10: 4
PAINLEVEL_OUTOF10: 4

## 2019-07-31 ASSESSMENT — PULMONARY FUNCTION TESTS
PIF_VALUE: 29
PIF_VALUE: 24
PIF_VALUE: 17
PIF_VALUE: 23
PIF_VALUE: 19
PIF_VALUE: 20
PIF_VALUE: 23
PIF_VALUE: 21
PIF_VALUE: 24
PIF_VALUE: 20
PIF_VALUE: 25
PIF_VALUE: 21
PIF_VALUE: 18
PIF_VALUE: 23
PIF_VALUE: 20
PIF_VALUE: 21
PIF_VALUE: 20
PIF_VALUE: 25
PIF_VALUE: 22

## 2019-07-31 NOTE — PROGRESS NOTES
ICU Progress Note    Admit Date: 7/22/2019  Vent Day: None  IV Access:Peripheral  IV Fluids:None  Vasopressors:None                Antibiotics: None  Diet: DIET TUBE FEED CONTINUOUS/CYCLIC NPO; STANDARD WITH FIBER (Jevity 1.2); Nasogastric; Continuous; 20; 60; 24     CC: mental status changes, possible alcohol withdrawal     Interval history: Patient off sedation and remains minimally responsive. MRI and EEG ordered. Neurology on board. Remains mechanically ventilated and oxygenating well on FiO2 of 40 PEEP 5. Maintaining blood pressure off pressors. Patient continues to spike low grade fevers and has a white count of 16.1. Cultures show no growth to date.     HPI: Mr. Kaela Copeland is a 71 yo M with PMHx significant for HTN, HLD, GERD, opioid dependence 2/2 cervical DDD, and alcohol abuse who presents to [facility] ED complaining of fatigue, nausea/vomiting, poor appetite and voice hoarseness over the last several months. Pt was originally admitted to the floor. One day after admission, pt had AMS and became hypotensive requiring IV boluses and 4 mg Ativan and 5 mg Haldol.  Patient being managed in ICU for ventilation support.     Medications:     Scheduled Meds:   meropenem  1 g Intravenous Q12H    levalbuterol  1.25 mg Nebulization 6 times per day    vancomycin  1,000 mg Intravenous Q24H    pantoprazole  40 mg Intravenous BID    VENELEX   Topical BID    chlorhexidine  15 mL Mouth/Throat BID    heparin (porcine)  5,000 Units Subcutaneous 3 times per day    atorvastatin  20 mg Oral Nightly    aspirin  81 mg Oral Daily     Continuous Infusions:   dextrose       PRN Meds:acetaminophen, medicated lip ointment, aspirin, glucose, dextrose, glucagon (rDNA), dextrose, ondansetron    Objective:   Vitals:   T-max:  Patient Vitals for the past 8 hrs:   BP Temp Temp src Pulse Resp SpO2   07/31/19 1243 -- -- -- 124 24 97 %   07/31/19 1240 -- -- -- -- 24 97 %   07/31/19 1125 (!) 151/69 99.9 °F (37.7 °C) Oral 125 28 99 % 07/31/19 1100 (!) 140/87 -- -- 123 26 98 %   07/31/19 1044 -- -- -- 119 26 97 %   07/31/19 1000 139/79 -- -- 127 (!) 33 100 %   07/31/19 0900 (!) 147/73 -- -- 122 (!) 33 97 %   07/31/19 0818 -- -- -- 119 24 98 %   07/31/19 0816 -- -- -- -- 23 97 %   07/31/19 0800 131/77 99.6 °F (37.6 °C) Oral 123 23 98 %   07/31/19 0700 125/71 -- -- 119 25 97 %   07/31/19 0600 134/75 -- -- 121 26 97 %       Intake/Output Summary (Last 24 hours) at 7/31/2019 1305  Last data filed at 7/31/2019 1125  Gross per 24 hour   Intake 3109.6 ml   Output 3415 ml   Net -305.4 ml     Review of systems unable to be done. Patient Sedated and on ventilator     Physical Exam   Constitutional: He appears well-developed and well-nourished. HENT:   Head: Normocephalic and atraumatic. Neck: Normal range of motion. Neck supple. Central line in place, right IJ   Cardiovascular: Tachycardia present. Pulmonary/Chest: He has no wheezes. He has no rales.   On ventilator  Abdominal: Soft. Bowel sounds present  Musculoskeletal:   Neurological: Minimally responsive to painful stimuli. Does not follow commands. Skin: Skin is warm and dry.     LABS:    CBC:   Recent Labs     07/29/19  0338  07/29/19  1750 07/30/19  0530 07/31/19  0405   WBC 8.3  --   --  18.3* 16.1*   HGB 10.0*   < > 7.6* 8.2* 7.8*   HCT 29.8*   < > 23.1* 23.8* 22.8*     --   --  172 200   MCV 95.6  --   --  91.8 90.6    < > = values in this interval not displayed.      Renal:    Recent Labs     07/31/19  0405 07/31/19  0830 07/31/19  1142    146* 144   K 3.4* 3.8 3.4*    110 109   CO2 24 23 22   BUN 49* 48* 44*   CREATININE 1.4* 1.4* 1.2   GLUCOSE 88 171* 89   CALCIUM 8.0* 7.8* 8.1*   PHOS 2.6 2.3* 2.0*   ANIONGAP 11 13 13     Hepatic:   Recent Labs     07/31/19  0405 07/31/19  0830 07/31/19  1142   LABALBU 1.8* 1.9* 2.0*       ABGs:    Recent Labs     07/29/19  0342 07/29/19  0553 07/30/19  0530   PHART 7.266* 7.270* 7.449   HQP2VMD 63.3* 68.5* 32.8*   PO2ART 109.5* than left basilar atelectasis versus consolidation. 4.  No pneumothorax radiographically evident. 5.  Tubes/lines as above. XR CHEST PORTABLE   Final Result      1. Persistent hazy perihilar, groundglass basilar consolidations and pleural effusions greater in the right lung   2. NG tube in place as well as a right IJ central venous catheter with the tip in the mid to distal SVC, no worsening               XR CHEST PORTABLE   Final Result      Introduction of NG tube with tip excluded from the inferior edge of the film. Moderate bilateral effusions and diffuse groundglass opacities consistent with edema, infection or aspiration. This appears worse since the previous study            XR ABDOMEN (KUB) (SINGLE AP VIEW)   Final Result      Nasogastric tube extending into the left upper quadrant. XR CHEST PORTABLE   Final Result      Persistent right basilar infiltrate. XR CHEST 1 VW   Final Result      Tip of the right IJ central venous catheter overlies lower SVC with no pneumothorax evident. CT HEAD WO CONTRAST   Final Result      1. No acute intracranial process. XR CHEST PORTABLE   Final Result      Interval development of bilateral lower lobe opacities may relate to edema or pneumonia      CT SOFT TISSUE NECK WO CONTRAST   Final Result   Unremarkable CT neck          XR CHEST PORTABLE   Final Result     Normal chest x-ray. CT ABDOMEN PELVIS WO CONTRAST Additional Contrast? None   Final Result   No acute abnormality demonstrated in the abdomen or pelvis. MRI BRAIN WO CONTRAST    (Results Pending)         Assessment/Plan:   Mr. Beacher Romberg is a 71 yo M with PMHx significant for HTN, HLD, GERD, opioid dependence 2/2 cervical DDD, and alcohol abuse who presents to [facility] ED complaining of fatigue, nausea/vomiting, poor appetite and voice hoarseness over the last several months. Pt was originally admitted to the floor.  One day after admission, pt had AMS and became may have been spent performing procedures for life threatening organ failure. Cary Gloria

## 2019-07-31 NOTE — PROGRESS NOTES
performed by Todd Mendoza MD at [address]:  99.6 °F (37.6 °C)  HEIGHT:  5' 5\" (165.1 cm)  WEIGHT:  134 lb 11.2 oz (61.1 kg)  BLOOD PRESSURE:  134/75    PULSE:  119  RESPIRATION:  24    I/0  Intake/Output:      Intake/Output Summary (Last 24 hours) at 7/31/2019 0836  Last data filed at 7/31/2019 0600  Gross per 24 hour   Intake 3034.6 ml   Output 2600 ml   Net 434.6 ml       CURRENT INPATIENT MEDICATIONS:  Scheduled Meds:   levalbuterol  1.25 mg Nebulization 6 times per day    vancomycin  1,000 mg Intravenous Q24H    pantoprazole  40 mg Intravenous BID    VENELEX   Topical BID    metroNIDAZOLE  500 mg Intravenous Q8H    cefepime  2 g Intravenous Q24H    chlorhexidine  15 mL Mouth/Throat BID    heparin (porcine)  5,000 Units Subcutaneous 3 times per day    atorvastatin  20 mg Oral Nightly    morphine  15 mg Oral BID    aspirin  81 mg Oral Daily     Continuous Infusions:   norepinephrine Stopped (07/30/19 1812)    lactated ringers 100 mL/hr at 07/31/19 0811    dexmedetomidine (PRECEDEX) IV infusion 0.3 mcg/kg/hr (07/31/19 0646)    dextrose       PRN Meds:acetaminophen, medicated lip ointment, aspirin, glucose, dextrose, glucagon (rDNA), dextrose, ondansetron    PERTINENT LABS:  CBC   Recent Labs     07/29/19  0338  07/29/19  1750 07/30/19  0530 07/31/19  0405   WBC 8.3  --   --  18.3* 16.1*   HGB 10.0*   < > 7.6* 8.2* 7.8*   HCT 29.8*   < > 23.1* 23.8* 22.8*   MCV 95.6  --   --  91.8 90.6     --   --  172 200    < > = values in this interval not displayed. Renal   Recent Labs     07/30/19  2025 07/30/19  2352 07/31/19  0405    143 144   K 3.5 3.1* 3.4*    107 109   CO2 25 24 24   PHOS 3.1 2.2* 2.6   BUN 54* 52* 49*   CREATININE 1.6* 1.5* 1.4*     Hepatic No results for input(s): AST, ALT, ALB, BILIDIR, BILITOT, ALKPHOS in the last 72 hours.     GLUCOSES/INSULIN REQUIREMENTS LAST 24 HOURS    116 - 88 - 72 - 79 - 148 mg/dL    INR/ANTI-Xa  Lab

## 2019-07-31 NOTE — PROGRESS NOTES
Plan for MRI today. Check list completed by RN and asked son Moon Sayres) questions for checklist.  Pt has remained unresponsive. Moves to pain, nonpurposeful movement of head and neck, pupils reactive to light. Pt HR in 140s, ICU residents made aware of HR, no new orders placed.

## 2019-07-31 NOTE — PROGRESS NOTES
Exam(s): FILM CXR 1 VIEW        ADDENDUM - Added by Elida Oliver MD on 7/29/2019 2:22 AM (-07:00)   IMPRESSION:        ET tube terminates 6.1 cm  FROM  the fransico.   ----------------------------------------------------------------------       EXAM:     XR Chest, 1 View       CLINICAL HISTORY:      Reason for exam: intubation. Reason for exam:->intubation. TECHNIQUE:     Frontal view of the chest.       COMPARISON:       Chest x-ray 7/20/19       IMPRESSION:        ET tube terminates 6.1 cm in the fransico. Final      CT CHEST WO CONTRAST   Final Result      1. Small bilateral pleural effusions. Multifocal pneumonia in the right upper and bilateral lower lobes. 2. 13 mm right upper lobe spiculated nodule suspicious for malignancy. Recommend PET/CT or biopsy. Additional nonspecific areas of nodular consolidation in the right lower lobe. Qzxv      XR CHEST PORTABLE   Final Result   1. Persistent but improved central pulmonary vascular congestion with    diffusely increased interstitial markings still present. 2.  Mildly decreased right greater than left pleural effusions. 3.  Right greater than left basilar atelectasis versus consolidation. 4.  No pneumothorax radiographically evident. 5.  Tubes/lines as above. XR CHEST PORTABLE   Final Result      1. Persistent hazy perihilar, groundglass basilar consolidations and pleural effusions greater in the right lung   2. NG tube in place as well as a right IJ central venous catheter with the tip in the mid to distal SVC, no worsening               XR CHEST PORTABLE   Final Result      Introduction of NG tube with tip excluded from the inferior edge of the film. Moderate bilateral effusions and diffuse groundglass opacities consistent with edema, infection or aspiration. This appears worse since the previous study            XR ABDOMEN (KUB) (SINGLE AP VIEW)   Final Result      Nasogastric tube extending into the left upper quadrant.

## 2019-07-31 NOTE — PROGRESS NOTES
and unable to give any details.     Reviewed admission H and P and other details. He has a history of  hypertension, opioid dependence, alcoholism, cervical degenerative  disease for which apparently he has some surgery, presents with fatigue,  nausea, vomiting, poor appetite, hoarseness of the voice for several  months, recent 10 pounds of weight loss.     Apparently, he has problem with the hoarseness and has videostroboscopy  with laryngoscopy in 07/2018 by ENT. Possible mass seen in the vocal  cord. Recently, he also has an esophagram.  Mild dysmotility was seen. The patient drinks lot of milk shakes mixing with vodka.     In the ER, his vitals were stable, but has abnormal lab that described  above.     I am seeing in his room where unable get any history. Interval History :     And intubated  On ventilation  Making urine  bP coming up  Vent settings stable    Seen with - RN  ROS -     Unable to obtain ROS due to intubation      Vitals:    07/31/19 0900   BP: (!) 147/73   Pulse: 122   Resp: (!) 33   Temp:    SpO2:          I/O last 3 completed shifts: In: 3034.6 [I.V.:3034.6]  Out: 2175 [Urine:2675]      General appearance: unresponsive, intubated   HEENT: Lips- normal, teeth- ok , oral mucosa- moist  Neck : Mass- no, appears symmetrical, JVD- not raised  Respiratory: Respiratory effort-  On ventilation- FiO2- 65 %, PEEP 10mmHg wheeze- no, crackles - no  Cardiovascular:  Ausculation- No M/R/G, Edema none  Abdomen: visible mass- no, distention- no, scar- no, tenderness- unable to assess                           hepatosplenomegaly-  No--  Musculoskeletal:  clubbing no,cyanosis- no , digital ischemia- no                           muscle strength- patient unable to co-operate     , tone - patient unable to co-operate   Skin: rashes- no , ulcers- no, induration- no, tightening - no  Psychiatric:   Intubated, unable to assess  Has resendiz- 200 ml urine     .l  Lab Results   Component Value Date

## 2019-08-01 ENCOUNTER — APPOINTMENT (OUTPATIENT)
Dept: MRI IMAGING | Age: 69
DRG: 682 | End: 2019-08-01
Payer: MEDICARE

## 2019-08-01 ENCOUNTER — APPOINTMENT (OUTPATIENT)
Dept: GENERAL RADIOLOGY | Age: 69
DRG: 682 | End: 2019-08-01
Payer: MEDICARE

## 2019-08-01 LAB
ALBUMIN SERPL-MCNC: 2.1 G/DL (ref 3.4–5)
ANION GAP SERPL CALCULATED.3IONS-SCNC: 13 MMOL/L (ref 3–16)
ATYPICAL LYMPHOCYTE RELATIVE PERCENT: 1 % (ref 0–6)
BASOPHILS ABSOLUTE: 0 K/UL (ref 0–0.2)
BASOPHILS RELATIVE PERCENT: 0 %
BLOOD CULTURE, ROUTINE: NORMAL
BUN BLDV-MCNC: 34 MG/DL (ref 7–20)
CALCIUM SERPL-MCNC: 7.9 MG/DL (ref 8.3–10.6)
CHLORIDE BLD-SCNC: 111 MMOL/L (ref 99–110)
CO2: 23 MMOL/L (ref 21–32)
CREAT SERPL-MCNC: 1.1 MG/DL (ref 0.8–1.3)
CULTURE, BLOOD 2: NORMAL
EOSINOPHILS ABSOLUTE: 0.2 K/UL (ref 0–0.6)
EOSINOPHILS RELATIVE PERCENT: 1 %
GFR AFRICAN AMERICAN: >60
GFR NON-AFRICAN AMERICAN: >60
GLUCOSE BLD-MCNC: 106 MG/DL (ref 70–99)
GLUCOSE BLD-MCNC: 111 MG/DL (ref 70–99)
GLUCOSE BLD-MCNC: 114 MG/DL (ref 70–99)
GLUCOSE BLD-MCNC: 115 MG/DL (ref 70–99)
GLUCOSE BLD-MCNC: 124 MG/DL (ref 70–99)
HCT VFR BLD CALC: 25 % (ref 40.5–52.5)
HEMOGLOBIN: 8.3 G/DL (ref 13.5–17.5)
LYMPHOCYTES ABSOLUTE: 0.8 K/UL (ref 1–5.1)
LYMPHOCYTES RELATIVE PERCENT: 4 %
MCH RBC QN AUTO: 30.7 PG (ref 26–34)
MCHC RBC AUTO-ENTMCNC: 33.3 G/DL (ref 31–36)
MCV RBC AUTO: 92.2 FL (ref 80–100)
MONOCYTES ABSOLUTE: 0.5 K/UL (ref 0–1.3)
MONOCYTES RELATIVE PERCENT: 3 %
NEUTROPHILS ABSOLUTE: 14.7 K/UL (ref 1.7–7.7)
NEUTROPHILS RELATIVE PERCENT: 91 %
PDW BLD-RTO: 14.6 % (ref 12.4–15.4)
PERFORMED ON: ABNORMAL
PHOSPHORUS: 1.6 MG/DL (ref 2.5–4.9)
PLATELET # BLD: 226 K/UL (ref 135–450)
PMV BLD AUTO: 9.4 FL (ref 5–10.5)
POTASSIUM SERPL-SCNC: 3.7 MMOL/L (ref 3.5–5.1)
RBC # BLD: 2.72 M/UL (ref 4.2–5.9)
RBC # BLD: NORMAL 10*6/UL
SODIUM BLD-SCNC: 147 MMOL/L (ref 136–145)
WBC # BLD: 16.1 K/UL (ref 4–11)

## 2019-08-01 PROCEDURE — 6370000000 HC RX 637 (ALT 250 FOR IP): Performed by: INTERNAL MEDICINE

## 2019-08-01 PROCEDURE — 85025 COMPLETE CBC W/AUTO DIFF WBC: CPT

## 2019-08-01 PROCEDURE — 94640 AIRWAY INHALATION TREATMENT: CPT

## 2019-08-01 PROCEDURE — 71045 X-RAY EXAM CHEST 1 VIEW: CPT

## 2019-08-01 PROCEDURE — 37799 UNLISTED PX VASCULAR SURGERY: CPT

## 2019-08-01 PROCEDURE — 94761 N-INVAS EAR/PLS OXIMETRY MLT: CPT

## 2019-08-01 PROCEDURE — 6360000002 HC RX W HCPCS: Performed by: STUDENT IN AN ORGANIZED HEALTH CARE EDUCATION/TRAINING PROGRAM

## 2019-08-01 PROCEDURE — 2000000000 HC ICU R&B

## 2019-08-01 PROCEDURE — 6360000002 HC RX W HCPCS: Performed by: INTERNAL MEDICINE

## 2019-08-01 PROCEDURE — 2580000003 HC RX 258: Performed by: INTERNAL MEDICINE

## 2019-08-01 PROCEDURE — 36592 COLLECT BLOOD FROM PICC: CPT

## 2019-08-01 PROCEDURE — 2580000003 HC RX 258: Performed by: NURSE PRACTITIONER

## 2019-08-01 PROCEDURE — 80069 RENAL FUNCTION PANEL: CPT

## 2019-08-01 PROCEDURE — 70551 MRI BRAIN STEM W/O DYE: CPT

## 2019-08-01 PROCEDURE — 99291 CRITICAL CARE FIRST HOUR: CPT | Performed by: INTERNAL MEDICINE

## 2019-08-01 PROCEDURE — 2500000003 HC RX 250 WO HCPCS: Performed by: INTERNAL MEDICINE

## 2019-08-01 PROCEDURE — 94770 HC ETCO2 MONITOR DAILY: CPT

## 2019-08-01 PROCEDURE — C9113 INJ PANTOPRAZOLE SODIUM, VIA: HCPCS | Performed by: INTERNAL MEDICINE

## 2019-08-01 PROCEDURE — 2580000003 HC RX 258: Performed by: STUDENT IN AN ORGANIZED HEALTH CARE EDUCATION/TRAINING PROGRAM

## 2019-08-01 PROCEDURE — 2500000003 HC RX 250 WO HCPCS: Performed by: STUDENT IN AN ORGANIZED HEALTH CARE EDUCATION/TRAINING PROGRAM

## 2019-08-01 PROCEDURE — 6360000002 HC RX W HCPCS: Performed by: NURSE PRACTITIONER

## 2019-08-01 PROCEDURE — 94750 HC PULMONARY COMPLIANCE STUDY: CPT

## 2019-08-01 PROCEDURE — 94003 VENT MGMT INPAT SUBQ DAY: CPT

## 2019-08-01 PROCEDURE — 6370000000 HC RX 637 (ALT 250 FOR IP): Performed by: STUDENT IN AN ORGANIZED HEALTH CARE EDUCATION/TRAINING PROGRAM

## 2019-08-01 PROCEDURE — 2700000000 HC OXYGEN THERAPY PER DAY

## 2019-08-01 RX ORDER — HYDRALAZINE HYDROCHLORIDE 20 MG/ML
5 INJECTION INTRAMUSCULAR; INTRAVENOUS EVERY 6 HOURS PRN
Status: DISCONTINUED | OUTPATIENT
Start: 2019-08-01 | End: 2019-08-01

## 2019-08-01 RX ORDER — MIDAZOLAM HYDROCHLORIDE 1 MG/ML
1 INJECTION INTRAMUSCULAR; INTRAVENOUS ONCE
Status: COMPLETED | OUTPATIENT
Start: 2019-08-01 | End: 2019-08-01

## 2019-08-01 RX ORDER — LABETALOL 20 MG/4 ML (5 MG/ML) INTRAVENOUS SYRINGE
10 EVERY 4 HOURS PRN
Status: DISCONTINUED | OUTPATIENT
Start: 2019-08-01 | End: 2019-08-12 | Stop reason: ALTCHOICE

## 2019-08-01 RX ADMIN — PANTOPRAZOLE SODIUM 40 MG: 40 INJECTION, POWDER, LYOPHILIZED, FOR SOLUTION INTRAVENOUS at 08:15

## 2019-08-01 RX ADMIN — LEVALBUTEROL HYDROCHLORIDE 1.25 MG: 1.25 SOLUTION, CONCENTRATE RESPIRATORY (INHALATION) at 11:41

## 2019-08-01 RX ADMIN — LEVALBUTEROL HYDROCHLORIDE 1.25 MG: 1.25 SOLUTION, CONCENTRATE RESPIRATORY (INHALATION) at 04:25

## 2019-08-01 RX ADMIN — PANTOPRAZOLE SODIUM 40 MG: 40 INJECTION, POWDER, LYOPHILIZED, FOR SOLUTION INTRAVENOUS at 21:48

## 2019-08-01 RX ADMIN — LEVALBUTEROL HYDROCHLORIDE 1.25 MG: 1.25 SOLUTION, CONCENTRATE RESPIRATORY (INHALATION) at 19:40

## 2019-08-01 RX ADMIN — HEPARIN SODIUM 5000 UNITS: 5000 INJECTION INTRAVENOUS; SUBCUTANEOUS at 21:48

## 2019-08-01 RX ADMIN — LEVALBUTEROL HYDROCHLORIDE 1.25 MG: 1.25 SOLUTION, CONCENTRATE RESPIRATORY (INHALATION) at 15:22

## 2019-08-01 RX ADMIN — LEVETIRACETAM 500 MG: 100 INJECTION, SOLUTION INTRAVENOUS at 16:39

## 2019-08-01 RX ADMIN — HEPARIN SODIUM 5000 UNITS: 5000 INJECTION INTRAVENOUS; SUBCUTANEOUS at 05:49

## 2019-08-01 RX ADMIN — POTASSIUM CHLORIDE 20 MEQ: 29.8 INJECTION, SOLUTION INTRAVENOUS at 04:40

## 2019-08-01 RX ADMIN — MIDAZOLAM 1 MG: 1 INJECTION INTRAMUSCULAR; INTRAVENOUS at 13:05

## 2019-08-01 RX ADMIN — METOPROLOL TARTRATE 25 MG: 25 TABLET ORAL at 08:40

## 2019-08-01 RX ADMIN — MEROPENEM 1 G: 1 INJECTION, POWDER, FOR SOLUTION INTRAVENOUS at 01:19

## 2019-08-01 RX ADMIN — SODIUM PHOSPHATE, MONOBASIC, MONOHYDRATE 30 MMOL: 276; 142 INJECTION, SOLUTION INTRAVENOUS at 11:34

## 2019-08-01 RX ADMIN — VANCOMYCIN HYDROCHLORIDE 1000 MG: 10 INJECTION, POWDER, LYOPHILIZED, FOR SOLUTION INTRAVENOUS at 08:15

## 2019-08-01 RX ADMIN — CASTOR OIL AND BALSAM, PERU: 788; 87 OINTMENT TOPICAL at 21:51

## 2019-08-01 RX ADMIN — CASTOR OIL AND BALSAM, PERU: 788; 87 OINTMENT TOPICAL at 08:15

## 2019-08-01 RX ADMIN — POTASSIUM CHLORIDE 20 MEQ: 29.8 INJECTION, SOLUTION INTRAVENOUS at 03:15

## 2019-08-01 RX ADMIN — HEPARIN SODIUM 5000 UNITS: 5000 INJECTION INTRAVENOUS; SUBCUTANEOUS at 16:38

## 2019-08-01 RX ADMIN — ASPIRIN 81 MG 81 MG: 81 TABLET ORAL at 08:15

## 2019-08-01 RX ADMIN — ATORVASTATIN CALCIUM 20 MG: 20 TABLET, FILM COATED ORAL at 21:48

## 2019-08-01 RX ADMIN — POTASSIUM CHLORIDE 20 MEQ: 29.8 INJECTION, SOLUTION INTRAVENOUS at 01:25

## 2019-08-01 RX ADMIN — LEVETIRACETAM 500 MG: 100 INJECTION, SOLUTION INTRAVENOUS at 04:35

## 2019-08-01 RX ADMIN — MEROPENEM 1 G: 1 INJECTION, POWDER, FOR SOLUTION INTRAVENOUS at 16:14

## 2019-08-01 RX ADMIN — LEVALBUTEROL HYDROCHLORIDE 1.25 MG: 1.25 SOLUTION, CONCENTRATE RESPIRATORY (INHALATION) at 00:35

## 2019-08-01 RX ADMIN — METOPROLOL TARTRATE 25 MG: 25 TABLET ORAL at 21:48

## 2019-08-01 RX ADMIN — Medication 15 ML: at 19:40

## 2019-08-01 RX ADMIN — LEVALBUTEROL HYDROCHLORIDE 1.25 MG: 1.25 SOLUTION, CONCENTRATE RESPIRATORY (INHALATION) at 07:52

## 2019-08-01 RX ADMIN — MIDAZOLAM 1 MG: 1 INJECTION INTRAMUSCULAR; INTRAVENOUS at 13:48

## 2019-08-01 RX ADMIN — LABETALOL 20 MG/4 ML (5 MG/ML) INTRAVENOUS SYRINGE 10 MG: at 10:28

## 2019-08-01 RX ADMIN — Medication 15 ML: at 08:00

## 2019-08-01 ASSESSMENT — PULMONARY FUNCTION TESTS
PIF_VALUE: 23
PIF_VALUE: 20
PIF_VALUE: 21
PIF_VALUE: 20
PIF_VALUE: 22
PIF_VALUE: 24
PIF_VALUE: 23
PIF_VALUE: 38
PIF_VALUE: 26
PIF_VALUE: 24
PIF_VALUE: 23
PIF_VALUE: 22
PIF_VALUE: 20

## 2019-08-01 NOTE — PROGRESS NOTES
· Feeding Route: Nasogastric  · Formula: Standard w/Fiber  · Rate (ml/hr):60 ml/hr    · Volume (ml/day): 1440 ml TV  · Water Flushes: 125 ml Q4 hrs  · Current TF & Flush Orders Provides:  At RD goal  · Goal TF & Flush Orders Provides: Jevity 1.2 at goal rate of 60 ml/hr will provide 1440 ml TV, 1728 kcal, 80 gm protein and 1162 ml free water  · Additional Calories: N/A  · Anthropometric Measures:  · Ht: 5' 5\" (165.1 cm)   · Current Body Wt: 133 lb (60.3 kg)  · Admission Body Wt: 120 lb (54.4 kg)(no wt method)  · Usual Body Wt: (JHONATAN; wt was 134lb 6/27 per care everywhere)  · Weight Change: 7.5% loss x1 month   · Ideal Body Wt: 136 lb (61.7 kg),    · BMI Classification: BMI 18.5 - 24.9 Normal Weight    Nutrition Interventions:   Continue NPO, Continue current Tube Feeding  Continued Inpatient Monitoring    Nutrition Evaluation:   · Evaluation: Progressing toward goals   · Goals: Pt will tolerate most appropriate form of nutrition therapy when initiated   · Monitoring: Nutrition Progression, TF Intake, TF Tolerance, Pertinent Labs      Electronically signed by Jeffrey Thompson RD, LD on 8/1/19 at 11:03 AM    Contact Number: [phone]

## 2019-08-01 NOTE — PROCEDURES
Name: Franca Sharpe  MRN: [id number]  : 1950  Interpreting Physician: Sukumar Rolon MD  Referring Physician: Neville Joe MD  Date of EE/31      Clinical History:  Franca Sharpe is a 70 y.o. male with a reported history of encephalopathy who was referred for EEG    Current Antiepileptic Medications:    metoprolol tartrate  25 mg Oral BID    sodium phosphate IVPB  30 mmol Intravenous Once    meropenem  1 g Intravenous Q12H    levetiracetam  500 mg Intravenous Q12H    levalbuterol  1.25 mg Nebulization 6 times per day    vancomycin  1,000 mg Intravenous Q24H    pantoprazole  40 mg Intravenous BID    VENELEX   Topical BID    chlorhexidine  15 mL Mouth/Throat BID    heparin (porcine)  5,000 Units Subcutaneous 3 times per day    atorvastatin  20 mg Oral Nightly    aspirin  81 mg Oral Daily       Indication:  encephalopathy    Technical Summary:  20 channels of EEG were recorded in a digital format on a patient who is reported to be confused during the recording. The patient was not sleep deprived prior to the EEG. The recording revealed a background rhythm in the delta to theta frequency range without PDR. There were triphasic waves noted throughout the record. Photic stimulation without clear occipital driving response    During the recording stage II sleep was not seen. The EKG lead revealed no rhythm abnormalties. EEG Interpretation:   The EEG was abnormal due to the presence of:    Generalized slowing which is a non-specific finding consistent with a generalized disturbance of cerebral functioning including toxic, metabolic, or structural abnormalities that are multi-focal or diffuse. Triphasic waves which are a non-specific finding associated with a process diffusely affecting the cerebrum including toxic (including cefepime), metabolic, and post-hypoxic processes--particularly hepatic or renal failure rarely associated with epileptic seizures.     Clinical correlation is recommended.   The absence of epileptiform discharges on a single EEG does not rule out a diagnosis of  epilepsy or rule out non-convulsive or complex partial status epilepticus as a cause of altered mental status

## 2019-08-01 NOTE — PROGRESS NOTES
RESPIRATORY THERAPY ASSESSMENT    Name:  [facility] Record Number:  [id number]  Age: 70 y.o. Gender: male  : 1950  Today's Date:  2019  Room:  [address] [place] [address]-    Assessment     Is the patient being admitted for a COPD or Asthma exacerbation? No   (If yes the patient will be seen every 4 hours for the first 24 hours and then reassessed)    Patient Admission Diagnosis      Allergies  No Known Allergies    Minimum Predicted Vital Capacity:     918          Actual Vital Capacity:      Unable on ventilator              Pulmonary History:current smoker  Home Oxygen Therapy:  room air  Home Respiratory Therapy:None   Current Respiratory Therapy: Xopenex 1.25mg, Vent, O2  Treatment Type: Aerosol generator  Medications: Levalbuterol HCL, Sodium Chloride    Respiratory Severity Index(RSI)   Patients with orders for inhalation medications, oxygen, or any therapeutic treatment modality will be placed on Respiratory Protocol. They will be assessed with the first treatment and at least every 72 hours thereafter. The following severity scale will be used to determine frequency of treatment intervention. Smoking History: Pulmonary Disease or Smoking History, Greater than 15 pack year = 2    Social History  Social History     Tobacco Use    Smoking status: Current Every Day Smoker    Smokeless tobacco: Never Used   Substance Use Topics    Alcohol use:  Yes    Drug use: Never       Recent Surgical History: None = 0  Past Surgical History  Past Surgical History:   Procedure Laterality Date    CERVICAL LAMINECTOMY      HAND SURGERY      left, reattached tendons    UPPER GASTROINTESTINAL ENDOSCOPY N/A 2019    EGD ESOPHAGOGASTRODUODENOSCOPY performed by Spencer Rolle MD at [facility] ENDOSCOPY       Level of Consciousness: Disoriented and Uncooperative = 2    Level of Activity: Bedridden, unresponsive or quadriplegic = 4    Respiratory Pattern: Dyspnea with exertion;Irregular pattern;or RR less than 6 = 2    Breath Sounds: Diminshed bilaterally and/or crackles = 2    Sputum  Sputum Color: White, Tenacity: Thick, Sputum How Obtained: Endotracheal, Suctioned  Cough: Weak, productive = 2    Vital Signs   BP (!) 149/78   Pulse 133   Temp 100.9 °F (38.3 °C) (Oral)   Resp 18   Ht 5' 5\" (1.651 m)   Wt 134 lb 11.2 oz (61.1 kg)   SpO2 97%   BMI 22.42 kg/m²   SPO2 (COPD values may differ): 86-87% on room air or greater than 92% on FiO2 35- 50% = 3    Peak Flow (asthma only): not applicable = 0    RSI: 30-39 = Q4 (every four hours)        Plan       Goals: medication delivery and improve oxygenation    Patient/caregiver was educated on the proper method of use for Respiratory Care Devices:  No      Level of patient/caregiver understanding able to:   ? Verbalize understanding   ? Demonstrate understanding       ? Teach back        ? Needs reinforcement       ? No available caregiver               ? Other:     Response to education:  Poor     Is patient being placed on Home Treatment Regimen? No     Does the patient have everything they need prior to discharge? NA     Comments: Patient does not use respiratory medications at home. He remains on the ventilator at this time. Plan of Care: Continue Xopenex 1.25mg Q4H, Vent as ordered, O2 to keep spo2 92% or greater. Electronically signed by Gricel Martinez RCP on 8/1/2019 at 2:03 AM    Respiratory Protocol Guidelines     1. Assessment and treatment by Respiratory Therapy will be initiated for medication and therapeutic interventions upon initiation of aerosolized medication. 2. Physician will be contacted for respiratory rate (RR) greater than 35 breaths per minute. Therapy will be held for heart rate (HR) greater than 140 beats per minute, pending direction from physician. 3. Bronchodilators will be administered via Metered Dose Inhaler (MDI) with spacer when the following criteria are met:  a.  Alert and cooperative     b. HR < 140 bpm  c. RR < 30 bpm physician for possible discontinuation.

## 2019-08-01 NOTE — PROGRESS NOTES
ICU Progress Note    Admit Date: 7/22/2019  Vent Day: None  IV Access:Peripheral  IV Fluids:None  Vasopressors:None                Antibiotics: None  Diet: DIET TUBE FEED CONTINUOUS/CYCLIC NPO; STANDARD WITH FIBER (Jevity 1.2); Nasogastric; Continuous; 20; 60; 24    CC: mental status changes, possible alcohol withdrawal     Interval history: Patient off sedation and remains minimally responsive. He is unresponsive to verbal stimuli, but does withdraw to pain. Patient to undergo MRI today. Remains mechanically ventilated and oxygenating well on FiO2 of 40 PEEP 5. Maintaining blood pressure off pressors. He continues to spike low grade fevers and has a persistent white count of 16.1. His chest Xray today showed worsening pneumonia. Cultures show MRSA and Klebsiella from his lung culture .     HPI: Mr. Phil Elder is a 69 yo M with PMHx significant for HTN, HLD, GERD, opioid dependence 2/2 cervical DDD, and alcohol abuse who presents to [facility] ED complaining of fatigue, nausea/vomiting, poor appetite and voice hoarseness over the last several months. Pt was originally admitted to the floor. One day after admission, pt had AMS and became hypotensive requiring IV boluses and 4 mg Ativan and 5 mg Haldol.  Patient being managed in ICU for ventilation support.     Medications:     Scheduled Meds:   metoprolol tartrate  25 mg Oral BID    sodium phosphate IVPB  30 mmol Intravenous Once    midazolam  1 mg Intravenous Once    meropenem  1 g Intravenous Q12H    levetiracetam  500 mg Intravenous Q12H    levalbuterol  1.25 mg Nebulization 6 times per day    vancomycin  1,000 mg Intravenous Q24H    pantoprazole  40 mg Intravenous BID    VENELEX   Topical BID    chlorhexidine  15 mL Mouth/Throat BID    heparin (porcine)  5,000 Units Subcutaneous 3 times per day    atorvastatin  20 mg Oral Nightly    aspirin  81 mg Oral Daily     Continuous Infusions:   dextrose       PRN Meds:labetalol, acetaminophen, medicated lip ointment, aspirin, glucose, dextrose, glucagon (rDNA), dextrose, ondansetron    Objective:   Vitals:   T-max:  Patient Vitals for the past 8 hrs:   BP Temp Temp src Pulse Resp SpO2   08/01/19 1348 (!) 159/89 -- -- 116 -- --   08/01/19 1300 -- -- -- 111 24 --   08/01/19 1200 (!) 150/84 -- -- 110 22 100 %   08/01/19 1156 -- -- -- -- 25 100 %   08/01/19 1142 -- -- -- 107 26 93 %   08/01/19 1100 -- -- -- 104 23 --   08/01/19 1000 (!) 146/72 -- -- 115 19 --   08/01/19 0900 (!) 142/79 -- -- 135 22 --   08/01/19 0840 (!) 167/76 -- -- 131 -- --   08/01/19 0800 (!) 159/83 99.6 °F (37.6 °C) Oral 131 (!) 31 99 %   08/01/19 0754 -- -- -- 132 26 92 %   08/01/19 0753 -- -- -- -- 28 94 %   08/01/19 0700 (!) 164/99 -- -- 126 28 --   08/01/19 0600 (!) 151/89 -- -- 127 (!) 31 99 %       Intake/Output Summary (Last 24 hours) at 8/1/2019 1359  Last data filed at 8/1/2019 1126  Gross per 24 hour   Intake 2997 ml   Output 2850 ml   Net 147 ml     Review of systems unable to be done. Patient Sedated and on ventilator     Physical Exam   Constitutional: He appears well-developed and well-nourished. HENT:   Head: Normocephalic and atraumatic. Neck: Normal range of motion. Neck supple. Central line in place, right IJ   Cardiovascular: Tachycardia present. Pulmonary/Chest: He has no wheezes. He has no rales.   On ventilator  Abdominal: Soft. Bowel sounds present  Musculoskeletal:   Neurological: Minimally responsive to painful stimuli. Does not follow commands.   Skin: Skin is warm and dry.   LABS:    CBC:   Recent Labs     07/30/19  0530 07/31/19  0405 08/01/19  0737   WBC 18.3* 16.1* 16.1*   HGB 8.2* 7.8* 8.3*   HCT 23.8* 22.8* 25.0*    200 226   MCV 91.8 90.6 92.2     Renal:    Recent Labs     07/31/19  1142 07/31/19  2102 08/01/19  0737    145 147*   K 3.4* 3.2* 3.7    111* 111*   CO2 22 22 23   BUN 44* 37* 34*   CREATININE 1.2 1.2 1.1   GLUCOSE 89 132* 124*   CALCIUM 8.1* 7.8* 7.9*   PHOS 2.0* 2.1* 1.6* ANIONGAP 13 12 13     Hepatic:   Recent Labs     07/31/19  1142 07/31/19  2102 08/01/19  0737   LABALBU 2.0* 2.0* 2.1*       ABGs:    Recent Labs     07/30/19  0530   PHART 7.449   BDY9YTC 32.8*   PO2ART 120.0*   QME6QRH 22   BEART -0.8   H1HRAUQM 99   XND8RHU 23       Cultures:  -----------------------------------------------------------------  RAD:   XR CHEST PORTABLE   Final Result      1. Multifocal airspace disease consistent with pneumonia as described. This has progressed in the right upper and left lower lobes when compared to the prior exam.  Correlate clinically. CT HEAD WO CONTRAST   Final Result   1. Mild atrophy. 2.  No evidence of an acute intracranial process. XR CHEST PORTABLE   Final Result      New consolidation in the right lower lung compatible with atelectasis or pneumonia. Aspiration is in the differential diagnosis. Prominent interstitial markings in a perihilar distribution again noted. Stable cardiac mediastinal silhouette. Lines and tubes without change. XR CHEST PORTABLE   Final Result   Addendum 1 of 1      Patient: Eugene Vásquez  Time Out: 02:22   Exam(s): FILM CXR 1 VIEW        ADDENDUM - Added by Esther Pettit MD on 7/29/2019 2:22 AM (-07:00)   IMPRESSION:        ET tube terminates 6.1 cm  FROM  the fransico.   ----------------------------------------------------------------------       EXAM:     XR Chest, 1 View       CLINICAL HISTORY:      Reason for exam: intubation. Reason for exam:->intubation. TECHNIQUE:     Frontal view of the chest.       COMPARISON:       Chest x-ray 7/20/19       IMPRESSION:        ET tube terminates 6.1 cm in the fransico. Final      CT CHEST WO CONTRAST   Final Result      1. Small bilateral pleural effusions. Multifocal pneumonia in the right upper and bilateral lower lobes. 2. 13 mm right upper lobe spiculated nodule suspicious for malignancy. Recommend PET/CT or biopsy.  Additional nonspecific areas of nodular consolidation in the right lower lobe. Qzxv      XR CHEST PORTABLE   Final Result   1. Persistent but improved central pulmonary vascular congestion with    diffusely increased interstitial markings still present. 2.  Mildly decreased right greater than left pleural effusions. 3.  Right greater than left basilar atelectasis versus consolidation. 4.  No pneumothorax radiographically evident. 5.  Tubes/lines as above. XR CHEST PORTABLE   Final Result      1. Persistent hazy perihilar, groundglass basilar consolidations and pleural effusions greater in the right lung   2. NG tube in place as well as a right IJ central venous catheter with the tip in the mid to distal SVC, no worsening               XR CHEST PORTABLE   Final Result      Introduction of NG tube with tip excluded from the inferior edge of the film. Moderate bilateral effusions and diffuse groundglass opacities consistent with edema, infection or aspiration. This appears worse since the previous study            XR ABDOMEN (KUB) (SINGLE AP VIEW)   Final Result      Nasogastric tube extending into the left upper quadrant. XR CHEST PORTABLE   Final Result      Persistent right basilar infiltrate. XR CHEST 1 VW   Final Result      Tip of the right IJ central venous catheter overlies lower SVC with no pneumothorax evident. CT HEAD WO CONTRAST   Final Result      1. No acute intracranial process. XR CHEST PORTABLE   Final Result      Interval development of bilateral lower lobe opacities may relate to edema or pneumonia      CT SOFT TISSUE NECK WO CONTRAST   Final Result   Unremarkable CT neck          XR CHEST PORTABLE   Final Result     Normal chest x-ray. CT ABDOMEN PELVIS WO CONTRAST Additional Contrast? None   Final Result   No acute abnormality demonstrated in the abdomen or pelvis.           MRI BRAIN WO CONTRAST    (Results Pending)         Assessment/Plan:     Assessment/Plan:

## 2019-08-02 ENCOUNTER — APPOINTMENT (OUTPATIENT)
Dept: GENERAL RADIOLOGY | Age: 69
DRG: 682 | End: 2019-08-02
Payer: MEDICARE

## 2019-08-02 LAB
ALBUMIN SERPL-MCNC: 2 G/DL (ref 3.4–5)
ANION GAP SERPL CALCULATED.3IONS-SCNC: 12 MMOL/L (ref 3–16)
APPEARANCE CSF: CLEAR
APPEARANCE CSF: CLEAR
BASOPHILS ABSOLUTE: 0 K/UL (ref 0–0.2)
BASOPHILS RELATIVE PERCENT: 0 %
BLOOD BANK DISPENSE STATUS: NORMAL
BLOOD BANK DISPENSE STATUS: NORMAL
BLOOD BANK PRODUCT CODE: NORMAL
BLOOD BANK PRODUCT CODE: NORMAL
BLOOD CULTURE, ROUTINE: NORMAL
BPU ID: NORMAL
BPU ID: NORMAL
BUN BLDV-MCNC: 26 MG/DL (ref 7–20)
CALCIUM SERPL-MCNC: 7.7 MG/DL (ref 8.3–10.6)
CHLORIDE BLD-SCNC: 106 MMOL/L (ref 99–110)
CLOT EVALUATION CSF: ABNORMAL
CLOT EVALUATION CSF: ABNORMAL
CO2: 24 MMOL/L (ref 21–32)
COLOR CSF: COLORLESS
COLOR CSF: COLORLESS
CREAT SERPL-MCNC: 0.9 MG/DL (ref 0.8–1.3)
CULTURE, BLOOD 2: NORMAL
DESCRIPTION BLOOD BANK: NORMAL
DESCRIPTION BLOOD BANK: NORMAL
EOSINOPHILS ABSOLUTE: 0 K/UL (ref 0–0.6)
EOSINOPHILS RELATIVE PERCENT: 0 %
GFR AFRICAN AMERICAN: >60
GFR NON-AFRICAN AMERICAN: >60
GLUCOSE BLD-MCNC: 112 MG/DL (ref 70–99)
GLUCOSE BLD-MCNC: 113 MG/DL (ref 70–99)
GLUCOSE BLD-MCNC: 118 MG/DL (ref 70–99)
GLUCOSE BLD-MCNC: 126 MG/DL (ref 70–99)
GLUCOSE BLD-MCNC: 128 MG/DL (ref 70–99)
GLUCOSE, CSF: 67 MG/DL (ref 40–80)
HCT VFR BLD CALC: 24.4 % (ref 40.5–52.5)
HEMOGLOBIN: 8.4 G/DL (ref 13.5–17.5)
INR BLD: 1.25 (ref 0.86–1.14)
LYMPHOCYTES ABSOLUTE: 0.4 K/UL (ref 1–5.1)
LYMPHOCYTES RELATIVE PERCENT: 3 %
MAGNESIUM: 1.5 MG/DL (ref 1.8–2.4)
MCH RBC QN AUTO: 32.4 PG (ref 26–34)
MCHC RBC AUTO-ENTMCNC: 34.3 G/DL (ref 31–36)
MCV RBC AUTO: 94.3 FL (ref 80–100)
MENINGITIS ENCEPHALITIS PANEL: NORMAL
MONOCYTES ABSOLUTE: 0.5 K/UL (ref 0–1.3)
MONOCYTES RELATIVE PERCENT: 4 %
NEUTROPHILS ABSOLUTE: 11.7 K/UL (ref 1.7–7.7)
NEUTROPHILS RELATIVE PERCENT: 93 %
NO DIFFERENTIAL CSF: ABNORMAL
NO DIFFERENTIAL CSF: ABNORMAL
PDW BLD-RTO: 14.4 % (ref 12.4–15.4)
PERFORMED ON: ABNORMAL
PHOSPHORUS: 2.4 MG/DL (ref 2.5–4.9)
PLATELET # BLD: 217 K/UL (ref 135–450)
PMV BLD AUTO: 9.8 FL (ref 5–10.5)
POTASSIUM SERPL-SCNC: 2.8 MMOL/L (ref 3.5–5.1)
PROTEIN CSF: 28 MG/DL (ref 15–45)
PROTHROMBIN TIME: 14.2 SEC (ref 9.8–13)
RBC # BLD: 2.59 M/UL (ref 4.2–5.9)
RBC CSF: 1080 /CUMM
RBC CSF: 315 /CUMM
REPORT: NORMAL
SODIUM BLD-SCNC: 142 MMOL/L (ref 136–145)
TUBE NUMBER CSF: ABNORMAL
TUBE NUMBER CSF: ABNORMAL
VANCOMYCIN TROUGH: 11.4 UG/ML (ref 10–20)
WBC # BLD: 12.6 K/UL (ref 4–11)
WBC CSF: 1 /CUMM (ref 0–5)
WBC CSF: 1 /CUMM (ref 0–5)

## 2019-08-02 PROCEDURE — 94750 HC PULMONARY COMPLIANCE STUDY: CPT

## 2019-08-02 PROCEDURE — 6360000002 HC RX W HCPCS: Performed by: INTERNAL MEDICINE

## 2019-08-02 PROCEDURE — 87483 CNS DNA AMP PROBE TYPE 12-25: CPT

## 2019-08-02 PROCEDURE — 86618 LYME DISEASE ANTIBODY: CPT

## 2019-08-02 PROCEDURE — 02HV33Z INSERTION OF INFUSION DEVICE INTO SUPERIOR VENA CAVA, PERCUTANEOUS APPROACH: ICD-10-PCS | Performed by: INTERNAL MEDICINE

## 2019-08-02 PROCEDURE — 89050 BODY FLUID CELL COUNT: CPT

## 2019-08-02 PROCEDURE — 6360000002 HC RX W HCPCS: Performed by: NURSE PRACTITIONER

## 2019-08-02 PROCEDURE — 2580000003 HC RX 258: Performed by: INTERNAL MEDICINE

## 2019-08-02 PROCEDURE — 2580000003 HC RX 258: Performed by: STUDENT IN AN ORGANIZED HEALTH CARE EDUCATION/TRAINING PROGRAM

## 2019-08-02 PROCEDURE — 94770 HC ETCO2 MONITOR DAILY: CPT

## 2019-08-02 PROCEDURE — 94003 VENT MGMT INPAT SUBQ DAY: CPT

## 2019-08-02 PROCEDURE — 94761 N-INVAS EAR/PLS OXIMETRY MLT: CPT

## 2019-08-02 PROCEDURE — 82784 ASSAY IGA/IGD/IGG/IGM EACH: CPT

## 2019-08-02 PROCEDURE — 94640 AIRWAY INHALATION TREATMENT: CPT

## 2019-08-02 PROCEDURE — 2580000003 HC RX 258: Performed by: NURSE PRACTITIONER

## 2019-08-02 PROCEDURE — 87205 SMEAR GRAM STAIN: CPT

## 2019-08-02 PROCEDURE — 009U3ZX DRAINAGE OF SPINAL CANAL, PERCUTANEOUS APPROACH, DIAGNOSTIC: ICD-10-PCS | Performed by: PSYCHIATRY & NEUROLOGY

## 2019-08-02 PROCEDURE — 80202 ASSAY OF VANCOMYCIN: CPT

## 2019-08-02 PROCEDURE — 6360000002 HC RX W HCPCS

## 2019-08-02 PROCEDURE — 6370000000 HC RX 637 (ALT 250 FOR IP): Performed by: STUDENT IN AN ORGANIZED HEALTH CARE EDUCATION/TRAINING PROGRAM

## 2019-08-02 PROCEDURE — 6370000000 HC RX 637 (ALT 250 FOR IP): Performed by: INTERNAL MEDICINE

## 2019-08-02 PROCEDURE — 6360000002 HC RX W HCPCS: Performed by: STUDENT IN AN ORGANIZED HEALTH CARE EDUCATION/TRAINING PROGRAM

## 2019-08-02 PROCEDURE — 83873 ASSAY OF CSF PROTEIN: CPT

## 2019-08-02 PROCEDURE — C9113 INJ PANTOPRAZOLE SODIUM, VIA: HCPCS | Performed by: INTERNAL MEDICINE

## 2019-08-02 PROCEDURE — 86789 WEST NILE VIRUS ANTIBODY: CPT

## 2019-08-02 PROCEDURE — 36415 COLL VENOUS BLD VENIPUNCTURE: CPT

## 2019-08-02 PROCEDURE — 99291 CRITICAL CARE FIRST HOUR: CPT | Performed by: INTERNAL MEDICINE

## 2019-08-02 PROCEDURE — 85610 PROTHROMBIN TIME: CPT

## 2019-08-02 PROCEDURE — 83735 ASSAY OF MAGNESIUM: CPT

## 2019-08-02 PROCEDURE — 83916 OLIGOCLONAL BANDS: CPT

## 2019-08-02 PROCEDURE — 82040 ASSAY OF SERUM ALBUMIN: CPT

## 2019-08-02 PROCEDURE — 85025 COMPLETE CBC W/AUTO DIFF WBC: CPT

## 2019-08-02 PROCEDURE — 87070 CULTURE OTHR SPECIMN AEROBIC: CPT

## 2019-08-02 PROCEDURE — 80069 RENAL FUNCTION PANEL: CPT

## 2019-08-02 PROCEDURE — 84157 ASSAY OF PROTEIN OTHER: CPT

## 2019-08-02 PROCEDURE — 2000000000 HC ICU R&B

## 2019-08-02 PROCEDURE — 86592 SYPHILIS TEST NON-TREP QUAL: CPT

## 2019-08-02 PROCEDURE — 71045 X-RAY EXAM CHEST 1 VIEW: CPT

## 2019-08-02 PROCEDURE — 2500000003 HC RX 250 WO HCPCS: Performed by: INTERNAL MEDICINE

## 2019-08-02 PROCEDURE — 88112 CYTOPATH CELL ENHANCE TECH: CPT

## 2019-08-02 PROCEDURE — 2500000003 HC RX 250 WO HCPCS: Performed by: STUDENT IN AN ORGANIZED HEALTH CARE EDUCATION/TRAINING PROGRAM

## 2019-08-02 PROCEDURE — 82042 OTHER SOURCE ALBUMIN QUAN EA: CPT

## 2019-08-02 PROCEDURE — 2700000000 HC OXYGEN THERAPY PER DAY

## 2019-08-02 PROCEDURE — 82945 GLUCOSE OTHER FLUID: CPT

## 2019-08-02 PROCEDURE — 86788 WEST NILE VIRUS AB IGM: CPT

## 2019-08-02 RX ORDER — MAGNESIUM SULFATE IN WATER 40 MG/ML
4 INJECTION, SOLUTION INTRAVENOUS ONCE
Status: COMPLETED | OUTPATIENT
Start: 2019-08-02 | End: 2019-08-02

## 2019-08-02 RX ORDER — LORAZEPAM 2 MG/ML
INJECTION INTRAMUSCULAR
Status: DISPENSED
Start: 2019-08-02 | End: 2019-08-02

## 2019-08-02 RX ORDER — FUROSEMIDE 10 MG/ML
20 INJECTION INTRAMUSCULAR; INTRAVENOUS ONCE
Status: COMPLETED | OUTPATIENT
Start: 2019-08-02 | End: 2019-08-02

## 2019-08-02 RX ORDER — SODIUM CHLORIDE 0.9 % (FLUSH) 0.9 %
10 SYRINGE (ML) INJECTION EVERY 12 HOURS SCHEDULED
Status: DISCONTINUED | OUTPATIENT
Start: 2019-08-02 | End: 2019-08-20 | Stop reason: HOSPADM

## 2019-08-02 RX ORDER — SODIUM CHLORIDE 0.9 % (FLUSH) 0.9 %
10 SYRINGE (ML) INJECTION PRN
Status: DISCONTINUED | OUTPATIENT
Start: 2019-08-02 | End: 2019-08-20 | Stop reason: HOSPADM

## 2019-08-02 RX ORDER — POTASSIUM CHLORIDE 29.8 MG/ML
20 INJECTION INTRAVENOUS
Status: COMPLETED | OUTPATIENT
Start: 2019-08-02 | End: 2019-08-02

## 2019-08-02 RX ORDER — LORAZEPAM 2 MG/ML
1 INJECTION INTRAMUSCULAR ONCE
Status: COMPLETED | OUTPATIENT
Start: 2019-08-02 | End: 2019-08-02

## 2019-08-02 RX ADMIN — LEVALBUTEROL HYDROCHLORIDE 1.25 MG: 1.25 SOLUTION, CONCENTRATE RESPIRATORY (INHALATION) at 12:40

## 2019-08-02 RX ADMIN — LEVETIRACETAM 500 MG: 100 INJECTION, SOLUTION INTRAVENOUS at 05:29

## 2019-08-02 RX ADMIN — LEVETIRACETAM 500 MG: 100 INJECTION, SOLUTION INTRAVENOUS at 18:16

## 2019-08-02 RX ADMIN — LEVALBUTEROL HYDROCHLORIDE 1.25 MG: 1.25 SOLUTION, CONCENTRATE RESPIRATORY (INHALATION) at 21:31

## 2019-08-02 RX ADMIN — Medication 15 ML: at 20:04

## 2019-08-02 RX ADMIN — CASTOR OIL AND BALSAM, PERU: 788; 87 OINTMENT TOPICAL at 20:04

## 2019-08-02 RX ADMIN — POTASSIUM CHLORIDE 20 MEQ: 400 INJECTION, SOLUTION INTRAVENOUS at 08:18

## 2019-08-02 RX ADMIN — HEPARIN SODIUM 5000 UNITS: 5000 INJECTION INTRAVENOUS; SUBCUTANEOUS at 13:04

## 2019-08-02 RX ADMIN — HEPARIN SODIUM 5000 UNITS: 5000 INJECTION INTRAVENOUS; SUBCUTANEOUS at 06:03

## 2019-08-02 RX ADMIN — POTASSIUM CHLORIDE 20 MEQ: 400 INJECTION, SOLUTION INTRAVENOUS at 19:30

## 2019-08-02 RX ADMIN — MEROPENEM 1 G: 1 INJECTION, POWDER, FOR SOLUTION INTRAVENOUS at 18:16

## 2019-08-02 RX ADMIN — MEROPENEM 1 G: 1 INJECTION, POWDER, FOR SOLUTION INTRAVENOUS at 09:09

## 2019-08-02 RX ADMIN — CASTOR OIL AND BALSAM, PERU: 788; 87 OINTMENT TOPICAL at 08:19

## 2019-08-02 RX ADMIN — LABETALOL 20 MG/4 ML (5 MG/ML) INTRAVENOUS SYRINGE 10 MG: at 05:31

## 2019-08-02 RX ADMIN — LORAZEPAM 1 MG: 2 INJECTION INTRAMUSCULAR; INTRAVENOUS at 12:35

## 2019-08-02 RX ADMIN — FUROSEMIDE 20 MG: 10 INJECTION, SOLUTION INTRAMUSCULAR; INTRAVENOUS at 18:16

## 2019-08-02 RX ADMIN — ASPIRIN 81 MG 81 MG: 81 TABLET ORAL at 08:19

## 2019-08-02 RX ADMIN — MAGNESIUM SULFATE HEPTAHYDRATE 4 G: 40 INJECTION, SOLUTION INTRAVENOUS at 10:04

## 2019-08-02 RX ADMIN — LEVALBUTEROL HYDROCHLORIDE 1.25 MG: 1.25 SOLUTION, CONCENTRATE RESPIRATORY (INHALATION) at 08:20

## 2019-08-02 RX ADMIN — DEXMEDETOMIDINE 0.2 MCG/KG/HR: 100 INJECTION, SOLUTION, CONCENTRATE INTRAVENOUS at 12:49

## 2019-08-02 RX ADMIN — LEVALBUTEROL HYDROCHLORIDE 1.25 MG: 1.25 SOLUTION, CONCENTRATE RESPIRATORY (INHALATION) at 00:35

## 2019-08-02 RX ADMIN — Medication 15 ML: at 08:19

## 2019-08-02 RX ADMIN — Medication 10 ML: at 20:04

## 2019-08-02 RX ADMIN — ATORVASTATIN CALCIUM 20 MG: 20 TABLET, FILM COATED ORAL at 20:04

## 2019-08-02 RX ADMIN — METOPROLOL TARTRATE 25 MG: 25 TABLET ORAL at 20:04

## 2019-08-02 RX ADMIN — POTASSIUM CHLORIDE 20 MEQ: 400 INJECTION, SOLUTION INTRAVENOUS at 12:50

## 2019-08-02 RX ADMIN — PANTOPRAZOLE SODIUM 40 MG: 40 INJECTION, POWDER, LYOPHILIZED, FOR SOLUTION INTRAVENOUS at 20:04

## 2019-08-02 RX ADMIN — LEVALBUTEROL HYDROCHLORIDE 1.25 MG: 1.25 SOLUTION, CONCENTRATE RESPIRATORY (INHALATION) at 04:10

## 2019-08-02 RX ADMIN — ACETAMINOPHEN 650 MG: 325 TABLET ORAL at 10:20

## 2019-08-02 RX ADMIN — HEPARIN SODIUM 5000 UNITS: 5000 INJECTION INTRAVENOUS; SUBCUTANEOUS at 22:15

## 2019-08-02 RX ADMIN — LEVALBUTEROL HYDROCHLORIDE 1.25 MG: 1.25 SOLUTION, CONCENTRATE RESPIRATORY (INHALATION) at 16:11

## 2019-08-02 RX ADMIN — PANTOPRAZOLE SODIUM 40 MG: 40 INJECTION, POWDER, LYOPHILIZED, FOR SOLUTION INTRAVENOUS at 08:19

## 2019-08-02 RX ADMIN — METOPROLOL TARTRATE 25 MG: 25 TABLET ORAL at 08:19

## 2019-08-02 RX ADMIN — POTASSIUM CHLORIDE 20 MEQ: 400 INJECTION, SOLUTION INTRAVENOUS at 10:09

## 2019-08-02 RX ADMIN — MEROPENEM 1 G: 1 INJECTION, POWDER, FOR SOLUTION INTRAVENOUS at 00:41

## 2019-08-02 ASSESSMENT — PULMONARY FUNCTION TESTS
PIF_VALUE: 22
PIF_VALUE: 21
PIF_VALUE: 21
PIF_VALUE: 25
PIF_VALUE: 28
PIF_VALUE: 16
PIF_VALUE: 21
PIF_VALUE: 20
PIF_VALUE: 25
PIF_VALUE: 30
PIF_VALUE: 22
PIF_VALUE: 19
PIF_VALUE: 21
PIF_VALUE: 22
PIF_VALUE: 19
PIF_VALUE: 16
PIF_VALUE: 24
PIF_VALUE: 16
PIF_VALUE: 17
PIF_VALUE: 23
PIF_VALUE: 20
PIF_VALUE: 19
PIF_VALUE: 15
PIF_VALUE: 24
PIF_VALUE: 18
PIF_VALUE: 23
PIF_VALUE: 18

## 2019-08-02 ASSESSMENT — PAIN SCALES - GENERAL
PAINLEVEL_OUTOF10: 0
PAINLEVEL_OUTOF10: 0

## 2019-08-02 NOTE — PROGRESS NOTES
distention. Trachea midline. Respiratory:  Normal respiratory effort. Clear to auscultation, bilaterally without Rales/Wheezes/Rhonchi. Cardiovascular: tachycardic, regular rhythm  Abdomen: Soft, non-tender, non-distended with normal bowel sounds. Neuro: only moves his upper and lower extremities on strong pain ful stimulus  Doesn't withdraw to pain  Doesn't follow commands  Psych: Unable to assess due to underlying neurological status    Labs:   Recent Labs     07/31/19  0405 08/01/19 0737 08/02/19  0409   WBC 16.1* 16.1* 12.6*   HGB 7.8* 8.3* 8.4*   HCT 22.8* 25.0* 24.4*    226 217     Recent Labs     07/31/19  2102 08/01/19 0737 08/02/19  0409    147* 142   K 3.2* 3.7 2.8*   * 111* 106   CO2 22 23 24   BUN 37* 34* 26*   CREATININE 1.2 1.1 0.9   CALCIUM 7.8* 7.9* 7.7*   PHOS 2.1* 1.6* 2.4*     No results for input(s): AST, ALT, BILIDIR, BILITOT, ALKPHOS in the last 72 hours. No results for input(s): INR in the last 72 hours. No results for input(s): [place] in the last 72 hours. Urinalysis:      Lab Results   Component Value Date    NITRU Negative 07/27/2019    WBCUA 0-2 07/27/2019    RBCUA 20-50 07/27/2019    BLOODU LARGE 07/27/2019    SPECGRAV 1.020 07/27/2019    GLUCOSEU Negative 07/27/2019       Radiology:  MRI BRAIN WO CONTRAST   Final Result      1. Overall limited exam due to sessile motion artifact. Repeated imaging sequences with little benefit. No acute intracranial abnormality identified. 2. Ethmoid and sphenoid sinus disease with air-fluid level. Findings may reflect altered mental status and retained secretions. 3. Minimal nonspecific periventricular white matter signal and body on FLAIR imaging suggesting mild chronic small vessel ischemic disease and/or age related degenerative change. XR CHEST PORTABLE   Final Result      1. Multifocal airspace disease consistent with pneumonia as described.   This has progressed in the right upper and left groundglass basilar consolidations and pleural effusions greater in the right lung   2. NG tube in place as well as a right IJ central venous catheter with the tip in the mid to distal SVC, no worsening               XR CHEST PORTABLE   Final Result      Introduction of NG tube with tip excluded from the inferior edge of the film. Moderate bilateral effusions and diffuse groundglass opacities consistent with edema, infection or aspiration. This appears worse since the previous study            XR ABDOMEN (KUB) (SINGLE AP VIEW)   Final Result      Nasogastric tube extending into the left upper quadrant. XR CHEST PORTABLE   Final Result      Persistent right basilar infiltrate. XR CHEST 1 VW   Final Result      Tip of the right IJ central venous catheter overlies lower SVC with no pneumothorax evident. CT HEAD WO CONTRAST   Final Result      1. No acute intracranial process. XR CHEST PORTABLE   Final Result      Interval development of bilateral lower lobe opacities may relate to edema or pneumonia      CT SOFT TISSUE NECK WO CONTRAST   Final Result   Unremarkable CT neck          XR CHEST PORTABLE   Final Result     Normal chest x-ray. CT ABDOMEN PELVIS WO CONTRAST Additional Contrast? None   Final Result   No acute abnormality demonstrated in the abdomen or pelvis. Assessment/Plan:    Active Hospital Problems    Diagnosis    Hypoxemia [R09.02]    Pulmonary edema cardiac cause (HCC) [I50.1]    Acute encephalopathy [G93.40]    Alcohol withdrawal syndrome, with delirium ([address] [F10.231]    Narcotic dependence ([address] [F11.20]    AVIVA (acute kidney injury) ([address] [N17.9]    Acute kidney failure ([address] [O67.5]       #Metabolic encephalopathy:   Ct head without contrast showed atrophy but no acute intracranial process. For further evaluation neurology consulte, recommended MRI,  change of antibiotics and EEG if mental status remains unchanged.   -EEG with generalized

## 2019-08-02 NOTE — PROGRESS NOTES
just got Ativan because he was  agitated, pulling his lines, was trying to jump out of the window, and  is sedated and unable to give any details.     Reviewed admission H and P and other details. He has a history of  hypertension, opioid dependence, alcoholism, cervical degenerative  disease for which apparently he has some surgery, presents with fatigue,  nausea, vomiting, poor appetite, hoarseness of the voice for several  months, recent 10 pounds of weight loss.     Apparently, he has problem with the hoarseness and has videostroboscopy  with laryngoscopy in 07/2018 by ENT. Possible mass seen in the vocal  cord. Recently, he also has an esophagram.  Mild dysmotility was seen. The patient drinks lot of milk shakes mixing with vodka.     In the ER, his vitals were stable, but has abnormal lab that described  above.     I am seeing in his room where unable get any history. Interval History :     Still intubated  Making urine    Seen with -no family  ROS -     Unable to obtain ROS due to intubation      Vitals:    08/02/19 1014   BP:    Pulse: 120   Resp: 22   Temp: 102.4 °F (39.1 °C)   SpO2:          I/O last 3 completed shifts: In: 9950 [I.V.:406; NG/GT:1153]  Out: 2025 [Urine:2025]      General appearance: unresponsive, intubated   HEENT: Lips- normal, teeth- ok , oral mucosa- moist  Neck : Mass- no, appears symmetrical, JVD- not raised  Respiratory: Respiratory effort-  On ventilation- FiO2-40 %, PEEP 5 mmHg wheeze- no, crackles - no  Cardiovascular:  Ausculation- No M/R/G, Edema none  Abdomen: visible mass- no, distention- no, scar- no, tenderness- unable to assess                           hepatosplenomegaly-  No--  Musculoskeletal:  clubbing no,cyanosis- no , digital ischemia- no                           muscle strength- patient unable to co-operate     , tone - patient unable to co-operate   Skin: rashes- no , ulcers- no, induration- no, tightening - no  Psychiatric:   Intubated, unable to assess  Has resendiz- 200 ml urine     .l  Lab Results   Component Value Date    CREATININE 0.9 08/02/2019    BUN 26 (H) 08/02/2019     08/02/2019    K 2.8 (LL) 08/02/2019     08/02/2019    CO2 24 08/02/2019     Lab Results   Component Value Date    WBC 12.6 (H) 08/02/2019    HGB 8.4 (L) 08/02/2019    HCT 24.4 (L) 08/02/2019    MCV 94.3 08/02/2019     08/02/2019

## 2019-08-03 ENCOUNTER — APPOINTMENT (OUTPATIENT)
Dept: CT IMAGING | Age: 69
DRG: 682 | End: 2019-08-03
Payer: MEDICARE

## 2019-08-03 LAB
ALBUMIN CSF: <9.5 MG/DL (ref 10–30)
ALBUMIN SERPL-MCNC: 1.9 G/DL (ref 3.4–5)
ALBUMIN SERPL-MCNC: 1484 MG/DL (ref 3400–5000)
ANION GAP SERPL CALCULATED.3IONS-SCNC: 9 MMOL/L (ref 3–16)
B BURGDORFERI AB,CSF: 0.05 LIV
BASE EXCESS VENOUS: 1.8 MMOL/L (ref -2–3)
BASOPHILS ABSOLUTE: 0 K/UL (ref 0–0.2)
BASOPHILS RELATIVE PERCENT: 0.2 %
BUN BLDV-MCNC: 23 MG/DL (ref 7–20)
CALCIUM SERPL-MCNC: 7.4 MG/DL (ref 8.3–10.6)
CARBOXYHEMOGLOBIN: 1.8 % (ref 0–1.5)
CHLORIDE BLD-SCNC: 104 MMOL/L (ref 99–110)
CO2: 25 MMOL/L (ref 21–32)
CREAT SERPL-MCNC: 0.9 MG/DL (ref 0.8–1.3)
EOSINOPHILS ABSOLUTE: 0.1 K/UL (ref 0–0.6)
EOSINOPHILS RELATIVE PERCENT: 0.5 %
GFR AFRICAN AMERICAN: >60
GFR NON-AFRICAN AMERICAN: >60
GLUCOSE BLD-MCNC: 101 MG/DL (ref 70–99)
GLUCOSE BLD-MCNC: 103 MG/DL (ref 70–99)
GLUCOSE BLD-MCNC: 115 MG/DL (ref 70–99)
GLUCOSE BLD-MCNC: 123 MG/DL (ref 70–99)
GLUCOSE BLD-MCNC: 125 MG/DL (ref 70–99)
GLUCOSE BLD-MCNC: 128 MG/DL (ref 70–99)
HCO3 VENOUS: 25.3 MMOL/L (ref 24–28)
HCT VFR BLD CALC: 21.1 % (ref 40.5–52.5)
HCT VFR BLD CALC: 21.6 % (ref 40.5–52.5)
HCT VFR BLD CALC: 21.7 % (ref 40.5–52.5)
HEMOGLOBIN, VEN, REDUCED: 30.2 %
HEMOGLOBIN: 7 G/DL (ref 13.5–17.5)
HEMOGLOBIN: 7.1 G/DL (ref 13.5–17.5)
HEMOGLOBIN: 7.3 G/DL (ref 13.5–17.5)
IGG CSF: 1.6 MG/DL (ref 0–6)
IGG INDEX: 0.51 (ref 0.2–0.7)
IGG SYNTHESIS RATE CSF: -0.8 MG/DAY (ref -9.9–3.3)
IGG: 490 MG/DL (ref 700–1600)
LACTIC ACID, SEPSIS: 1.2 MMOL/L (ref 0.4–1.9)
LYMPHOCYTES ABSOLUTE: 0.6 K/UL (ref 1–5.1)
LYMPHOCYTES RELATIVE PERCENT: 5.4 %
MAGNESIUM: 1.8 MG/DL (ref 1.8–2.4)
MCH RBC QN AUTO: 31.1 PG (ref 26–34)
MCHC RBC AUTO-ENTMCNC: 33.7 G/DL (ref 31–36)
MCV RBC AUTO: 92.2 FL (ref 80–100)
METHEMOGLOBIN VENOUS: 0.4 % (ref 0–1.5)
MONOCYTES ABSOLUTE: 0.8 K/UL (ref 0–1.3)
MONOCYTES RELATIVE PERCENT: 6.9 %
NEUTROPHILS ABSOLUTE: 9.5 K/UL (ref 1.7–7.7)
NEUTROPHILS RELATIVE PERCENT: 87 %
O2 SAT, VEN: 69 %
PCO2, VEN: 37.4 MMHG (ref 41–51)
PDW BLD-RTO: 14.3 % (ref 12.4–15.4)
PERFORMED ON: ABNORMAL
PH VENOUS: 7.45 (ref 7.35–7.45)
PHOSPHORUS: 2.2 MG/DL (ref 2.5–4.9)
PLATELET # BLD: 212 K/UL (ref 135–450)
PMV BLD AUTO: 9.5 FL (ref 5–10.5)
PO2, VEN: 36.9 MMHG (ref 25–40)
POTASSIUM SERPL-SCNC: 3 MMOL/L (ref 3.5–5.1)
POTASSIUM SERPL-SCNC: 3.4 MMOL/L (ref 3.5–5.1)
RBC # BLD: 2.36 M/UL (ref 4.2–5.9)
SODIUM BLD-SCNC: 138 MMOL/L (ref 136–145)
TCO2 CALC VENOUS: 27 MMOL/L
WBC # BLD: 10.9 K/UL (ref 4–11)

## 2019-08-03 PROCEDURE — 85014 HEMATOCRIT: CPT

## 2019-08-03 PROCEDURE — 2580000003 HC RX 258

## 2019-08-03 PROCEDURE — 94640 AIRWAY INHALATION TREATMENT: CPT

## 2019-08-03 PROCEDURE — 84132 ASSAY OF SERUM POTASSIUM: CPT

## 2019-08-03 PROCEDURE — 6360000004 HC RX CONTRAST MEDICATION: Performed by: STUDENT IN AN ORGANIZED HEALTH CARE EDUCATION/TRAINING PROGRAM

## 2019-08-03 PROCEDURE — 82803 BLOOD GASES ANY COMBINATION: CPT

## 2019-08-03 PROCEDURE — 6360000002 HC RX W HCPCS: Performed by: INTERNAL MEDICINE

## 2019-08-03 PROCEDURE — 6370000000 HC RX 637 (ALT 250 FOR IP): Performed by: STUDENT IN AN ORGANIZED HEALTH CARE EDUCATION/TRAINING PROGRAM

## 2019-08-03 PROCEDURE — 6360000002 HC RX W HCPCS

## 2019-08-03 PROCEDURE — 6370000000 HC RX 637 (ALT 250 FOR IP): Performed by: INTERNAL MEDICINE

## 2019-08-03 PROCEDURE — 6370000000 HC RX 637 (ALT 250 FOR IP)

## 2019-08-03 PROCEDURE — 85018 HEMOGLOBIN: CPT

## 2019-08-03 PROCEDURE — 94761 N-INVAS EAR/PLS OXIMETRY MLT: CPT

## 2019-08-03 PROCEDURE — 36592 COLLECT BLOOD FROM PICC: CPT

## 2019-08-03 PROCEDURE — 99291 CRITICAL CARE FIRST HOUR: CPT | Performed by: INTERNAL MEDICINE

## 2019-08-03 PROCEDURE — C9113 INJ PANTOPRAZOLE SODIUM, VIA: HCPCS | Performed by: INTERNAL MEDICINE

## 2019-08-03 PROCEDURE — 94003 VENT MGMT INPAT SUBQ DAY: CPT

## 2019-08-03 PROCEDURE — 6360000002 HC RX W HCPCS: Performed by: STUDENT IN AN ORGANIZED HEALTH CARE EDUCATION/TRAINING PROGRAM

## 2019-08-03 PROCEDURE — 2000000000 HC ICU R&B

## 2019-08-03 PROCEDURE — 2580000003 HC RX 258: Performed by: STUDENT IN AN ORGANIZED HEALTH CARE EDUCATION/TRAINING PROGRAM

## 2019-08-03 PROCEDURE — 94770 HC ETCO2 MONITOR DAILY: CPT

## 2019-08-03 PROCEDURE — 80069 RENAL FUNCTION PANEL: CPT

## 2019-08-03 PROCEDURE — 94750 HC PULMONARY COMPLIANCE STUDY: CPT

## 2019-08-03 PROCEDURE — 85025 COMPLETE CBC W/AUTO DIFF WBC: CPT

## 2019-08-03 PROCEDURE — 6360000002 HC RX W HCPCS: Performed by: NURSE PRACTITIONER

## 2019-08-03 PROCEDURE — 74177 CT ABD & PELVIS W/CONTRAST: CPT

## 2019-08-03 PROCEDURE — 83605 ASSAY OF LACTIC ACID: CPT

## 2019-08-03 PROCEDURE — 36415 COLL VENOUS BLD VENIPUNCTURE: CPT

## 2019-08-03 PROCEDURE — 83735 ASSAY OF MAGNESIUM: CPT

## 2019-08-03 PROCEDURE — 2580000003 HC RX 258: Performed by: INTERNAL MEDICINE

## 2019-08-03 PROCEDURE — 2700000000 HC OXYGEN THERAPY PER DAY

## 2019-08-03 PROCEDURE — 37799 UNLISTED PX VASCULAR SURGERY: CPT

## 2019-08-03 PROCEDURE — 2580000003 HC RX 258: Performed by: NURSE PRACTITIONER

## 2019-08-03 PROCEDURE — 2500000003 HC RX 250 WO HCPCS: Performed by: STUDENT IN AN ORGANIZED HEALTH CARE EDUCATION/TRAINING PROGRAM

## 2019-08-03 PROCEDURE — 36556 INSERT NON-TUNNEL CV CATH: CPT | Performed by: INTERNAL MEDICINE

## 2019-08-03 RX ORDER — ACETAMINOPHEN 325 MG/1
650 TABLET ORAL EVERY 4 HOURS PRN
Status: DISCONTINUED | OUTPATIENT
Start: 2019-08-03 | End: 2019-08-07

## 2019-08-03 RX ORDER — POTASSIUM CHLORIDE 29.8 MG/ML
20 INJECTION INTRAVENOUS
Status: COMPLETED | OUTPATIENT
Start: 2019-08-03 | End: 2019-08-03

## 2019-08-03 RX ORDER — MIDAZOLAM HYDROCHLORIDE 1 MG/ML
INJECTION INTRAMUSCULAR; INTRAVENOUS
Status: DISPENSED
Start: 2019-08-03 | End: 2019-08-04

## 2019-08-03 RX ORDER — MIDAZOLAM HYDROCHLORIDE 1 MG/ML
4 INJECTION INTRAMUSCULAR; INTRAVENOUS ONCE
Status: DISCONTINUED | OUTPATIENT
Start: 2019-08-03 | End: 2019-08-05

## 2019-08-03 RX ORDER — POTASSIUM CHLORIDE 29.8 MG/ML
20 INJECTION INTRAVENOUS
Status: COMPLETED | OUTPATIENT
Start: 2019-08-03 | End: 2019-08-04

## 2019-08-03 RX ORDER — FUROSEMIDE 10 MG/ML
20 INJECTION INTRAMUSCULAR; INTRAVENOUS ONCE
Status: COMPLETED | OUTPATIENT
Start: 2019-08-03 | End: 2019-08-03

## 2019-08-03 RX ORDER — SODIUM CHLORIDE 9 MG/ML
INJECTION, SOLUTION INTRAVENOUS
Status: COMPLETED
Start: 2019-08-03 | End: 2019-08-03

## 2019-08-03 RX ADMIN — Medication 10 ML: at 20:32

## 2019-08-03 RX ADMIN — LEVALBUTEROL HYDROCHLORIDE 1.25 MG: 1.25 SOLUTION, CONCENTRATE RESPIRATORY (INHALATION) at 03:56

## 2019-08-03 RX ADMIN — ATORVASTATIN CALCIUM 20 MG: 20 TABLET, FILM COATED ORAL at 20:32

## 2019-08-03 RX ADMIN — LEVALBUTEROL HYDROCHLORIDE 1.25 MG: 1.25 SOLUTION, CONCENTRATE RESPIRATORY (INHALATION) at 00:23

## 2019-08-03 RX ADMIN — SODIUM CHLORIDE: 9 INJECTION, SOLUTION INTRAVENOUS at 10:22

## 2019-08-03 RX ADMIN — FUROSEMIDE 20 MG: 10 INJECTION, SOLUTION INTRAMUSCULAR; INTRAVENOUS at 10:27

## 2019-08-03 RX ADMIN — METOPROLOL TARTRATE 25 MG: 25 TABLET ORAL at 08:28

## 2019-08-03 RX ADMIN — CASTOR OIL AND BALSAM, PERU: 788; 87 OINTMENT TOPICAL at 08:38

## 2019-08-03 RX ADMIN — METOPROLOL TARTRATE 25 MG: 25 TABLET ORAL at 20:32

## 2019-08-03 RX ADMIN — POTASSIUM CHLORIDE 20 MEQ: 29.8 INJECTION, SOLUTION INTRAVENOUS at 12:50

## 2019-08-03 RX ADMIN — VANCOMYCIN HYDROCHLORIDE 1000 MG: 10 INJECTION, POWDER, LYOPHILIZED, FOR SOLUTION INTRAVENOUS at 01:04

## 2019-08-03 RX ADMIN — POTASSIUM CHLORIDE 20 MEQ: 29.8 INJECTION, SOLUTION INTRAVENOUS at 23:10

## 2019-08-03 RX ADMIN — LEVALBUTEROL HYDROCHLORIDE 1.25 MG: 1.25 SOLUTION, CONCENTRATE RESPIRATORY (INHALATION) at 11:40

## 2019-08-03 RX ADMIN — MEROPENEM 1 G: 1 INJECTION, POWDER, FOR SOLUTION INTRAVENOUS at 00:17

## 2019-08-03 RX ADMIN — CASTOR OIL AND BALSAM, PERU: 788; 87 OINTMENT TOPICAL at 20:34

## 2019-08-03 RX ADMIN — PANTOPRAZOLE SODIUM 40 MG: 40 INJECTION, POWDER, LYOPHILIZED, FOR SOLUTION INTRAVENOUS at 08:29

## 2019-08-03 RX ADMIN — LEVALBUTEROL HYDROCHLORIDE 1.25 MG: 1.25 SOLUTION, CONCENTRATE RESPIRATORY (INHALATION) at 20:10

## 2019-08-03 RX ADMIN — POTASSIUM CHLORIDE 20 MEQ: 29.8 INJECTION, SOLUTION INTRAVENOUS at 15:10

## 2019-08-03 RX ADMIN — LEVETIRACETAM 500 MG: 100 INJECTION, SOLUTION INTRAVENOUS at 18:06

## 2019-08-03 RX ADMIN — HEPARIN SODIUM 5000 UNITS: 5000 INJECTION INTRAVENOUS; SUBCUTANEOUS at 21:30

## 2019-08-03 RX ADMIN — Medication 15 ML: at 08:38

## 2019-08-03 RX ADMIN — DEXMEDETOMIDINE 1 MCG/KG/HR: 100 INJECTION, SOLUTION, CONCENTRATE INTRAVENOUS at 12:53

## 2019-08-03 RX ADMIN — ACETAMINOPHEN 650 MG: 325 TABLET ORAL at 22:00

## 2019-08-03 RX ADMIN — IOPAMIDOL 80 ML: 755 INJECTION, SOLUTION INTRAVENOUS at 12:31

## 2019-08-03 RX ADMIN — DEXMEDETOMIDINE 0.8 MCG/KG/HR: 100 INJECTION, SOLUTION, CONCENTRATE INTRAVENOUS at 02:04

## 2019-08-03 RX ADMIN — MEROPENEM 1 G: 1 INJECTION, POWDER, FOR SOLUTION INTRAVENOUS at 18:24

## 2019-08-03 RX ADMIN — POTASSIUM CHLORIDE 20 MEQ: 29.8 INJECTION, SOLUTION INTRAVENOUS at 10:14

## 2019-08-03 RX ADMIN — LEVALBUTEROL HYDROCHLORIDE 1.25 MG: 1.25 SOLUTION, CONCENTRATE RESPIRATORY (INHALATION) at 07:57

## 2019-08-03 RX ADMIN — Medication 15 ML: at 20:31

## 2019-08-03 RX ADMIN — PANTOPRAZOLE SODIUM 40 MG: 40 INJECTION, POWDER, LYOPHILIZED, FOR SOLUTION INTRAVENOUS at 20:31

## 2019-08-03 RX ADMIN — POTASSIUM CHLORIDE 20 MEQ: 29.8 INJECTION, SOLUTION INTRAVENOUS at 08:24

## 2019-08-03 RX ADMIN — HEPARIN SODIUM 5000 UNITS: 5000 INJECTION INTRAVENOUS; SUBCUTANEOUS at 15:09

## 2019-08-03 RX ADMIN — ASPIRIN 81 MG 81 MG: 81 TABLET ORAL at 08:28

## 2019-08-03 RX ADMIN — Medication 10 ML: at 08:38

## 2019-08-03 RX ADMIN — LEVETIRACETAM 500 MG: 100 INJECTION, SOLUTION INTRAVENOUS at 04:21

## 2019-08-03 RX ADMIN — VANCOMYCIN HYDROCHLORIDE 1000 MG: 10 INJECTION, POWDER, LYOPHILIZED, FOR SOLUTION INTRAVENOUS at 19:37

## 2019-08-03 RX ADMIN — HEPARIN SODIUM 5000 UNITS: 5000 INJECTION INTRAVENOUS; SUBCUTANEOUS at 06:29

## 2019-08-03 RX ADMIN — POTASSIUM CHLORIDE 20 MEQ: 29.8 INJECTION, SOLUTION INTRAVENOUS at 21:15

## 2019-08-03 RX ADMIN — LEVALBUTEROL HYDROCHLORIDE 1.25 MG: 1.25 SOLUTION, CONCENTRATE RESPIRATORY (INHALATION) at 16:07

## 2019-08-03 RX ADMIN — MEROPENEM 1 G: 1 INJECTION, POWDER, FOR SOLUTION INTRAVENOUS at 08:29

## 2019-08-03 ASSESSMENT — PULMONARY FUNCTION TESTS
PIF_VALUE: 26
PIF_VALUE: 17
PIF_VALUE: 20
PIF_VALUE: 19
PIF_VALUE: 24
PIF_VALUE: 24
PIF_VALUE: 32
PIF_VALUE: 25
PIF_VALUE: 27
PIF_VALUE: 15
PIF_VALUE: 22
PIF_VALUE: 14
PIF_VALUE: 24
PIF_VALUE: 20
PIF_VALUE: 27
PIF_VALUE: 26

## 2019-08-03 ASSESSMENT — PAIN SCALES - GENERAL
PAINLEVEL_OUTOF10: 0
PAINLEVEL_OUTOF10: 3

## 2019-08-03 NOTE — PLAN OF CARE
Problem: Restraint Use - Nonviolent/Non-Self-Destructive Behavior:  Goal: Absence of restraint-related injury  Description  Upper extremity soft wrist restraints intact. No S/S of restraint related injury . ROM performed Q2HR. Outcome: Ongoing       Problem: Risk for Impaired Skin Integrity  Goal: Tissue integrity - skin and mucous membranes  Description  Monitoring patient skin integrity for skin breakdown, turning and repositioning q2h per protocol.      8/3/2019 0334 by Carolynn Gonzalez RN  Outcome: Ongoing

## 2019-08-03 NOTE — PROGRESS NOTES
1915: shift handoff and 4eyes assessment complete with Pham day shift RN. Pt on the vent with precedex for sedation. Pt somewhat agitated; will titrate precedex to achieve RASS ordered. Pt not following any purposeful commands but moves all extremities and withdraws from pain. TF flush is at 150ml/hr will adjust to ordered 150ml/4hrs. All lines and drains intact. See doc flow sheet for complete assessment. 0000: No significant changes from shift assessment. Pt not following purposeful commands but withdraws from pain. Titrating Precedex to achieve ordered RASS. Will continue to monitor. 0600: no significant events overnight.       0740: shift handoff with day shift RN    Electronically signed by Jaden Yu RN on 8/3/2019 at 7:40 AM
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
## 2019-08-03 NOTE — PROGRESS NOTES
his lines, was trying to jump out of the window, and  is sedated and unable to give any details.     Reviewed admission H and P and other details. He has a history of  hypertension, opioid dependence, alcoholism, cervical degenerative  disease for which apparently he has some surgery, presents with fatigue,  nausea, vomiting, poor appetite, hoarseness of the voice for several  months, recent 10 pounds of weight loss.     Apparently, he has problem with the hoarseness and has videostroboscopy  with laryngoscopy in 07/2018 by ENT. Possible mass seen in the vocal  cord. Recently, he also has an esophagram.  Mild dysmotility was seen. The patient drinks lot of milk shakes mixing with vodka.     In the ER, his vitals were stable, but has abnormal lab that described  above.     I am seeing in his room where unable get any history. Interval History :     Intubated on vent    Seen with -no family  ROS -     Unable to obtain ROS due to intubation      BP (!) 99/56   Pulse 98   Temp 98.6 °F (37 °C) (Axillary)   Resp 22   Ht 5' 5\" (1.651 m)   Wt 125 lb 10.6 oz (57 kg)   SpO2 100%   BMI 20.91 kg/m²         I/O last 3 completed shifts: In: 6910.1 [I.V.:988.1; NG/GT:5332; IV Piggyback:590]  Out: 5067 [LSLCO:0916]      General appearance: unresponsive, intubated   HEENT: Lips- normal, teeth- ok , oral mucosa- moist  Neck : Mass- no, appears symmetrical, JVD- not raised  Respiratory: Respiratory effort-  On ventilation- FiO2-40 %, PEEP 5 mmHg wheeze- no, crackles - no  Cardiovascular:  Ausculation- No M/R/G, Edema none  Abdomen: visible mass- no, distention- no, scar- no, tenderness- unable to assess                           hepatosplenomegaly-  No--  Musculoskeletal:  clubbing no,cyanosis- no , digital ischemia- no                           muscle strength- patient unable to co-operate     , tone - patient unable to co-operate   Skin: rashes- no , ulcers- no, induration- no, tightening - no  Psychiatric:

## 2019-08-03 NOTE — PROGRESS NOTES
Hospitalist Progress Note      PCP: Lyndon Gonzalez    Date of Admission: 7/22/2019    Chief Complaint: fatigue, N/V    Hospital Course: Pt. Is a 69 yo alcoholic male who presented with acute renal failure and went into delirium tremens and in the ICU for further management. In the ICU being treated for acute hypoxic resp failure likely 2/2 Aspiration pna, metabolic encephalopathy, AVIVA.      Subjective:   Patient seen and examined  Patient with more movements today  Unresponsive to pain today  Although does not follow commands  No abnormal movements on exam today    Medications:  Reviewed    Infusion Medications    dexmedetomidine (PRECEDEX) IV infusion 0.2 mcg/kg/hr (08/03/19 7440)    dextrose       Scheduled Medications    potassium chloride  20 mEq Intravenous Q2H    meropenem  1 g Intravenous Q8H    vancomycin  1,000 mg Intravenous Q18H    alteplase  1 mg Intracatheter Once    sodium chloride flush  10 mL Intravenous 2 times per day    metoprolol tartrate  25 mg Oral BID    levetiracetam  500 mg Intravenous Q12H    levalbuterol  1.25 mg Nebulization 6 times per day    pantoprazole  40 mg Intravenous BID    VENELEX   Topical BID    chlorhexidine  15 mL Mouth/Throat BID    heparin (porcine)  5,000 Units Subcutaneous 3 times per day    atorvastatin  20 mg Oral Nightly    aspirin  81 mg Oral Daily     PRN Meds: sodium chloride flush, labetalol, acetaminophen, medicated lip ointment, aspirin, glucose, dextrose, glucagon (rDNA), dextrose, ondansetron      Intake/Output Summary (Last 24 hours) at 8/3/2019 0754  Last data filed at 8/3/2019 0500  Gross per 24 hour   Intake 6910.1 ml   Output 3615 ml   Net 3295.1 ml       Physical Exam Performed:    /65   Pulse 112   Temp 99.5 °F (37.5 °C) (Oral)   Resp 26   Ht 5' 5\" (1.651 m)   Wt 125 lb 10.6 oz (57 kg)   SpO2 100%   BMI 20.91 kg/m²     General appearance: Ill appearing, intubated, moving his head right and left  Spontaneously Acute kidney failure (HCC) [D53.0]       #Metabolic encephalopathy:   Ct head without contrast showed atrophy but no acute intracranial process. For further evaluation neurology consulte, recommended MRI,  change of antibiotics and EEG if mental status remains unchanged. -EEG with generalized slowing nonspecific, no epileptiform discharges seen  - MRI with chronic mild small vessel ischemic changes otherwise non-diagnostic and Abx changed to Merrem (cefepime and metro stopped 07/31)  -Lumbar puncture; Colorless, 1 WBC  Normal blood glucose, protein  Pending: Cytology, VDRL, oligoclonal band, MVP, culture, Lyme ab, WNV  Meningitis encephalitis panel was negative  Agree with sedation vacation for the next 24 to 48 hours  Patient seems to be more responsive to pain today     Electrolyte abnormalities:   Replacing potassium/phosphorus/magnesium as needed    #Errosive esophagitis:  - s/p EGD 07/30 -- Errosive esophagitis  - Continue PPI bid  - s/p 1 units pRBC  - Monitor Hgb trend, Transfuse for Hgb < 7.0    #Acute hypoxic resp failure likely from aspiration pneumonia likely due to gram-negative/anaerobic organisms, fluid overload  -CT chest without contrast with small bilateral effusions, multifocal pneumonia in the right upper and bilateral lobes concerning for aspiration  - On broad spectrum coverage; will change cefepime and Metronidazole to Merrem  - s/p bronchoscopy, BAL with Increased Neutrophils, diatherix gel per chart review Klebsiella plus MRSA    #AVIVA; Cr down to wnl  - nephrology following  - Continue to monitor UOP  - Good UOP now that ATN is resolving/resolved    #Electrolyte abnormalities  Replacing as needed    Addressed/Resolved problems:  Alcohol withdrawal with DT POA  - S/p CIWA protocol and high dose thiamine.     #Elevated troponin: likely demand ischemia  Wall motion abnormalities on ECHO noted   Cardiology signed off for now  Ischemic evaluation once clinically stable      #Hx of Damien nodules:  Right upper lobe 1.3 cm spiculated nodule concerning for malignancy  - will need further work up once more clinically stable    #Septic/Hemmorhagic Shock in the setting of underlying aspiration pneumonia  - Cortisol level was normal, he is Off pressors now      DVT Prophylaxis: SCD's  Diet: DIET TUBE FEED VOLUME BASED NPO STANDARD WITH FIBER (Jevity 1.2);  Nasogastric; Continuous; 1,440; 24  Code Status: Full Code    Discussed with Dr. Mikey Stevenson  Discussed with nursing    Dispo - Continue ICU level of care    [facility]

## 2019-08-03 NOTE — PROGRESS NOTES
ICU Progress Note    Admit Date: 7/22/2019  IV Access:Peripheral  IV Fluids:None               Antibiotics: Vanc and Meropenem  Diet: DIET TUBE FEED VOLUME BASED NPO STANDARD WITH FIBER (Jevity 1.2); Nasogastric; Continuous; 1,440; 24    CC: mental status changes, possible alcohol withdrawal     Interval history: B/l upper extremity swelling w/ pitting edema. Open eyes when calling name. Grimmace in pain when palpating abdomen. Was on Precedex 0.6 this am. Titrated Precedex to max and given 10 mg Versed for abdomen pelvis CT.   Lactate 1.2.      Medications:     Scheduled Meds:   potassium chloride  20 mEq Intravenous Q2H    midazolam  4 mg Intravenous Once    meropenem  1 g Intravenous Q8H    vancomycin  1,000 mg Intravenous Q18H    alteplase  1 mg Intracatheter Once    sodium chloride flush  10 mL Intravenous 2 times per day    metoprolol tartrate  25 mg Oral BID    levetiracetam  500 mg Intravenous Q12H    levalbuterol  1.25 mg Nebulization 6 times per day    pantoprazole  40 mg Intravenous BID    VENELEX   Topical BID    chlorhexidine  15 mL Mouth/Throat BID    heparin (porcine)  5,000 Units Subcutaneous 3 times per day    atorvastatin  20 mg Oral Nightly    aspirin  81 mg Oral Daily     Continuous Infusions:   dexmedetomidine (PRECEDEX) IV infusion 0.6 mcg/kg/hr (08/03/19 1449)    dextrose       PRN Meds:sodium chloride flush, labetalol, acetaminophen, medicated lip ointment, aspirin, glucose, dextrose, glucagon (rDNA), dextrose, ondansetron    Objective:   Vitals:   T-max:  Patient Vitals for the past 8 hrs:   BP Temp Temp src Pulse Resp SpO2   08/03/19 1239 -- -- -- 98 22 100 %   08/03/19 1200 (!) 99/56 -- -- 106 19 --   08/03/19 1145 -- -- -- 108 29 100 %   08/03/19 1140 -- -- -- -- 19 100 %   08/03/19 1130 -- -- -- 104 23 100 %   08/03/19 1115 -- -- -- 103 24 100 %   08/03/19 1100 125/71 -- -- -- -- --   08/03/19 1045 -- -- -- 107 25 100 %   08/03/19 1030 -- -- -- 102 27 --   08/03/19 1015 PORTABLE   Final Result   Addendum 1 of 1      Patient: Ke Rodriguez  Time Out: 02:22   Exam(s): FILM CXR 1 VIEW        ADDENDUM - Added by Hamzah Foster MD on 7/29/2019 2:22 AM (-07:00)   IMPRESSION:        ET tube terminates 6.1 cm  FROM  the fransico.   ----------------------------------------------------------------------       EXAM:     XR Chest, 1 View       CLINICAL HISTORY:      Reason for exam: intubation. Reason for exam:->intubation. TECHNIQUE:     Frontal view of the chest.       COMPARISON:       Chest x-ray 7/20/19       IMPRESSION:        ET tube terminates 6.1 cm in the fransico. Final      CT CHEST WO CONTRAST   Final Result      1. Small bilateral pleural effusions. Multifocal pneumonia in the right upper and bilateral lower lobes. 2. 13 mm right upper lobe spiculated nodule suspicious for malignancy. Recommend PET/CT or biopsy. Additional nonspecific areas of nodular consolidation in the right lower lobe. Qzxv      XR CHEST PORTABLE   Final Result   1. Persistent but improved central pulmonary vascular congestion with    diffusely increased interstitial markings still present. 2.  Mildly decreased right greater than left pleural effusions. 3.  Right greater than left basilar atelectasis versus consolidation. 4.  No pneumothorax radiographically evident. 5.  Tubes/lines as above. XR CHEST PORTABLE   Final Result      1. Persistent hazy perihilar, groundglass basilar consolidations and pleural effusions greater in the right lung   2. NG tube in place as well as a right IJ central venous catheter with the tip in the mid to distal SVC, no worsening               XR CHEST PORTABLE   Final Result      Introduction of NG tube with tip excluded from the inferior edge of the film. Moderate bilateral effusions and diffuse groundglass opacities consistent with edema, infection or aspiration.  This appears worse since the previous study            XR ABDOMEN (KUB) (SINGLE AP VIEW)   Final Result      Nasogastric tube extending into the left upper quadrant. XR CHEST PORTABLE   Final Result      Persistent right basilar infiltrate. XR CHEST 1 VW   Final Result      Tip of the right IJ central venous catheter overlies lower SVC with no pneumothorax evident. CT HEAD WO CONTRAST   Final Result      1. No acute intracranial process. XR CHEST PORTABLE   Final Result      Interval development of bilateral lower lobe opacities may relate to edema or pneumonia      CT SOFT TISSUE NECK WO CONTRAST   Final Result   Unremarkable CT neck          XR CHEST PORTABLE   Final Result     Normal chest x-ray. CT ABDOMEN PELVIS WO CONTRAST Additional Contrast? None   Final Result   No acute abnormality demonstrated in the abdomen or pelvis. Assessment/Plan:     Assessment/Plan:   Mr. Tiana Ivory is a 71 yo M with PMHx significant for HTN, HLD, GERD, opioid dependence 2/2 cervical DDD, and alcohol abuse who presented to [facility] ED complaining of fatigue, nausea/vomiting, poor appetite and voice hoarseness over the last several months. Pt was originally admitted to the floor. One day after admission, pt had AMS and became hypotensive requiring IV boluses and 4 mg Ativan and 5 mg Haldol.  Patient being managed in ICU for acute respiratory failure and pneumonia.     Altered Mental Status possibly secondary to metabolic Encephalopathy vs alcohol withdrawl  Patient weaned off of sedation and remain minimally responsive.  Pupils are reactive, but currently does not follow commands and minimally responds to painful stimuli. Hx of alcohol use. - MRI, EEG, and LP done. EEG shows non-specific slowing. MRI shows ao acute intracranial abnormality identified.  - Meningitis and encephalitis panel negative.  No organism or WBC seen on LP.  - Neurology on board  - Precidex for sedation    - Con't daily sedation holidays    Septic Shock secondary to Pneumonia:   Chest Xray shows

## 2019-08-04 LAB
ABO/RH: NORMAL
ALBUMIN SERPL-MCNC: 1.8 G/DL (ref 3.4–5)
ANION GAP SERPL CALCULATED.3IONS-SCNC: 10 MMOL/L (ref 3–16)
ANTIBODY SCREEN: NORMAL
BASE EXCESS VENOUS: -0.1 MMOL/L (ref -2–3)
BASE EXCESS VENOUS: -0.8 MMOL/L (ref -2–3)
BASOPHILS ABSOLUTE: 0 K/UL (ref 0–0.2)
BASOPHILS RELATIVE PERCENT: 0.4 %
BLOOD BANK DISPENSE STATUS: NORMAL
BLOOD BANK PRODUCT CODE: NORMAL
BPU ID: NORMAL
BUN BLDV-MCNC: 23 MG/DL (ref 7–20)
CALCIUM SERPL-MCNC: 7.7 MG/DL (ref 8.3–10.6)
CARBOXYHEMOGLOBIN: 1.7 % (ref 0–1.5)
CARBOXYHEMOGLOBIN: 2.5 % (ref 0–1.5)
CHLORIDE BLD-SCNC: 109 MMOL/L (ref 99–110)
CO2: 23 MMOL/L (ref 21–32)
CREAT SERPL-MCNC: 0.9 MG/DL (ref 0.8–1.3)
DESCRIPTION BLOOD BANK: NORMAL
EOSINOPHILS ABSOLUTE: 0.1 K/UL (ref 0–0.6)
EOSINOPHILS RELATIVE PERCENT: 0.5 %
GFR AFRICAN AMERICAN: >60
GFR NON-AFRICAN AMERICAN: >60
GLUCOSE BLD-MCNC: 112 MG/DL (ref 70–99)
GLUCOSE BLD-MCNC: 116 MG/DL (ref 70–99)
GLUCOSE BLD-MCNC: 118 MG/DL (ref 70–99)
GLUCOSE BLD-MCNC: 119 MG/DL (ref 70–99)
GLUCOSE BLD-MCNC: 154 MG/DL (ref 70–99)
GLUCOSE BLD-MCNC: 165 MG/DL (ref 70–99)
HCO3 VENOUS: 22.3 MMOL/L (ref 24–28)
HCO3 VENOUS: 22.5 MMOL/L (ref 24–28)
HCT VFR BLD CALC: 20.4 % (ref 40.5–52.5)
HCT VFR BLD CALC: 20.9 % (ref 40.5–52.5)
HCT VFR BLD CALC: 23 % (ref 40.5–52.5)
HCT VFR BLD CALC: 23.1 % (ref 40.5–52.5)
HCT VFR BLD CALC: 23.2 % (ref 40.5–52.5)
HEMOGLOBIN, VEN, REDUCED: 18.5 %
HEMOGLOBIN, VEN, REDUCED: 22.2 %
HEMOGLOBIN: 6.8 G/DL (ref 13.5–17.5)
HEMOGLOBIN: 6.9 G/DL (ref 13.5–17.5)
HEMOGLOBIN: 7.6 G/DL (ref 13.5–17.5)
HEMOGLOBIN: 7.6 G/DL (ref 13.5–17.5)
HEMOGLOBIN: 7.8 G/DL (ref 13.5–17.5)
HERPES SIMPLEX VIRUS BY PCR: NOT DETECTED
HSV SOURCE: NORMAL
LYMPHOCYTES ABSOLUTE: 0.5 K/UL (ref 1–5.1)
LYMPHOCYTES RELATIVE PERCENT: 4.5 %
MAGNESIUM: 1.8 MG/DL (ref 1.8–2.4)
MCH RBC QN AUTO: 31.1 PG (ref 26–34)
MCHC RBC AUTO-ENTMCNC: 33.1 G/DL (ref 31–36)
MCV RBC AUTO: 94.1 FL (ref 80–100)
METHEMOGLOBIN VENOUS: 0.3 % (ref 0–1.5)
METHEMOGLOBIN VENOUS: 0.8 % (ref 0–1.5)
MONOCYTES ABSOLUTE: 0.6 K/UL (ref 0–1.3)
MONOCYTES RELATIVE PERCENT: 5.4 %
MYELIN BASIC PROTEIN, CSF: 5.01 NG/ML (ref 0–5.5)
NEUTROPHILS ABSOLUTE: 9.3 K/UL (ref 1.7–7.7)
NEUTROPHILS RELATIVE PERCENT: 89.2 %
O2 SAT, VEN: 77 %
O2 SAT, VEN: 81 %
PCO2, VEN: 28.7 MMHG (ref 41–51)
PCO2, VEN: 33 MMHG (ref 41–51)
PDW BLD-RTO: 14.8 % (ref 12.4–15.4)
PERFORMED ON: ABNORMAL
PH VENOUS: 7.45 (ref 7.35–7.45)
PH VENOUS: 7.5 (ref 7.35–7.45)
PHOSPHORUS: 2 MG/DL (ref 2.5–4.9)
PLATELET # BLD: 267 K/UL (ref 135–450)
PMV BLD AUTO: 9.3 FL (ref 5–10.5)
PO2, VEN: 41.2 MMHG (ref 25–40)
PO2, VEN: 41.8 MMHG (ref 25–40)
POTASSIUM SERPL-SCNC: 3.8 MMOL/L (ref 3.5–5.1)
RBC # BLD: 2.22 M/UL (ref 4.2–5.9)
SODIUM BLD-SCNC: 142 MMOL/L (ref 136–145)
TCO2 CALC VENOUS: 23 MMOL/L
TCO2 CALC VENOUS: 24 MMOL/L
VDRL CSF SCREEN: NON REACTIVE
WBC # BLD: 10.4 K/UL (ref 4–11)
WEST NILE IGG, CSF: 0.2 IV
WEST NILE IGM ANTIBODY CSF: 0 IV

## 2019-08-04 PROCEDURE — 6370000000 HC RX 637 (ALT 250 FOR IP): Performed by: INTERNAL MEDICINE

## 2019-08-04 PROCEDURE — 80069 RENAL FUNCTION PANEL: CPT

## 2019-08-04 PROCEDURE — 36415 COLL VENOUS BLD VENIPUNCTURE: CPT

## 2019-08-04 PROCEDURE — 83735 ASSAY OF MAGNESIUM: CPT

## 2019-08-04 PROCEDURE — 94761 N-INVAS EAR/PLS OXIMETRY MLT: CPT

## 2019-08-04 PROCEDURE — 2580000003 HC RX 258: Performed by: INTERNAL MEDICINE

## 2019-08-04 PROCEDURE — 6360000002 HC RX W HCPCS: Performed by: INTERNAL MEDICINE

## 2019-08-04 PROCEDURE — 85025 COMPLETE CBC W/AUTO DIFF WBC: CPT

## 2019-08-04 PROCEDURE — 6360000002 HC RX W HCPCS: Performed by: STUDENT IN AN ORGANIZED HEALTH CARE EDUCATION/TRAINING PROGRAM

## 2019-08-04 PROCEDURE — C9113 INJ PANTOPRAZOLE SODIUM, VIA: HCPCS | Performed by: INTERNAL MEDICINE

## 2019-08-04 PROCEDURE — 2700000000 HC OXYGEN THERAPY PER DAY

## 2019-08-04 PROCEDURE — 94750 HC PULMONARY COMPLIANCE STUDY: CPT

## 2019-08-04 PROCEDURE — 99291 CRITICAL CARE FIRST HOUR: CPT | Performed by: INTERNAL MEDICINE

## 2019-08-04 PROCEDURE — 2580000003 HC RX 258: Performed by: STUDENT IN AN ORGANIZED HEALTH CARE EDUCATION/TRAINING PROGRAM

## 2019-08-04 PROCEDURE — 2580000003 HC RX 258: Performed by: NURSE PRACTITIONER

## 2019-08-04 PROCEDURE — 94640 AIRWAY INHALATION TREATMENT: CPT

## 2019-08-04 PROCEDURE — 94003 VENT MGMT INPAT SUBQ DAY: CPT

## 2019-08-04 PROCEDURE — 6370000000 HC RX 637 (ALT 250 FOR IP): Performed by: STUDENT IN AN ORGANIZED HEALTH CARE EDUCATION/TRAINING PROGRAM

## 2019-08-04 PROCEDURE — 82803 BLOOD GASES ANY COMBINATION: CPT

## 2019-08-04 PROCEDURE — 86850 RBC ANTIBODY SCREEN: CPT

## 2019-08-04 PROCEDURE — 6360000002 HC RX W HCPCS: Performed by: NURSE PRACTITIONER

## 2019-08-04 PROCEDURE — 94770 HC ETCO2 MONITOR DAILY: CPT

## 2019-08-04 PROCEDURE — 2000000000 HC ICU R&B

## 2019-08-04 PROCEDURE — 85014 HEMATOCRIT: CPT

## 2019-08-04 PROCEDURE — 2500000003 HC RX 250 WO HCPCS: Performed by: STUDENT IN AN ORGANIZED HEALTH CARE EDUCATION/TRAINING PROGRAM

## 2019-08-04 PROCEDURE — P9016 RBC LEUKOCYTES REDUCED: HCPCS

## 2019-08-04 PROCEDURE — 86900 BLOOD TYPING SEROLOGIC ABO: CPT

## 2019-08-04 PROCEDURE — 85018 HEMOGLOBIN: CPT

## 2019-08-04 PROCEDURE — 2500000003 HC RX 250 WO HCPCS: Performed by: INTERNAL MEDICINE

## 2019-08-04 PROCEDURE — 2580000003 HC RX 258

## 2019-08-04 PROCEDURE — 86923 COMPATIBILITY TEST ELECTRIC: CPT

## 2019-08-04 PROCEDURE — 86901 BLOOD TYPING SEROLOGIC RH(D): CPT

## 2019-08-04 PROCEDURE — 36430 TRANSFUSION BLD/BLD COMPNT: CPT

## 2019-08-04 RX ORDER — SODIUM CHLORIDE 9 MG/ML
INJECTION, SOLUTION INTRAVENOUS
Status: COMPLETED
Start: 2019-08-04 | End: 2019-08-04

## 2019-08-04 RX ORDER — 0.9 % SODIUM CHLORIDE 0.9 %
250 INTRAVENOUS SOLUTION INTRAVENOUS ONCE
Status: DISCONTINUED | OUTPATIENT
Start: 2019-08-04 | End: 2019-08-04

## 2019-08-04 RX ORDER — MAGNESIUM SULFATE IN WATER 40 MG/ML
2 INJECTION, SOLUTION INTRAVENOUS ONCE
Status: COMPLETED | OUTPATIENT
Start: 2019-08-04 | End: 2019-08-04

## 2019-08-04 RX ADMIN — LEVALBUTEROL HYDROCHLORIDE 1.25 MG: 1.25 SOLUTION, CONCENTRATE RESPIRATORY (INHALATION) at 00:33

## 2019-08-04 RX ADMIN — HEPARIN SODIUM 5000 UNITS: 5000 INJECTION INTRAVENOUS; SUBCUTANEOUS at 06:30

## 2019-08-04 RX ADMIN — CASTOR OIL AND BALSAM, PERU: 788; 87 OINTMENT TOPICAL at 08:11

## 2019-08-04 RX ADMIN — MEROPENEM 1 G: 1 INJECTION, POWDER, FOR SOLUTION INTRAVENOUS at 17:31

## 2019-08-04 RX ADMIN — MEROPENEM 1 G: 1 INJECTION, POWDER, FOR SOLUTION INTRAVENOUS at 08:10

## 2019-08-04 RX ADMIN — ATORVASTATIN CALCIUM 20 MG: 20 TABLET, FILM COATED ORAL at 20:06

## 2019-08-04 RX ADMIN — SODIUM CHLORIDE 250 ML: 9 INJECTION, SOLUTION INTRAVENOUS at 16:23

## 2019-08-04 RX ADMIN — DEXMEDETOMIDINE 1.4 MCG/KG/HR: 100 INJECTION, SOLUTION, CONCENTRATE INTRAVENOUS at 07:05

## 2019-08-04 RX ADMIN — LEVALBUTEROL HYDROCHLORIDE 1.25 MG: 1.25 SOLUTION, CONCENTRATE RESPIRATORY (INHALATION) at 13:53

## 2019-08-04 RX ADMIN — MEROPENEM 1 G: 1 INJECTION, POWDER, FOR SOLUTION INTRAVENOUS at 01:29

## 2019-08-04 RX ADMIN — LEVALBUTEROL HYDROCHLORIDE 1.25 MG: 1.25 SOLUTION, CONCENTRATE RESPIRATORY (INHALATION) at 16:41

## 2019-08-04 RX ADMIN — HEPARIN SODIUM 5000 UNITS: 5000 INJECTION INTRAVENOUS; SUBCUTANEOUS at 14:43

## 2019-08-04 RX ADMIN — CASTOR OIL AND BALSAM, PERU: 788; 87 OINTMENT TOPICAL at 21:23

## 2019-08-04 RX ADMIN — DEXMEDETOMIDINE 1.1 MCG/KG/HR: 100 INJECTION, SOLUTION, CONCENTRATE INTRAVENOUS at 02:00

## 2019-08-04 RX ADMIN — ASPIRIN 81 MG 81 MG: 81 TABLET ORAL at 08:10

## 2019-08-04 RX ADMIN — Medication 15 ML: at 20:06

## 2019-08-04 RX ADMIN — LEVETIRACETAM 500 MG: 100 INJECTION, SOLUTION INTRAVENOUS at 04:10

## 2019-08-04 RX ADMIN — METOPROLOL TARTRATE 25 MG: 25 TABLET ORAL at 20:06

## 2019-08-04 RX ADMIN — VANCOMYCIN HYDROCHLORIDE 1000 MG: 10 INJECTION, POWDER, LYOPHILIZED, FOR SOLUTION INTRAVENOUS at 13:44

## 2019-08-04 RX ADMIN — HEPARIN SODIUM 5000 UNITS: 5000 INJECTION INTRAVENOUS; SUBCUTANEOUS at 21:23

## 2019-08-04 RX ADMIN — Medication 10 ML: at 08:11

## 2019-08-04 RX ADMIN — Medication 15 ML: at 08:11

## 2019-08-04 RX ADMIN — LEVALBUTEROL HYDROCHLORIDE 1.25 MG: 1.25 SOLUTION, CONCENTRATE RESPIRATORY (INHALATION) at 09:57

## 2019-08-04 RX ADMIN — LEVALBUTEROL HYDROCHLORIDE 1.25 MG: 1.25 SOLUTION, CONCENTRATE RESPIRATORY (INHALATION) at 20:41

## 2019-08-04 RX ADMIN — SODIUM CHLORIDE 250 ML: 9 INJECTION, SOLUTION INTRAVENOUS at 06:33

## 2019-08-04 RX ADMIN — LEVALBUTEROL HYDROCHLORIDE 1.25 MG: 1.25 SOLUTION, CONCENTRATE RESPIRATORY (INHALATION) at 03:58

## 2019-08-04 RX ADMIN — PANTOPRAZOLE SODIUM 40 MG: 40 INJECTION, POWDER, LYOPHILIZED, FOR SOLUTION INTRAVENOUS at 08:10

## 2019-08-04 RX ADMIN — POTASSIUM PHOSPHATE, MONOBASIC AND POTASSIUM PHOSPHATE, DIBASIC 20 MMOL: 224; 236 INJECTION, SOLUTION, CONCENTRATE INTRAVENOUS at 13:43

## 2019-08-04 RX ADMIN — DEXMEDETOMIDINE 1.2 MCG/KG/HR: 100 INJECTION, SOLUTION, CONCENTRATE INTRAVENOUS at 16:22

## 2019-08-04 RX ADMIN — MAGNESIUM SULFATE HEPTAHYDRATE 2 G: 40 INJECTION, SOLUTION INTRAVENOUS at 14:53

## 2019-08-04 RX ADMIN — LEVETIRACETAM 500 MG: 100 INJECTION, SOLUTION INTRAVENOUS at 17:15

## 2019-08-04 RX ADMIN — Medication 10 ML: at 21:00

## 2019-08-04 RX ADMIN — METOPROLOL TARTRATE 25 MG: 25 TABLET ORAL at 08:11

## 2019-08-04 RX ADMIN — PANTOPRAZOLE SODIUM 40 MG: 40 INJECTION, POWDER, LYOPHILIZED, FOR SOLUTION INTRAVENOUS at 20:06

## 2019-08-04 ASSESSMENT — PULMONARY FUNCTION TESTS
PIF_VALUE: 20
PIF_VALUE: 23
PIF_VALUE: 23
PIF_VALUE: 25
PIF_VALUE: 24
PIF_VALUE: 22
PIF_VALUE: 20
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
## 2019-08-04 NOTE — PROGRESS NOTES
Pediatric Physical Therapy Outpatient Progress Note     Name: Colton Mosquera  Date: 6/12/2018  Clinic #: [id number]  Time in: 1015   Time out:1100     Visit 11 of 14 approved visits, referral expiring 06/30/2018     Subjective:  Colton was brought to therapy by mom.  Parent/Caregiver reports:doing really well in aquatic therapy      Pain: Colton is unable to rate pain on numeric scale. No pain behaviors noted through session.      Objective:  Parent/Caregiver waited in the observation room during session.   Colton was seen for 45 minutes of physical therapy services; including: therapeutic exercise, neuromuscular re-ed, gain training, sensory integration, therapeutic activities, wheelchair management/training skills, fit/training of orthotic.     Education:  Patient's mother was educated on patient's current functional status and progress.  Patient's mother was educated on updated HEP.  Patient's mother verbalized understanding.      Treatment:  Session focused on: exercises to develop LE strength and muscular endurance, LE range of motion and flexibility, sitting balance, standing balance, coordination, posture, kinesthetic sense and proprioception, desensitization techniques, facilitation of gait, stair negotiation, enhacement of sensory processing, promotion of adaptive responses to environmental demands, gross motor stimulation, cardiovascular endurance training, parent education and training, initiation/progression of HEP eye-hand coordination, core muscle activation.     Activities included:  · Stretching into R lower twist for improved hip mobility with static hold x 5 minutes   · Transitioning prone to quad with mod A x 2 trials  · Facilitation of creeping/crawling with mod A to increase weightbearing through knees when pulling forward x 10 feet x multiple trials  · Quad on mat with min A to maintain, and improved weight bearing through BUEs independently, initiation of rocking independently   · Tall  [facility]   [phone]  [facility] [place]  Inpatient Progress note     REASON FOR CONSULTATION:    AVIVA    INTERVAL Plan  Potassium better. Replace magnesium we will try to keep it above 2. Creatinine stable. Phosphorus low. We will replete. Overall improving. Acute kidney injury  Likely acute tubular necrosis  Second peak of 2.4 on 7/29/19- 0.9  on 8/4/2019   Made   3 L urine yesterday  First peak creatinine 6.8- down to 1.2  baseline creatinine in 12/2018 1.2-   proteinuria  258 mg, 3 to 5 red blood   Urine sodium 29. in 05/2018 one time  creatinine  was 2.1   In 08/2015, creatinine ranged from 0.8 to 1.2. CT abdomen kidneys normal   CT in 2015 mention some bilateral mild to moderate cortical  thinning of the renal cortex, no mention in 07/2019 CT. Traumatic Hematuria due to resendiz which pt tried to pull      Hypercalcemia  Improved    due to volume depletion On admission  calcium was 10.1 in 12/2018  His hypercalcemia could be due to excessive calcium intake because he  was drinking lot of milkshake as per history.     Hypokalemia  Potassium low-getting potassium and magnesium     History of alcoholism   withdrawal  liver function tests are normal.   He does not have acute hepatitis     History of multiple lung nodules   on CT in 2015 with current weight loss little worry some. will need a CT scan of the chest to rule out any lung malignancy because long-term smoking and currentweight loss. CT of the abdomen already done, which does not show any acute  abnormality.     History of left adrenal adenoma   16 mm as seen on CT in 2015. CT this admission in 07/2019 did not mention that.     History of emphysema  as seen on previous CT of the chest in 2015.     History of abdominal aortic aneurysm   as seen on CT in 2015 was 3.4 cm.    This admission CT without contrast did not mention         [facility]  From consult note-   79-year-old  gentleman  whom I am seeing in the room kneel position with BUE support at bench with Mod A at trunk and Min A to hips to maintain position, facilitation of equal weight acceptance through B knees  · Supine to sit with min A of therapist giving hand to pull up on and tactile cues at hip to keep hips down x 3 trials   · Long sitting to ring sit with min A x 3 trials  · Ring sitting to prone from crawling with min A x 3 trials         Treatment was tolerated: fair     Assessment  Colton was seen for follow up treatment today. Colton demonstrates fair tolerance for today's session with inconsistent fussing.  Colton demonstrates improved tolerance for quad position requires assistance to maintain weightbearing through extended UEs. Improved tolerance of weight bearing though knees and maintaining quad position. Improved initiation of weightbearing through knees when attempting to crawl.   Colton required Min A at hips and Mod A at trunk to maintain tall kneel position at bench secondary to limited participation in tasks. Initially weight bears through RLE with facilitation will increase weightbearing through LLE. Increased time with weight bearing in tall kneeling ,with improved weight shift equally through BLEs.Improved transition from supine to sitting when given therapist hand to pull up on.   Colton presents with decreased strength, delayed gross motor milestones, increased tone, and poor motor planning.  He demonstrates solid skills in the 3-5 month ranges with scattered skills in 6-8 month range.  Colton demonstrates motivation to stand and participate in therapy. The patient will benefit from Physical Therapy to progress towards the following goals to address the above impairments and functional limitations.        Goals  Met Goals  1. Colton will roll supine to prone independently 3/4 trials bilaterally Goal Met 5/30/2017  2. Colton will demo independent ring sitting for play without UE propping x 2 minutes for play Goal Met 9/26/2017   3.  Colton will roll prone to supine independently 3/4 trials GOAL MET 10/24/2017   4.. Family will be independent with given HEP Ongoing     Long term 6 months: continue to 6/30/2018  1. Colton will demo sit to stand from bench with SBA with BUEs on support surface x 5 trials Progressing requires min A  2.  Colton will transition supine to sit with CGA x 3/4 trials Progressing requires min A  3 Colton will maintain quad positioning while reaching for toy, Progressing requires CGA to maintain position    4.  Colton will demonstrate creeping x 5 feet, Progressing will demonstrates rocking in quad position for weight shifts with no forward movements  5. Colton will transition quad to tall kneel at surface independently. Progressing requires min-mod A         Plan:  Continue PT treatment 1x week for ROM and stretching, strengthening, manual therapy, balance activities, gross motor developmental activities, gait training, transfer training, cardiovascular/endurance training, patient education, family training, progression of home exercise program.     Lizzie Black, PT  6/12/2018

## 2019-08-04 NOTE — PROGRESS NOTES
mg, 1 mg, Intracatheter, Once, Roberta Ramirez MD    dexmedetomidine (PRECEDEX) 400 mcg in sodium chloride 0.9 % 100 mL infusion, 0.2 mcg/kg/hr, Intravenous, Continuous, Mikey Abdi MD, Last Rate: 6.1 mL/hr at 08/03/19 1811, 0.4 mcg/kg/hr at 08/03/19 1811    sodium chloride flush 0.9 % injection 10 mL, 10 mL, Intravenous, 2 times per day, Azalia Ruiz MD, 10 mL at 08/03/19 0838    sodium chloride flush 0.9 % injection 10 mL, 10 mL, Intravenous, PRN, Azalia Ruiz MD    metoprolol tartrate (LOPRESSOR) tablet 25 mg, 25 mg, Oral, BID, Mikey Abdi MD, 25 mg at 08/03/19 0828    labetalol (NORMODYNE;TRANDATE) injection syringe 10 mg, 10 mg, Intravenous, Q4H PRN, Vinicio Pollard MD, 10 mg at 08/02/19 0531    levETIRAcetam (KEPPRA) 500 mg in sodium chloride 0.9 % 100 mL IVPB, 500 mg, Intravenous, Q12H, Jacqui Smart APRN - CNP, Stopped at 08/03/19 1825    levalbuterol (XOPENEX) nebulizer solution 1.25 mg, 1.25 mg, Nebulization, 6 times per day, Stacie Grant MD, 1.25 mg at 08/03/19 2010    pantoprazole (PROTONIX) injection 40 mg, 40 mg, Intravenous, BID, Renu Cote MD, 40 mg at 08/03/19 0829    VENELEX ointment, , Topical, BID, Ramin Dennis MD    chlorhexidine (PERIDEX) 0.12 % solution 15 mL, 15 mL, Mouth/Throat, BID, Stacie Grant MD, 15 mL at 08/03/19 2003    heparin (porcine) injection 5,000 Units, 5,000 Units, Subcutaneous, 3 times per day, Alejandra Tate MD, 5,000 Units at 08/03/19 1509    atorvastatin (LIPITOR) tablet 20 mg, 20 mg, Oral, Nightly, Anya Ndiaye MD, 20 mg at 08/02/19 2004    acetaminophen (TYLENOL) tablet 650 mg, 650 mg, Oral, Q4H PRN, Tarun Germain DO, 650 mg at 08/02/19 1020    medicated lip ointment (BLISTEX), , Topical, PRN, Chay Wood MD, 7.5 g at 07/27/19 1444    aspirin suppository 300 mg, 300 mg, Rectal, Q6H PRN, Chay Wood MD, 300 mg at 07/24/19 1138    aspirin chewable tablet 81 mg, 81 mg, Oral, Daily, Chay Wood MD, 81 mg at 08/03/19 2536    glucose (GLUTOSE) 40 % oral gel 15 g, 15 g, Oral, PRN, Flex Reaves MD    dextrose 50 % IV solution, 12.5 g, Intravenous, PRN, Flex Reaves MD, 12.5 g at 07/31/19 0817    glucagon (rDNA) injection 1 mg, 1 mg, Intramuscular, PRN, Flex Reaves MD    dextrose 5 % solution, 100 mL/hr, Intravenous, PRN, Flex Reaves MD    ondansetron [facility]) injection 4 mg, 4 mg, Intravenous, Q30 Min PRN, Nury Garcia DO, 4 mg at 07/22/19 2224    ------------------------------------------    Exam:    Vitals:  /63   Pulse 115   Temp 98.6 °F (37 °C) (Axillary)   Resp 23   Ht 5' 5\" (1.651 m)   Wt 125 lb 10.6 oz (57 kg)   SpO2 100%   BMI 20.91 kg/m²     Pt sedated, intubated. Follows few commands. Grimaces to pain. Withdraws to pin in all 4 ext. Pupils reactive. Gaze conjugate. NLFs symmetric.    Plantar responses extensor bilateral.      Recent Results (from the past 24 hour(s))   POCT Glucose    Collection Time: 08/03/19 12:16 AM   Result Value Ref Range    POC Glucose 123 (H) 70 - 99 mg/dl    Performed on ACCU-CHEK    POCT Glucose    Collection Time: 08/03/19  4:24 AM   Result Value Ref Range    POC Glucose 115 (H) 70 - 99 mg/dl    Performed on ACCU-CHEK    CBC auto differential    Collection Time: 08/03/19  4:40 AM   Result Value Ref Range    WBC 10.9 4.0 - 11.0 K/uL    RBC 2.36 (L) 4.20 - 5.90 M/uL    Hemoglobin 7.3 (L) 13.5 - 17.5 g/dL    Hematocrit 21.7 (L) 40.5 - 52.5 %    MCV 92.2 80.0 - 100.0 fL    MCH 31.1 26.0 - 34.0 pg    MCHC 33.7 31.0 - 36.0 g/dL    RDW 14.3 12.4 - 15.4 %    Platelets 749 595 - 959 K/uL    MPV 9.5 5.0 - 10.5 fL    Neutrophils % 87.0 %    Lymphocytes % 5.4 %    Monocytes % 6.9 %    Eosinophils % 0.5 %    Basophils % 0.2 %    Neutrophils # 9.5 (H) 1.7 - 7.7 K/uL    Lymphocytes # 0.6 (L) 1.0 - 5.1 K/uL    Monocytes # 0.8 0.0 - 1.3 K/uL    Eosinophils # 0.1 0.0 - 0.6 K/uL    Basophils # 0.0 0.0 - 0.2 K/uL   Renal function panel    Collection Time: 08/03/19  4:40 AM Result Value Ref Range    Sodium 138 136 - 145 mmol/L    Potassium 3.0 (L) 3.5 - 5.1 mmol/L    Chloride 104 99 - 110 mmol/L    CO2 25 21 - 32 mmol/L    Anion Gap 9 3 - 16    Glucose 125 (H) 70 - 99 mg/dL    BUN 23 (H) 7 - 20 mg/dL    CREATININE 0.9 0.8 - 1.3 mg/dL    GFR Non-African American >60 >60    GFR African American >60 >60    Calcium 7.4 (L) 8.3 - 10.6 mg/dL    Phosphorus 2.2 (L) 2.5 - 4.9 mg/dL    Alb 1.9 (L) 3.4 - 5.0 g/dL   POCT Glucose    Collection Time: 08/03/19  8:07 AM   Result Value Ref Range    POC Glucose 103 (H) 70 - 99 mg/dl    Performed on ACCU-CHEK    Lactate, Sepsis    Collection Time: 08/03/19 10:04 AM   Result Value Ref Range    Lactic Acid, Sepsis 1.2 0.4 - 1.9 mmol/L   Hemoglobin and hematocrit, blood    Collection Time: 08/03/19  1:23 PM   Result Value Ref Range    Hemoglobin 7.1 (L) 13.5 - 17.5 g/dL    Hematocrit 21.6 (L) 40.5 - 52.5 %   Magnesium    Collection Time: 08/03/19  1:23 PM   Result Value Ref Range    Magnesium 1.80 1.80 - 2.40 mg/dL   POCT Glucose    Collection Time: 08/03/19  4:10 PM   Result Value Ref Range    POC Glucose 128 (H) 70 - 99 mg/dl    Performed on ACCU-CHEK    Blood gas, venous    Collection Time: 08/03/19  5:42 PM   Result Value Ref Range    pH, Vladimir 7.445 7.350 - 7.450    pCO2, Vladimir 37.4 (L) 41.0 - 51.0 mmHg    pO2, Vladimir 36.9 25.0 - 40.0 mmHg    HCO3, Venous 25.3 24.0 - 28.0 mmol/L    Base Excess, Vladimir 1.8 -2.0 - 3.0 mmol/L    O2 Sat, Vladimir 69 Not established %    Carboxyhemoglobin 1.8 (H) 0.0 - 1.5 %    MetHgb, Vladimir 0.4 0.0 - 1.5 %    TC02 (Calc), Vladimir 27 mmol/L    Hemoglobin, Vladimir, Reduced 30.20 %   Hemoglobin and hematocrit, blood    Collection Time: 08/03/19  7:31 PM   Result Value Ref Range    Hemoglobin 7.0 (L) 13.5 - 17.5 g/dL    Hematocrit 21.1 (L) 40.5 - 52.5 %   Potassium    Collection Time: 08/03/19  7:31 PM   Result Value Ref Range    Potassium 3.4 (L) 3.5 - 5.1 mmol/L   POCT Glucose    Collection Time: 08/03/19  7:35 PM   Result Value Ref Range    POC

## 2019-08-04 NOTE — PROGRESS NOTES
113/64 -- -- 102 18 -- --   08/04/19 0644 113/64 99 °F (37.2 °C) Axillary 101 22 98 % --   08/04/19 0630 -- -- -- 106 25 -- --   08/04/19 0627 107/60 99 °F (37.2 °C) Axillary 99 28 96 % --   08/04/19 0615 -- -- -- 107 21 -- --   08/04/19 0600 107/60 -- -- 103 21 -- 134 lb 14.7 oz (61.2 kg)   08/04/19 0545 -- -- -- 107 30 -- --   08/04/19 0530 -- -- -- 108 26 -- --   08/04/19 0515 -- -- -- 107 27 -- --   08/04/19 0500 (!) 104/55 99 °F (37.2 °C) Axillary 109 21 93 % --   08/04/19 0445 -- -- -- 110 20 -- --   08/04/19 0430 -- -- -- 107 22 -- --   08/04/19 0415 -- -- -- 107 23 -- --   08/04/19 0400 (!) 105/58 -- -- 101 22 -- --   08/04/19 0359 -- -- -- 101 23 100 % --   08/04/19 0345 -- -- -- 99 23 -- --   08/04/19 0330 -- -- -- 101 23 -- --   08/04/19 0315 -- -- -- 99 21 -- --   08/04/19 0300 (!) 99/57 -- -- 108 26 -- --   08/04/19 0245 -- -- -- 110 23 -- --   08/04/19 0230 -- -- -- 104 24 -- --   08/04/19 0215 -- -- -- 104 24 -- --   08/04/19 0200 (!) 95/54 -- -- 109 20 -- --   08/04/19 0145 -- -- -- 107 28 -- --   08/04/19 0130 -- -- -- 111 21 -- --   08/04/19 0115 -- -- -- 112 26 -- --       Intake/Output Summary (Last 24 hours) at 8/4/2019 0900  Last data filed at 8/4/2019 0740  Gross per 24 hour   Intake 3342.33 ml   Output 2255 ml   Net 1087.33 ml       Review of Systems   Unable to perform ROS: Intubated   Psychiatric/Behavioral: Positive for agitation (Agitation overnight leading to increased precedex requirments. ). Physical Exam   Constitutional: He appears well-developed and well-nourished. HENT:   Head: Normocephalic and atraumatic. Eyes: Pupils are equal, round, and reactive to light. Neck: Normal range of motion. No JVD present. Cardiovascular: Normal rate, regular rhythm, normal heart sounds and intact distal pulses. Exam reveals no gallop and no friction rub. No murmur heard. Pulmonary/Chest: Breath sounds normal. He has no wheezes. Abdominal: Soft.  Bowel sounds are normal. He exhibits 4. Stable T12 compression fracture. XR CHEST 1 VW   Final Result      Lines and tubes as above. Multifocal airspace disease, similar to the prior study. MRI BRAIN WO CONTRAST   Final Result      1. Overall limited exam due to sessile motion artifact. Repeated imaging sequences with little benefit. No acute intracranial abnormality identified. 2. Ethmoid and sphenoid sinus disease with air-fluid level. Findings may reflect altered mental status and retained secretions. 3. Minimal nonspecific periventricular white matter signal and body on FLAIR imaging suggesting mild chronic small vessel ischemic disease and/or age related degenerative change. XR CHEST PORTABLE   Final Result      1. Multifocal airspace disease consistent with pneumonia as described. This has progressed in the right upper and left lower lobes when compared to the prior exam.  Correlate clinically. CT HEAD WO CONTRAST   Final Result   1. Mild atrophy. 2.  No evidence of an acute intracranial process. XR CHEST PORTABLE   Final Result      New consolidation in the right lower lung compatible with atelectasis or pneumonia. Aspiration is in the differential diagnosis. Prominent interstitial markings in a perihilar distribution again noted. Stable cardiac mediastinal silhouette. Lines and tubes without change. XR CHEST PORTABLE   Final Result   Addendum 1 of 1      Patient: Santino Moreno  Time Out: 02:22   Exam(s): FILM CXR 1 VIEW        ADDENDUM - Added by Mary Esteban MD on 7/29/2019 2:22 AM (-07:00)   IMPRESSION:        ET tube terminates 6.1 cm  FROM  the frans[place].   ----------------------------------------------------------------------       EXAM:     XR Chest, 1 View       CLINICAL HISTORY:      Reason for exam: intubation. Reason for exam:->intubation.        TECHNIQUE:     Frontal view of the chest.       COMPARISON:       Chest x-ray 7/20/19       IMPRESSION:        ET tube terminates 6.1 cm in the fran[place]. Final      CT CHEST WO CONTRAST   Final Result      1. Small bilateral pleural effusions. Multifocal pneumonia in the right upper and bilateral lower lobes. 2. 13 mm right upper lobe spiculated nodule suspicious for malignancy. Recommend PET/CT or biopsy. Additional nonspecific areas of nodular consolidation in the right lower lobe. Qzxv      XR CHEST PORTABLE   Final Result   1. Persistent but improved central pulmonary vascular congestion with    diffusely increased interstitial markings still present. 2.  Mildly decreased right greater than left pleural effusions. 3.  Right greater than left basilar atelectasis versus consolidation. 4.  No pneumothorax radiographically evident. 5.  Tubes/lines as above. XR CHEST PORTABLE   Final Result      1. Persistent hazy perihilar, groundglass basilar consolidations and pleural effusions greater in the right lung   2. NG tube in place as well as a right IJ central venous catheter with the tip in the mid to distal SVC, no worsening               XR CHEST PORTABLE   Final Result      Introduction of NG tube with tip excluded from the inferior edge of the film. Moderate bilateral effusions and diffuse groundglass opacities consistent with edema, infection or aspiration. This appears worse since the previous study            XR ABDOMEN (KUB) (SINGLE AP VIEW)   Final Result      Nasogastric tube extending into the left upper quadrant. XR CHEST PORTABLE   Final Result      Persistent right basilar infiltrate. XR CHEST 1 VW   Final Result      Tip of the right IJ central venous catheter overlies lower SVC with no pneumothorax evident. CT HEAD WO CONTRAST   Final Result      1. No acute intracranial process.          XR CHEST PORTABLE   Final Result      Interval development of bilateral lower lobe opacities may relate to edema or pneumonia      CT SOFT TISSUE NECK WO CONTRAST   Final Result Unremarkable CT neck          XR CHEST PORTABLE   Final Result     Normal chest x-ray. CT ABDOMEN PELVIS WO CONTRAST Additional Contrast? None   Final Result   No acute abnormality demonstrated in the abdomen or pelvis. Assessment/Plan:   Mr. Charlane Sever is a 71 yo M with PMHx significant for HTN, HLD, GERD, opioid dependence 2/2 cervical DDD, and alcohol abuse being managed in ICU for acute respiratory failure, pneumonia and erosive esophagitis.     Septic Shock secondary to Pneumonia:    - Resp cultures grew MRSA and Klebsiella. WBC 10.4 (down from 10.9 on 8/3). - Continue Vanc and Merem  - If patient worsening clincial signs and symptoms, consider switching Vanc to Zyvox for possible resistance. - Continue IVF.     Erosive Esophagitis  - HgB 6.9 this morning.   - He was transfused 1 unit of pRBCs. Repeat Hgb s/p transfusion is 7.6  - Continue IV Protonix  - q6h H/H  - Transfuse HgB < 7    Altered Mental Status possibly secondary to metabolic Encephalopathy vs alcohol withdrawl   - Meningitis and encephalitis panel negative.  No organism or WBC seen on LP.  - Neurology on board  - Precidex for sedation and agitation currently on 1.4mcg/hr.     Hypoxic Respiratory Failure:  - Intubated on 30% FiO2 and PEEP of 5.  - VBG today showed pH 7.503, CO2 28.7, 02 41.8, Bicarcb 22.3  - Possible extubation today  - Patient on contact precautions.     Acute Kidney Injury:   - Nephrology on board     Malnutrition  - Continue tube feeds.     Hypernatremia  - Improved NA+ 142   - Using Lactated ringers for fluids.     Alcohol Withdrawal:  - Currently not sedated and of benzos  - On Precidex  - Continue to monitor.     Code Status: Full        FEN: Lacted Ringers   PPX: SCD, Protonix, SQ Heparin  Valentina Mcdaniel MD, PGY- 1  08/04/19  9:00 AM    This patient has been staffed and discussed with Letitia Matt MD     Patient was seen, examined and discussed with  and the ICU team this morning. I agree with the interval history. My physical exam confirms the findings listed  Chart was reviewed including labs and medical records confirm the findings noted     Acute hypoxemic respiratory failure, on mechanical vent support, intubated on 7/29. Aspiration pneumonia.  Bronch on 7/30.  Has significant right side secretions and mucus plugs that was aspirated.  [employer] is positive for klebsiella and MRSA   Septic vs hemorrhagic shock:  resolved. UGI bleed: EGD show erosive esophagitis.  No active bleeding.  currently on PPI    Respiratory alkalosis 8/4  AVIVA - improved.  Had good UOP   Alcohol withdrawal, acute encephalopathy. Improving. He is following some commands. Hypernatremia - resolved. Hypokalemia - resolved   Leukocytosis - resolved.          I was planning to do breathing trial with plan of extubation however he continue to have increase tracheal secretions. Decrease set RR to 16 and get ABG  Continue merrem and vanc  Continue TF  Continue PPI  Transfuse one unit of PRBC  Keep on Precedex     Pt has a high probability of imminent or life-threatening deterioration requiring close monitoring, and highly complex decision-making and/or interventions of high intensity to assess, manipulate, and support his critical organ systems to prevent a likely inevitable decline which could occur if left untreated.      A total critical care time 35 minutes was used. This includes but not limited to examining patient, collaborating with other physicians, monitoring vital signs, telemetry, continuous pulse oximetry, and clinical response to IV medications, documentation time, review and interpretation of laboratory and radiological data, review of nursing notes and old record review. This time excludes any time that may have been spent performing procedures for life threatening organ failure. Rishi Garvin

## 2019-08-04 NOTE — PROGRESS NOTES
Hospitalist Progress Note      PCP: Star Ortiz    Date of Admission: 7/22/2019    Chief Complaint: fatigue, N/V    Hospital Course: Pt. Is a 69 yo alcoholic male who presented with acute renal failure and went into delirium tremens and in the ICU for further management. In the ICU being treated for acute hypoxic resp failure likely 2/2 Aspiration pna, metabolic encephalopathy, AVIVA.      Subjective:   Patient seen and examined  Overnight events noted  When off sedation increased agitation  Placed back on Precedex  He is able to nod, blink his eyes when asked to  Does not move his extremities  Overall logical status seems to be improving    Medications:  Reviewed    Infusion Medications    sodium chloride      dexmedetomidine (PRECEDEX) IV infusion 1.2 mcg/kg/hr (08/04/19 1413)    dextrose       Scheduled Medications    potassium phosphate IVPB  20 mmol Intravenous Once    magnesium sulfate  2 g Intravenous Once    midazolam  4 mg Intravenous Once    meropenem  1 g Intravenous Q8H    vancomycin  1,000 mg Intravenous Q18H    alteplase  1 mg Intracatheter Once    sodium chloride flush  10 mL Intravenous 2 times per day    metoprolol tartrate  25 mg Oral BID    levetiracetam  500 mg Intravenous Q12H    levalbuterol  1.25 mg Nebulization 6 times per day    pantoprazole  40 mg Intravenous BID    VENELEX   Topical BID    chlorhexidine  15 mL Mouth/Throat BID    heparin (porcine)  5,000 Units Subcutaneous 3 times per day    atorvastatin  20 mg Oral Nightly    aspirin  81 mg Oral Daily     PRN Meds: acetaminophen, sodium chloride flush, labetalol, medicated lip ointment, aspirin, glucose, dextrose, glucagon (rDNA), dextrose, ondansetron      Intake/Output Summary (Last 24 hours) at 8/4/2019 1437  Last data filed at 8/4/2019 0740  Gross per 24 hour   Intake 3342.33 ml   Output 1505 ml   Net 1837.33 ml       Physical Exam Performed:    /62   Pulse 109   Temp 99.3 °F (37.4 °C)

## 2019-08-05 ENCOUNTER — APPOINTMENT (OUTPATIENT)
Dept: GENERAL RADIOLOGY | Age: 69
DRG: 682 | End: 2019-08-05
Payer: MEDICARE

## 2019-08-05 LAB
ALBUMIN SERPL-MCNC: 1.9 G/DL (ref 3.4–5)
ANION GAP SERPL CALCULATED.3IONS-SCNC: 8 MMOL/L (ref 3–16)
BASOPHILS ABSOLUTE: 0.1 K/UL (ref 0–0.2)
BASOPHILS RELATIVE PERCENT: 0.5 %
BLOOD CULTURE, ROUTINE: NORMAL
BUN BLDV-MCNC: 23 MG/DL (ref 7–20)
CALCIUM SERPL-MCNC: 7.6 MG/DL (ref 8.3–10.6)
CHLORIDE BLD-SCNC: 116 MMOL/L (ref 99–110)
CO2: 22 MMOL/L (ref 21–32)
CREAT SERPL-MCNC: 0.8 MG/DL (ref 0.8–1.3)
CULTURE, BLOOD 2: NORMAL
EOSINOPHILS ABSOLUTE: 0.1 K/UL (ref 0–0.6)
EOSINOPHILS RELATIVE PERCENT: 0.7 %
GFR AFRICAN AMERICAN: >60
GFR NON-AFRICAN AMERICAN: >60
GLUCOSE BLD-MCNC: 108 MG/DL (ref 70–99)
GLUCOSE BLD-MCNC: 115 MG/DL (ref 70–99)
GLUCOSE BLD-MCNC: 130 MG/DL (ref 70–99)
GLUCOSE BLD-MCNC: 134 MG/DL (ref 70–99)
GLUCOSE BLD-MCNC: 136 MG/DL (ref 70–99)
HCT VFR BLD CALC: 22.1 % (ref 40.5–52.5)
HEMOGLOBIN: 7.4 G/DL (ref 13.5–17.5)
LYMPHOCYTES ABSOLUTE: 0.5 K/UL (ref 1–5.1)
LYMPHOCYTES RELATIVE PERCENT: 5 %
MAGNESIUM: 2.2 MG/DL (ref 1.8–2.4)
MCH RBC QN AUTO: 31.1 PG (ref 26–34)
MCHC RBC AUTO-ENTMCNC: 33.6 G/DL (ref 31–36)
MCV RBC AUTO: 92.6 FL (ref 80–100)
MONOCYTES ABSOLUTE: 0.6 K/UL (ref 0–1.3)
MONOCYTES RELATIVE PERCENT: 5.9 %
NEUTROPHILS ABSOLUTE: 8.8 K/UL (ref 1.7–7.7)
NEUTROPHILS RELATIVE PERCENT: 87.9 %
PDW BLD-RTO: 14.6 % (ref 12.4–15.4)
PERFORMED ON: ABNORMAL
PHOSPHORUS: 3.6 MG/DL (ref 2.5–4.9)
PLATELET # BLD: 288 K/UL (ref 135–450)
PMV BLD AUTO: 9.2 FL (ref 5–10.5)
POTASSIUM SERPL-SCNC: 3.8 MMOL/L (ref 3.5–5.1)
RBC # BLD: 2.39 M/UL (ref 4.2–5.9)
SODIUM BLD-SCNC: 146 MMOL/L (ref 136–145)
VANCOMYCIN TROUGH: 13.3 UG/ML (ref 10–20)
WBC # BLD: 10 K/UL (ref 4–11)

## 2019-08-05 PROCEDURE — 94799 UNLISTED PULMONARY SVC/PX: CPT

## 2019-08-05 PROCEDURE — 2580000003 HC RX 258: Performed by: STUDENT IN AN ORGANIZED HEALTH CARE EDUCATION/TRAINING PROGRAM

## 2019-08-05 PROCEDURE — 6360000002 HC RX W HCPCS: Performed by: INTERNAL MEDICINE

## 2019-08-05 PROCEDURE — 71045 X-RAY EXAM CHEST 1 VIEW: CPT

## 2019-08-05 PROCEDURE — 6370000000 HC RX 637 (ALT 250 FOR IP): Performed by: INTERNAL MEDICINE

## 2019-08-05 PROCEDURE — C9113 INJ PANTOPRAZOLE SODIUM, VIA: HCPCS | Performed by: INTERNAL MEDICINE

## 2019-08-05 PROCEDURE — 6360000002 HC RX W HCPCS: Performed by: STUDENT IN AN ORGANIZED HEALTH CARE EDUCATION/TRAINING PROGRAM

## 2019-08-05 PROCEDURE — 99291 CRITICAL CARE FIRST HOUR: CPT | Performed by: INTERNAL MEDICINE

## 2019-08-05 PROCEDURE — 80069 RENAL FUNCTION PANEL: CPT

## 2019-08-05 PROCEDURE — 2580000003 HC RX 258: Performed by: NURSE PRACTITIONER

## 2019-08-05 PROCEDURE — 2500000003 HC RX 250 WO HCPCS: Performed by: STUDENT IN AN ORGANIZED HEALTH CARE EDUCATION/TRAINING PROGRAM

## 2019-08-05 PROCEDURE — 94761 N-INVAS EAR/PLS OXIMETRY MLT: CPT

## 2019-08-05 PROCEDURE — 94003 VENT MGMT INPAT SUBQ DAY: CPT

## 2019-08-05 PROCEDURE — 94750 HC PULMONARY COMPLIANCE STUDY: CPT

## 2019-08-05 PROCEDURE — 2580000003 HC RX 258: Performed by: INTERNAL MEDICINE

## 2019-08-05 PROCEDURE — 36415 COLL VENOUS BLD VENIPUNCTURE: CPT

## 2019-08-05 PROCEDURE — 2700000000 HC OXYGEN THERAPY PER DAY

## 2019-08-05 PROCEDURE — 6370000000 HC RX 637 (ALT 250 FOR IP): Performed by: STUDENT IN AN ORGANIZED HEALTH CARE EDUCATION/TRAINING PROGRAM

## 2019-08-05 PROCEDURE — 6370000000 HC RX 637 (ALT 250 FOR IP)

## 2019-08-05 PROCEDURE — 83735 ASSAY OF MAGNESIUM: CPT

## 2019-08-05 PROCEDURE — 80202 ASSAY OF VANCOMYCIN: CPT

## 2019-08-05 PROCEDURE — 6360000002 HC RX W HCPCS: Performed by: NURSE PRACTITIONER

## 2019-08-05 PROCEDURE — 85025 COMPLETE CBC W/AUTO DIFF WBC: CPT

## 2019-08-05 PROCEDURE — 94770 HC ETCO2 MONITOR DAILY: CPT

## 2019-08-05 PROCEDURE — 94640 AIRWAY INHALATION TREATMENT: CPT

## 2019-08-05 PROCEDURE — 2000000000 HC ICU R&B

## 2019-08-05 RX ORDER — SODIUM CHLORIDE FOR INHALATION 3 %
15 VIAL, NEBULIZER (ML) INHALATION EVERY 4 HOURS
Status: DISCONTINUED | OUTPATIENT
Start: 2019-08-05 | End: 2019-08-11

## 2019-08-05 RX ORDER — SODIUM CHLORIDE FOR INHALATION 0.9 %
3 VIAL, NEBULIZER (ML) INHALATION EVERY 4 HOURS
Status: DISCONTINUED | OUTPATIENT
Start: 2019-08-05 | End: 2019-08-05

## 2019-08-05 RX ADMIN — LEVALBUTEROL HYDROCHLORIDE 1.25 MG: 1.25 SOLUTION, CONCENTRATE RESPIRATORY (INHALATION) at 03:55

## 2019-08-05 RX ADMIN — DEXMEDETOMIDINE 1.2 MCG/KG/HR: 100 INJECTION, SOLUTION, CONCENTRATE INTRAVENOUS at 03:17

## 2019-08-05 RX ADMIN — MEROPENEM 1 G: 1 INJECTION, POWDER, FOR SOLUTION INTRAVENOUS at 10:03

## 2019-08-05 RX ADMIN — ISODIUM CHLORIDE 3 ML: 0.03 SOLUTION RESPIRATORY (INHALATION) at 13:40

## 2019-08-05 RX ADMIN — ATORVASTATIN CALCIUM 20 MG: 20 TABLET, FILM COATED ORAL at 20:51

## 2019-08-05 RX ADMIN — DEXMEDETOMIDINE 1.2 MCG/KG/HR: 100 INJECTION, SOLUTION, CONCENTRATE INTRAVENOUS at 15:58

## 2019-08-05 RX ADMIN — SODIUM CHLORIDE 30 MG/ML INHALATION SOLUTION 15 ML: 30 SOLUTION INHALANT at 21:05

## 2019-08-05 RX ADMIN — LEVALBUTEROL HYDROCHLORIDE 1.25 MG: 1.25 SOLUTION, CONCENTRATE RESPIRATORY (INHALATION) at 13:39

## 2019-08-05 RX ADMIN — LEVETIRACETAM 500 MG: 100 INJECTION, SOLUTION INTRAVENOUS at 03:30

## 2019-08-05 RX ADMIN — Medication 15 ML: at 10:11

## 2019-08-05 RX ADMIN — DEXMEDETOMIDINE 1.4 MCG/KG/HR: 100 INJECTION, SOLUTION, CONCENTRATE INTRAVENOUS at 20:37

## 2019-08-05 RX ADMIN — LEVALBUTEROL HYDROCHLORIDE 1.25 MG: 1.25 SOLUTION, CONCENTRATE RESPIRATORY (INHALATION) at 21:05

## 2019-08-05 RX ADMIN — SODIUM CHLORIDE 30 MG/ML INHALATION SOLUTION 15 ML: 30 SOLUTION INHALANT at 16:39

## 2019-08-05 RX ADMIN — ASPIRIN 81 MG 81 MG: 81 TABLET ORAL at 10:04

## 2019-08-05 RX ADMIN — VANCOMYCIN HYDROCHLORIDE 1000 MG: 10 INJECTION, POWDER, LYOPHILIZED, FOR SOLUTION INTRAVENOUS at 20:51

## 2019-08-05 RX ADMIN — VANCOMYCIN HYDROCHLORIDE 1000 MG: 10 INJECTION, POWDER, LYOPHILIZED, FOR SOLUTION INTRAVENOUS at 09:37

## 2019-08-05 RX ADMIN — HEPARIN SODIUM 5000 UNITS: 5000 INJECTION INTRAVENOUS; SUBCUTANEOUS at 15:56

## 2019-08-05 RX ADMIN — Medication 10 ML: at 10:07

## 2019-08-05 RX ADMIN — PANTOPRAZOLE SODIUM 40 MG: 40 INJECTION, POWDER, LYOPHILIZED, FOR SOLUTION INTRAVENOUS at 20:50

## 2019-08-05 RX ADMIN — MEROPENEM 1 G: 1 INJECTION, POWDER, FOR SOLUTION INTRAVENOUS at 00:51

## 2019-08-05 RX ADMIN — CASTOR OIL AND BALSAM, PERU: 788; 87 OINTMENT TOPICAL at 20:50

## 2019-08-05 RX ADMIN — LEVALBUTEROL HYDROCHLORIDE 1.25 MG: 1.25 SOLUTION, CONCENTRATE RESPIRATORY (INHALATION) at 08:44

## 2019-08-05 RX ADMIN — HEPARIN SODIUM 5000 UNITS: 5000 INJECTION INTRAVENOUS; SUBCUTANEOUS at 05:18

## 2019-08-05 RX ADMIN — PANTOPRAZOLE SODIUM 40 MG: 40 INJECTION, POWDER, LYOPHILIZED, FOR SOLUTION INTRAVENOUS at 10:05

## 2019-08-05 RX ADMIN — LEVALBUTEROL HYDROCHLORIDE 1.25 MG: 1.25 SOLUTION, CONCENTRATE RESPIRATORY (INHALATION) at 00:05

## 2019-08-05 RX ADMIN — LEVALBUTEROL HYDROCHLORIDE 1.25 MG: 1.25 SOLUTION, CONCENTRATE RESPIRATORY (INHALATION) at 16:38

## 2019-08-05 RX ADMIN — CASTOR OIL AND BALSAM, PERU: 788; 87 OINTMENT TOPICAL at 10:07

## 2019-08-05 RX ADMIN — Medication 15 ML: at 21:05

## 2019-08-05 RX ADMIN — HEPARIN SODIUM 5000 UNITS: 5000 INJECTION INTRAVENOUS; SUBCUTANEOUS at 21:03

## 2019-08-05 RX ADMIN — MEROPENEM 1 G: 1 INJECTION, POWDER, FOR SOLUTION INTRAVENOUS at 16:24

## 2019-08-05 RX ADMIN — METOPROLOL TARTRATE 25 MG: 25 TABLET ORAL at 20:51

## 2019-08-05 RX ADMIN — Medication 10 ML: at 20:51

## 2019-08-05 RX ADMIN — DEXMEDETOMIDINE 1.2 MCG/KG/HR: 100 INJECTION, SOLUTION, CONCENTRATE INTRAVENOUS at 09:37

## 2019-08-05 RX ADMIN — ACETAMINOPHEN 650 MG: 325 TABLET ORAL at 01:16

## 2019-08-05 RX ADMIN — LEVETIRACETAM 500 MG: 100 INJECTION, SOLUTION INTRAVENOUS at 15:56

## 2019-08-05 ASSESSMENT — PULMONARY FUNCTION TESTS
PIF_VALUE: 19
PIF_VALUE: 27
PIF_VALUE: 22
PIF_VALUE: 30
PIF_VALUE: 13
PIF_VALUE: 25
PIF_VALUE: 23
PIF_VALUE: 28
PIF_VALUE: 22
PIF_VALUE: 22
PIF_VALUE: 25
PIF_VALUE: 25
PIF_VALUE: 22
PIF_VALUE: 22
PIF_VALUE: 23
PIF_VALUE: 25
PIF_VALUE: 29

## 2019-08-05 NOTE — PLAN OF CARE
multiple skin integrity issues, including blisters/ abrasions on his lower lip being treated with venelex, scattered bruising and abrasions mostly on his upper extremities and redness on his L cheek. Patient's skin integrity maintained throughout shift.      Outcome: Ongoing

## 2019-08-05 NOTE — PROGRESS NOTES
Neurology Consult Note    Updates:  Patient was seen and examined this AM. Mental status appears much improved. The patient is alert and able to track me. He nods yes or no to questioning. Plan to obtain an MRI brain with contrast and continue supportive management at this time. Exam:  Blood pressure 111/67, pulse 87, temperature 98.4 °F (36.9 °C), temperature source Axillary, resp. rate 27, height 5' 5\" (1.651 m), weight 133 lb 9.6 oz (60.6 kg), SpO2 100 %. Constitutional    Vital signs: BP, HR, and RR reviewed   General Patient is alert and able to follow some commands. Eyes: fundoscopic exam revealed no hemorrhage   Cardiovascular: pulses symmetric in all 4 extremities. No peripheral edema. Psychiatric: cooperative with examination, no  psychotic behavior noted. Neurologic  Mental status:    Unable to assess as patient is intubated; he is able to follow some commands   Cranial nerves:   CN2: Visual Fields full w/o extinction on confrontational testing,   CN 3,4,6: extraocular muscles intact,  Strength: Unable to assess; patient intubated and sedated   Deep tendon reflexes: normal in all 4 extremities  Tone: normal in all 4 extremities    Labs:  Meningitis/encephalitis panel negative; West Nile virus CSF IgG negative; HSV CSF negative; Lyme disease Ab negative (CSF); VDRL (CSF) negative; spinal fluid culture NGTD (day 3); myelin basic protein (CSF) negative; CSF protein and glucose within normal limits; oligoclonal banding pending     Studies:  MRI brain with and without contrast pending     Impression:  Lali Arriola a 70 y. o. male who initially presented with hoarseness, nausea/vomiting and weight loss. The patient became acutely altered shortly after admission and developed delirium tremens. He was transferred to the ICU and required escalating doses of lorazepam and Precedex for agitation. On 7/29, the patient became acutely hypoxic and required intubation.  CT scan of the chest was remarkable for

## 2019-08-05 NOTE — PROGRESS NOTES
[facility]   [phone]  [facility] [place]  Inpatient Progress note     REASON FOR CONSULTATION:    AVIVA    INTERVAL Plan  Potassium better. Replace magnesium we will try to keep it above 2. Creatinine stable. Overall improving. Acute kidney injury  Likely acute tubular necrosis  Second peak of 2.4 on 7/29/19- 0.8  on 8/5/2019   Made  1.7 L urine yesterday  baseline creatinine in 12/2018 1.2-   proteinuria  258 mg, 3 to 5 red blood   Urine sodium 29. in 05/2018 one time  creatinine  was 2.1   In 08/2015, creatinine ranged from 0.8 to 1.2. CT abdomen kidneys normal   CT in 2015 mention some bilateral mild to moderate cortical  thinning of the renal cortex, no mention in 07/2019 CT. Traumatic Hematuria due to resendiz which pt tried to pull      Hypercalcemia  Improved    due to volume depletion on admission  calcium was 10.1 in 12/2018  His hypercalcemia could be due to excessive calcium intake      Hypokalemia  Potassium low-getting potassium and magnesium     History of alcoholism   withdrawal  liver function tests are normal.   He does not have acute hepatitis     History of multiple lung nodules   on CT in 2015 with current weight loss little worry some. will need a CT scan of the chest to rule out any lung malignancy because long-term smoking and currentweight loss. CT of the abdomen already done, which does not show any acute  abnormality.     History of left adrenal adenoma   16 mm as seen on CT in 2015. CT this admission in 07/2019 did not mention that.     History of emphysema  as seen on previous CT of the chest in 2015.     History of abdominal aortic aneurysm   as seen on CT in 2015 was 3.4 cm.    This admission CT without contrast did not mention         [facility]  From consult note-   69-year-old  gentleman  whom I am seeing in the room where he just got Ativan because he was  agitated, pulling his lines, was trying to jump out of the window, and  is sedated and

## 2019-08-05 NOTE — PROGRESS NOTES
Pt awake & restless in bed, follows commands. Has large amounts of oral and respiratory secretions. Precedex gtt continued at 1.4 mcg/kg/hr.

## 2019-08-05 NOTE — PROGRESS NOTES
Hospitalist Progress Note      PCP: Brigette Carpenter    Date of Admission: 7/22/2019    Chief Complaint: fatigue, N/V    Hospital Course: Pt. Is a 71 yo alcoholic male who presented with acute renal failure and went into delirium tremens and in the ICU for further management. In the ICU being treated for acute hypoxic resp failure likely 2/2 Aspiration pna, metabolic encephalopathy, AVIVA.      Subjective:   Patient seen and examined  More awake and alert today  Continues to be on Precedex  Continues to have significant secretions  Able to nod and answer questions    Medications:  Reviewed    Infusion Medications    dexmedetomidine (PRECEDEX) IV infusion 1.2 mcg/kg/hr (08/05/19 0937)    dextrose       Scheduled Medications    vancomycin  1,000 mg Intravenous Q12H    sodium chloride nebulizer  3 mL Nebulization Q4H    meropenem  1 g Intravenous Q8H    sodium chloride flush  10 mL Intravenous 2 times per day    metoprolol tartrate  25 mg Oral BID    levetiracetam  500 mg Intravenous Q12H    levalbuterol  1.25 mg Nebulization 6 times per day    pantoprazole  40 mg Intravenous BID    VENELEX   Topical BID    chlorhexidine  15 mL Mouth/Throat BID    heparin (porcine)  5,000 Units Subcutaneous 3 times per day    atorvastatin  20 mg Oral Nightly    aspirin  81 mg Oral Daily     PRN Meds: acetaminophen, sodium chloride flush, labetalol, medicated lip ointment, aspirin, glucose, dextrose, glucagon (rDNA), dextrose, ondansetron      Intake/Output Summary (Last 24 hours) at 8/5/2019 1423  Last data filed at 8/5/2019 1200  Gross per 24 hour   Intake 2837 ml   Output 2245 ml   Net 592 ml       Physical Exam Performed:    BP (!) 120/57   Pulse 89   Temp 98.4 °F (36.9 °C) (Axillary)   Resp 26   Ht 5' 5\" (1.651 m)   Wt 133 lb 9.6 oz (60.6 kg)   SpO2 100%   BMI 22.23 kg/m²     General appearance: Ill appearing, intubated, able to nod and answer  Spontaneously moving all extremities  Able to nod and colon diverticulosis. 3. Stable aortic aneurysm. 4. Stable T12 compression fracture. XR CHEST 1 VW   Final Result      Lines and tubes as above. Multifocal airspace disease, similar to the prior study. MRI BRAIN WO CONTRAST   Final Result      1. Overall limited exam due to sessile motion artifact. Repeated imaging sequences with little benefit. No acute intracranial abnormality identified. 2. Ethmoid and sphenoid sinus disease with air-fluid level. Findings may reflect altered mental status and retained secretions. 3. Minimal nonspecific periventricular white matter signal and body on FLAIR imaging suggesting mild chronic small vessel ischemic disease and/or age related degenerative change. XR CHEST PORTABLE   Final Result      1. Multifocal airspace disease consistent with pneumonia as described. This has progressed in the right upper and left lower lobes when compared to the prior exam.  Correlate clinically. CT HEAD WO CONTRAST   Final Result   1. Mild atrophy. 2.  No evidence of an acute intracranial process. XR CHEST PORTABLE   Final Result      New consolidation in the right lower lung compatible with atelectasis or pneumonia. Aspiration is in the differential diagnosis. Prominent interstitial markings in a perihilar distribution again noted. Stable cardiac mediastinal silhouette. Lines and tubes without change. XR CHEST PORTABLE   Final Result   Addendum 1 of 1      Patient: Tim Inman  Time Out: 02:22   Exam(s): FILM CXR 1 VIEW        ADDENDUM - Added by Miko Flores MD on 7/29/2019 2:22 AM (-07:00)   IMPRESSION:        ET tube terminates 6.1 cm  FROM  the fransico.   ----------------------------------------------------------------------       EXAM:     XR Chest, 1 View       CLINICAL HISTORY:      Reason for exam: intubation. Reason for exam:->intubation.        TECHNIQUE:     Frontal view of the chest.       COMPARISON:       Chest x-ray 7/20/19       IMPRESSION:        ET tube terminates 6.1 cm in the fransico. Final      CT CHEST WO CONTRAST   Final Result      1. Small bilateral pleural effusions. Multifocal pneumonia in the right upper and bilateral lower lobes. 2. 13 mm right upper lobe spiculated nodule suspicious for malignancy. Recommend PET/CT or biopsy. Additional nonspecific areas of nodular consolidation in the right lower lobe. Qzxv      XR CHEST PORTABLE   Final Result   1. Persistent but improved central pulmonary vascular congestion with    diffusely increased interstitial markings still present. 2.  Mildly decreased right greater than left pleural effusions. 3.  Right greater than left basilar atelectasis versus consolidation. 4.  No pneumothorax radiographically evident. 5.  Tubes/lines as above. XR CHEST PORTABLE   Final Result      1. Persistent hazy perihilar, groundglass basilar consolidations and pleural effusions greater in the right lung   2. NG tube in place as well as a right IJ central venous catheter with the tip in the mid to distal SVC, no worsening               XR CHEST PORTABLE   Final Result      Introduction of NG tube with tip excluded from the inferior edge of the film. Moderate bilateral effusions and diffuse groundglass opacities consistent with edema, infection or aspiration. This appears worse since the previous study            XR ABDOMEN (KUB) (SINGLE AP VIEW)   Final Result      Nasogastric tube extending into the left upper quadrant. XR CHEST PORTABLE   Final Result      Persistent right basilar infiltrate. XR CHEST 1 VW   Final Result      Tip of the right IJ central venous catheter overlies lower SVC with no pneumothorax evident. CT HEAD WO CONTRAST   Final Result      1. No acute intracranial process.          XR CHEST PORTABLE   Final Result      Interval development of bilateral lower lobe opacities may relate to edema or pneumonia      CT SOFT

## 2019-08-05 NOTE — PROGRESS NOTES
PORTABLE   Final Result   1. Persistent but improved central pulmonary vascular congestion with    diffusely increased interstitial markings still present. 2.  Mildly decreased right greater than left pleural effusions. 3.  Right greater than left basilar atelectasis versus consolidation. 4.  No pneumothorax radiographically evident. 5.  Tubes/lines as above. XR CHEST PORTABLE   Final Result      1. Persistent hazy perihilar, groundglass basilar consolidations and pleural effusions greater in the right lung   2. NG tube in place as well as a right IJ central venous catheter with the tip in the mid to distal SVC, no worsening               XR CHEST PORTABLE   Final Result      Introduction of NG tube with tip excluded from the inferior edge of the film. Moderate bilateral effusions and diffuse groundglass opacities consistent with edema, infection or aspiration. This appears worse since the previous study            XR ABDOMEN (KUB) (SINGLE AP VIEW)   Final Result      Nasogastric tube extending into the left upper quadrant. XR CHEST PORTABLE   Final Result      Persistent right basilar infiltrate. XR CHEST 1 VW   Final Result      Tip of the right IJ central venous catheter overlies lower SVC with no pneumothorax evident. CT HEAD WO CONTRAST   Final Result      1. No acute intracranial process. XR CHEST PORTABLE   Final Result      Interval development of bilateral lower lobe opacities may relate to edema or pneumonia      CT SOFT TISSUE NECK WO CONTRAST   Final Result   Unremarkable CT neck          XR CHEST PORTABLE   Final Result     Normal chest x-ray. CT ABDOMEN PELVIS WO CONTRAST Additional Contrast? None   Final Result   No acute abnormality demonstrated in the abdomen or pelvis.           MRI BRAIN W WO CONTRAST    (Results Pending)         Assessment/Plan:   Mr. Lali Gutierrez is a 69 yo M with PMHx significant for HTN, HLD, GERD, opioid dependence 2/2

## 2019-08-06 LAB
ALBUMIN SERPL-MCNC: 2 G/DL (ref 3.4–5)
ANION GAP SERPL CALCULATED.3IONS-SCNC: 12 MMOL/L (ref 3–16)
BASE EXCESS ARTERIAL: -5 (ref -3–3)
BASOPHILS ABSOLUTE: 0.1 K/UL (ref 0–0.2)
BASOPHILS RELATIVE PERCENT: 0.7 %
BUN BLDV-MCNC: 21 MG/DL (ref 7–20)
CALCIUM SERPL-MCNC: 7.6 MG/DL (ref 8.3–10.6)
CHLORIDE BLD-SCNC: 109 MMOL/L (ref 99–110)
CO2: 20 MMOL/L (ref 21–32)
CREAT SERPL-MCNC: 0.7 MG/DL (ref 0.8–1.3)
EOSINOPHILS ABSOLUTE: 0.2 K/UL (ref 0–0.6)
EOSINOPHILS RELATIVE PERCENT: 1.7 %
GFR AFRICAN AMERICAN: >60
GFR NON-AFRICAN AMERICAN: >60
GLUCOSE BLD-MCNC: 100 MG/DL (ref 70–99)
GLUCOSE BLD-MCNC: 106 MG/DL (ref 70–99)
GLUCOSE BLD-MCNC: 106 MG/DL (ref 70–99)
GLUCOSE BLD-MCNC: 110 MG/DL (ref 70–99)
GLUCOSE BLD-MCNC: 111 MG/DL (ref 70–99)
GLUCOSE BLD-MCNC: 130 MG/DL (ref 70–99)
GLUCOSE BLD-MCNC: 137 MG/DL (ref 70–99)
HCO3 ARTERIAL: 18.4 MMOL/L (ref 21–29)
HCT VFR BLD CALC: 21.7 % (ref 40.5–52.5)
HEMOGLOBIN: 7.3 G/DL (ref 13.5–17.5)
LYMPHOCYTES ABSOLUTE: 0.4 K/UL (ref 1–5.1)
LYMPHOCYTES RELATIVE PERCENT: 4.8 %
MAGNESIUM: 1.8 MG/DL (ref 1.8–2.4)
MCH RBC QN AUTO: 31 PG (ref 26–34)
MCHC RBC AUTO-ENTMCNC: 33.7 G/DL (ref 31–36)
MCV RBC AUTO: 91.8 FL (ref 80–100)
MONOCYTES ABSOLUTE: 0.6 K/UL (ref 0–1.3)
MONOCYTES RELATIVE PERCENT: 5.9 %
NEUTROPHILS ABSOLUTE: 8.1 K/UL (ref 1.7–7.7)
NEUTROPHILS RELATIVE PERCENT: 86.9 %
O2 SAT, ARTERIAL: 92 % (ref 93–100)
PCO2 ARTERIAL: 22.7 MM HG (ref 35–45)
PDW BLD-RTO: 14.4 % (ref 12.4–15.4)
PERFORMED ON: ABNORMAL
PH ARTERIAL: 7.52 (ref 7.35–7.45)
PHOSPHORUS: 2.6 MG/DL (ref 2.5–4.9)
PLATELET # BLD: 356 K/UL (ref 135–450)
PMV BLD AUTO: 8.9 FL (ref 5–10.5)
PO2 ARTERIAL: 54.2 MM HG (ref 75–108)
POC SAMPLE TYPE: ABNORMAL
POTASSIUM SERPL-SCNC: 3.3 MMOL/L (ref 3.5–5.1)
RBC # BLD: 2.36 M/UL (ref 4.2–5.9)
SODIUM BLD-SCNC: 141 MMOL/L (ref 136–145)
TCO2 ARTERIAL: 19 MMOL/L
WBC # BLD: 9.4 K/UL (ref 4–11)

## 2019-08-06 PROCEDURE — 2500000003 HC RX 250 WO HCPCS: Performed by: STUDENT IN AN ORGANIZED HEALTH CARE EDUCATION/TRAINING PROGRAM

## 2019-08-06 PROCEDURE — 99152 MOD SED SAME PHYS/QHP 5/>YRS: CPT | Performed by: INTERNAL MEDICINE

## 2019-08-06 PROCEDURE — 0B9J8ZX DRAINAGE OF LEFT LOWER LUNG LOBE, VIA NATURAL OR ARTIFICIAL OPENING ENDOSCOPIC, DIAGNOSTIC: ICD-10-PCS | Performed by: INTERNAL MEDICINE

## 2019-08-06 PROCEDURE — 36600 WITHDRAWAL OF ARTERIAL BLOOD: CPT

## 2019-08-06 PROCEDURE — 0BC68ZZ EXTIRPATION OF MATTER FROM RIGHT LOWER LOBE BRONCHUS, VIA NATURAL OR ARTIFICIAL OPENING ENDOSCOPIC: ICD-10-PCS | Performed by: INTERNAL MEDICINE

## 2019-08-06 PROCEDURE — 31624 DX BRONCHOSCOPE/LAVAGE: CPT | Performed by: INTERNAL MEDICINE

## 2019-08-06 PROCEDURE — 94750 HC PULMONARY COMPLIANCE STUDY: CPT

## 2019-08-06 PROCEDURE — 94640 AIRWAY INHALATION TREATMENT: CPT

## 2019-08-06 PROCEDURE — 6360000002 HC RX W HCPCS: Performed by: INTERNAL MEDICINE

## 2019-08-06 PROCEDURE — 6370000000 HC RX 637 (ALT 250 FOR IP): Performed by: INTERNAL MEDICINE

## 2019-08-06 PROCEDURE — 2700000000 HC OXYGEN THERAPY PER DAY

## 2019-08-06 PROCEDURE — 80069 RENAL FUNCTION PANEL: CPT

## 2019-08-06 PROCEDURE — 87541 LEGION PNEUMO DNA AMP PROB: CPT

## 2019-08-06 PROCEDURE — 87633 RESP VIRUS 12-25 TARGETS: CPT

## 2019-08-06 PROCEDURE — 87486 CHLMYD PNEUM DNA AMP PROBE: CPT

## 2019-08-06 PROCEDURE — 2580000003 HC RX 258: Performed by: STUDENT IN AN ORGANIZED HEALTH CARE EDUCATION/TRAINING PROGRAM

## 2019-08-06 PROCEDURE — 36592 COLLECT BLOOD FROM PICC: CPT

## 2019-08-06 PROCEDURE — 0B9F8ZX DRAINAGE OF RIGHT LOWER LUNG LOBE, VIA NATURAL OR ARTIFICIAL OPENING ENDOSCOPIC, DIAGNOSTIC: ICD-10-PCS | Performed by: INTERNAL MEDICINE

## 2019-08-06 PROCEDURE — 94761 N-INVAS EAR/PLS OXIMETRY MLT: CPT

## 2019-08-06 PROCEDURE — 87798 DETECT AGENT NOS DNA AMP: CPT

## 2019-08-06 PROCEDURE — 85025 COMPLETE CBC W/AUTO DIFF WBC: CPT

## 2019-08-06 PROCEDURE — 82803 BLOOD GASES ANY COMBINATION: CPT

## 2019-08-06 PROCEDURE — C9113 INJ PANTOPRAZOLE SODIUM, VIA: HCPCS | Performed by: INTERNAL MEDICINE

## 2019-08-06 PROCEDURE — 6360000002 HC RX W HCPCS: Performed by: NURSE PRACTITIONER

## 2019-08-06 PROCEDURE — 2580000003 HC RX 258: Performed by: NURSE PRACTITIONER

## 2019-08-06 PROCEDURE — 2580000003 HC RX 258: Performed by: INTERNAL MEDICINE

## 2019-08-06 PROCEDURE — 0BCB8ZZ EXTIRPATION OF MATTER FROM LEFT LOWER LOBE BRONCHUS, VIA NATURAL OR ARTIFICIAL OPENING ENDOSCOPIC: ICD-10-PCS | Performed by: INTERNAL MEDICINE

## 2019-08-06 PROCEDURE — 6360000002 HC RX W HCPCS

## 2019-08-06 PROCEDURE — 94770 HC ETCO2 MONITOR DAILY: CPT

## 2019-08-06 PROCEDURE — 99291 CRITICAL CARE FIRST HOUR: CPT | Performed by: INTERNAL MEDICINE

## 2019-08-06 PROCEDURE — 31645 BRNCHSC W/THER ASPIR 1ST: CPT | Performed by: INTERNAL MEDICINE

## 2019-08-06 PROCEDURE — 2000000000 HC ICU R&B

## 2019-08-06 PROCEDURE — 6360000002 HC RX W HCPCS: Performed by: STUDENT IN AN ORGANIZED HEALTH CARE EDUCATION/TRAINING PROGRAM

## 2019-08-06 PROCEDURE — 87641 MR-STAPH DNA AMP PROBE: CPT

## 2019-08-06 PROCEDURE — 6370000000 HC RX 637 (ALT 250 FOR IP): Performed by: STUDENT IN AN ORGANIZED HEALTH CARE EDUCATION/TRAINING PROGRAM

## 2019-08-06 PROCEDURE — 31622 DX BRONCHOSCOPE/WASH: CPT

## 2019-08-06 PROCEDURE — 2580000003 HC RX 258

## 2019-08-06 PROCEDURE — 87205 SMEAR GRAM STAIN: CPT

## 2019-08-06 PROCEDURE — 87651 STREP A DNA AMP PROBE: CPT

## 2019-08-06 PROCEDURE — 83735 ASSAY OF MAGNESIUM: CPT

## 2019-08-06 PROCEDURE — 87070 CULTURE OTHR SPECIMN AEROBIC: CPT

## 2019-08-06 PROCEDURE — 87077 CULTURE AEROBIC IDENTIFY: CPT

## 2019-08-06 PROCEDURE — 87581 M.PNEUMON DNA AMP PROBE: CPT

## 2019-08-06 PROCEDURE — 94003 VENT MGMT INPAT SUBQ DAY: CPT

## 2019-08-06 PROCEDURE — 36415 COLL VENOUS BLD VENIPUNCTURE: CPT

## 2019-08-06 PROCEDURE — 87186 SC STD MICRODIL/AGAR DIL: CPT

## 2019-08-06 RX ORDER — FENTANYL CITRATE 50 UG/ML
INJECTION, SOLUTION INTRAMUSCULAR; INTRAVENOUS
Status: COMPLETED
Start: 2019-08-06 | End: 2019-08-06

## 2019-08-06 RX ORDER — MAGNESIUM SULFATE IN WATER 40 MG/ML
2 INJECTION, SOLUTION INTRAVENOUS ONCE
Status: COMPLETED | OUTPATIENT
Start: 2019-08-06 | End: 2019-08-06

## 2019-08-06 RX ORDER — POTASSIUM CHLORIDE 29.8 MG/ML
20 INJECTION INTRAVENOUS
Status: COMPLETED | OUTPATIENT
Start: 2019-08-06 | End: 2019-08-06

## 2019-08-06 RX ORDER — FENTANYL CITRATE 50 UG/ML
200 INJECTION, SOLUTION INTRAMUSCULAR; INTRAVENOUS
Status: COMPLETED | OUTPATIENT
Start: 2019-08-07 | End: 2019-08-06

## 2019-08-06 RX ORDER — SODIUM CHLORIDE 9 MG/ML
INJECTION, SOLUTION INTRAVENOUS
Status: COMPLETED
Start: 2019-08-06 | End: 2019-08-06

## 2019-08-06 RX ORDER — MIDAZOLAM HYDROCHLORIDE 1 MG/ML
5 INJECTION INTRAMUSCULAR; INTRAVENOUS
Status: COMPLETED | OUTPATIENT
Start: 2019-08-07 | End: 2019-08-06

## 2019-08-06 RX ORDER — MIDAZOLAM HYDROCHLORIDE 1 MG/ML
INJECTION INTRAMUSCULAR; INTRAVENOUS
Status: COMPLETED
Start: 2019-08-06 | End: 2019-08-06

## 2019-08-06 RX ADMIN — SODIUM CHLORIDE 30 MG/ML INHALATION SOLUTION 15 ML: 30 SOLUTION INHALANT at 13:28

## 2019-08-06 RX ADMIN — METOPROLOL TARTRATE 25 MG: 25 TABLET ORAL at 08:04

## 2019-08-06 RX ADMIN — FENTANYL CITRATE 200 MCG: 50 INJECTION, SOLUTION INTRAMUSCULAR; INTRAVENOUS at 14:30

## 2019-08-06 RX ADMIN — LEVETIRACETAM 500 MG: 100 INJECTION, SOLUTION INTRAVENOUS at 05:11

## 2019-08-06 RX ADMIN — SODIUM CHLORIDE 30 MG/ML INHALATION SOLUTION 15 ML: 30 SOLUTION INHALANT at 16:16

## 2019-08-06 RX ADMIN — LEVALBUTEROL HYDROCHLORIDE 1.25 MG: 1.25 SOLUTION, CONCENTRATE RESPIRATORY (INHALATION) at 00:00

## 2019-08-06 RX ADMIN — DEXMEDETOMIDINE 1.4 MCG/KG/HR: 100 INJECTION, SOLUTION, CONCENTRATE INTRAVENOUS at 06:25

## 2019-08-06 RX ADMIN — POTASSIUM CHLORIDE 20 MEQ: 29.8 INJECTION, SOLUTION INTRAVENOUS at 09:51

## 2019-08-06 RX ADMIN — HEPARIN SODIUM 5000 UNITS: 5000 INJECTION INTRAVENOUS; SUBCUTANEOUS at 13:44

## 2019-08-06 RX ADMIN — LEVALBUTEROL HYDROCHLORIDE 1.25 MG: 1.25 SOLUTION, CONCENTRATE RESPIRATORY (INHALATION) at 08:29

## 2019-08-06 RX ADMIN — DEXMEDETOMIDINE 1.4 MCG/KG/HR: 100 INJECTION, SOLUTION, CONCENTRATE INTRAVENOUS at 01:48

## 2019-08-06 RX ADMIN — SODIUM CHLORIDE 30 MG/ML INHALATION SOLUTION 15 ML: 30 SOLUTION INHALANT at 08:29

## 2019-08-06 RX ADMIN — POTASSIUM CHLORIDE 20 MEQ: 29.8 INJECTION, SOLUTION INTRAVENOUS at 11:26

## 2019-08-06 RX ADMIN — CASTOR OIL AND BALSAM, PERU: 788; 87 OINTMENT TOPICAL at 20:02

## 2019-08-06 RX ADMIN — Medication 15 ML: at 08:08

## 2019-08-06 RX ADMIN — ATORVASTATIN CALCIUM 20 MG: 20 TABLET, FILM COATED ORAL at 20:00

## 2019-08-06 RX ADMIN — MAGNESIUM SULFATE HEPTAHYDRATE 2 G: 40 INJECTION, SOLUTION INTRAVENOUS at 09:11

## 2019-08-06 RX ADMIN — SODIUM CHLORIDE 30 MG/ML INHALATION SOLUTION 15 ML: 30 SOLUTION INHALANT at 21:19

## 2019-08-06 RX ADMIN — LEVALBUTEROL HYDROCHLORIDE 1.25 MG: 1.25 SOLUTION, CONCENTRATE RESPIRATORY (INHALATION) at 03:35

## 2019-08-06 RX ADMIN — CASTOR OIL AND BALSAM, PERU: 788; 87 OINTMENT TOPICAL at 08:06

## 2019-08-06 RX ADMIN — MEROPENEM 1 G: 1 INJECTION, POWDER, FOR SOLUTION INTRAVENOUS at 00:34

## 2019-08-06 RX ADMIN — LEVALBUTEROL HYDROCHLORIDE 1.25 MG: 1.25 SOLUTION, CONCENTRATE RESPIRATORY (INHALATION) at 13:27

## 2019-08-06 RX ADMIN — MIDAZOLAM HYDROCHLORIDE 5 MG: 1 INJECTION INTRAMUSCULAR; INTRAVENOUS at 14:30

## 2019-08-06 RX ADMIN — MEROPENEM 1 G: 1 INJECTION, POWDER, FOR SOLUTION INTRAVENOUS at 16:53

## 2019-08-06 RX ADMIN — DEXMEDETOMIDINE 1.4 MCG/KG/HR: 100 INJECTION, SOLUTION, CONCENTRATE INTRAVENOUS at 21:37

## 2019-08-06 RX ADMIN — DEXMEDETOMIDINE 1.4 MCG/KG/HR: 100 INJECTION, SOLUTION, CONCENTRATE INTRAVENOUS at 11:36

## 2019-08-06 RX ADMIN — SODIUM CHLORIDE 250 ML: 9 INJECTION, SOLUTION INTRAVENOUS at 14:12

## 2019-08-06 RX ADMIN — SODIUM CHLORIDE 30 MG/ML INHALATION SOLUTION 15 ML: 30 SOLUTION INHALANT at 03:35

## 2019-08-06 RX ADMIN — SODIUM CHLORIDE 30 MG/ML INHALATION SOLUTION 15 ML: 30 SOLUTION INHALANT at 00:00

## 2019-08-06 RX ADMIN — MEROPENEM 1 G: 1 INJECTION, POWDER, FOR SOLUTION INTRAVENOUS at 08:05

## 2019-08-06 RX ADMIN — VANCOMYCIN HYDROCHLORIDE 1000 MG: 10 INJECTION, POWDER, LYOPHILIZED, FOR SOLUTION INTRAVENOUS at 20:53

## 2019-08-06 RX ADMIN — Medication 10 ML: at 08:07

## 2019-08-06 RX ADMIN — HEPARIN SODIUM 5000 UNITS: 5000 INJECTION INTRAVENOUS; SUBCUTANEOUS at 22:30

## 2019-08-06 RX ADMIN — Medication 10 ML: at 20:01

## 2019-08-06 RX ADMIN — DEXMEDETOMIDINE 1.4 MCG/KG/HR: 100 INJECTION, SOLUTION, CONCENTRATE INTRAVENOUS at 16:52

## 2019-08-06 RX ADMIN — METOPROLOL TARTRATE 25 MG: 25 TABLET ORAL at 20:45

## 2019-08-06 RX ADMIN — PANTOPRAZOLE SODIUM 40 MG: 40 INJECTION, POWDER, LYOPHILIZED, FOR SOLUTION INTRAVENOUS at 20:00

## 2019-08-06 RX ADMIN — Medication 15 ML: at 20:00

## 2019-08-06 RX ADMIN — LEVALBUTEROL HYDROCHLORIDE 1.25 MG: 1.25 SOLUTION, CONCENTRATE RESPIRATORY (INHALATION) at 21:19

## 2019-08-06 RX ADMIN — PANTOPRAZOLE SODIUM 40 MG: 40 INJECTION, POWDER, LYOPHILIZED, FOR SOLUTION INTRAVENOUS at 08:55

## 2019-08-06 RX ADMIN — MIDAZOLAM 5 MG: 1 INJECTION INTRAMUSCULAR; INTRAVENOUS at 14:30

## 2019-08-06 RX ADMIN — VANCOMYCIN HYDROCHLORIDE 1000 MG: 10 INJECTION, POWDER, LYOPHILIZED, FOR SOLUTION INTRAVENOUS at 09:52

## 2019-08-06 RX ADMIN — LEVETIRACETAM 500 MG: 100 INJECTION, SOLUTION INTRAVENOUS at 16:02

## 2019-08-06 RX ADMIN — HEPARIN SODIUM 5000 UNITS: 5000 INJECTION INTRAVENOUS; SUBCUTANEOUS at 06:21

## 2019-08-06 RX ADMIN — LEVALBUTEROL HYDROCHLORIDE 1.25 MG: 1.25 SOLUTION, CONCENTRATE RESPIRATORY (INHALATION) at 16:16

## 2019-08-06 RX ADMIN — POTASSIUM CHLORIDE 20 MEQ: 29.8 INJECTION, SOLUTION INTRAVENOUS at 08:05

## 2019-08-06 RX ADMIN — ASPIRIN 81 MG 81 MG: 81 TABLET ORAL at 08:04

## 2019-08-06 RX ADMIN — FENTANYL CITRATE 200 MCG: 50 INJECTION INTRAMUSCULAR; INTRAVENOUS at 14:30

## 2019-08-06 ASSESSMENT — PAIN SCALES - GENERAL
PAINLEVEL_OUTOF10: 0

## 2019-08-06 ASSESSMENT — PULMONARY FUNCTION TESTS
PIF_VALUE: 19
PIF_VALUE: 20
PIF_VALUE: 21
PIF_VALUE: 25
PIF_VALUE: 13
PIF_VALUE: 20
PIF_VALUE: 19
PIF_VALUE: 24
PIF_VALUE: 27
PIF_VALUE: 22
PIF_VALUE: 21
PIF_VALUE: 22
PIF_VALUE: 13
PIF_VALUE: 22
PIF_VALUE: 16
PIF_VALUE: 13
PIF_VALUE: 20
PIF_VALUE: 21
PIF_VALUE: 21
PIF_VALUE: 34
PIF_VALUE: 18
PIF_VALUE: 20
PIF_VALUE: 20
PIF_VALUE: 21
PIF_VALUE: 19
PIF_VALUE: 21
PIF_VALUE: 19
PIF_VALUE: 22

## 2019-08-06 NOTE — PROGRESS NOTES
Hospitalist Progress Note      PCP: Yris Hinojosa    Date of Admission: 7/22/2019    Chief Complaint: fatigue, N/V    Hospital Course: Pt. Is a 69 yo alcoholic male who presented with acute renal failure and went into delirium tremens and in the ICU for further management. In the ICU being treated for acute hypoxic resp failure likely 2/2 Aspiration pna, metabolic encephalopathy, AVIVA. Subjective:   Patient seen and examined  Follows commands  Doing better    Medications:  Reviewed    Infusion Medications    dexmedetomidine (PRECEDEX) IV infusion 1.4 mcg/kg/hr (08/06/19 1652)    dextrose       Scheduled Medications    vancomycin  1,000 mg Intravenous Q12H    sodium chloride (Inhalant)  15 mL Nebulization Q4H    meropenem  1 g Intravenous Q8H    sodium chloride flush  10 mL Intravenous 2 times per day    metoprolol tartrate  25 mg Oral BID    levetiracetam  500 mg Intravenous Q12H    levalbuterol  1.25 mg Nebulization 6 times per day    pantoprazole  40 mg Intravenous BID    VENELEX   Topical BID    chlorhexidine  15 mL Mouth/Throat BID    heparin (porcine)  5,000 Units Subcutaneous 3 times per day    atorvastatin  20 mg Oral Nightly    aspirin  81 mg Oral Daily     PRN Meds: acetaminophen, sodium chloride flush, labetalol, medicated lip ointment, aspirin, glucose, dextrose, glucagon (rDNA), dextrose, ondansetron      Intake/Output Summary (Last 24 hours) at 8/6/2019 1820  Last data filed at 8/6/2019 1700  Gross per 24 hour   Intake 1642 ml   Output 2110 ml   Net -468 ml       Physical Exam Performed:    /68   Pulse 93   Temp 99.2 °F (37.3 °C) (Axillary)   Resp 21   Ht 5' 5\" (1.651 m)   Wt 135 lb 2.3 oz (61.3 kg)   SpO2 100%   BMI 22.49 kg/m²     General appearance:More alert, intubated, able to nod and answer  Spontaneously moving all extremities  Able to nod and blink when asked to  HEENT: Pupils equal, round, and reactive to light. Conjunctivae/corneas clear.   Neck: Addressed/Resolved problems:  Alcohol withdrawal with DT POA  - S/p CIWA protocol and high dose thiamine. #Elevated troponin: likely demand ischemia  Wall motion abnormalities on ECHO noted   Cardiology signed off for now  Ischemic evaluation once clinically stable      #Hx of Luing nodules:  Right upper lobe 1.3 cm spiculated nodule concerning for malignancy  - will need further work up once more clinically stable    #Septic/Hemmorhagic Shock in the setting of underlying aspiration pneumonia  - Cortisol level was normal, he is Off pressors now    #AVIVA;  Resolved    #Electrolyte abnormalities:   Replacing potassium/phosphorus/magnesium as needed    DVT Prophylaxis: SCD's  Diet: Diet NPO, After Midnight  Code Status: Full Code    Dispo - Continue ICU level of care      [facility]

## 2019-08-06 NOTE — PROGRESS NOTES
b/m   VT: 446 mL (average VT = VE/RR)   RSBI: 63 (RR/VT in liters)   ETCO2: 15 cmH2O   SPO2: 99 %   If on sedation, amount and type Precedex 1.4    [x]   RR is less than 35, RSBI is less than 100, patient's vitals signs are stable, and patient is in no apparent distress; therefore, patient is being left on SBT for up to 1 hour. []   RR is greater than 35, RSBI is greater than 100, VS's are unstable, or patient is in distress; therefore, patient is being placed back on previous settings. SBT - Concluded at  ***. Weaning Parameters/VS's at conclusion of SBT:   VE: *** L   RR: *** b/m   VT: *** mL (average VT = VE/RR)   RSBI: *** (RR/VT in liters)   ETCO2: *** cmH2O   SPO2: *** %    If on sedation, amount and type ***    []   Patient tolerated SBT for full 60 minutes with acceptable weaning parameters and vital signs and showed no signs or distress. []   Patient tolerated SBT for full 60 minutes, but had unacceptable weaning parameters or vital signs, and/or signs of distress. []   Patient was unable to tolerate SBT for 60 minutes and was placed back on previous settings.             COMMENTS:
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
## 2019-08-06 NOTE — PROGRESS NOTES
medicated lip ointment, aspirin, glucose, dextrose, glucagon (rDNA), dextrose, ondansetron    Objective:   Vitals:   T-max:  Patient Vitals for the past 8 hrs:   BP Temp Temp src Pulse Resp SpO2   08/06/19 1100 111/62 99.9 °F (37.7 °C) Axillary 93 20 100 %   08/06/19 1044 -- -- -- 90 21 100 %   08/06/19 1019 -- -- -- -- (!) 34 100 %   08/06/19 1000 (!) 106/59 -- -- 93 30 100 %   08/06/19 0900 110/67 -- -- 98 (!) 31 100 %   08/06/19 0840 -- -- -- 93 27 100 %   08/06/19 0832 -- -- -- 88 29 100 %   08/06/19 0831 -- -- -- -- 20 100 %   08/06/19 0804 119/63 -- -- 87 -- --   08/06/19 0800 119/63 99.5 °F (37.5 °C) Oral 90 18 98 %   08/06/19 0700 110/60 -- -- 93 21 100 %   08/06/19 0600 126/67 -- -- 99 19 100 %   08/06/19 0500 122/62 97.1 °F (36.2 °C) Axillary 98 22 99 %   08/06/19 0400 (!) 104/57 -- -- 108 25 99 %   08/06/19 0340 -- -- -- -- 21 100 %       Intake/Output Summary (Last 24 hours) at 8/6/2019 1136  Last data filed at 8/6/2019 1119  Gross per 24 hour   Intake 1494 ml   Output 2000 ml   Net -506 ml     Review of Systems   Unable to perform ROS: Intubated     Physical Exam   Constitutional: He appears well-developed and well-nourished. HENT:   Head: Normocephalic and atraumatic. Eyes: Pupils are equal, round, and reactive to light. Neck: Normal range of motion. No JVD present. Cardiovascular: Normal rate, regular rhythm, normal heart sounds and intact distal pulses. Exam reveals no gallop and no friction rub. No murmur heard. Pulmonary/Chest: Breath sounds normal. He has no wheezes. Mechanically ventilated with FiO2: 30 and PEEP 5. Abdominal: Soft. Bowel sounds are normal. He exhibits no distension. There is no tenderness. Musculoskeletal: He exhibits edema (B/l upper extremities). Neurological:   Awake, able to tranck and follow commands. Continues to be on precedex. Skin: Skin is warm and dry. Capillary refill takes less than 2 seconds. No pallor.      LABS:    CBC:   Recent Labs biopsy. Additional nonspecific areas of nodular consolidation in the right lower lobe. Qzxv      XR CHEST PORTABLE   Final Result   1. Persistent but improved central pulmonary vascular congestion with    diffusely increased interstitial markings still present. 2.  Mildly decreased right greater than left pleural effusions. 3.  Right greater than left basilar atelectasis versus consolidation. 4.  No pneumothorax radiographically evident. 5.  Tubes/lines as above. XR CHEST PORTABLE   Final Result      1. Persistent hazy perihilar, groundglass basilar consolidations and pleural effusions greater in the right lung   2. NG tube in place as well as a right IJ central venous catheter with the tip in the mid to distal SVC, no worsening               XR CHEST PORTABLE   Final Result      Introduction of NG tube with tip excluded from the inferior edge of the film. Moderate bilateral effusions and diffuse groundglass opacities consistent with edema, infection or aspiration. This appears worse since the previous study            XR ABDOMEN (KUB) (SINGLE AP VIEW)   Final Result      Nasogastric tube extending into the left upper quadrant. XR CHEST PORTABLE   Final Result      Persistent right basilar infiltrate. XR CHEST 1 VW   Final Result      Tip of the right IJ central venous catheter overlies lower SVC with no pneumothorax evident. CT HEAD WO CONTRAST   Final Result      1. No acute intracranial process. XR CHEST PORTABLE   Final Result      Interval development of bilateral lower lobe opacities may relate to edema or pneumonia      CT SOFT TISSUE NECK WO CONTRAST   Final Result   Unremarkable CT neck          XR CHEST PORTABLE   Final Result     Normal chest x-ray. CT ABDOMEN PELVIS WO CONTRAST Additional Contrast? None   Final Result   No acute abnormality demonstrated in the abdomen or pelvis.                 Assessment/Plan:   Mr. Betty Reyes is a 71 yo M with PMHx

## 2019-08-06 NOTE — PROGRESS NOTES
[facility]   [phone]  [facility] [place]  Inpatient Progress note     REASON FOR CONSULTATION:    AVIVA    INTERVAL Plan  Replace potassium for hypokalemia  Magnesium is better  Creatinine stable. Overall improving. Acute kidney injury  Likely acute tubular necrosis  Second peak of 2.4 on 7/29/19- 0.7  on 8/6/2019   Made  1.9 L urine yesterday  baseline creatinine in 12/2018 1.2-   proteinuria  258 mg, 3 to 5 red blood   Urine sodium 29. in 05/2018 one time  creatinine  was 2.1   In 08/2015, creatinine ranged from 0.8 to 1.2. CT abdomen kidneys normal   CT in 2015 mention some bilateral mild to moderate cortical  thinning of the renal cortex, no mention in 07/2019 CT. Traumatic Hematuria due to resendiz which pt tried to pull      Hypercalcemia  Improved    due to volume depletion on admission  calcium was 10.1 in 12/2018  His hypercalcemia could be due to excessive calcium intake      Hypokalemia  Potassium low-getting potassium and magnesium     History of alcoholism   withdrawal  liver function tests are normal.   He does not have acute hepatitis     History of multiple lung nodules   on CT in 2015 with current weight loss little worry some. will need a CT scan of the chest to rule out any lung malignancy because long-term smoking and currentweight loss. CT of the abdomen already done, which does not show any acute  abnormality.     History of left adrenal adenoma   16 mm as seen on CT in 2015. CT this admission in 07/2019 did not mention that.     History of emphysema  as seen on previous CT of the chest in 2015.     History of abdominal aortic aneurysm   as seen on CT in 2015 was 3.4 cm.    This admission CT without contrast did not mention         [facility]  From consult note-   77-year-old  gentleman  whom I am seeing in the room where he just got Ativan because he was  agitated, pulling his lines, was trying to jump out of the window, and  is sedated and unable to give any

## 2019-08-07 ENCOUNTER — APPOINTMENT (OUTPATIENT)
Dept: MRI IMAGING | Age: 69
DRG: 682 | End: 2019-08-07
Payer: MEDICARE

## 2019-08-07 LAB
ALBUMIN SERPL-MCNC: 2.1 G/DL (ref 3.4–5)
ANION GAP SERPL CALCULATED.3IONS-SCNC: 12 MMOL/L (ref 3–16)
BASE EXCESS ARTERIAL: -4 (ref -3–3)
BASOPHILS ABSOLUTE: 0 K/UL (ref 0–0.2)
BASOPHILS RELATIVE PERCENT: 0.5 %
BUN BLDV-MCNC: 17 MG/DL (ref 7–20)
CALCIUM SERPL-MCNC: 7.8 MG/DL (ref 8.3–10.6)
CHLORIDE BLD-SCNC: 107 MMOL/L (ref 99–110)
CO2: 20 MMOL/L (ref 21–32)
CREAT SERPL-MCNC: 0.7 MG/DL (ref 0.8–1.3)
EOSINOPHILS ABSOLUTE: 0.2 K/UL (ref 0–0.6)
EOSINOPHILS RELATIVE PERCENT: 1.7 %
GFR AFRICAN AMERICAN: >60
GFR NON-AFRICAN AMERICAN: >60
GLUCOSE BLD-MCNC: 114 MG/DL (ref 70–99)
GLUCOSE BLD-MCNC: 122 MG/DL (ref 70–99)
GLUCOSE BLD-MCNC: 138 MG/DL (ref 70–99)
GLUCOSE BLD-MCNC: 144 MG/DL (ref 70–99)
GLUCOSE BLD-MCNC: 98 MG/DL (ref 70–99)
HCO3 ARTERIAL: 18.6 MMOL/L (ref 21–29)
HCT VFR BLD CALC: 21.9 % (ref 40.5–52.5)
HEMOGLOBIN: 7.3 G/DL (ref 13.5–17.5)
LYMPHOCYTES ABSOLUTE: 0.5 K/UL (ref 1–5.1)
LYMPHOCYTES RELATIVE PERCENT: 5.9 %
MAGNESIUM: 2.1 MG/DL (ref 1.8–2.4)
MCH RBC QN AUTO: 31 PG (ref 26–34)
MCHC RBC AUTO-ENTMCNC: 33.5 G/DL (ref 31–36)
MCV RBC AUTO: 92.6 FL (ref 80–100)
MONOCYTES ABSOLUTE: 0.5 K/UL (ref 0–1.3)
MONOCYTES RELATIVE PERCENT: 6 %
NEUTROPHILS ABSOLUTE: 7.6 K/UL (ref 1.7–7.7)
NEUTROPHILS RELATIVE PERCENT: 85.9 %
O2 SAT, ARTERIAL: 100 % (ref 93–100)
PCO2 ARTERIAL: 21.2 MM HG (ref 35–45)
PDW BLD-RTO: 14.1 % (ref 12.4–15.4)
PERFORMED ON: ABNORMAL
PERFORMED ON: NORMAL
PH ARTERIAL: 7.55 (ref 7.35–7.45)
PHOSPHORUS: 2.3 MG/DL (ref 2.5–4.9)
PLATELET # BLD: 415 K/UL (ref 135–450)
PMV BLD AUTO: 9 FL (ref 5–10.5)
PO2 ARTERIAL: 154 MM HG (ref 75–108)
POC SAMPLE TYPE: ABNORMAL
POTASSIUM SERPL-SCNC: 3.8 MMOL/L (ref 3.5–5.1)
RBC # BLD: 2.37 M/UL (ref 4.2–5.9)
SODIUM BLD-SCNC: 139 MMOL/L (ref 136–145)
TCO2 ARTERIAL: 19 MMOL/L
WBC # BLD: 8.8 K/UL (ref 4–11)

## 2019-08-07 PROCEDURE — 2580000003 HC RX 258: Performed by: NURSE PRACTITIONER

## 2019-08-07 PROCEDURE — 82803 BLOOD GASES ANY COMBINATION: CPT

## 2019-08-07 PROCEDURE — 94003 VENT MGMT INPAT SUBQ DAY: CPT

## 2019-08-07 PROCEDURE — 2580000003 HC RX 258: Performed by: STUDENT IN AN ORGANIZED HEALTH CARE EDUCATION/TRAINING PROGRAM

## 2019-08-07 PROCEDURE — 6360000004 HC RX CONTRAST MEDICATION: Performed by: INTERNAL MEDICINE

## 2019-08-07 PROCEDURE — 85025 COMPLETE CBC W/AUTO DIFF WBC: CPT

## 2019-08-07 PROCEDURE — 6360000002 HC RX W HCPCS: Performed by: NURSE PRACTITIONER

## 2019-08-07 PROCEDURE — 6360000002 HC RX W HCPCS: Performed by: STUDENT IN AN ORGANIZED HEALTH CARE EDUCATION/TRAINING PROGRAM

## 2019-08-07 PROCEDURE — 2500000003 HC RX 250 WO HCPCS

## 2019-08-07 PROCEDURE — 2000000000 HC ICU R&B

## 2019-08-07 PROCEDURE — 2700000000 HC OXYGEN THERAPY PER DAY

## 2019-08-07 PROCEDURE — 6370000000 HC RX 637 (ALT 250 FOR IP): Performed by: INTERNAL MEDICINE

## 2019-08-07 PROCEDURE — 80069 RENAL FUNCTION PANEL: CPT

## 2019-08-07 PROCEDURE — 6360000002 HC RX W HCPCS: Performed by: INTERNAL MEDICINE

## 2019-08-07 PROCEDURE — 83735 ASSAY OF MAGNESIUM: CPT

## 2019-08-07 PROCEDURE — 2580000003 HC RX 258

## 2019-08-07 PROCEDURE — 94770 HC ETCO2 MONITOR DAILY: CPT

## 2019-08-07 PROCEDURE — 2500000003 HC RX 250 WO HCPCS: Performed by: STUDENT IN AN ORGANIZED HEALTH CARE EDUCATION/TRAINING PROGRAM

## 2019-08-07 PROCEDURE — 6370000000 HC RX 637 (ALT 250 FOR IP): Performed by: STUDENT IN AN ORGANIZED HEALTH CARE EDUCATION/TRAINING PROGRAM

## 2019-08-07 PROCEDURE — 36592 COLLECT BLOOD FROM PICC: CPT

## 2019-08-07 PROCEDURE — 99291 CRITICAL CARE FIRST HOUR: CPT | Performed by: INTERNAL MEDICINE

## 2019-08-07 PROCEDURE — C9113 INJ PANTOPRAZOLE SODIUM, VIA: HCPCS | Performed by: INTERNAL MEDICINE

## 2019-08-07 PROCEDURE — 94761 N-INVAS EAR/PLS OXIMETRY MLT: CPT

## 2019-08-07 PROCEDURE — 2580000003 HC RX 258: Performed by: INTERNAL MEDICINE

## 2019-08-07 PROCEDURE — 94640 AIRWAY INHALATION TREATMENT: CPT

## 2019-08-07 PROCEDURE — 94750 HC PULMONARY COMPLIANCE STUDY: CPT

## 2019-08-07 PROCEDURE — A9576 INJ PROHANCE MULTIPACK: HCPCS | Performed by: INTERNAL MEDICINE

## 2019-08-07 PROCEDURE — 70553 MRI BRAIN STEM W/O & W/DYE: CPT

## 2019-08-07 RX ORDER — FUROSEMIDE 10 MG/ML
40 INJECTION INTRAMUSCULAR; INTRAVENOUS ONCE
Status: COMPLETED | OUTPATIENT
Start: 2019-08-07 | End: 2019-08-07

## 2019-08-07 RX ORDER — PROPOFOL 10 MG/ML
10 INJECTION, EMULSION INTRAVENOUS
Status: DISCONTINUED | OUTPATIENT
Start: 2019-08-07 | End: 2019-08-09

## 2019-08-07 RX ORDER — ACETAMINOPHEN 325 MG/1
650 TABLET ORAL EVERY 4 HOURS PRN
Status: DISCONTINUED | OUTPATIENT
Start: 2019-08-07 | End: 2019-08-20 | Stop reason: HOSPADM

## 2019-08-07 RX ORDER — SODIUM CHLORIDE 9 MG/ML
INJECTION, SOLUTION INTRAVENOUS
Status: COMPLETED
Start: 2019-08-07 | End: 2019-08-07

## 2019-08-07 RX ORDER — SODIUM CHLORIDE 9 MG/ML
INJECTION, SOLUTION INTRAVENOUS
Status: COMPLETED
Start: 2019-08-07 | End: 2019-08-08

## 2019-08-07 RX ADMIN — SODIUM CHLORIDE 30 MG/ML INHALATION SOLUTION 15 ML: 30 SOLUTION INHALANT at 08:03

## 2019-08-07 RX ADMIN — PROPOFOL 10 MCG/KG/MIN: 10 INJECTION, EMULSION INTRAVENOUS at 14:31

## 2019-08-07 RX ADMIN — DEXMEDETOMIDINE 1.4 MCG/KG/HR: 100 INJECTION, SOLUTION, CONCENTRATE INTRAVENOUS at 08:22

## 2019-08-07 RX ADMIN — VANCOMYCIN HYDROCHLORIDE 1000 MG: 10 INJECTION, POWDER, LYOPHILIZED, FOR SOLUTION INTRAVENOUS at 08:37

## 2019-08-07 RX ADMIN — HEPARIN SODIUM 5000 UNITS: 5000 INJECTION INTRAVENOUS; SUBCUTANEOUS at 05:28

## 2019-08-07 RX ADMIN — Medication 10 ML: at 08:26

## 2019-08-07 RX ADMIN — HEPARIN SODIUM 5000 UNITS: 5000 INJECTION INTRAVENOUS; SUBCUTANEOUS at 14:37

## 2019-08-07 RX ADMIN — MEROPENEM 1 G: 1 INJECTION, POWDER, FOR SOLUTION INTRAVENOUS at 18:57

## 2019-08-07 RX ADMIN — LEVALBUTEROL HYDROCHLORIDE 1.25 MG: 1.25 SOLUTION, CONCENTRATE RESPIRATORY (INHALATION) at 00:36

## 2019-08-07 RX ADMIN — SODIUM CHLORIDE 30 MG/ML INHALATION SOLUTION 15 ML: 30 SOLUTION INHALANT at 00:37

## 2019-08-07 RX ADMIN — ASPIRIN 81 MG 81 MG: 81 TABLET ORAL at 08:23

## 2019-08-07 RX ADMIN — METOPROLOL TARTRATE 25 MG: 25 TABLET ORAL at 21:06

## 2019-08-07 RX ADMIN — SODIUM CHLORIDE 30 MG/ML INHALATION SOLUTION 15 ML: 30 SOLUTION INHALANT at 21:46

## 2019-08-07 RX ADMIN — PANTOPRAZOLE SODIUM 40 MG: 40 INJECTION, POWDER, LYOPHILIZED, FOR SOLUTION INTRAVENOUS at 08:24

## 2019-08-07 RX ADMIN — DEXMEDETOMIDINE 1.4 MCG/KG/HR: 100 INJECTION, SOLUTION, CONCENTRATE INTRAVENOUS at 20:11

## 2019-08-07 RX ADMIN — SODIUM CHLORIDE 30 MG/ML INHALATION SOLUTION 15 ML: 30 SOLUTION INHALANT at 04:25

## 2019-08-07 RX ADMIN — SODIUM CHLORIDE 30 MG/ML INHALATION SOLUTION 15 ML: 30 SOLUTION INHALANT at 11:30

## 2019-08-07 RX ADMIN — MEROPENEM 1 G: 1 INJECTION, POWDER, FOR SOLUTION INTRAVENOUS at 08:28

## 2019-08-07 RX ADMIN — LEVALBUTEROL HYDROCHLORIDE 1.25 MG: 1.25 SOLUTION, CONCENTRATE RESPIRATORY (INHALATION) at 21:46

## 2019-08-07 RX ADMIN — LEVALBUTEROL HYDROCHLORIDE 1.25 MG: 1.25 SOLUTION, CONCENTRATE RESPIRATORY (INHALATION) at 04:25

## 2019-08-07 RX ADMIN — CASTOR OIL AND BALSAM, PERU: 788; 87 OINTMENT TOPICAL at 20:19

## 2019-08-07 RX ADMIN — SODIUM CHLORIDE 250 ML: 9 INJECTION, SOLUTION INTRAVENOUS at 04:18

## 2019-08-07 RX ADMIN — SODIUM CHLORIDE 30 MG/ML INHALATION SOLUTION 15 ML: 30 SOLUTION INHALANT at 15:58

## 2019-08-07 RX ADMIN — METOPROLOL TARTRATE 25 MG: 25 TABLET ORAL at 08:23

## 2019-08-07 RX ADMIN — LEVALBUTEROL HYDROCHLORIDE 1.25 MG: 1.25 SOLUTION, CONCENTRATE RESPIRATORY (INHALATION) at 08:03

## 2019-08-07 RX ADMIN — PANTOPRAZOLE SODIUM 40 MG: 40 INJECTION, POWDER, LYOPHILIZED, FOR SOLUTION INTRAVENOUS at 20:11

## 2019-08-07 RX ADMIN — Medication 15 ML: at 08:17

## 2019-08-07 RX ADMIN — LEVETIRACETAM 500 MG: 100 INJECTION, SOLUTION INTRAVENOUS at 04:11

## 2019-08-07 RX ADMIN — Medication 10 ML: at 20:26

## 2019-08-07 RX ADMIN — FUROSEMIDE 40 MG: 10 INJECTION, SOLUTION INTRAMUSCULAR; INTRAVENOUS at 11:03

## 2019-08-07 RX ADMIN — POTASSIUM PHOSPHATE, MONOBASIC AND POTASSIUM PHOSPHATE, DIBASIC 15 MMOL: 224; 236 INJECTION, SOLUTION, CONCENTRATE INTRAVENOUS at 08:37

## 2019-08-07 RX ADMIN — ATORVASTATIN CALCIUM 20 MG: 20 TABLET, FILM COATED ORAL at 20:11

## 2019-08-07 RX ADMIN — LEVALBUTEROL HYDROCHLORIDE 1.25 MG: 1.25 SOLUTION, CONCENTRATE RESPIRATORY (INHALATION) at 15:58

## 2019-08-07 RX ADMIN — DEXMEDETOMIDINE 1.4 MCG/KG/HR: 100 INJECTION, SOLUTION, CONCENTRATE INTRAVENOUS at 02:24

## 2019-08-07 RX ADMIN — VANCOMYCIN HYDROCHLORIDE 1000 MG: 10 INJECTION, POWDER, LYOPHILIZED, FOR SOLUTION INTRAVENOUS at 21:06

## 2019-08-07 RX ADMIN — GADOTERIDOL 13 ML: 279.3 INJECTION, SOLUTION INTRAVENOUS at 18:00

## 2019-08-07 RX ADMIN — MEROPENEM 1 G: 1 INJECTION, POWDER, FOR SOLUTION INTRAVENOUS at 01:44

## 2019-08-07 RX ADMIN — Medication 15 ML: at 21:06

## 2019-08-07 RX ADMIN — CASTOR OIL AND BALSAM, PERU: 788; 87 OINTMENT TOPICAL at 08:27

## 2019-08-07 RX ADMIN — HEPARIN SODIUM 5000 UNITS: 5000 INJECTION INTRAVENOUS; SUBCUTANEOUS at 22:26

## 2019-08-07 RX ADMIN — LEVALBUTEROL HYDROCHLORIDE 1.25 MG: 1.25 SOLUTION, CONCENTRATE RESPIRATORY (INHALATION) at 11:30

## 2019-08-07 ASSESSMENT — PULMONARY FUNCTION TESTS
PIF_VALUE: 17
PIF_VALUE: 17
PIF_VALUE: 16
PIF_VALUE: 20
PIF_VALUE: 16
PIF_VALUE: 19
PIF_VALUE: 19
PIF_VALUE: 18
PIF_VALUE: 20
PIF_VALUE: 19
PIF_VALUE: 17
PIF_VALUE: 16
PIF_VALUE: 18
PIF_VALUE: 19
PIF_VALUE: 13
PIF_VALUE: 13
PIF_VALUE: 18
PIF_VALUE: 18
PIF_VALUE: 13
PIF_VALUE: 21

## 2019-08-07 NOTE — PROGRESS NOTES
Patient back from MRI. Spoke with Dr. Charan Foy. Propofol off as only ordered for MRI, tube feed resumed. Will continue to monitor.

## 2019-08-07 NOTE — PROGRESS NOTES
[id number])    dextrose       PRN Meds:acetaminophen, sodium chloride flush, labetalol, medicated lip ointment, aspirin, glucose, dextrose, glucagon (rDNA), dextrose, ondansetron    Objective:   Vitals:   T-max:  Patient Vitals for the past 8 hrs:   BP Temp Temp src Pulse Resp SpO2 Weight   08/07/19 1131 -- -- -- 95 19 100 % --   08/07/19 1130 -- -- -- -- -- 100 % --   08/07/19 1000 113/62 -- -- 93 23 100 % --   08/07/19 0941 -- -- -- 90 28 100 % --   08/07/19 0900 (!) 110/58 -- -- 95 25 100 % --   08/07/19 0820 -- -- -- 104 25 100 % --   08/07/19 0816 -- -- -- -- 26 100 % --   08/07/19 0805 -- -- -- 90 18 100 % --   08/07/19 0800 (!) 103/58 -- Axillary 90 16 100 % --   08/07/19 0700 104/60 -- -- 96 21 98 % --   08/07/19 0600 107/61 -- -- 99 21 -- 136 lb 11 oz (62 kg)   08/07/19 0525 -- -- -- 98 27 100 % --   08/07/19 0524 -- -- -- 108 22 100 % --   08/07/19 0523 -- -- -- 107 21 100 % --   08/07/19 0522 -- -- -- 106 18 100 % --   08/07/19 0521 -- -- -- 106 24 100 % --   08/07/19 0520 -- -- -- 107 20 99 % --   08/07/19 0519 -- -- -- 92 23 100 % --   08/07/19 0518 -- -- -- 101 27 100 % --   08/07/19 0517 -- -- -- 108 26 100 % --   08/07/19 0516 -- -- -- 109 25 100 % --   08/07/19 0515 -- -- -- 107 16 100 % --   08/07/19 0514 -- -- -- 107 19 100 % --   08/07/19 0513 -- -- -- 106 24 100 % --   08/07/19 0512 -- -- -- 106 26 100 % --   08/07/19 0511 -- -- -- 107 25 100 % --   08/07/19 0510 -- -- -- 108 20 100 % --   08/07/19 0509 -- -- -- 108 26 98 % --   08/07/19 0508 -- -- -- 107 19 100 % --   08/07/19 0507 -- -- -- 105 29 100 % --   08/07/19 0500 113/60 -- -- 106 20 100 % --   08/07/19 0445 -- -- -- 104 20 100 % --   08/07/19 0439 -- -- -- 98 27 100 % --   08/07/19 0438 -- -- -- -- 17 100 % --   08/07/19 0430 -- -- -- 90 19 100 % --   08/07/19 0415 -- -- -- 87 17 98 % --   08/07/19 0400 (!) 102/56 98.5 °F (36.9 °C) Axillary 92 21 98 % --   08/07/19 0345 -- -- -- 94 23 100 % --       Intake/Output Summary (Last 24 hours) at 8/7/2019 1139  Last data filed at 8/7/2019 0949  Gross per 24 hour   Intake 2393 ml   Output 2260 ml   Net 133 ml     Review of Systems   Unable to perform ROS: Intubated     Physical Exam   Constitutional: He appears well-developed and well-nourished. HENT:   Head: Normocephalic and atraumatic. Eyes: Pupils are equal, round, and reactive to light. Neck: Normal range of motion. No JVD present. Cardiovascular: Normal rate, regular rhythm, normal heart sounds and intact distal pulses. Exam reveals no gallop and no friction rub. No murmur heard. Pulmonary/Chest: Breath sounds normal. He has no wheezes. Mechanically ventilated with FiO2: 30 and PEEP 5. Abdominal: Soft. Bowel sounds are normal. He exhibits no distension. There is no tenderness. Musculoskeletal: He exhibits edema (B/l upper extremities). Neurological:   Awake, able to tranck and follow commands. Continues to be on precedex. Skin: Skin is warm and dry. Capillary refill takes less than 2 seconds. No pallor. LABS:    CBC:   Recent Labs     08/05/19  0342 08/06/19 0522 08/07/19  0403   WBC 10.0 9.4 8.8   HGB 7.4* 7.3* 7.3*   HCT 22.1* 21.7* 21.9*    356 415   MCV 92.6 91.8 92.6     Renal:    Recent Labs     08/05/19 0355 08/06/19 0522 08/07/19  0403   * 141 139   K 3.8 3.3* 3.8   * 109 107   CO2 22 20* 20*   BUN 23* 21* 17   CREATININE 0.8 0.7* 0.7*   GLUCOSE 115* 110* 122*   CALCIUM 7.6* 7.6* 7.8*   MG 2.20 1.80 2.10   PHOS 3.6 2.6 2.3*   ANIONGAP 8 12 12     Hepatic:   Recent Labs     08/05/19 0355 08/06/19 0522 08/07/19  0403   LABALBU 1.9* 2.0* 2.1*     Troponin: No results for input(s): TROPONINI in the last 72 hours. BNP: No results for input(s): BNP in the last 72 hours. Lipids: No results for input(s): CHOL, HDL in the last 72 hours.     Invalid input(s): LDLCALCU, TRIGLYCERIDE  ABGs:    Recent Labs     08/06/19  1002 08/07/19  0932   PHART 7.516* 7.553*   PAQ6DBW 22.7* 21.2*   PO2ART 54.2* 154.0* pneumonia. Respiratory cultures grew MRSA and Klebsiella. WBC down to 8.8. Chest Xray 8/6 shows Bibasilar airspace consolidative opacities similar to prior Xray. Bronchoscopy done 8/7 removed a fair amount of mucous, sent for culture and diatherix. Has lessening secretions  - Continue Vanc and Merem  - Continue contact precautions for MRSA. - Continue IVF     Hypoxic Respiratory Failure:   Intubated on 30% FiO2 and PEEP of 5. Failed spontaneous breathing trail today. Will do therapeutic bronchoscopy   - Continue daily SBT, with goal of extubation    Erosive Esophagitis  HgB 7.3 this morning.   - Continue IV Protonix  - Continue to monitor H/H  - Transfuse HgB < 7     Acute Kidney Injury-improving  - Keep Mg+ above 2  - Creatinine 0.8, BUN 23.  - Nephrology on board     Malnutrition  - Continue tube feeds.     Hypernatremia  - Improved NA+ 140      Code Status: Full        [place]  PPX: SCD, Protonix, SQ Heparin  Dali Morocho MD, PGY-1  08/07/19  11:39 AM    This patient has been staffed and discussed with Dr. Umu Teran MD.    [facility]     Patient seen and examined. I agree with Dr. Rod's history, physical, lab findings, assessment and plan.        SBT - Concluded at  927.     Weaning Parameters/VS's at conclusion of SBT:        VE:        14.4 L        RR:        23 b/m        VT:        626 mL (average VT = VE/RR)        RSBI:    38 (RR/VT in liters)        ETCO2: 17 cmH2O        SPO2:   100 %         If on sedation, amount and type Precedex 1.4    Assessment  1.  Acute hypoxemic/hypercapnic respiratory failure  2.  Aspiration pneumonia -Diatherix positive for Klebsiella and MRSA   3.  Mucous plugs on bronchoscopy July 30  4.  Shock -resolved  5.  Acute encephalopathy/alcohol withdrawal -much improved  6.  AVIVA -resolved     Plan  1.  Continue meropenem and vancomycin  2.    Continue hypertonic saline and Xopenex  3.  Passed SBT today.   He has much less secretions

## 2019-08-07 NOTE — PROGRESS NOTES
MECHANICAL VENTILATION WEANING PROTOCOL    PRE-TRIAL PATIENT ASSESSMENT - COMPLETED     Ventilatory Assessment:    PARAMETER CRITERIA FOR WEANING   Spontaneous Cough:  Yes    Sputum Characteristics:  · Sputum Amount: Small  · Tenacity: Thick  · Sputum Color: Tan SPONTANEOUS COUGH With small to moderate  Amount of secretions   FiO2 : 100 % FIO2 less than or equal to 50%     PEEP less than or equal to 8   Progressive Mobility Protocol  No     ABG:  Lab Results   Component Value Date    PHART 7.516 08/06/2019    SXK0EGQ 22.7 08/06/2019    PO2ART 54.2 08/06/2019    [id number] 92 08/06/2019    XAV4JOT 18.4 08/06/2019    BEART -5 08/06/2019     HGB/WBC:  Lab Results   Component Value Date    HGB 7.3 08/07/2019    WBC 8.8 08/07/2019        Vital Signs:    PARAMETER CRITERIA FOR WEANING Meets Criteria   Pulse: 90 Within patient's normal limits / stable Yes   Resp: 26 Less than or equal to 30 Yes   BP: 104/60 Within patient's normal limits / minimal pressors (Hemodynamically Stable) Yes   SpO2: 100 % Greater than or equal to 90% Yes   End Tidal CO2: 15 (%) Within patient's normal limits Yes   Temp: 98.5 °F (36.9 °C) Less than 38. 5oC / 101. 3oF Yes     [x]    Based on this assessment and the [facility] Ventilator Weaning Protocol, this patient  IS being placed on a Spontaneous Breathing Trial (SBT) at this time.  []    Based on this assessment and the [facility] Ventilator Weaning Protocol, this patient  IS NOT being placed on a Spontaneous Breathing Trial (SBT) at this time. []    Patient  IS NOT being placed on a Spontaneous Breathing Trial (SBT) at this time because of factors not previously addressed.   Those factors include        SBT - Initiated at  818    Ventilator Settings:  CPAP - 5 cmH2O, PS - 8 cmH2O(if using settings other than CPAP 5/PS 8, please explain reasons for settings here):       1 Minute SBT Respiratory Parameters:   VE: 16.1 L   RR: 26 b/m   VT: 619 mL (average VT = VE/RR)   RSBI: 42 (RR/VT in liters)   ETCO2: 14 cmH2O   SPO2: 100 %   If on sedation, amount and type precedex 1.4    [x]   RR is less than 35, RSBI is less than 100, patient's vitals signs are stable, and patient is in no apparent distress; therefore, patient is being left on SBT for up to 1 hour. []   RR is greater than 35, RSBI is greater than 100, VS's are unstable, or patient is in distress; therefore, patient is being placed back on previous settings. SBT - Concluded at  927. Weaning Parameters/VS's at conclusion of SBT:   VE: 14.4 L   RR: 23 b/m   VT: 626 mL (average VT = VE/RR)   RSBI: 38 (RR/VT in liters)   ETCO2: 17 cmH2O   SPO2: 100 %    If on sedation, amount and type Precedex 1.4    [x]   Patient tolerated SBT for full 60 minutes with acceptable weaning parameters and vital signs and showed no signs or distress. []   Patient tolerated SBT for full 60 minutes, but had unacceptable weaning parameters or vital signs, and/or signs of distress. []   Patient was unable to tolerate SBT for 60 minutes and was placed back on previous settings.             COMMENTS:

## 2019-08-07 NOTE — PROGRESS NOTES
extremities on command  Psych: Unable to assess, on vent    Labs:   Recent Labs     08/05/19  0342 08/06/19  0522 08/07/19  0403   WBC 10.0 9.4 8.8   HGB 7.4* 7.3* 7.3*   HCT 22.1* 21.7* 21.9*    356 415     Recent Labs     08/05/19  0355 08/06/19  0522 08/07/19  0403   * 141 139   K 3.8 3.3* 3.8   * 109 107   CO2 22 20* 20*   BUN 23* 21* 17   CREATININE 0.8 0.7* 0.7*   CALCIUM 7.6* 7.6* 7.8*   PHOS 3.6 2.6 2.3*     No results for input(s): AST, ALT, BILIDIR, BILITOT, ALKPHOS in the last 72 hours. No results for input(s): INR in the last 72 hours. No results for input(s): Darian Salcedo in the last 72 hours. Urinalysis:      Lab Results   Component Value Date    NITRU Negative 07/27/2019    WBCUA 0-2 07/27/2019    RBCUA 20-50 07/27/2019    BLOODU LARGE 07/27/2019    SPECGRAV 1.020 07/27/2019    GLUCOSEU Negative 07/27/2019       Radiology:  XR CHEST PORTABLE   Final Result   Impression: Stable evaluation of the chest, including multifocal airspace disease. CT ABDOMEN PELVIS W IV CONTRAST Additional Contrast? None   Final Result   1. Interval development of patchy infiltrates and patchy areas of consolidation within the lower lobes. Correlate for pneumonia. 2. Uncomplicated sigmoid colon diverticulosis. 3. Stable aortic aneurysm. 4. Stable T12 compression fracture. XR CHEST 1 VW   Final Result      Lines and tubes as above. Multifocal airspace disease, similar to the prior study. MRI BRAIN WO CONTRAST   Final Result      1. Overall limited exam due to sessile motion artifact. Repeated imaging sequences with little benefit. No acute intracranial abnormality identified. 2. Ethmoid and sphenoid sinus disease with air-fluid level. Findings may reflect altered mental status and retained secretions.    3. Minimal nonspecific periventricular white matter signal and body on FLAIR imaging suggesting mild chronic small vessel ischemic disease and/or age related ([facility] [place] [facility] [F11.20]    AVIVA (acute kidney injury) ([facility] [N17.9]    Acute kidney failure ([facility] [place] [facility] [Z82.3]       #Metabolic encephalopathy:   Ct head without contrast showed atrophy but no acute intracranial process. MRI 08/01 showed mild chronic small vessel ischemic changes otherwise no acute abnormalities. EEG with generalized slowing nonspecific, no epileptiform discharges seen so far; Keppra has been stopped per neurology. On admission to ICU was on Cefepime and metro and these were stopped and changed to [facility] 07/31  -Lumbar puncture; Colorless, 1 WBC, Normal blood glucose, protein; Unremarkable results including Meningitis encephalitis panel was negative  - Agree with Neurology: Repeat MRI is warranted to rule out stroke.  If MRI is reassuring likely metabolic encephalopathy related to renal failure and pneumonia with respiratory failure. If MRI of the brain is positive will need vessel imaging CTA of the head/neck    #Errosive esophagitis: - s/p EGD 07/30 -- Errosive esophagitis. Continue PPI bid. s/p 1 units pRBC. Hgb trend stable, Transfuse for Hgb < 7.0    #Acute hypoxic resp failure likely from aspiration pneumonia with Klebsiella plus MRSA; with thick retained secretions  -CT chest without contrast with small bilateral effusions, multifocal pneumonia in the right upper and bilateral lobes concerning for aspiration  - On broad spectrum coverage; s/p bronchoscopy  - Continue breathing rx with hypertonic saline  - Bronch 08/06; thick, purulent secretions throughout the bilateral lower lobe airways with associated mucous plugs. BAL bacterial cx and Diatherix was sent   - Passed SBT today, ABG alkalotic but otherwise was able to wean for > 1 hours  - Plan for extubation tomorrow as getting MRI with sedation today      Addressed/Resolved problems:  Alcohol withdrawal with DT POA  - S/p CIWA protocol and high dose thiamine.     #Elevated troponin: likely demand ischemia  Wall motion abnormalities on ECHO noted

## 2019-08-07 NOTE — PROGRESS NOTES
decreased right greater than left pleural effusions. 3.  Right greater than left basilar atelectasis versus consolidation. 4.  No pneumothorax radiographically evident. 5.  Tubes/lines as above. XR CHEST PORTABLE   Final Result      1. Persistent hazy perihilar, groundglass basilar consolidations and pleural effusions greater in the right lung   2. NG tube in place as well as a right IJ central venous catheter with the tip in the mid to distal SVC, no worsening               XR CHEST PORTABLE   Final Result      Introduction of NG tube with tip excluded from the inferior edge of the film. Moderate bilateral effusions and diffuse groundglass opacities consistent with edema, infection or aspiration. This appears worse since the previous study            XR ABDOMEN (KUB) (SINGLE AP VIEW)   Final Result      Nasogastric tube extending into the left upper quadrant. XR CHEST PORTABLE   Final Result      Persistent right basilar infiltrate. XR CHEST 1 VW   Final Result      Tip of the right IJ central venous catheter overlies lower SVC with no pneumothorax evident. CT HEAD WO CONTRAST   Final Result      1. No acute intracranial process. XR CHEST PORTABLE   Final Result      Interval development of bilateral lower lobe opacities may relate to edema or pneumonia      CT SOFT TISSUE NECK WO CONTRAST   Final Result   Unremarkable CT neck          XR CHEST PORTABLE   Final Result     Normal chest x-ray. CT ABDOMEN PELVIS WO CONTRAST Additional Contrast? None   Final Result   No acute abnormality demonstrated in the abdomen or pelvis. EE19  Generalized slowing which is a non-specific finding consistent   with a generalized disturbance of cerebral functioning including   toxic, metabolic, or structural abnormalities that are   multi-focal or diffuse.     Triphasic waves which are a non-specific finding associated with   a process diffusely affecting the cerebrum including toxic   (including cefepime), metabolic, and post-hypoxic   processes--particularly hepatic or renal failure rarely   associated with epileptic seizures. Clinical correlation is recommended.  The absence of epileptiform discharges on a single EEG does not rule out a diagnosis of    epilepsy or rule out non-convulsive or complex partial status   epilepticus as a cause of altered mental status    ECHO:07/24/19  Summary   Left ventricular cavity size is normal. There is mild concentric left   ventricular hypertrophy. Left ventricular function is borderline normal with   LVEF 50%. The basal inferior and inferolateral walls are hypokinetic.   Abnormal septal motion is present. Diastolic filling parameters suggest   grade I diastolic dysfunction.  IVC size is dilated (>2.1 cm) and collapses < 50% with respiration   consistent with elevated RA pressure (15 mmHg). Estimated pulmonary artery   systolic pressure is at 44 mmHg assuming a right atrial pressure of 15 mmHg.         Scheduled Meds:   potassium phosphate IVPB  15 mmol Intravenous Once    vancomycin  1,000 mg Intravenous Q12H    sodium chloride (Inhalant)  15 mL Nebulization Q4H    meropenem  1 g Intravenous Q8H    sodium chloride flush  10 mL Intravenous 2 times per day    metoprolol tartrate  25 mg Oral BID    levetiracetam  500 mg Intravenous Q12H    levalbuterol  1.25 mg Nebulization 6 times per day    pantoprazole  40 mg Intravenous BID    VENELEX   Topical BID    chlorhexidine  15 mL Mouth/Throat BID    heparin (porcine)  5,000 Units Subcutaneous 3 times per day    atorvastatin  20 mg Oral Nightly    aspirin  81 mg Oral Daily       IMPRESSION:  71 yo with encephalopathy. 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 His mentation is improving. CSF studies reassuring. 1. AMS  2. Dysphagia  3. Hoarseness  4. AVIVA resolved  5. Pneumonia   6. Lung mass      RECOMMENDATIONS:   -Repeat MRI is warranted to rule out stroke. If MRI is reassuring likely metabolic encephalopathy related to renal failure and pneumonia with respiratory failure.  -If MRI of the brain is positive will need vessel imaging CTA of the head/neck  -EEG with no epileptogenic features no reported seizures will d/c keppra.    -Continue ASA and statin therapy daily.  -Wean off all sedatives  -CSF studies pending.   -Workup for lung mass as per Primary medical team.   -Treatment of all infectious processes and metabolic disturbances as per Primary Medical team.        A copy of this note was provided for MD Laurel Head, APRN-CNP  [address] [place] [address] [id number] Neuroscience  [phone]  Evenings, weekends, and off weeks please discuss with the covering neurologist.

## 2019-08-07 NOTE — PLAN OF CARE
Problem: Pain:  Goal: Pain level will decrease  Description  Pain level will decrease  8/6/2019 1532 by Yeison Thakur RN  Outcome: Ongoing  Note:   CPOT scale used this shift. See pain assessment for details. Problem: Falls - Risk of:  Goal: Will remain free from falls  Description  Patient has been free from falls and injuries this shift. Chaudhry fall risk assessed. Bed locked in lowest position, alarm engaged. Fall bracelet in place, fall sign present outside door, fall socks present on patient. Patients call light within reach. Patient educated on use of call light. Room free of clutter. Will continue to round and assess needs. Problem: Restraint Use - Nonviolent/Non-Self-Destructive Behavior:  Goal: Absence of restraint indications  Description  Absence of restraint indications  Pt remains in bilateral soft wrist restraints due poor safety awareness and pulling at lines/tubes/equipments. Visual checks every  hour and restraint need/assessment re-evaluated every 2 hours per hospital policy. See restraint flowsheet. Goal is for patient to remain free of physical, harm/injury. Will continue to monitor. Problem: Nutrition  Goal: Optimal nutrition therapy  Description  Patient remains on tube feeds this shift. Jevity 1.2 currently running. Problem: Infection - Central Venous Catheter-Associated Bloodstream Infection:  Goal: Will show no infection signs and symptoms  Description  CVC site clean, dry, intact. CVC dressing clean, dry, intact, occlusive. Pt. Denies tenderness. No drainage or redness at site. Will continue to monitor with each assessment and as needed. Problem: Urinary Elimination:  Goal: Signs and symptoms of infection will decrease  Description  Signs and symptoms of infection will decrease  No signs of infection at this time. Urine is clear, yellow. Choi care is completed Qshift. Will cont to monitor.

## 2019-08-07 NOTE — PROGRESS NOTES
AM assessment complete. Patient alert but unable to answer questions due to ETT. Patient nods and shakes head no appropriately at times but other times just stares and doesn't do anything. Occasionally patient will do the opposite of what is asked of him, such as when this RN asked patient to lift head up so the pillow could be adjusted but patient pushed head further into bed. Unsure if patient doing this on purpose or unable to understand the direction. Medications given through NG without issue. Bed alarm on, brake set, bed in lowest position. Will continue to monitor.

## 2019-08-08 LAB
ALBUMIN SERPL-MCNC: 2 G/DL (ref 3.4–5)
ANION GAP SERPL CALCULATED.3IONS-SCNC: 11 MMOL/L (ref 3–16)
BASE EXCESS ARTERIAL: 1 (ref -3–3)
BASE EXCESS ARTERIAL: 1 (ref -3–3)
BASOPHILS ABSOLUTE: 0.1 K/UL (ref 0–0.2)
BASOPHILS RELATIVE PERCENT: 0.7 %
BUN BLDV-MCNC: 16 MG/DL (ref 7–20)
CALCIUM SERPL-MCNC: 7.8 MG/DL (ref 8.3–10.6)
CHLORIDE BLD-SCNC: 107 MMOL/L (ref 99–110)
CO2: 23 MMOL/L (ref 21–32)
CREAT SERPL-MCNC: 0.7 MG/DL (ref 0.8–1.3)
EOSINOPHILS ABSOLUTE: 0.1 K/UL (ref 0–0.6)
EOSINOPHILS RELATIVE PERCENT: 1.3 %
GFR AFRICAN AMERICAN: >60
GFR NON-AFRICAN AMERICAN: >60
GLUCOSE BLD-MCNC: 107 MG/DL (ref 70–99)
GLUCOSE BLD-MCNC: 113 MG/DL (ref 70–99)
GLUCOSE BLD-MCNC: 117 MG/DL (ref 70–99)
GLUCOSE BLD-MCNC: 120 MG/DL (ref 70–99)
GLUCOSE BLD-MCNC: 133 MG/DL (ref 70–99)
GLUCOSE BLD-MCNC: 83 MG/DL (ref 70–99)
GLUCOSE BLD-MCNC: 84 MG/DL (ref 70–99)
HCO3 ARTERIAL: 24.7 MMOL/L (ref 21–29)
HCO3 ARTERIAL: 25 MMOL/L (ref 21–29)
HCT VFR BLD CALC: 22.3 % (ref 40.5–52.5)
HEMOGLOBIN: 7.4 G/DL (ref 13.5–17.5)
LYMPHOCYTES ABSOLUTE: 0.6 K/UL (ref 1–5.1)
LYMPHOCYTES RELATIVE PERCENT: 8.5 %
MAGNESIUM: 1.8 MG/DL (ref 1.8–2.4)
MCH RBC QN AUTO: 30.9 PG (ref 26–34)
MCHC RBC AUTO-ENTMCNC: 33.3 G/DL (ref 31–36)
MCV RBC AUTO: 92.8 FL (ref 80–100)
MONOCYTES ABSOLUTE: 0.6 K/UL (ref 0–1.3)
MONOCYTES RELATIVE PERCENT: 7.9 %
NEUTROPHILS ABSOLUTE: 6 K/UL (ref 1.7–7.7)
NEUTROPHILS RELATIVE PERCENT: 81.6 %
O2 SAT, ARTERIAL: 53 % (ref 93–100)
O2 SAT, ARTERIAL: 56 % (ref 93–100)
PCO2 ARTERIAL: 36.7 MM HG (ref 35–45)
PCO2 ARTERIAL: 37.6 MM HG (ref 35–45)
PDW BLD-RTO: 14.1 % (ref 12.4–15.4)
PERFORMED ON: ABNORMAL
PERFORMED ON: NORMAL
PERFORMED ON: NORMAL
PH ARTERIAL: 7.43 (ref 7.35–7.45)
PH ARTERIAL: 7.43 (ref 7.35–7.45)
PHOSPHORUS: 2.6 MG/DL (ref 2.5–4.9)
PLATELET # BLD: 469 K/UL (ref 135–450)
PMV BLD AUTO: 8.6 FL (ref 5–10.5)
PO2 ARTERIAL: 27.2 MM HG (ref 75–108)
PO2 ARTERIAL: 28.2 MM HG (ref 75–108)
POC SAMPLE TYPE: ABNORMAL
POC SAMPLE TYPE: ABNORMAL
POTASSIUM SERPL-SCNC: 3 MMOL/L (ref 3.5–5.1)
RBC # BLD: 2.4 M/UL (ref 4.2–5.9)
SODIUM BLD-SCNC: 141 MMOL/L (ref 136–145)
TCO2 ARTERIAL: 26 MMOL/L
TCO2 ARTERIAL: 26 MMOL/L
WBC # BLD: 7.3 K/UL (ref 4–11)

## 2019-08-08 PROCEDURE — 2000000000 HC ICU R&B

## 2019-08-08 PROCEDURE — 94770 HC ETCO2 MONITOR DAILY: CPT

## 2019-08-08 PROCEDURE — 2580000003 HC RX 258: Performed by: INTERNAL MEDICINE

## 2019-08-08 PROCEDURE — 94003 VENT MGMT INPAT SUBQ DAY: CPT

## 2019-08-08 PROCEDURE — 80069 RENAL FUNCTION PANEL: CPT

## 2019-08-08 PROCEDURE — 6370000000 HC RX 637 (ALT 250 FOR IP): Performed by: INTERNAL MEDICINE

## 2019-08-08 PROCEDURE — 6360000002 HC RX W HCPCS: Performed by: INTERNAL MEDICINE

## 2019-08-08 PROCEDURE — 94761 N-INVAS EAR/PLS OXIMETRY MLT: CPT

## 2019-08-08 PROCEDURE — 82947 ASSAY GLUCOSE BLOOD QUANT: CPT

## 2019-08-08 PROCEDURE — 36592 COLLECT BLOOD FROM PICC: CPT

## 2019-08-08 PROCEDURE — 36415 COLL VENOUS BLD VENIPUNCTURE: CPT

## 2019-08-08 PROCEDURE — 99291 CRITICAL CARE FIRST HOUR: CPT | Performed by: INTERNAL MEDICINE

## 2019-08-08 PROCEDURE — 2580000003 HC RX 258: Performed by: STUDENT IN AN ORGANIZED HEALTH CARE EDUCATION/TRAINING PROGRAM

## 2019-08-08 PROCEDURE — 94640 AIRWAY INHALATION TREATMENT: CPT

## 2019-08-08 PROCEDURE — 83735 ASSAY OF MAGNESIUM: CPT

## 2019-08-08 PROCEDURE — 2500000003 HC RX 250 WO HCPCS: Performed by: STUDENT IN AN ORGANIZED HEALTH CARE EDUCATION/TRAINING PROGRAM

## 2019-08-08 PROCEDURE — 2700000000 HC OXYGEN THERAPY PER DAY

## 2019-08-08 PROCEDURE — 94750 HC PULMONARY COMPLIANCE STUDY: CPT

## 2019-08-08 PROCEDURE — C9113 INJ PANTOPRAZOLE SODIUM, VIA: HCPCS | Performed by: INTERNAL MEDICINE

## 2019-08-08 PROCEDURE — 6360000002 HC RX W HCPCS: Performed by: STUDENT IN AN ORGANIZED HEALTH CARE EDUCATION/TRAINING PROGRAM

## 2019-08-08 PROCEDURE — 2580000003 HC RX 258

## 2019-08-08 PROCEDURE — 6360000002 HC RX W HCPCS: Performed by: SURGERY

## 2019-08-08 PROCEDURE — 85025 COMPLETE CBC W/AUTO DIFF WBC: CPT

## 2019-08-08 PROCEDURE — 6370000000 HC RX 637 (ALT 250 FOR IP): Performed by: STUDENT IN AN ORGANIZED HEALTH CARE EDUCATION/TRAINING PROGRAM

## 2019-08-08 PROCEDURE — 82803 BLOOD GASES ANY COMBINATION: CPT

## 2019-08-08 PROCEDURE — 31720 CLEARANCE OF AIRWAYS: CPT

## 2019-08-08 RX ORDER — QUETIAPINE FUMARATE 25 MG/1
25 TABLET, FILM COATED ORAL ONCE
Status: COMPLETED | OUTPATIENT
Start: 2019-08-08 | End: 2019-08-08

## 2019-08-08 RX ORDER — POTASSIUM CHLORIDE 29.8 MG/ML
20 INJECTION INTRAVENOUS
Status: COMPLETED | OUTPATIENT
Start: 2019-08-08 | End: 2019-08-08

## 2019-08-08 RX ORDER — FUROSEMIDE 10 MG/ML
40 INJECTION INTRAMUSCULAR; INTRAVENOUS 2 TIMES DAILY
Status: COMPLETED | OUTPATIENT
Start: 2019-08-08 | End: 2019-08-08

## 2019-08-08 RX ADMIN — Medication 15 ML: at 08:47

## 2019-08-08 RX ADMIN — LEVALBUTEROL HYDROCHLORIDE 1.25 MG: 1.25 SOLUTION, CONCENTRATE RESPIRATORY (INHALATION) at 08:00

## 2019-08-08 RX ADMIN — DEXMEDETOMIDINE 1.2 MCG/KG/HR: 100 INJECTION, SOLUTION, CONCENTRATE INTRAVENOUS at 10:50

## 2019-08-08 RX ADMIN — Medication 15 ML: at 21:31

## 2019-08-08 RX ADMIN — SODIUM CHLORIDE 30 MG/ML INHALATION SOLUTION 15 ML: 30 SOLUTION INHALANT at 00:50

## 2019-08-08 RX ADMIN — SODIUM CHLORIDE 250 ML: 9 INJECTION, SOLUTION INTRAVENOUS at 00:43

## 2019-08-08 RX ADMIN — HEPARIN SODIUM 5000 UNITS: 5000 INJECTION INTRAVENOUS; SUBCUTANEOUS at 21:31

## 2019-08-08 RX ADMIN — FUROSEMIDE 40 MG: 10 INJECTION, SOLUTION INTRAMUSCULAR; INTRAVENOUS at 10:18

## 2019-08-08 RX ADMIN — LEVALBUTEROL HYDROCHLORIDE 1.25 MG: 1.25 SOLUTION, CONCENTRATE RESPIRATORY (INHALATION) at 12:15

## 2019-08-08 RX ADMIN — SODIUM CHLORIDE 30 MG/ML INHALATION SOLUTION 15 ML: 30 SOLUTION INHALANT at 12:15

## 2019-08-08 RX ADMIN — POTASSIUM CHLORIDE 20 MEQ: 29.8 INJECTION, SOLUTION INTRAVENOUS at 14:18

## 2019-08-08 RX ADMIN — FUROSEMIDE 40 MG: 10 INJECTION, SOLUTION INTRAMUSCULAR; INTRAVENOUS at 18:01

## 2019-08-08 RX ADMIN — CASTOR OIL AND BALSAM, PERU: 788; 87 OINTMENT TOPICAL at 08:30

## 2019-08-08 RX ADMIN — VANCOMYCIN HYDROCHLORIDE 1000 MG: 10 INJECTION, POWDER, LYOPHILIZED, FOR SOLUTION INTRAVENOUS at 08:45

## 2019-08-08 RX ADMIN — POTASSIUM CHLORIDE 20 MEQ: 29.8 INJECTION, SOLUTION INTRAVENOUS at 12:00

## 2019-08-08 RX ADMIN — SODIUM CHLORIDE 30 MG/ML INHALATION SOLUTION 15 ML: 30 SOLUTION INHALANT at 08:00

## 2019-08-08 RX ADMIN — QUETIAPINE FUMARATE 25 MG: 25 TABLET ORAL at 23:15

## 2019-08-08 RX ADMIN — CASTOR OIL AND BALSAM, PERU: 788; 87 OINTMENT TOPICAL at 20:17

## 2019-08-08 RX ADMIN — PANTOPRAZOLE SODIUM 40 MG: 40 INJECTION, POWDER, LYOPHILIZED, FOR SOLUTION INTRAVENOUS at 08:28

## 2019-08-08 RX ADMIN — HEPARIN SODIUM 5000 UNITS: 5000 INJECTION INTRAVENOUS; SUBCUTANEOUS at 05:44

## 2019-08-08 RX ADMIN — HEPARIN SODIUM 5000 UNITS: 5000 INJECTION INTRAVENOUS; SUBCUTANEOUS at 14:16

## 2019-08-08 RX ADMIN — Medication 10 ML: at 08:29

## 2019-08-08 RX ADMIN — METOPROLOL TARTRATE 25 MG: 25 TABLET ORAL at 08:28

## 2019-08-08 RX ADMIN — SODIUM CHLORIDE 30 MG/ML INHALATION SOLUTION 15 ML: 30 SOLUTION INHALANT at 20:20

## 2019-08-08 RX ADMIN — METOPROLOL TARTRATE 25 MG: 25 TABLET ORAL at 20:15

## 2019-08-08 RX ADMIN — ATORVASTATIN CALCIUM 20 MG: 20 TABLET, FILM COATED ORAL at 20:15

## 2019-08-08 RX ADMIN — ASPIRIN 81 MG 81 MG: 81 TABLET ORAL at 08:29

## 2019-08-08 RX ADMIN — LEVALBUTEROL HYDROCHLORIDE 1.25 MG: 1.25 SOLUTION, CONCENTRATE RESPIRATORY (INHALATION) at 17:12

## 2019-08-08 RX ADMIN — DEXMEDETOMIDINE 1.4 MCG/KG/HR: 100 INJECTION, SOLUTION, CONCENTRATE INTRAVENOUS at 05:51

## 2019-08-08 RX ADMIN — PANTOPRAZOLE SODIUM 40 MG: 40 INJECTION, POWDER, LYOPHILIZED, FOR SOLUTION INTRAVENOUS at 20:16

## 2019-08-08 RX ADMIN — SODIUM CHLORIDE 30 MG/ML INHALATION SOLUTION 15 ML: 30 SOLUTION INHALANT at 17:12

## 2019-08-08 RX ADMIN — Medication 10 ML: at 20:16

## 2019-08-08 RX ADMIN — DEXMEDETOMIDINE 0.2 MCG/KG/HR: 100 INJECTION, SOLUTION, CONCENTRATE INTRAVENOUS at 21:59

## 2019-08-08 RX ADMIN — LEVALBUTEROL HYDROCHLORIDE 1.25 MG: 1.25 SOLUTION, CONCENTRATE RESPIRATORY (INHALATION) at 20:20

## 2019-08-08 RX ADMIN — DEXMEDETOMIDINE 1.4 MCG/KG/HR: 100 INJECTION, SOLUTION, CONCENTRATE INTRAVENOUS at 01:23

## 2019-08-08 RX ADMIN — LEVALBUTEROL HYDROCHLORIDE 1.25 MG: 1.25 SOLUTION, CONCENTRATE RESPIRATORY (INHALATION) at 00:50

## 2019-08-08 RX ADMIN — POTASSIUM CHLORIDE 20 MEQ: 29.8 INJECTION, SOLUTION INTRAVENOUS at 10:14

## 2019-08-08 RX ADMIN — MEROPENEM 1 G: 1 INJECTION, POWDER, FOR SOLUTION INTRAVENOUS at 03:29

## 2019-08-08 RX ADMIN — POTASSIUM CHLORIDE 20 MEQ: 29.8 INJECTION, SOLUTION INTRAVENOUS at 06:42

## 2019-08-08 ASSESSMENT — PULMONARY FUNCTION TESTS
PIF_VALUE: 15
PIF_VALUE: 15
PIF_VALUE: 16
PIF_VALUE: 19
PIF_VALUE: 13
PIF_VALUE: 17
PIF_VALUE: 13
PIF_VALUE: 15

## 2019-08-08 ASSESSMENT — PAIN SCALES - GENERAL
PAINLEVEL_OUTOF10: 0

## 2019-08-08 NOTE — PROGRESS NOTES
TRIGLYCERIDE  ABGs:    Recent Labs     08/07/19  0932 08/08/19  1129 08/08/19  1148   PHART 7.553* 7.435 7.431   VOS6GIU 21.2* 36.7 37.6   PO2ART 154.0* 28.2* 27.2*   JWW3YWN 18.6* 24.7 25.0   BEART -4* 1 1   [id number] 100 56* 53*   IFR4HNW 19 26 26       INR: No results for input(s): INR in the last 72 hours. Lactate: No results for input(s): LACTATE in the last 72 hours. Cultures:  -----------------------------------------------------------------  RAD:   MRI BRAIN W WO CONTRAST   Final Result      1. No acute intracranial abnormality. No evidence of acute ischemia. 2. Bilateral mastoid air cell effusions, middle ear effusions, and fluid in the nasopharynx and sphenoid sinuses suggesting retained secretions and altered mental status. XR CHEST PORTABLE   Final Result   Impression: Stable evaluation of the chest, including multifocal airspace disease. CT ABDOMEN PELVIS W IV CONTRAST Additional Contrast? None   Final Result   1. Interval development of patchy infiltrates and patchy areas of consolidation within the lower lobes. Correlate for pneumonia. 2. Uncomplicated sigmoid colon diverticulosis. 3. Stable aortic aneurysm. 4. Stable T12 compression fracture. XR CHEST 1 VW   Final Result      Lines and tubes as above. Multifocal airspace disease, similar to the prior study. MRI BRAIN WO CONTRAST   Final Result      1. Overall limited exam due to sessile motion artifact. Repeated imaging sequences with little benefit. No acute intracranial abnormality identified. 2. Ethmoid and sphenoid sinus disease with air-fluid level. Findings may reflect altered mental status and retained secretions. 3. Minimal nonspecific periventricular white matter signal and body on FLAIR imaging suggesting mild chronic small vessel ischemic disease and/or age related degenerative change. XR CHEST PORTABLE   Final Result      1.   Multifocal airspace disease consistent with pneumonia as described. This has progressed in the right upper and left lower lobes when compared to the prior exam.  Correlate clinically. CT HEAD WO CONTRAST   Final Result   1. Mild atrophy. 2.  No evidence of an acute intracranial process. XR CHEST PORTABLE   Final Result      New consolidation in the right lower lung compatible with atelectasis or pneumonia. Aspiration is in the differential diagnosis. Prominent interstitial markings in a perihilar distribution again noted. Stable cardiac mediastinal silhouette. Lines and tubes without change. XR CHEST PORTABLE   Final Result   Addendum 1 of 1      Patient: Joan Brown  Time Out: 02:22   Exam(s): FILM CXR 1 VIEW        ADDENDUM - Added by Bebo Conrad MD on 7/29/2019 2:22 AM (-07:00)   IMPRESSION:        ET tube terminates 6.1 cm  FROM  the fransico.   ----------------------------------------------------------------------       EXAM:     XR Chest, 1 View       CLINICAL HISTORY:      Reason for exam: intubation. Reason for exam:->intubation. TECHNIQUE:     Frontal view of the chest.       COMPARISON:       Chest x-ray 7/20/19       IMPRESSION:        ET tube terminates 6.1 cm in the fransico. Final      CT CHEST WO CONTRAST   Final Result      1. Small bilateral pleural effusions. Multifocal pneumonia in the right upper and bilateral lower lobes. 2. 13 mm right upper lobe spiculated nodule suspicious for malignancy. Recommend PET/CT or biopsy. Additional nonspecific areas of nodular consolidation in the right lower lobe. Qzxv      XR CHEST PORTABLE   Final Result   1. Persistent but improved central pulmonary vascular congestion with    diffusely increased interstitial markings still present. 2.  Mildly decreased right greater than left pleural effusions. 3.  Right greater than left basilar atelectasis versus consolidation. 4.  No pneumothorax radiographically evident. 5.  Tubes/lines as above.           XR CHEST RSBI of 43.  - Pt successfully extubated    Erosive Esophagitis  HgB 7.4 this morning.   - Continue IV Protonix  - Continue to monitor H/H  - Transfuse HgB < 7     Acute Kidney Injury-improving  - Keep Mg+ above 2  - Creatinine 0.7, BUN 16.  - Nephrology on board     Malnutrition  - Continue tube feeds.     Hypernatremia  - Improved NA+ 141      Code Status: Full        FEN:   PPX: SCD, Protonix, SQ Heparin  Valentina Mcdaniel MD, PGY-1  08/08/19  7:48 PM    This patient has been staffed and discussed with Dr. Orlando Gordon MD.

## 2019-08-08 NOTE — PROGRESS NOTES
cmH2O   SPO2: 100 %   If on sedation, amount and type     [x]   RR is less than 35, RSBI is less than 100, patient's vitals signs are stable, and patient is in no apparent distress; therefore, patient is being left on SBT for up to 1 hour. []   RR is greater than 35, RSBI is greater than 100, VS's are unstable, or patient is in distress; therefore, patient is being placed back on previous settings. SBT - Concluded at  1238. Weaning Parameters/VS's at conclusion of SBT:   VE: 12.5 L   RR: 574 b/m   VT: 25 mL (average VT = VE/RR)   RSBI: 43 (RR/VT in liters)   ETCO2: 18 cmH2O   SPO2: 100 %    If on sedation, amount and type     [x]   Patient tolerated SBT for full 60 minutes with acceptable weaning parameters and vital signs and showed no signs or distress. []   Patient tolerated SBT for full 60 minutes, but had unacceptable weaning parameters or vital signs, and/or signs of distress. []   Patient was unable to tolerate SBT for 60 minutes and was placed back on previous settings.             COMMENTS:

## 2019-08-08 NOTE — PROGRESS NOTES
[facility]   [phone]  [facility]  Inpatient Progress note     REASON FOR CONSULTATION:    AVIVA    INTERVAL Plan  Magnesium is better  Creatinine stable. Overall improving. Replace KCL  Lasix X 2 today         Acute kidney injury  Likely acute tubular necrosis  Second peak of 2.4 on 7/29/19- 0.7  on 8/8/2019   Made  5.09 L urine yesterday  baseline creatinine in 12/2018 1.2-   proteinuria  258 mg, 3 to 5 red blood   Urine sodium 29. in 05/2018 one time  creatinine  was 2.1   In 08/2015, creatinine ranged from 0.8 to 1.2. CT abdomen kidneys normal   CT in 2015 mention some bilateral mild to moderate cortical  thinning of the renal cortex, no mention in 07/2019 CT. Traumatic Hematuria due to resendiz which pt tried to pull      Hypercalcemia  Improved    due to volume depletion on admission  calcium was 10.1 in 12/2018  His hypercalcemia could be due to excessive calcium intake      Hypokalemia  Potassium low-getting potassium and magnesium as needed     History of alcoholism   withdrawal  liver function tests are normal.   He does not have acute hepatitis     History of multiple lung nodules   on CT in 2015 with current weight loss little worry some. will need a CT scan of the chest to rule out any lung malignancy because long-term smoking and currentweight loss. CT of the abdomen already done, which does not show any acute  abnormality.     History of left adrenal adenoma   16 mm as seen on CT in 2015. CT this admission in 07/2019 did not mention that.     History of emphysema  as seen on previous CT of the chest in 2015.     History of abdominal aortic aneurysm   as seen on CT in 2015 was 3.4 cm.    This admission CT without contrast did not mention         [facility]  From consult note-   71-year-old  gentleman  whom I am seeing in the room where he just got Ativan because he was  agitated, pulling his lines, was trying to jump out of the window, and  is sedated and unable to

## 2019-08-08 NOTE — PROGRESS NOTES
ventilator, rhonchi heard bilaterally  Cardiovascular: tachycardic, regular rhythm  Abdomen: Soft, non-tender, non-distended with normal bowel sounds. Neuro: Able to move all extremities  Not following commands  Psych: Unable to assess    Labs:   Recent Labs     08/06/19  0522 08/07/19  0403 08/08/19  0504   WBC 9.4 8.8 7.3   HGB 7.3* 7.3* 7.4*   HCT 21.7* 21.9* 22.3*    415 469*     Recent Labs     08/06/19  0522 08/07/19  0403 08/08/19  0504    139 141   K 3.3* 3.8 3.0*    107 107   CO2 20* 20* 23   BUN 21* 17 16   CREATININE 0.7* 0.7* 0.7*   CALCIUM 7.6* 7.8* 7.8*   PHOS 2.6 2.3* 2.6     No results for input(s): AST, ALT, BILIDIR, BILITOT, ALKPHOS in the last 72 hours. No results for input(s): INR in the last 72 hours. No results for input(s): Kathlee Hylan in the last 72 hours. Urinalysis:      Lab Results   Component Value Date    NITRU Negative 07/27/2019    WBCUA 0-2 07/27/2019    RBCUA 20-50 07/27/2019    BLOODU LARGE 07/27/2019    SPECGRAV 1.020 07/27/2019    GLUCOSEU Negative 07/27/2019       Radiology:  MRI BRAIN W WO CONTRAST   Final Result      1. No acute intracranial abnormality. No evidence of acute ischemia. 2. Bilateral mastoid air cell effusions, middle ear effusions, and fluid in the nasopharynx and sphenoid sinuses suggesting retained secretions and altered mental status. XR CHEST PORTABLE   Final Result   Impression: Stable evaluation of the chest, including multifocal airspace disease. CT ABDOMEN PELVIS W IV CONTRAST Additional Contrast? None   Final Result   1. Interval development of patchy infiltrates and patchy areas of consolidation within the lower lobes. Correlate for pneumonia. 2. Uncomplicated sigmoid colon diverticulosis. 3. Stable aortic aneurysm. 4. Stable T12 compression fracture. XR CHEST 1 VW   Final Result      Lines and tubes as above. Multifocal airspace disease, similar to the prior study.       MRI BRAIN WO CONTRAST   Final Result      1. Overall limited exam due to sessile motion artifact. Repeated imaging sequences with little benefit. No acute intracranial abnormality identified. 2. Ethmoid and sphenoid sinus disease with air-fluid level. Findings may reflect altered mental status and retained secretions. 3. Minimal nonspecific periventricular white matter signal and body on FLAIR imaging suggesting mild chronic small vessel ischemic disease and/or age related degenerative change. XR CHEST PORTABLE   Final Result      1. Multifocal airspace disease consistent with pneumonia as described. This has progressed in the right upper and left lower lobes when compared to the prior exam.  Correlate clinically. CT HEAD WO CONTRAST   Final Result   1. Mild atrophy. 2.  No evidence of an acute intracranial process. XR CHEST PORTABLE   Final Result      New consolidation in the right lower lung compatible with atelectasis or pneumonia. Aspiration is in the differential diagnosis. Prominent interstitial markings in a perihilar distribution again noted. Stable cardiac mediastinal silhouette. Lines and tubes without change. XR CHEST PORTABLE   Final Result   Addendum 1 of 1      Patient: Wendie Cooks  Time Out: 02:22   Exam(s): FILM CXR 1 VIEW        ADDENDUM - Added by Myrna Kehr, MD on 7/29/2019 2:22 AM (-07:00)   IMPRESSION:        ET tube terminates 6.1 cm  FROM  the fransico.   ----------------------------------------------------------------------       EXAM:     XR Chest, 1 View       CLINICAL HISTORY:      Reason for exam: intubation. Reason for exam:->intubation. TECHNIQUE:     Frontal view of the chest.       COMPARISON:       Chest x-ray 7/20/19       IMPRESSION:        ET tube terminates 6.1 cm in the fransico. Final      CT CHEST WO CONTRAST   Final Result      1. Small bilateral pleural effusions.  Multifocal pneumonia in the right upper and bilateral lower lobes.   2. 13 mm right upper lobe spiculated nodule suspicious for malignancy. Recommend PET/CT or biopsy. Additional nonspecific areas of nodular consolidation in the right lower lobe. Qzxv      XR CHEST PORTABLE   Final Result   1. Persistent but improved central pulmonary vascular congestion with    diffusely increased interstitial markings still present. 2.  Mildly decreased right greater than left pleural effusions. 3.  Right greater than left basilar atelectasis versus consolidation. 4.  No pneumothorax radiographically evident. 5.  Tubes/lines as above. XR CHEST PORTABLE   Final Result      1. Persistent hazy perihilar, groundglass basilar consolidations and pleural effusions greater in the right lung   2. NG tube in place as well as a right IJ central venous catheter with the tip in the mid to distal SVC, no worsening               XR CHEST PORTABLE   Final Result      Introduction of NG tube with tip excluded from the inferior edge of the film. Moderate bilateral effusions and diffuse groundglass opacities consistent with edema, infection or aspiration. This appears worse since the previous study            XR ABDOMEN (KUB) (SINGLE AP VIEW)   Final Result      Nasogastric tube extending into the left upper quadrant. XR CHEST PORTABLE   Final Result      Persistent right basilar infiltrate. XR CHEST 1 VW   Final Result      Tip of the right IJ central venous catheter overlies lower SVC with no pneumothorax evident. CT HEAD WO CONTRAST   Final Result      1. No acute intracranial process. XR CHEST PORTABLE   Final Result      Interval development of bilateral lower lobe opacities may relate to edema or pneumonia      CT SOFT TISSUE NECK WO CONTRAST   Final Result   Unremarkable CT neck          XR CHEST PORTABLE   Final Result     Normal chest x-ray.           CT ABDOMEN PELVIS WO CONTRAST Additional Contrast? None   Final Result   No acute abnormality purulent secretions throughout the bilateral lower lobe airways with associated mucous plugs. BAL bacterial cx and [employer] was sent  - Extubated 08/08/2019  - continue to have significant congestion and unable to expectorate   - Keep NPO until official speech evaluation    #Hx of Luing nodules:  Right upper lobe 1.3 cm spiculated nodule concerning for malignancy  - will need further work up once more clinically stable    Addressed/Resolved problems:  #Alcohol withdrawal with DT POA: S/p CIWA protocol and high dose thiamine. #Elevated troponin: likely demand ischemia. Wall motion abnormalities on ECHO noted, cardiology signed off for now, Ischemic evaluation once clinically stable   #Septic/Hemmorhagic Shock in the setting of underlying aspiration pneumonia. Cortisol level was normal  #AVIVA POA  #Electrolyte abnormalities    PT/OT: Ordered 08/08/2019  DVT Prophylaxis: SCD's  Diet: DIET TUBE FEED VOLUME BASED NPO STANDARD WITH FIBER (Jevity 1.2); Nasogastric; Continuous; 1,320; 24  Code Status: Full Code    Dispo - Continue ICU level of care, I am worried that he is having trouble with getting secretions out and he may develop a mucus plug and desat again.  Will monitor at least for 24 hours more in the ICU  LTACH was denied for now, will need SNF placement on [address] [facility]

## 2019-08-08 NOTE — PLAN OF CARE
Problem: Pain:  Goal: Pain level will decrease  Description  Pain level will decrease  Outcome: Ongoing  Pt denies pain, will cont to monitor. Problem: Falls - Risk of:  Goal: Will remain free from falls  Description  Patient has been free from falls and injuries this shift. Chaudhry fall risk assessed each shift. Bed locked in lowest position, alarm engaged. Fall bracelet in place, fall sign present outside door, fall socks present on patient. Patients call light within reach. Patient educated on use of call light. Room free of clutter. Patient making no attempts to get out of bed at this time. Will continue to round and assess needs. Outcome: Ongoing  Pt is fall risk. Attempts to exit bed at times. Instructed to call for assistance, call light in reach. Bed in lowest locked position, side rails up x 3. Will cont to monitor for safety. Problem: Restraint Use - Nonviolent/Non-Self-Destructive Behavior:  Goal: Absence of restraint indications  Description  Absence of restraint indications  Outcome: Ongoing  Pt remains in bilat soft wrist restraints to prevent pulling and moving equipment. Will cont to assess for need. Problem: Urinary Elimination:  Goal: Signs and symptoms of infection will decrease  Description  Signs and symptoms of infection will decrease  Outcome: Ongoing  Choi cath in place, draining. Problem: Risk for Impaired Skin Integrity  Goal: Tissue integrity - skin and mucous membranes  Description  Patient's skin assessed q4h hours and prn. Patient turned q2h and prn. Skin is kept clean and dry. Preventative dressings have been applied. Sacral heart placed and assessed to be CDI. Jani scale is assessed and documented each shift. Pt family is educated on importance of frequent repositioning and assessment.  Patient has multiple skin integrity issues, including blisters/ abrasions on his lower lip being treated with venelex, scattered bruising and abrasions mostly on his upper

## 2019-08-08 NOTE — PROGRESS NOTES
spiculated nodule suspicious for malignancy. Recommend PET/CT or biopsy. Additional nonspecific areas of nodular consolidation in the right lower lobe. Qzxv      XR CHEST PORTABLE   Final Result   1. Persistent but improved central pulmonary vascular congestion with    diffusely increased interstitial markings still present. 2.  Mildly decreased right greater than left pleural effusions. 3.  Right greater than left basilar atelectasis versus consolidation. 4.  No pneumothorax radiographically evident. 5.  Tubes/lines as above. XR CHEST PORTABLE   Final Result      1. Persistent hazy perihilar, groundglass basilar consolidations and pleural effusions greater in the right lung   2. NG tube in place as well as a right IJ central venous catheter with the tip in the mid to distal SVC, no worsening               XR CHEST PORTABLE   Final Result      Introduction of NG tube with tip excluded from the inferior edge of the film. Moderate bilateral effusions and diffuse groundglass opacities consistent with edema, infection or aspiration. This appears worse since the previous study            XR ABDOMEN (KUB) (SINGLE AP VIEW)   Final Result      Nasogastric tube extending into the left upper quadrant. XR CHEST PORTABLE   Final Result      Persistent right basilar infiltrate. XR CHEST 1 VW   Final Result      Tip of the right IJ central venous catheter overlies lower SVC with no pneumothorax evident. CT HEAD WO CONTRAST   Final Result      1. No acute intracranial process. XR CHEST PORTABLE   Final Result      Interval development of bilateral lower lobe opacities may relate to edema or pneumonia      CT SOFT TISSUE NECK WO CONTRAST   Final Result   Unremarkable CT neck          XR CHEST PORTABLE   Final Result     Normal chest x-ray. CT ABDOMEN PELVIS WO CONTRAST Additional Contrast? None   Final Result   No acute abnormality demonstrated in the abdomen or pelvis. EE19  Generalized slowing which is a non-specific finding consistent   with a generalized disturbance of cerebral functioning including   toxic, metabolic, or structural abnormalities that are   multi-focal or diffuse. Triphasic waves which are a non-specific finding associated with   a process diffusely affecting the cerebrum including toxic   (including cefepime), metabolic, and post-hypoxic   processes--particularly hepatic or renal failure rarely   associated with epileptic seizures. Clinical correlation is recommended.  The absence of epileptiform discharges on a single EEG does not rule out a diagnosis of    epilepsy or rule out non-convulsive or complex partial status   epilepticus as a cause of altered mental status    ECHO:19  Summary   Left ventricular cavity size is normal. There is mild concentric left   ventricular hypertrophy. Left ventricular function is borderline normal with   LVEF 50%. The basal inferior and inferolateral walls are hypokinetic.   Abnormal septal motion is present. Diastolic filling parameters suggest   grade I diastolic dysfunction.  IVC size is dilated (>2.1 cm) and collapses < 50% with respiration   consistent with elevated RA pressure (15 mmHg).  Estimated pulmonary artery   systolic pressure is at 44 mmHg assuming a right atrial pressure of 15 mmHg.         Scheduled Meds:   potassium chloride  20 mEq Intravenous Q2H    vancomycin  1,000 mg Intravenous Q12H    sodium chloride (Inhalant)  15 mL Nebulization Q4H    meropenem  1 g Intravenous Q8H    sodium chloride flush  10 mL Intravenous 2 times per day    metoprolol tartrate  25 mg Oral BID    levalbuterol  1.25 mg Nebulization 6 times per day    pantoprazole  40 mg Intravenous BID    VENELEX   Topical BID    chlorhexidine  15 mL Mouth/Throat BID    heparin (porcine)  5,000 Units Subcutaneous 3 times per day    atorvastatin  20 mg Oral Nightly    aspirin  81 mg Oral Daily IMPRESSION:  69 yo male  With metabolic encephalopathy related to renal failure and pneumonia with respiratory failure. Repeat MRI of the brain reassuring. His mentation is improving. CSF studies reassuring. 1. Metabolic encephalopathy  2. Dysphagia  3. Hoarseness  4. AVIVA resolved  5. Pneumonia   6. Lung mass      RECOMMENDATIONS:   -Continue ASA and statin therapy daily.  -Wean off all sedatives  -CSF studies pending.   -Workup for lung mass as per Primary medical team.   -Treatment of all infectious processes and metabolic disturbances as per Primary Medical team.   -No further neurological workup. Please call with any questions or concerns.         A copy of this note was provided for MD Shruti Espinosa, APRN-[address] [facility]  [phone]  Evenings, weekends, and off weeks please discuss with the covering neurologist.
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
## 2019-08-08 NOTE — PROGRESS NOTES
ICU Progress Note    Admit Date: 7/22/2019  Day: 15  IV Access: Peripheral, Central Line  IV Fluids:None  Vasopressors:None                Antibiotics: Marshal Pica and Merem  Diet: DIET TUBE FEED VOLUME BASED NPO STANDARD WITH FIBER (Jevity 1.2); Nasogastric; Continuous; 1,320; 24    CC: AMS d/t possible alcohol or benzo withdrawal    Interval history: Pt alert and able to follow commands. Good cough. Moderate secretions. Passed SBT:    SBT - Concluded at  1238.     Weaning Parameters/VS's at conclusion of SBT:        VE:        12.5 L        RR:        574 b/m        VT:        25 mL (average VT = VE/RR)        RSBI:    43 (RR/VT in liters)        ETCO2: 18 cmH2O        SPO2:   100 %         If on sedation, amount and type       HPI: Mr. Tania Goodson is a 70year-old male with PMHx significant for HTN, HLD, GERD, opioid dependence 2/2 cervical DDD, and alcohol abuse who presents to [facility] ED complaining of fatigue, nausea/vomiting, poor appetite and voice hoarseness over the last several months. Pt was originally admitted to the floor. One day after admission, pt had AMS and became hypotensive requiring IV boluses and 4 mg Ativan and 5 mg Haldol.       Medications:     Scheduled Meds:   sodium chloride (Inhalant)  15 mL Nebulization Q4H    sodium chloride flush  10 mL Intravenous 2 times per day    metoprolol tartrate  25 mg Oral BID    levalbuterol  1.25 mg Nebulization 6 times per day    pantoprazole  40 mg Intravenous BID    VENELEX   Topical BID    chlorhexidine  15 mL Mouth/Throat BID    heparin (porcine)  5,000 Units Subcutaneous 3 times per day    atorvastatin  20 mg Oral Nightly    aspirin  81 mg Oral Daily     Continuous Infusions:   propofol Stopped (08/07/19 1859)    dexmedetomidine (PRECEDEX) IV infusion 0.4 mcg/kg/hr (08/08/19 1800)    dextrose       PRN Meds:acetaminophen, sodium chloride flush, labetalol, medicated lip ointment, aspirin, glucose, dextrose, glucagon (rDNA), dextrose, abnormality identified. 2. Ethmoid and sphenoid sinus disease with air-fluid level. Findings may reflect altered mental status and retained secretions. 3. Minimal nonspecific periventricular white matter signal and body on FLAIR imaging suggesting mild chronic small vessel ischemic disease and/or age related degenerative change. XR CHEST PORTABLE   Final Result      1. Multifocal airspace disease consistent with pneumonia as described. This has progressed in the right upper and left lower lobes when compared to the prior exam.  Correlate clinically. CT HEAD WO CONTRAST   Final Result   1. Mild atrophy. 2.  No evidence of an acute intracranial process. XR CHEST PORTABLE   Final Result      New consolidation in the right lower lung compatible with atelectasis or pneumonia. Aspiration is in the differential diagnosis. Prominent interstitial markings in a perihilar distribution again noted. Stable cardiac mediastinal silhouette. Lines and tubes without change. XR CHEST PORTABLE   Final Result   Addendum 1 of 1      Patient: Antonio Devlin  Time Out: 02:22   Exam(s): FILM CXR 1 VIEW        ADDENDUM - Added by William Oliva MD on 7/29/2019 2:22 AM (-07:00)   IMPRESSION:        ET tube terminates 6.1 cm  FROM  the fransico.   ----------------------------------------------------------------------       EXAM:     XR Chest, 1 View       CLINICAL HISTORY:      Reason for exam: intubation. Reason for exam:->intubation. TECHNIQUE:     Frontal view of the chest.       COMPARISON:       Chest x-ray 7/20/19       IMPRESSION:        ET tube terminates 6.1 cm in the fransico. Final      CT CHEST WO CONTRAST   Final Result      1. Small bilateral pleural effusions. Multifocal pneumonia in the right upper and bilateral lower lobes. 2. 13 mm right upper lobe spiculated nodule suspicious for malignancy. Recommend PET/CT or biopsy.  Additional nonspecific areas of nodular

## 2019-08-08 NOTE — PROGRESS NOTES
exertion;Irregular pattern;or RR less than 6 = 2    Breath Sounds: Diminshed bilaterally and/or crackles = 2    Sputum  Sputum Color: White, Yellow, Tenacity: Thick, Sputum How Obtained: Suctioned, Endotracheal  Cough: Weak, productive = 2    Vital Signs   /61   Pulse 96   Temp 99.9 °F (37.7 °C) (Axillary)   Resp 22   Ht 5' 5\" (1.651 m)   Wt 136 lb 11 oz (62 kg)   SpO2 96%   BMI 22.75 kg/m²   SPO2 (COPD values may differ): 88-89% on room air or greater than 92% on FiO2 28- 35% = 2    Peak Flow (asthma only): not applicable = 0    RSI: 74-73 = Q4 (every four hours)        Plan       Goals: medication delivery and improve oxygenation    Patient/caregiver was educated on the proper method of use for Respiratory Care Devices:  Yes      Level of patient/caregiver understanding able to:   ? Verbalize understanding   ? Demonstrate understanding       ? Teach back        ? Needs reinforcement       ? No available caregiver               ? Other:     Response to education:  Poor     Is patient being placed on Home Treatment Regimen? No     Does the patient have everything they need prior to discharge? NA     Comments: Patient remains on the vent, Heated wire circuit added to help thin secretions. Plan of Care: Continue Xopenex 1.25mg Q4H, Vent as ordered, O2 to keep spo2 92% or greater. Electronically signed by Luis Antonio Campos RCP on 8/8/2019 at 1:06 AM    Respiratory Protocol Guidelines     1. Assessment and treatment by Respiratory Therapy will be initiated for medication and therapeutic interventions upon initiation of aerosolized medication. 2. Physician will be contacted for respiratory rate (RR) greater than 35 breaths per minute. Therapy will be held for heart rate (HR) greater than 140 beats per minute, pending direction from physician. 3. Bronchodilators will be administered via Metered Dose Inhaler (MDI) with spacer when the following criteria are met:  a.  Alert and cooperative     b. HR < 140 bpm  c. RR < 30 bpm                d. Can demonstrate a 2-3 second inspiratory hold  4. Bronchodilators will be administered via Hand Held Nebulizer RAD[facility]) to patients when ANY of the following criteria are met  a. Incognizant or uncooperative          b. Patients treated with HHN at Home        c. Unable to demonstrate proper use of MDI with spacer     d. RR > 30 bpm   5. Bronchodilators will be delivered via Metered Dose Inhaler (MDI), HHN, Aerogen to intubated patients on mechanical ventilation. 6. Inhalation medication orders will be delivered and/or substituted as outlined below. Aerosolized Medications Ordering and Administration Guidelines:    1. All Medications will be ordered by a physician, and their frequency and/or modality will be adjusted as defined by the patients Respiratory Severity Index (RSI) score. 2. If the patient does not have documented COPD, consider discontinuing anticholinergics when RSI is less than 9.  3. If the bronchospasm worsens (increased RSI), then the bronchodilator frequency can be increased to a maximum of every 4 hours. If greater than every 4 hours is required, the physician will be contacted. 4. If the bronchospasm improves, the frequency of the bronchodilator can be decreased, based on the patient's RSI, but not less than home treatment regimen frequency. 5. Bronchodilator(s) will be discontinued if patient has a RSI less than 9 and has received no scheduled or as needed treatment for 72  Hrs. Patients Ordered on a Mucolytic Agent:    1. Must always be administered with a bronchodilator. 2. Discontinue if patient experiences worsened bronchospasm, or secretions have lessened to the point that the patient is able to clear them with a cough. Anti-inflammatory and Combination Medications:    1.  If the patient lacks prior history of lung disease, is not using inhaled anti-inflammatory medication at home, and lacks wheezing by examination or by history for at

## 2019-08-09 ENCOUNTER — APPOINTMENT (OUTPATIENT)
Dept: GENERAL RADIOLOGY | Age: 69
DRG: 682 | End: 2019-08-09
Payer: MEDICARE

## 2019-08-09 ENCOUNTER — APPOINTMENT (OUTPATIENT)
Dept: CT IMAGING | Age: 69
DRG: 682 | End: 2019-08-09
Payer: MEDICARE

## 2019-08-09 LAB
ALBUMIN SERPL-MCNC: 2.8 G/DL (ref 3.4–5)
ANION GAP SERPL CALCULATED.3IONS-SCNC: 16 MMOL/L (ref 3–16)
BASE EXCESS ARTERIAL: 1 (ref -3–3)
BASE EXCESS VENOUS: 2 (ref -3–3)
BASOPHILS ABSOLUTE: 0.2 K/UL (ref 0–0.2)
BASOPHILS RELATIVE PERCENT: 1.2 %
BUN BLDV-MCNC: 19 MG/DL (ref 7–20)
CALCIUM SERPL-MCNC: 8.8 MG/DL (ref 8.3–10.6)
CHLORIDE BLD-SCNC: 102 MMOL/L (ref 99–110)
CO2: 23 MMOL/L (ref 21–32)
CREAT SERPL-MCNC: 0.8 MG/DL (ref 0.8–1.3)
CSF CULTURE: NORMAL
CSF INTERPRETATION: NORMAL
CULTURE, RESPIRATORY: ABNORMAL
CULTURE, RESPIRATORY: ABNORMAL
EOSINOPHILS ABSOLUTE: 0 K/UL (ref 0–0.6)
EOSINOPHILS RELATIVE PERCENT: 0.1 %
GFR AFRICAN AMERICAN: >60
GFR NON-AFRICAN AMERICAN: >60
GLUCOSE BLD-MCNC: 100 MG/DL (ref 70–99)
GLUCOSE BLD-MCNC: 81 MG/DL (ref 70–99)
GLUCOSE BLD-MCNC: 86 MG/DL (ref 70–99)
GLUCOSE BLD-MCNC: 89 MG/DL (ref 70–99)
GLUCOSE BLD-MCNC: 95 MG/DL (ref 70–99)
GRAM STAIN RESULT: ABNORMAL
GRAM STAIN RESULT: NORMAL
HCO3 ARTERIAL: 23.6 MMOL/L (ref 21–29)
HCO3 VENOUS: 25.3 MMOL/L (ref 23–29)
HCT VFR BLD CALC: 28.9 % (ref 40.5–52.5)
HEMOGLOBIN: 9.5 G/DL (ref 13.5–17.5)
LYMPHOCYTES ABSOLUTE: 0.5 K/UL (ref 1–5.1)
LYMPHOCYTES RELATIVE PERCENT: 3.8 %
MAGNESIUM: 2 MG/DL (ref 1.8–2.4)
MCH RBC QN AUTO: 30.8 PG (ref 26–34)
MCHC RBC AUTO-ENTMCNC: 33 G/DL (ref 31–36)
MCV RBC AUTO: 93.3 FL (ref 80–100)
MONOCYTES ABSOLUTE: 0.7 K/UL (ref 0–1.3)
MONOCYTES RELATIVE PERCENT: 5.4 %
NEUTROPHILS ABSOLUTE: 12.2 K/UL (ref 1.7–7.7)
NEUTROPHILS RELATIVE PERCENT: 89.5 %
O2 SAT, ARTERIAL: 87 % (ref 93–100)
O2 SAT, VEN: 51 %
OLIGOCLONAL BANDS CSF: 2
OLIGOCLONAL BANDS: 0
ORGANISM: ABNORMAL
PCO2 ARTERIAL: 28.2 MM HG (ref 35–45)
PCO2, VEN: 33.3 MM HG (ref 40–50)
PDW BLD-RTO: 13.8 % (ref 12.4–15.4)
PERFORMED ON: ABNORMAL
PERFORMED ON: NORMAL
PH ARTERIAL: 7.53 (ref 7.35–7.45)
PH VENOUS: 7.49 (ref 7.35–7.45)
PHOSPHORUS: 2.6 MG/DL (ref 2.5–4.9)
PLATELET # BLD: 749 K/UL (ref 135–450)
PMV BLD AUTO: 8.2 FL (ref 5–10.5)
PO2 ARTERIAL: 46.1 MM HG (ref 75–108)
PO2, VEN: 25 MM HG
POC SAMPLE TYPE: ABNORMAL
POC SAMPLE TYPE: ABNORMAL
POTASSIUM SERPL-SCNC: 3.6 MMOL/L (ref 3.5–5.1)
PRO-BNP: 5707 PG/ML (ref 0–124)
RBC # BLD: 3.09 M/UL (ref 4.2–5.9)
SODIUM BLD-SCNC: 141 MMOL/L (ref 136–145)
TCO2 ARTERIAL: 24 MMOL/L
TCO2 CALC VENOUS: 26 MMOL/L
TROPONIN: 0.05 NG/ML
WBC # BLD: 13.6 K/UL (ref 4–11)

## 2019-08-09 PROCEDURE — 94640 AIRWAY INHALATION TREATMENT: CPT

## 2019-08-09 PROCEDURE — 6360000004 HC RX CONTRAST MEDICATION: Performed by: INTERNAL MEDICINE

## 2019-08-09 PROCEDURE — 2000000000 HC ICU R&B

## 2019-08-09 PROCEDURE — 2580000003 HC RX 258: Performed by: STUDENT IN AN ORGANIZED HEALTH CARE EDUCATION/TRAINING PROGRAM

## 2019-08-09 PROCEDURE — 6370000000 HC RX 637 (ALT 250 FOR IP): Performed by: INTERNAL MEDICINE

## 2019-08-09 PROCEDURE — 6370000000 HC RX 637 (ALT 250 FOR IP): Performed by: STUDENT IN AN ORGANIZED HEALTH CARE EDUCATION/TRAINING PROGRAM

## 2019-08-09 PROCEDURE — 6360000002 HC RX W HCPCS: Performed by: STUDENT IN AN ORGANIZED HEALTH CARE EDUCATION/TRAINING PROGRAM

## 2019-08-09 PROCEDURE — 80069 RENAL FUNCTION PANEL: CPT

## 2019-08-09 PROCEDURE — 94761 N-INVAS EAR/PLS OXIMETRY MLT: CPT

## 2019-08-09 PROCEDURE — 36415 COLL VENOUS BLD VENIPUNCTURE: CPT

## 2019-08-09 PROCEDURE — 71260 CT THORAX DX C+: CPT

## 2019-08-09 PROCEDURE — 83735 ASSAY OF MAGNESIUM: CPT

## 2019-08-09 PROCEDURE — 94660 CPAP INITIATION&MGMT: CPT

## 2019-08-09 PROCEDURE — 31720 CLEARANCE OF AIRWAYS: CPT

## 2019-08-09 PROCEDURE — 2580000003 HC RX 258

## 2019-08-09 PROCEDURE — 82803 BLOOD GASES ANY COMBINATION: CPT

## 2019-08-09 PROCEDURE — 2700000000 HC OXYGEN THERAPY PER DAY

## 2019-08-09 PROCEDURE — 99233 SBSQ HOSP IP/OBS HIGH 50: CPT | Performed by: INTERNAL MEDICINE

## 2019-08-09 PROCEDURE — 6360000002 HC RX W HCPCS: Performed by: INTERNAL MEDICINE

## 2019-08-09 PROCEDURE — C9113 INJ PANTOPRAZOLE SODIUM, VIA: HCPCS | Performed by: INTERNAL MEDICINE

## 2019-08-09 PROCEDURE — 36600 WITHDRAWAL OF ARTERIAL BLOOD: CPT

## 2019-08-09 PROCEDURE — 71045 X-RAY EXAM CHEST 1 VIEW: CPT

## 2019-08-09 PROCEDURE — 83880 ASSAY OF NATRIURETIC PEPTIDE: CPT

## 2019-08-09 PROCEDURE — 85025 COMPLETE CBC W/AUTO DIFF WBC: CPT

## 2019-08-09 PROCEDURE — 84484 ASSAY OF TROPONIN QUANT: CPT

## 2019-08-09 PROCEDURE — 2500000003 HC RX 250 WO HCPCS

## 2019-08-09 RX ORDER — SODIUM CHLORIDE 9 MG/ML
INJECTION, SOLUTION INTRAVENOUS
Status: DISPENSED
Start: 2019-08-09 | End: 2019-08-09

## 2019-08-09 RX ORDER — OLANZAPINE 10 MG/1
5 INJECTION, POWDER, LYOPHILIZED, FOR SOLUTION INTRAMUSCULAR ONCE
Status: COMPLETED | OUTPATIENT
Start: 2019-08-09 | End: 2019-08-09

## 2019-08-09 RX ORDER — SODIUM CHLORIDE 9 MG/ML
INJECTION, SOLUTION INTRAVENOUS
Status: COMPLETED
Start: 2019-08-09 | End: 2019-08-09

## 2019-08-09 RX ORDER — MORPHINE SULFATE 2 MG/ML
2 INJECTION, SOLUTION INTRAMUSCULAR; INTRAVENOUS ONCE
Status: COMPLETED | OUTPATIENT
Start: 2019-08-09 | End: 2019-08-09

## 2019-08-09 RX ORDER — LINEZOLID 2 MG/ML
600 INJECTION, SOLUTION INTRAVENOUS EVERY 12 HOURS
Status: DISCONTINUED | OUTPATIENT
Start: 2019-08-09 | End: 2019-08-18

## 2019-08-09 RX ORDER — FUROSEMIDE 10 MG/ML
40 INJECTION INTRAMUSCULAR; INTRAVENOUS 2 TIMES DAILY
Status: COMPLETED | OUTPATIENT
Start: 2019-08-09 | End: 2019-08-09

## 2019-08-09 RX ADMIN — LEVALBUTEROL HYDROCHLORIDE 1.25 MG: 1.25 SOLUTION, CONCENTRATE RESPIRATORY (INHALATION) at 08:29

## 2019-08-09 RX ADMIN — LEVALBUTEROL HYDROCHLORIDE 1.25 MG: 1.25 SOLUTION, CONCENTRATE RESPIRATORY (INHALATION) at 16:50

## 2019-08-09 RX ADMIN — MEROPENEM 1 G: 1 INJECTION, POWDER, FOR SOLUTION INTRAVENOUS at 09:36

## 2019-08-09 RX ADMIN — HEPARIN SODIUM 5000 UNITS: 5000 INJECTION INTRAVENOUS; SUBCUTANEOUS at 14:11

## 2019-08-09 RX ADMIN — OLANZAPINE 5 MG: 10 INJECTION, POWDER, FOR SOLUTION INTRAMUSCULAR at 02:20

## 2019-08-09 RX ADMIN — PANTOPRAZOLE SODIUM 40 MG: 40 INJECTION, POWDER, LYOPHILIZED, FOR SOLUTION INTRAVENOUS at 21:23

## 2019-08-09 RX ADMIN — FUROSEMIDE 40 MG: 10 INJECTION, SOLUTION INTRAMUSCULAR; INTRAVENOUS at 10:22

## 2019-08-09 RX ADMIN — Medication 10 ML: at 21:43

## 2019-08-09 RX ADMIN — MORPHINE SULFATE 2 MG: 2 INJECTION, SOLUTION INTRAMUSCULAR; INTRAVENOUS at 03:34

## 2019-08-09 RX ADMIN — LEVALBUTEROL HYDROCHLORIDE 1.25 MG: 1.25 SOLUTION, CONCENTRATE RESPIRATORY (INHALATION) at 12:29

## 2019-08-09 RX ADMIN — LINEZOLID 600 MG: 600 INJECTION, SOLUTION INTRAVENOUS at 12:10

## 2019-08-09 RX ADMIN — CASTOR OIL AND BALSAM, PERU: 788; 87 OINTMENT TOPICAL at 08:09

## 2019-08-09 RX ADMIN — CASTOR OIL AND BALSAM, PERU: 788; 87 OINTMENT TOPICAL at 21:42

## 2019-08-09 RX ADMIN — SODIUM CHLORIDE 30 MG/ML INHALATION SOLUTION 15 ML: 30 SOLUTION INHALANT at 20:19

## 2019-08-09 RX ADMIN — ASPIRIN 81 MG 81 MG: 81 TABLET ORAL at 08:08

## 2019-08-09 RX ADMIN — Medication 10 ML: at 08:08

## 2019-08-09 RX ADMIN — SODIUM CHLORIDE 30 MG/ML INHALATION SOLUTION 15 ML: 30 SOLUTION INHALANT at 12:29

## 2019-08-09 RX ADMIN — SODIUM CHLORIDE 30 MG/ML INHALATION SOLUTION 15 ML: 30 SOLUTION INHALANT at 16:50

## 2019-08-09 RX ADMIN — VANCOMYCIN HYDROCHLORIDE 1000 MG: 10 INJECTION, POWDER, LYOPHILIZED, FOR SOLUTION INTRAVENOUS at 10:22

## 2019-08-09 RX ADMIN — SODIUM CHLORIDE 250 ML: 9 INJECTION, SOLUTION INTRAVENOUS at 03:54

## 2019-08-09 RX ADMIN — FUROSEMIDE 40 MG: 10 INJECTION, SOLUTION INTRAMUSCULAR; INTRAVENOUS at 18:15

## 2019-08-09 RX ADMIN — METOPROLOL TARTRATE 25 MG: 25 TABLET ORAL at 21:23

## 2019-08-09 RX ADMIN — SODIUM CHLORIDE 30 MG/ML INHALATION SOLUTION 15 ML: 30 SOLUTION INHALANT at 01:41

## 2019-08-09 RX ADMIN — MEROPENEM 1 G: 1 INJECTION, POWDER, FOR SOLUTION INTRAVENOUS at 16:12

## 2019-08-09 RX ADMIN — Medication 15 ML: at 08:08

## 2019-08-09 RX ADMIN — LINEZOLID 600 MG: 600 INJECTION, SOLUTION INTRAVENOUS at 22:43

## 2019-08-09 RX ADMIN — PANTOPRAZOLE SODIUM 40 MG: 40 INJECTION, POWDER, LYOPHILIZED, FOR SOLUTION INTRAVENOUS at 08:08

## 2019-08-09 RX ADMIN — ATORVASTATIN CALCIUM 20 MG: 20 TABLET, FILM COATED ORAL at 21:23

## 2019-08-09 RX ADMIN — HEPARIN SODIUM 5000 UNITS: 5000 INJECTION INTRAVENOUS; SUBCUTANEOUS at 21:23

## 2019-08-09 RX ADMIN — IOPAMIDOL 80 ML: 755 INJECTION, SOLUTION INTRAVENOUS at 05:16

## 2019-08-09 RX ADMIN — LEVALBUTEROL HYDROCHLORIDE 1.25 MG: 1.25 SOLUTION, CONCENTRATE RESPIRATORY (INHALATION) at 01:41

## 2019-08-09 RX ADMIN — HEPARIN SODIUM 5000 UNITS: 5000 INJECTION INTRAVENOUS; SUBCUTANEOUS at 05:47

## 2019-08-09 RX ADMIN — Medication 15 ML: at 21:23

## 2019-08-09 RX ADMIN — SODIUM CHLORIDE 30 MG/ML INHALATION SOLUTION 15 ML: 30 SOLUTION INHALANT at 08:29

## 2019-08-09 RX ADMIN — METOPROLOL TARTRATE 25 MG: 25 TABLET ORAL at 08:08

## 2019-08-09 RX ADMIN — LEVALBUTEROL HYDROCHLORIDE 1.25 MG: 1.25 SOLUTION, CONCENTRATE RESPIRATORY (INHALATION) at 20:19

## 2019-08-09 ASSESSMENT — PAIN SCALES - GENERAL
PAINLEVEL_OUTOF10: 0

## 2019-08-09 NOTE — PROGRESS NOTES
[facility]   [phone]  [facility]  Inpatient Progress note     REASON FOR CONSULTATION:    AVIVA    INTERVAL Plan    Creatinine stable. Overall improving. Lasix X 2 today   CT with cavitary lesions in the chest        Acute kidney injury  Likely acute tubular necrosis  Second peak of 2.4 on 7/29/19- 0.8  on 8/9/2019   Made 6.05 L urine yesterday with lasix  baseline creatinine in 12/2018 1.2-   proteinuria  258 mg, 3 to 5 red blood   Urine sodium 29. in 05/2018 one time  creatinine  was 2.1   In 08/2015, creatinine ranged from 0.8 to 1.2. CT abdomen kidneys normal   CT in 2015 mention some bilateral mild to moderate cortical  thinning of the renal cortex, no mention in 07/2019 CT. Traumatic Hematuria due to resendiz which pt tried to pull      Hypercalcemia  Improved   calcium was 10.1 in 12/2018  His hypercalcemia could be due to excessive calcium intake      Hypokalemia  Potassium low-getting potassium and magnesium as needed     History of alcoholism   withdrawal  liver function tests are normal.   He does not have acute hepatitis     History of multiple lung nodules   on CT in 2015 with current weight loss little worry some. long-term smoking and currentweight loss.     History of left adrenal adenoma   16 mm as seen on CT in 2015. CT this admission in 07/2019 did not mention that.     History of emphysema  as seen on previous CT of the chest in 2015.     History of abdominal aortic aneurysm   as seen on CT in 2015 was 3.4 cm. This admission CT without contrast did not mention         [facility]  From consult note-   48-year-old  gentleman  whom I am seeing in the room where he just got Ativan because he was  agitated, pulling his lines, was trying to jump out of the window, and  is sedated and unable to give any details.     Reviewed admission H and P and other details.   He has a history of  hypertension, opioid dependence, alcoholism, cervical degenerative  disease for WBC 13.6 (H) 08/09/2019    HGB 9.5 (L) 08/09/2019    HCT 28.9 (L) 08/09/2019    MCV 93.3 08/09/2019     (H) 08/09/2019     Nephrology  [facility] [address], [address]  Office: [phone]  Cell: [phone]  Fax: [phone]

## 2019-08-09 NOTE — PROGRESS NOTES
After being on vapotherm, MD ordered stat CT and bipap to transport pt. Pt tolerated well. Pt will be left on bipap 10/5 at 50%.   Pt tolerating bipap well

## 2019-08-09 NOTE — PROGRESS NOTES
Nutrition Assessment    Type and Reason for Visit: Reassess    Nutrition Recommendations:   · Monitor respiratory status and ability to restart TF via NGT w/ new goal to better meet pt needs  · Tube Feeding: Standard w/ fiber \"Jevity 1.2\" @ 65 mL/hr (1560 mL TV/day)  · No H2O bolus per MD     Nutrition Assessment: Pt continues to be at nutritional compromise r/t severe malnutrition and NPO status. Pt extubated yesterday (8/8), NGT remains in place but TF is currently off. Pt requiring Bipap this am. Monitor respiratory status and ability to restart TF via NGT w/ new goal to better meet patients needs: Jevity 1.2 @ 65 mL/hr (1560 mL TV) w/ no water flushes per MD.     Malnutrition Assessment:  · Malnutrition Status: Meets the criteria for severe malnutrition  · Context: Chronic illness  · Findings of the 6 clinical characteristics of malnutrition (Minimum of 2 out of 6 clinical characteristics is required to make the diagnosis of moderate or severe Protein Calorie Malnutrition based on AND/ASPEN Guidelines):  1. Energy Intake-Less than or equal to 75% of estimated energy requirement, Greater than or equal to 1 month(upno admission)    2. Weight Loss-7.5% loss or greater, in 1 month(upon admission)  3. Fat Loss-Severe subcutaneous fat loss, Orbital, Triceps  4. Muscle Loss-Severe muscle mass loss, Calf (gastrocnemius), Thigh (quadriceps), Clavicles (pectoralis and deltoids)  5. Fluid Accumulation-Mild fluid accumulation, Extremities  6.   Strength-Not measured    Nutrition Risk Level: High    Nutrition Needs:  · Estimated Daily Total Kcal: 3433-4017 kcal (30-35)  · Estimated Daily Protein (g): 73-84 grams (1.3-1.5)  · Estimated Daily Fluid (ml/day): 7946-8294 mL    Nutrition Diagnosis:   · Problem: Inadequate energy intake  · Etiology: related to Impaired respiratory function-inability to consume food     Signs and symptoms:  as evidenced by NPO status due to medical condition    Objective Information:  · Nutrition-Focused Physical Findings: NGT; LBM 8/6; BUE +2 pitting edema   · Wound Type: None  · Current Nutrition Therapies:  · Oral Diet Orders: NPO   · Oral Diet intake: NPO  · Oral Nutrition Supplement (ONS) Orders: None  · ONS intake: NPO  · Tube Feeding (TF) Orders:   · Feeding Route: Nasogastric  · Formula: (TF on hold at this time )  · Rate (ml/hr):60 mL/hr    · Volume (ml/day): goal provides 1440 mL TV/day  · Duration: Continuous  · Water Flushes: none running  · Current TF & Flush Orders Provides: Jevity 1.2 @ 55 mL/hr + 1 pro modular daily provides 1320 mL TV, 1688 kcal, 99 grams protein, 1065 mL free water  · Goal TF & Flush Orders Provides: Jevity 1.2 @ 65 mL/hr provides 1560 mL TV, 1872 kcal, 86 grams protein, 1259 mL free water  · Additional Calories: N/A  · Anthropometric Measures:  · Ht: 5' 5\" (165.1 cm)   · Current Body Wt: 124 lb 1.9 oz (56.3 kg)  · Admission Body Wt: 120 lb (54.4 kg)(no wt method)  · Usual Body Wt: (JHONATAN; wt was 134lb 6/27 per care everywhere)  · Weight Change: 7.5% loss x1 month   · Ideal Body Wt: 136 lb (61.7 kg)   · BMI Classification: BMI 18.5 - 24.9 Normal Weight    Nutrition Interventions:   Modify current Tube Feeding; restart once appropriate   Continued Inpatient Monitoring    Nutrition Evaluation:   · Evaluation: Progress towards goals declining   · Goals: Pt will tolerate TF to provide 100% of needs until extubated and diet adv is tolerated   · Monitoring: Nutrition Progression, TF Intake, Mental Status/Confusion, TF Tolerance      Electronically signed by Lana Villagran RD, LD on 8/9/19 at 2:12 PM    Contact Number:   Pager: [phone]  Office: [phone]

## 2019-08-09 NOTE — PROGRESS NOTES
ICU Progress Note    Admit Date: 7/22/2019  Day: 17  IV Access: Peripheral, Central Line  IV Fluids:None  Vasopressors:None                Antibiotics: Deon Angers and Merem  Diet: DIET TUBE FEED VOLUME BASED NPO STANDARD WITH FIBER (Jevity 1.2); Nasogastric; Continuous; 1,320; 24    CC: AMS d/t possible alcohol or benzo withdrawal now w/ Aspiration pneumonia -Diatherix positive for Klebsiella and MRSA    Interval history: Pt was extubated yesterday and was saturation well @ 98% on 3L nc around 1pm yesterday. At about 6:30pm, pt sat fell to 87% and was suctioned to try to clear secretions. He was placed on high flow NC at 12L and sat went up to 93%. At about 3:45am this morning, pt became tachypnea and sats fell to the the low 80s w/ not improvement with suction. He was tachycardic to the 140s. Vapotherm was started and morphine given. ABG at the time showed pH of 7.530, pCO2 of 28.2, PO2 of 46.1 and HCO3 of 23.6. CTPA order to r/o pulmonary embolism. Pt was placed on BiPAP at 10/5 for imaging and tolerated very well. CTPA showed a new 3cm cavitary lesion in the right upper lobe, possibly pul abscess, persistent multifocal opacities and nodules, possibly infectious or inflammatory in etiology and a 12 mm lesion in right upper lobe appearing somewhat spiculated. Resp culture from 08/06/19 grew MRSA. Pt continues to follow commands appropriately. He is currently on BiPAP set at 10/5 w/ FiO2 of 50%. Vanc and Merrem were d/ede yesterday. Pt remains afebrile.      Medications:     Scheduled Meds:   meropenem  1 g Intravenous Q8H    furosemide  40 mg Intravenous BID    linezolid  600 mg Intravenous Q12H    sodium chloride (Inhalant)  15 mL Nebulization Q4H    sodium chloride flush  10 mL Intravenous 2 times per day    metoprolol tartrate  25 mg Oral BID    levalbuterol  1.25 mg Nebulization 6 times per day    pantoprazole  40 mg Intravenous BID    VENELEX   Topical BID    chlorhexidine  15 mL Mouth/Throat BID    heparin (porcine)  5,000 Units Subcutaneous 3 times per day    atorvastatin  20 mg Oral Nightly    aspirin  81 mg Oral Daily     Continuous Infusions:   sodium chloride      dextrose       PRN Meds:acetaminophen, sodium chloride flush, labetalol, medicated lip ointment, aspirin, glucose, dextrose, glucagon (rDNA), dextrose, ondansetron    Objective:   Vitals:   T-max:  Patient Vitals for the past 8 hrs:   BP Temp Temp src Pulse Resp SpO2 Weight   08/09/19 1000 (!) 144/77 -- -- 115 17 100 % --   08/09/19 0900 (!) 149/77 -- -- 120 24 100 % --   08/09/19 0835 -- -- -- -- 24 100 % --   08/09/19 0800 (!) 141/73 100 °F (37.8 °C) Axillary 132 21 100 % --   08/09/19 0700 (!) 165/89 -- -- 138 22 -- --   08/09/19 0600 (!) 155/83 -- -- 132 30 94 % 124 lb 1.9 oz (56.3 kg)   08/09/19 0558 (!) 155/83 -- -- 135 24 95 % --   08/09/19 0548 -- -- -- -- 20 -- --   08/09/19 0530 -- -- -- 142 (!) 31 90 % --   08/09/19 0515 (!) 146/89 -- -- 135 (!) 33 98 % --   08/09/19 0500 (!) 162/67 -- -- 138 25 100 % --   08/09/19 0445 (!) 147/82 -- -- 131 25 100 % --   08/09/19 0415 (!) 141/71 -- -- 128 22 -- --   08/09/19 0400 (!) 146/78 99.6 °F (37.6 °C) Axillary 135 (!) 36 100 % --   08/09/19 0339 -- -- -- -- 28 100 % --   08/09/19 0330 -- -- -- -- (!) 32 (!) 88 % --       Intake/Output Summary (Last 24 hours) at 8/9/2019 1120  Last data filed at 8/9/2019 0800  Gross per 24 hour   Intake 1033 ml   Output 5475 ml   Net -4442 ml     Review of Systems   Unable to perform ROS: On BiPAP  Physical Exam   Constitutional: He appears well-developed and well-nourished. HENT:   Head: Normocephalic and atraumatic. Eyes: Pupils are equal, round, and reactive to light. Neck: Normal range of motion. No JVD present. Cardiovascular: Normal rate, regular rhythm, normal heart sounds and intact distal pulses. Exam reveals no gallop and no friction rub. No murmur heard. Pulmonary/Chest: Breath sounds normal. He has no wheezes. On BiPAP @ 10/5 FiO2 50% Merrem  - Continue IVF  - Drainage not indicated at this time     Altered Mental Status secondary to metabolic Encephalopathy    Mental status improved. He's following commands  - MRI showed no acute intracranial patholgy. Hypoxic Respiratory Failure:  - Pt extubated yesterday  - On BiPAP 10/5 w/ FiO2 of 50%. Tolerating well  - Switch BiPAP to vapotherm     Erosive Esophagitis  HgB 9.5 this morning.   - Continue IV Protonix  - Continue to monitor H/H  - Transfuse HgB < 7     Acute Kidney Injury-improving  - Resolved  - Creatinine 0.8, BUN 19.  - Nephrology on board monitoring.     Malnutrition  - Continue tube feeds.     Hypernatremia  - Improved NA+ 141      Code Status: Full        FEN:   PPX: SCD, Protonix, SQ Heparin  Daniel Singh MD, PGY-1  08/09/19  11:20 AM    This patient has been staffed and discussed with Dr. Kristina Mathews MD.    [facility] [place]    Patient seen and examined. I agree with Dr. Jeanette Martins history, physical, lab findings, assessment and plan. Assessment  1.  Acute hypoxemic/hypercapnic respiratory failure -extubated yesterday but required BiPAP overnight. On Vapotherm 40 L at 100% FiO2 at present  2.  Aspiration pneumonia -Diatherix positive for Klebsiella and MRSA   3.  Mucous plugs on bronchoscopy July 30  4.  Shock -resolved  5.  Acute encephalopathy/alcohol withdrawal -much improved  6.  AVIVA -resolved  7. Pulmonary abscess     Plan  1.  Restart meropenem and vancomycin. Will need antibiotics for at least 6 weeks. There is no need to drain pulmonary abscess at this time  2.  Continue hypertonic saline and Xopenex  3.  Continue BiPAP or Vapotherm  4. Continue tube feeds at goal  5.   Full Code        Kristina Mathews MD
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
## 2019-08-09 NOTE — PROGRESS NOTES
Hospitalist Progress Note      PCP: Altagracia Vargas    Date of Admission: 7/22/2019    Chief Complaint: fatigue, N/V    Subjective:   8.9-in restraints, extubated yesterday, confused, son at bedside, n.p.o. because of aspiration. No fevers, denies any distress continues to be on 100% 40 L FiO2 high flow    Medications:  Reviewed    Infusion Medications    sodium chloride      dextrose       Scheduled Medications    meropenem  1 g Intravenous Q8H    furosemide  40 mg Intravenous BID    linezolid  600 mg Intravenous Q12H    sodium chloride (Inhalant)  15 mL Nebulization Q4H    sodium chloride flush  10 mL Intravenous 2 times per day    metoprolol tartrate  25 mg Oral BID    levalbuterol  1.25 mg Nebulization 6 times per day    pantoprazole  40 mg Intravenous BID    VENELEX   Topical BID    chlorhexidine  15 mL Mouth/Throat BID    heparin (porcine)  5,000 Units Subcutaneous 3 times per day    atorvastatin  20 mg Oral Nightly    aspirin  81 mg Oral Daily     PRN Meds: acetaminophen, sodium chloride flush, labetalol, medicated lip ointment, aspirin, glucose, dextrose, glucagon (rDNA), dextrose, ondansetron      Intake/Output Summary (Last 24 hours) at 8/9/2019 1319  Last data filed at 8/9/2019 1200  Gross per 24 hour   Intake 1033 ml   Output 5175 ml   Net -4142 ml       Physical Exam Performed:    /62   Pulse 118   Temp 99.6 °F (37.6 °C) (Axillary)   Resp 25   Ht 5' 5\" (1.651 m)   Wt 124 lb 1.9 oz (56.3 kg)   SpO2 99%   BMI 20.65 kg/m²     General appearance:  Alert, unable to answer questions intelligently  Mimics actions but doesn't follow commands  Spontaneously moving all extremities  HEENT: Pupils equal, round, and reactive to light. Conjunctivae/corneas clear. Neck: Supple, with full range of motion. No jugular venous distention. Trachea midline. Respiratory:  Normal respiratory effort.  On ventilator, rhonchi heard bilaterally  Cardiovascular: tachycardic, regular rhythm  Abdomen: Soft, non-tender, non-distended with normal bowel sounds. Neuro: Able to move all extremities  Not following commands  Psych: Unable to assess    Labs:   Recent Labs     08/07/19  0403 08/08/19  0504 08/09/19  0608   WBC 8.8 7.3 13.6*   HGB 7.3* 7.4* 9.5*   HCT 21.9* 22.3* 28.9*    469* 749*     Recent Labs     08/07/19  0403 08/08/19  0504 08/09/19  0608    141 141   K 3.8 3.0* 3.6    107 102   CO2 20* 23 23   BUN 17 16 19   CREATININE 0.7* 0.7* 0.8   CALCIUM 7.8* 7.8* 8.8   PHOS 2.3* 2.6 2.6     No results for input(s): AST, ALT, BILIDIR, BILITOT, ALKPHOS in the last 72 hours. No results for input(s): INR in the last 72 hours. Recent Labs     08/09/19  0608   TROPONINI 0.05*       Urinalysis:      Lab Results   Component Value Date    NITRU Negative 07/27/2019    WBCUA 0-2 07/27/2019    RBCUA 20-50 07/27/2019    BLOODU LARGE 07/27/2019    SPECGRAV 1.020 07/27/2019    GLUCOSEU Negative 07/27/2019       Radiology:  CT CHEST PULMONARY EMBOLISM W CONTRAST   Final Result       1. No pulmonary embolism. 2.  New 3 cm cavitary lesion in the right upper lobe, possibly pulmonary    abscess given presence of multifocal patchy opacities in the most recent    exam.   3.  Overall decreased but persistent multifocal opacities and nodules,    possibly infectious or inflammatory in etiology. The 12 mm lesion in the    right upper lobe appears somewhat spiculated. Recommend short-term    follow-up in 3 months to ensure resolution. 4.  Previously seen pleural effusions have resolved. 5.  Prominent mildly dilated loops of small bowel in the upper abdomen,    partially visualized. Cannot exclude ileus or small bowel obstruction. Consider CT abdomen and pelvis. XR CHEST PORTABLE   Final Result       Unchanged heart size. The lung bases are not completely imaged. However there may be some    atelectasis in the right lung base. ET tube has been removed.   Other lines and tubes markings in a perihilar distribution again noted. Stable cardiac mediastinal silhouette. Lines and tubes without change. XR CHEST PORTABLE   Final Result   Addendum 1 of 1      Patient: Zoë Rivera  Time Out: 02:22   Exam(s): FILM CXR 1 VIEW        ADDENDUM - Added by Venkaetsh Mason MD on 7/29/2019 2:22 AM (-07:00)   IMPRESSION:        ET tube terminates 6.1 cm  FROM  the fransico.   ----------------------------------------------------------------------       EXAM:     XR Chest, 1 View       CLINICAL HISTORY:      Reason for exam: intubation. Reason for exam:->intubation. TECHNIQUE:     Frontal view of the chest.       COMPARISON:       Chest x-ray 7/20/19       IMPRESSION:        ET tube terminates 6.1 cm in the fransico. Final      CT CHEST WO CONTRAST   Final Result      1. Small bilateral pleural effusions. Multifocal pneumonia in the right upper and bilateral lower lobes. 2. 13 mm right upper lobe spiculated nodule suspicious for malignancy. Recommend PET/CT or biopsy. Additional nonspecific areas of nodular consolidation in the right lower lobe. Qzxv      XR CHEST PORTABLE   Final Result   1. Persistent but improved central pulmonary vascular congestion with    diffusely increased interstitial markings still present. 2.  Mildly decreased right greater than left pleural effusions. 3.  Right greater than left basilar atelectasis versus consolidation. 4.  No pneumothorax radiographically evident. 5.  Tubes/lines as above. XR CHEST PORTABLE   Final Result      1. Persistent hazy perihilar, groundglass basilar consolidations and pleural effusions greater in the right lung   2. NG tube in place as well as a right IJ central venous catheter with the tip in the mid to distal SVC, no worsening               XR CHEST PORTABLE   Final Result      Introduction of NG tube with tip excluded from the inferior edge of the film.       Moderate bilateral effusions and diffuse

## 2019-08-10 LAB
ALBUMIN SERPL-MCNC: 2.7 G/DL (ref 3.4–5)
ANION GAP SERPL CALCULATED.3IONS-SCNC: 10 MMOL/L (ref 3–16)
ANION GAP SERPL CALCULATED.3IONS-SCNC: 17 MMOL/L (ref 3–16)
BASOPHILS ABSOLUTE: 0 K/UL (ref 0–0.2)
BASOPHILS RELATIVE PERCENT: 0 %
BUN BLDV-MCNC: 20 MG/DL (ref 7–20)
BUN BLDV-MCNC: 22 MG/DL (ref 7–20)
CALCIUM SERPL-MCNC: 8.5 MG/DL (ref 8.3–10.6)
CALCIUM SERPL-MCNC: 9 MG/DL (ref 8.3–10.6)
CHLORIDE BLD-SCNC: 101 MMOL/L (ref 99–110)
CHLORIDE BLD-SCNC: 109 MMOL/L (ref 99–110)
CO2: 26 MMOL/L (ref 21–32)
CO2: 27 MMOL/L (ref 21–32)
CREAT SERPL-MCNC: 1 MG/DL (ref 0.8–1.3)
CREAT SERPL-MCNC: 1 MG/DL (ref 0.8–1.3)
EOSINOPHILS ABSOLUTE: 0 K/UL (ref 0–0.6)
EOSINOPHILS RELATIVE PERCENT: 0 %
GFR AFRICAN AMERICAN: >60
GFR AFRICAN AMERICAN: >60
GFR NON-AFRICAN AMERICAN: >60
GFR NON-AFRICAN AMERICAN: >60
GLUCOSE BLD-MCNC: 102 MG/DL (ref 70–99)
GLUCOSE BLD-MCNC: 109 MG/DL (ref 70–99)
GLUCOSE BLD-MCNC: 122 MG/DL (ref 70–99)
GLUCOSE BLD-MCNC: 144 MG/DL (ref 70–99)
HCT VFR BLD CALC: 27.8 % (ref 40.5–52.5)
HEMOGLOBIN: 9.1 G/DL (ref 13.5–17.5)
LYMPHOCYTES ABSOLUTE: 0.8 K/UL (ref 1–5.1)
LYMPHOCYTES RELATIVE PERCENT: 7 %
MAGNESIUM: 2 MG/DL (ref 1.8–2.4)
MCH RBC QN AUTO: 30.6 PG (ref 26–34)
MCHC RBC AUTO-ENTMCNC: 32.7 G/DL (ref 31–36)
MCV RBC AUTO: 93.6 FL (ref 80–100)
MONOCYTES ABSOLUTE: 0.3 K/UL (ref 0–1.3)
MONOCYTES RELATIVE PERCENT: 3 %
NEUTROPHILS ABSOLUTE: 10 K/UL (ref 1.7–7.7)
NEUTROPHILS RELATIVE PERCENT: 90 %
PDW BLD-RTO: 14.3 % (ref 12.4–15.4)
PERFORMED ON: ABNORMAL
PERFORMED ON: ABNORMAL
PHOSPHORUS: 3.1 MG/DL (ref 2.5–4.9)
PLATELET # BLD: 712 K/UL (ref 135–450)
PMV BLD AUTO: 8.3 FL (ref 5–10.5)
POTASSIUM SERPL-SCNC: 2.8 MMOL/L (ref 3.5–5.1)
POTASSIUM SERPL-SCNC: 3.3 MMOL/L (ref 3.5–5.1)
RBC # BLD: 2.97 M/UL (ref 4.2–5.9)
SODIUM BLD-SCNC: 145 MMOL/L (ref 136–145)
SODIUM BLD-SCNC: 145 MMOL/L (ref 136–145)
WBC # BLD: 11.1 K/UL (ref 4–11)

## 2019-08-10 PROCEDURE — 97530 THERAPEUTIC ACTIVITIES: CPT

## 2019-08-10 PROCEDURE — 6360000002 HC RX W HCPCS

## 2019-08-10 PROCEDURE — 2580000003 HC RX 258: Performed by: STUDENT IN AN ORGANIZED HEALTH CARE EDUCATION/TRAINING PROGRAM

## 2019-08-10 PROCEDURE — 6370000000 HC RX 637 (ALT 250 FOR IP): Performed by: INTERNAL MEDICINE

## 2019-08-10 PROCEDURE — 2000000000 HC ICU R&B

## 2019-08-10 PROCEDURE — 94640 AIRWAY INHALATION TREATMENT: CPT

## 2019-08-10 PROCEDURE — 92526 ORAL FUNCTION THERAPY: CPT

## 2019-08-10 PROCEDURE — 6370000000 HC RX 637 (ALT 250 FOR IP): Performed by: STUDENT IN AN ORGANIZED HEALTH CARE EDUCATION/TRAINING PROGRAM

## 2019-08-10 PROCEDURE — 99233 SBSQ HOSP IP/OBS HIGH 50: CPT | Performed by: INTERNAL MEDICINE

## 2019-08-10 PROCEDURE — 6360000002 HC RX W HCPCS: Performed by: STUDENT IN AN ORGANIZED HEALTH CARE EDUCATION/TRAINING PROGRAM

## 2019-08-10 PROCEDURE — 92610 EVALUATE SWALLOWING FUNCTION: CPT

## 2019-08-10 PROCEDURE — 6360000002 HC RX W HCPCS: Performed by: INTERNAL MEDICINE

## 2019-08-10 PROCEDURE — 36415 COLL VENOUS BLD VENIPUNCTURE: CPT

## 2019-08-10 PROCEDURE — 83735 ASSAY OF MAGNESIUM: CPT

## 2019-08-10 PROCEDURE — 97162 PT EVAL MOD COMPLEX 30 MIN: CPT

## 2019-08-10 PROCEDURE — 94761 N-INVAS EAR/PLS OXIMETRY MLT: CPT

## 2019-08-10 PROCEDURE — 94660 CPAP INITIATION&MGMT: CPT

## 2019-08-10 PROCEDURE — 2580000003 HC RX 258

## 2019-08-10 PROCEDURE — 2700000000 HC OXYGEN THERAPY PER DAY

## 2019-08-10 PROCEDURE — C9113 INJ PANTOPRAZOLE SODIUM, VIA: HCPCS | Performed by: INTERNAL MEDICINE

## 2019-08-10 PROCEDURE — 85025 COMPLETE CBC W/AUTO DIFF WBC: CPT

## 2019-08-10 PROCEDURE — 80069 RENAL FUNCTION PANEL: CPT

## 2019-08-10 PROCEDURE — 36592 COLLECT BLOOD FROM PICC: CPT

## 2019-08-10 PROCEDURE — 93005 ELECTROCARDIOGRAM TRACING: CPT | Performed by: STUDENT IN AN ORGANIZED HEALTH CARE EDUCATION/TRAINING PROGRAM

## 2019-08-10 PROCEDURE — 6370000000 HC RX 637 (ALT 250 FOR IP)

## 2019-08-10 RX ORDER — QUETIAPINE FUMARATE 25 MG/1
100 TABLET, FILM COATED ORAL ONCE
Status: COMPLETED | OUTPATIENT
Start: 2019-08-10 | End: 2019-08-10

## 2019-08-10 RX ORDER — POTASSIUM CHLORIDE 29.8 MG/ML
20 INJECTION INTRAVENOUS
Status: COMPLETED | OUTPATIENT
Start: 2019-08-10 | End: 2019-08-10

## 2019-08-10 RX ORDER — SODIUM CHLORIDE 9 MG/ML
INJECTION, SOLUTION INTRAVENOUS
Status: COMPLETED
Start: 2019-08-10 | End: 2019-08-10

## 2019-08-10 RX ORDER — 0.9 % SODIUM CHLORIDE 0.9 %
500 INTRAVENOUS SOLUTION INTRAVENOUS ONCE
Status: COMPLETED | OUTPATIENT
Start: 2019-08-10 | End: 2019-08-10

## 2019-08-10 RX ADMIN — CASTOR OIL AND BALSAM, PERU: 788; 87 OINTMENT TOPICAL at 08:01

## 2019-08-10 RX ADMIN — LEVALBUTEROL HYDROCHLORIDE 1.25 MG: 1.25 SOLUTION, CONCENTRATE RESPIRATORY (INHALATION) at 04:12

## 2019-08-10 RX ADMIN — SODIUM CHLORIDE 500 ML: 9 INJECTION, SOLUTION INTRAVENOUS at 17:21

## 2019-08-10 RX ADMIN — Medication 10 ML: at 21:14

## 2019-08-10 RX ADMIN — ATORVASTATIN CALCIUM 20 MG: 20 TABLET, FILM COATED ORAL at 21:10

## 2019-08-10 RX ADMIN — LEVALBUTEROL HYDROCHLORIDE 1.25 MG: 1.25 SOLUTION, CONCENTRATE RESPIRATORY (INHALATION) at 16:16

## 2019-08-10 RX ADMIN — MEROPENEM 1 G: 1 INJECTION, POWDER, FOR SOLUTION INTRAVENOUS at 00:37

## 2019-08-10 RX ADMIN — SODIUM CHLORIDE 30 MG/ML INHALATION SOLUTION 15 ML: 30 SOLUTION INHALANT at 16:16

## 2019-08-10 RX ADMIN — QUETIAPINE 100 MG: 25 TABLET, FILM COATED ORAL at 21:31

## 2019-08-10 RX ADMIN — METOPROLOL TARTRATE 25 MG: 25 TABLET ORAL at 08:01

## 2019-08-10 RX ADMIN — PANTOPRAZOLE SODIUM 40 MG: 40 INJECTION, POWDER, LYOPHILIZED, FOR SOLUTION INTRAVENOUS at 21:10

## 2019-08-10 RX ADMIN — SODIUM CHLORIDE 30 MG/ML INHALATION SOLUTION 15 ML: 30 SOLUTION INHALANT at 20:26

## 2019-08-10 RX ADMIN — SODIUM CHLORIDE 30 MG/ML INHALATION SOLUTION 15 ML: 30 SOLUTION INHALANT at 08:58

## 2019-08-10 RX ADMIN — Medication 15 ML: at 21:10

## 2019-08-10 RX ADMIN — LEVALBUTEROL HYDROCHLORIDE 1.25 MG: 1.25 SOLUTION, CONCENTRATE RESPIRATORY (INHALATION) at 20:26

## 2019-08-10 RX ADMIN — POTASSIUM CHLORIDE 20 MEQ: 29.8 INJECTION, SOLUTION INTRAVENOUS at 11:38

## 2019-08-10 RX ADMIN — HEPARIN SODIUM 5000 UNITS: 5000 INJECTION INTRAVENOUS; SUBCUTANEOUS at 06:14

## 2019-08-10 RX ADMIN — SODIUM CHLORIDE 500 ML: 9 INJECTION, SOLUTION INTRAVENOUS at 14:38

## 2019-08-10 RX ADMIN — ASPIRIN 81 MG 81 MG: 81 TABLET ORAL at 08:01

## 2019-08-10 RX ADMIN — POTASSIUM CHLORIDE 20 MEQ: 29.8 INJECTION, SOLUTION INTRAVENOUS at 19:05

## 2019-08-10 RX ADMIN — MEROPENEM 1 G: 1 INJECTION, POWDER, FOR SOLUTION INTRAVENOUS at 08:02

## 2019-08-10 RX ADMIN — SODIUM CHLORIDE 30 MG/ML INHALATION SOLUTION 15 ML: 30 SOLUTION INHALANT at 00:20

## 2019-08-10 RX ADMIN — METOPROLOL TARTRATE 25 MG: 25 TABLET ORAL at 21:10

## 2019-08-10 RX ADMIN — POTASSIUM CHLORIDE 20 MEQ: 29.8 INJECTION, SOLUTION INTRAVENOUS at 21:10

## 2019-08-10 RX ADMIN — POTASSIUM CHLORIDE 20 MEQ: 29.8 INJECTION, SOLUTION INTRAVENOUS at 07:38

## 2019-08-10 RX ADMIN — Medication 10 ML: at 07:39

## 2019-08-10 RX ADMIN — Medication 15 ML: at 08:59

## 2019-08-10 RX ADMIN — LINEZOLID 600 MG: 600 INJECTION, SOLUTION INTRAVENOUS at 11:04

## 2019-08-10 RX ADMIN — CASTOR OIL AND BALSAM, PERU: 788; 87 OINTMENT TOPICAL at 21:13

## 2019-08-10 RX ADMIN — LEVALBUTEROL HYDROCHLORIDE 1.25 MG: 1.25 SOLUTION, CONCENTRATE RESPIRATORY (INHALATION) at 08:58

## 2019-08-10 RX ADMIN — HEPARIN SODIUM 5000 UNITS: 5000 INJECTION INTRAVENOUS; SUBCUTANEOUS at 13:43

## 2019-08-10 RX ADMIN — ACETAMINOPHEN 650 MG: 325 TABLET ORAL at 08:01

## 2019-08-10 RX ADMIN — LINEZOLID 600 MG: 600 INJECTION, SOLUTION INTRAVENOUS at 22:34

## 2019-08-10 RX ADMIN — SODIUM CHLORIDE 30 MG/ML INHALATION SOLUTION 15 ML: 30 SOLUTION INHALANT at 12:08

## 2019-08-10 RX ADMIN — LEVALBUTEROL HYDROCHLORIDE 1.25 MG: 1.25 SOLUTION, CONCENTRATE RESPIRATORY (INHALATION) at 00:20

## 2019-08-10 RX ADMIN — HEPARIN SODIUM 5000 UNITS: 5000 INJECTION INTRAVENOUS; SUBCUTANEOUS at 21:14

## 2019-08-10 RX ADMIN — Medication 500 ML: at 14:38

## 2019-08-10 RX ADMIN — POTASSIUM CHLORIDE 20 MEQ: 29.8 INJECTION, SOLUTION INTRAVENOUS at 09:40

## 2019-08-10 RX ADMIN — LEVALBUTEROL HYDROCHLORIDE 1.25 MG: 1.25 SOLUTION, CONCENTRATE RESPIRATORY (INHALATION) at 12:08

## 2019-08-10 RX ADMIN — POTASSIUM CHLORIDE 20 MEQ: 29.8 INJECTION, SOLUTION INTRAVENOUS at 23:15

## 2019-08-10 RX ADMIN — MEROPENEM 1 G: 1 INJECTION, POWDER, FOR SOLUTION INTRAVENOUS at 16:05

## 2019-08-10 RX ADMIN — SODIUM CHLORIDE 30 MG/ML INHALATION SOLUTION 15 ML: 30 SOLUTION INHALANT at 04:12

## 2019-08-10 RX ADMIN — PANTOPRAZOLE SODIUM 40 MG: 40 INJECTION, POWDER, LYOPHILIZED, FOR SOLUTION INTRAVENOUS at 08:02

## 2019-08-10 RX ADMIN — Medication 10 ML: at 08:02

## 2019-08-10 ASSESSMENT — PAIN SCALES - GENERAL
PAINLEVEL_OUTOF10: 3
PAINLEVEL_OUTOF10: 0
PAINLEVEL_OUTOF10: 3
PAINLEVEL_OUTOF10: 2
PAINLEVEL_OUTOF10: 2
PAINLEVEL_OUTOF10: 0

## 2019-08-10 ASSESSMENT — ENCOUNTER SYMPTOMS
COUGH: 1
WHEEZING: 1
TROUBLE SWALLOWING: 1
CONSTIPATION: 0
EYE PAIN: 0
COLOR CHANGE: 0
ABDOMINAL PAIN: 0
EYE ITCHING: 0

## 2019-08-10 NOTE — PROGRESS NOTES
Hospitalist Progress Note      PCP: Grant Gold    Date of Admission: 7/22/2019    Chief Complaint: fatigue, N/V    Subjective:     Low-grade fever 100.5 overnight  Currently on Vapotherm  Failed for swallow study had started on tube feeds  Denies any chest pain or shortness of breath      Medications:  Reviewed    Infusion Medications    dextrose       Scheduled Medications    meropenem  1 g Intravenous Q8H    linezolid  600 mg Intravenous Q12H    sodium chloride (Inhalant)  15 mL Nebulization Q4H    sodium chloride flush  10 mL Intravenous 2 times per day    metoprolol tartrate  25 mg Oral BID    levalbuterol  1.25 mg Nebulization 6 times per day    pantoprazole  40 mg Intravenous BID    VENELEX   Topical BID    chlorhexidine  15 mL Mouth/Throat BID    heparin (porcine)  5,000 Units Subcutaneous 3 times per day    atorvastatin  20 mg Oral Nightly    aspirin  81 mg Oral Daily     PRN Meds: acetaminophen, sodium chloride flush, labetalol, medicated lip ointment, aspirin, glucose, dextrose, glucagon (rDNA), dextrose, ondansetron      Intake/Output Summary (Last 24 hours) at 8/10/2019 1322  Last data filed at 8/10/2019 1200  Gross per 24 hour   Intake 1346 ml   Output 1747 ml   Net -401 ml       Physical Exam Performed:    BP (!) 118/56   Pulse 130   Temp 98.5 °F (36.9 °C) (Axillary)   Resp (!) 35   Ht 5' 5\" (1.651 m)   Wt 112 lb 14 oz (51.2 kg)   SpO2 99%   BMI 18.78 kg/m²     General appearance:  Alert, unable to answer questions intelligently  Mimics actions but doesn't follow commands  Spontaneously moving all extremities  HEENT: Pupils equal, round, and reactive to light. Conjunctivae/corneas clear. Neck: Supple, with full range of motion. No jugular venous distention. Trachea midline. Respiratory:  Normal respiratory effort.  On ventilator, rhonchi heard bilaterally  Cardiovascular: tachycardic, regular rhythm  Abdomen: Soft, non-tender, non-distended with normal bowel

## 2019-08-10 NOTE — PROGRESS NOTES
he drinks \" a lot of milkshakes,\" but also went on to say that he mixes vodka with his milkshakes. He endorses drinking 1-2 vodka shots per day and states he smokes 1 pack of cigarettes daily \"for a long time. \" He denies CP, SOB (despite requiring 2L NC), abdominal pain, constipation or diarrhea. \"  Subjective  Subjective: Pt alert, oriented only to self, resting in bed. NG tube in place and pt on high flow 20L O2. Pt mostly aphonic, intermittent wet cough productive of sputum present. 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 Previous MBSS completed 11/8/18, with no penetration/aspiration observed; regular/thin liquid diet recommended. Patient intubated this hospital stay from 7/29-8/9, and has remained NPO on NG tube for nutrition. Pt with concern for penetration aspiration per cxr. Consistencies Administered: Ice Chips    Oral Motor Deficits  Oral/Motor  Oral Motor: Exceptions to [facility]  Labial Coordination: Reduced  Lingual Coordination: Reduced    Prognosis  Prognosis  Prognosis for safe diet advancement: fair  Barriers to reach goals: severity of dysphagia;cognitive deficits  Individuals consulted  Consulted and agree with results and recommendations: Patient    Education  Patient Education: Educated pt to purpose of visit, role of slp, recommendations. Patient Education Response: No evidence of learning  Safety Devices in place: Yes  Type of devices: Bed alarm in place; Left in bed         Therapy Time  SLP Individual Minutes  Time In:

## 2019-08-10 NOTE — PROGRESS NOTES
ICU Progress Note    Admit Date: 7/22/2019  Day: 18  IV Access: Peripheral, Central Line  IV Fluids:None  Vasopressors:None                Antibiotics: Thanh Mosley and Abner  Diet: DIET TUBE FEED VOLUME BASED NPO STANDARD WITH FIBER (Jevity 1.2); Nasogastric; Continuous; 1,320; 24    CC: AMS d/t possible alcohol or benzo withdrawal now w/ Aspiration pneumonia -[employer] positive for Klebsiella and MRSA    Interval history: Had mild fever to 100.5 overnight. Tachycardic to 120s. Blood pressure stable. Appears dry. Made less Urine. Renal function stable. Patient weaning off vapotherm, will switch to HFNC. Is making secretions, but respiratory effort is normal. Did not pass swallow study. Tube feeds restarted. Had BM overnight.        Medications:     Scheduled Meds:   potassium chloride  20 mEq Intravenous Q2H    meropenem  1 g Intravenous Q8H    linezolid  600 mg Intravenous Q12H    sodium chloride (Inhalant)  15 mL Nebulization Q4H    sodium chloride flush  10 mL Intravenous 2 times per day    metoprolol tartrate  25 mg Oral BID    levalbuterol  1.25 mg Nebulization 6 times per day    pantoprazole  40 mg Intravenous BID    VENELEX   Topical BID    chlorhexidine  15 mL Mouth/Throat BID    heparin (porcine)  5,000 Units Subcutaneous 3 times per day    atorvastatin  20 mg Oral Nightly    aspirin  81 mg Oral Daily     Continuous Infusions:   dextrose       PRN Meds:acetaminophen, sodium chloride flush, labetalol, medicated lip ointment, aspirin, glucose, dextrose, glucagon (rDNA), dextrose, ondansetron    Objective:   Vitals:   T-max:  Patient Vitals for the past 8 hrs:   BP Temp Temp src Pulse Resp SpO2   08/10/19 0900 109/68 99.1 °F (37.3 °C) Axillary 123 (!) 36 100 %   08/10/19 0858 -- -- -- -- -- 100 %   08/10/19 0801 121/67 -- -- 135 -- --   08/10/19 0800 121/67 -- -- 135 (!) 31 97 %   08/10/19 0745 112/66 100.5 °F (38.1 °C) Axillary 133 (!) 32 97 %   08/10/19 0730 121/65 -- -- 139 (!) 32 93 %   08/10/19 0700
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
## 2019-08-11 LAB
ALBUMIN SERPL-MCNC: 2.2 G/DL (ref 3.4–5)
ANION GAP SERPL CALCULATED.3IONS-SCNC: 8 MMOL/L (ref 3–16)
BASOPHILS ABSOLUTE: 0.1 K/UL (ref 0–0.2)
BASOPHILS RELATIVE PERCENT: 1 %
BUN BLDV-MCNC: 21 MG/DL (ref 7–20)
CALCIUM SERPL-MCNC: 8.3 MG/DL (ref 8.3–10.6)
CHLORIDE BLD-SCNC: 113 MMOL/L (ref 99–110)
CO2: 24 MMOL/L (ref 21–32)
CREAT SERPL-MCNC: 1 MG/DL (ref 0.8–1.3)
EKG ATRIAL RATE: 125 BPM
EKG DIAGNOSIS: NORMAL
EKG P AXIS: 50 DEGREES
EKG P-R INTERVAL: 114 MS
EKG Q-T INTERVAL: 308 MS
EKG QRS DURATION: 78 MS
EKG QTC CALCULATION (BAZETT): 444 MS
EKG R AXIS: -37 DEGREES
EKG T AXIS: -50 DEGREES
EKG VENTRICULAR RATE: 125 BPM
EOSINOPHILS ABSOLUTE: 0.1 K/UL (ref 0–0.6)
EOSINOPHILS RELATIVE PERCENT: 2 %
FOLATE: 16.28 NG/ML (ref 4.78–24.2)
GFR AFRICAN AMERICAN: >60
GFR NON-AFRICAN AMERICAN: >60
GLUCOSE BLD-MCNC: 131 MG/DL (ref 70–99)
HCT VFR BLD CALC: 23.9 % (ref 40.5–52.5)
HEMOGLOBIN: 7.9 G/DL (ref 13.5–17.5)
LYMPHOCYTES ABSOLUTE: 0.9 K/UL (ref 1–5.1)
LYMPHOCYTES RELATIVE PERCENT: 12.8 %
MAGNESIUM: 2.1 MG/DL (ref 1.8–2.4)
MCH RBC QN AUTO: 31.7 PG (ref 26–34)
MCHC RBC AUTO-ENTMCNC: 33.2 G/DL (ref 31–36)
MCV RBC AUTO: 95.6 FL (ref 80–100)
MONOCYTES ABSOLUTE: 0.7 K/UL (ref 0–1.3)
MONOCYTES RELATIVE PERCENT: 9.3 %
NEUTROPHILS ABSOLUTE: 5.3 K/UL (ref 1.7–7.7)
NEUTROPHILS RELATIVE PERCENT: 74.9 %
PDW BLD-RTO: 14.5 % (ref 12.4–15.4)
PHOSPHORUS: 1.5 MG/DL (ref 2.5–4.9)
PLATELET # BLD: 558 K/UL (ref 135–450)
PMV BLD AUTO: 7.9 FL (ref 5–10.5)
POTASSIUM SERPL-SCNC: 4.3 MMOL/L (ref 3.5–5.1)
RBC # BLD: 2.5 M/UL (ref 4.2–5.9)
SODIUM BLD-SCNC: 145 MMOL/L (ref 136–145)
VITAMIN B-12: 1806 PG/ML (ref 211–911)
WBC # BLD: 7.1 K/UL (ref 4–11)

## 2019-08-11 PROCEDURE — 6370000000 HC RX 637 (ALT 250 FOR IP): Performed by: INTERNAL MEDICINE

## 2019-08-11 PROCEDURE — 94640 AIRWAY INHALATION TREATMENT: CPT

## 2019-08-11 PROCEDURE — 99233 SBSQ HOSP IP/OBS HIGH 50: CPT | Performed by: INTERNAL MEDICINE

## 2019-08-11 PROCEDURE — 6360000002 HC RX W HCPCS: Performed by: STUDENT IN AN ORGANIZED HEALTH CARE EDUCATION/TRAINING PROGRAM

## 2019-08-11 PROCEDURE — 80069 RENAL FUNCTION PANEL: CPT

## 2019-08-11 PROCEDURE — 93010 ELECTROCARDIOGRAM REPORT: CPT | Performed by: INTERNAL MEDICINE

## 2019-08-11 PROCEDURE — 36592 COLLECT BLOOD FROM PICC: CPT

## 2019-08-11 PROCEDURE — 82607 VITAMIN B-12: CPT

## 2019-08-11 PROCEDURE — 83735 ASSAY OF MAGNESIUM: CPT

## 2019-08-11 PROCEDURE — 6370000000 HC RX 637 (ALT 250 FOR IP)

## 2019-08-11 PROCEDURE — 6360000002 HC RX W HCPCS: Performed by: PSYCHIATRY & NEUROLOGY

## 2019-08-11 PROCEDURE — 2000000000 HC ICU R&B

## 2019-08-11 PROCEDURE — 2580000003 HC RX 258: Performed by: STUDENT IN AN ORGANIZED HEALTH CARE EDUCATION/TRAINING PROGRAM

## 2019-08-11 PROCEDURE — 6370000000 HC RX 637 (ALT 250 FOR IP): Performed by: STUDENT IN AN ORGANIZED HEALTH CARE EDUCATION/TRAINING PROGRAM

## 2019-08-11 PROCEDURE — 82746 ASSAY OF FOLIC ACID SERUM: CPT

## 2019-08-11 PROCEDURE — 85025 COMPLETE CBC W/AUTO DIFF WBC: CPT

## 2019-08-11 PROCEDURE — C9113 INJ PANTOPRAZOLE SODIUM, VIA: HCPCS | Performed by: INTERNAL MEDICINE

## 2019-08-11 PROCEDURE — 6360000002 HC RX W HCPCS: Performed by: INTERNAL MEDICINE

## 2019-08-11 PROCEDURE — 2500000003 HC RX 250 WO HCPCS: Performed by: STUDENT IN AN ORGANIZED HEALTH CARE EDUCATION/TRAINING PROGRAM

## 2019-08-11 PROCEDURE — 36415 COLL VENOUS BLD VENIPUNCTURE: CPT

## 2019-08-11 RX ORDER — QUETIAPINE FUMARATE 25 MG/1
50 TABLET, FILM COATED ORAL NIGHTLY
Status: DISCONTINUED | OUTPATIENT
Start: 2019-08-11 | End: 2019-08-11

## 2019-08-11 RX ORDER — SODIUM CHLORIDE 9 MG/ML
INJECTION, SOLUTION INTRAVENOUS
Status: DISCONTINUED
Start: 2019-08-11 | End: 2019-08-11

## 2019-08-11 RX ORDER — LEVALBUTEROL 1.25 MG/.5ML
1.25 SOLUTION, CONCENTRATE RESPIRATORY (INHALATION) 4 TIMES DAILY
Status: DISCONTINUED | OUTPATIENT
Start: 2019-08-12 | End: 2019-08-14

## 2019-08-11 RX ORDER — HALOPERIDOL 5 MG/ML
1 INJECTION INTRAMUSCULAR 2 TIMES DAILY
Status: DISCONTINUED | OUTPATIENT
Start: 2019-08-11 | End: 2019-08-14

## 2019-08-11 RX ORDER — SODIUM CHLORIDE FOR INHALATION 3 %
15 VIAL, NEBULIZER (ML) INHALATION 4 TIMES DAILY
Status: DISCONTINUED | OUTPATIENT
Start: 2019-08-12 | End: 2019-08-14

## 2019-08-11 RX ORDER — QUETIAPINE FUMARATE 25 MG/1
25 TABLET, FILM COATED ORAL PRN
Status: DISCONTINUED | OUTPATIENT
Start: 2019-08-11 | End: 2019-08-11

## 2019-08-11 RX ORDER — LEVALBUTEROL 1.25 MG/.5ML
1.25 SOLUTION, CONCENTRATE RESPIRATORY (INHALATION) EVERY 4 HOURS PRN
Status: DISCONTINUED | OUTPATIENT
Start: 2019-08-11 | End: 2019-08-20 | Stop reason: HOSPADM

## 2019-08-11 RX ADMIN — LEVALBUTEROL HYDROCHLORIDE 1.25 MG: 1.25 SOLUTION, CONCENTRATE RESPIRATORY (INHALATION) at 11:27

## 2019-08-11 RX ADMIN — Medication 15 ML: at 08:47

## 2019-08-11 RX ADMIN — METOPROLOL TARTRATE 25 MG: 25 TABLET ORAL at 08:48

## 2019-08-11 RX ADMIN — SODIUM CHLORIDE 30 MG/ML INHALATION SOLUTION 15 ML: 30 SOLUTION INHALANT at 15:05

## 2019-08-11 RX ADMIN — HALOPERIDOL LACTATE 1 MG: 5 INJECTION INTRAMUSCULAR at 13:07

## 2019-08-11 RX ADMIN — MEROPENEM 1 G: 1 INJECTION, POWDER, FOR SOLUTION INTRAVENOUS at 01:03

## 2019-08-11 RX ADMIN — SODIUM CHLORIDE 30 MG/ML INHALATION SOLUTION 15 ML: 30 SOLUTION INHALANT at 11:28

## 2019-08-11 RX ADMIN — LEVALBUTEROL HYDROCHLORIDE 1.25 MG: 1.25 SOLUTION, CONCENTRATE RESPIRATORY (INHALATION) at 19:25

## 2019-08-11 RX ADMIN — Medication 10 ML: at 08:48

## 2019-08-11 RX ADMIN — CASTOR OIL AND BALSAM, PERU: 788; 87 OINTMENT TOPICAL at 20:14

## 2019-08-11 RX ADMIN — ATORVASTATIN CALCIUM 20 MG: 20 TABLET, FILM COATED ORAL at 20:12

## 2019-08-11 RX ADMIN — HEPARIN SODIUM 5000 UNITS: 5000 INJECTION INTRAVENOUS; SUBCUTANEOUS at 20:35

## 2019-08-11 RX ADMIN — LINEZOLID 600 MG: 600 INJECTION, SOLUTION INTRAVENOUS at 10:15

## 2019-08-11 RX ADMIN — LINEZOLID 600 MG: 600 INJECTION, SOLUTION INTRAVENOUS at 22:24

## 2019-08-11 RX ADMIN — METOPROLOL TARTRATE 25 MG: 25 TABLET ORAL at 20:13

## 2019-08-11 RX ADMIN — ASPIRIN 81 MG 81 MG: 81 TABLET ORAL at 08:48

## 2019-08-11 RX ADMIN — PANTOPRAZOLE SODIUM 40 MG: 40 INJECTION, POWDER, LYOPHILIZED, FOR SOLUTION INTRAVENOUS at 20:12

## 2019-08-11 RX ADMIN — LEVALBUTEROL HYDROCHLORIDE 1.25 MG: 1.25 SOLUTION, CONCENTRATE RESPIRATORY (INHALATION) at 15:04

## 2019-08-11 RX ADMIN — Medication 10 ML: at 20:12

## 2019-08-11 RX ADMIN — Medication 15 ML: at 20:15

## 2019-08-11 RX ADMIN — PANTOPRAZOLE SODIUM 40 MG: 40 INJECTION, POWDER, LYOPHILIZED, FOR SOLUTION INTRAVENOUS at 08:48

## 2019-08-11 RX ADMIN — Medication 10 ML: at 13:07

## 2019-08-11 RX ADMIN — HEPARIN SODIUM 5000 UNITS: 5000 INJECTION INTRAVENOUS; SUBCUTANEOUS at 06:47

## 2019-08-11 RX ADMIN — HEPARIN SODIUM 5000 UNITS: 5000 INJECTION INTRAVENOUS; SUBCUTANEOUS at 14:58

## 2019-08-11 RX ADMIN — SODIUM PHOSPHATE, MONOBASIC, MONOHYDRATE 30 MMOL: 276; 142 INJECTION, SOLUTION INTRAVENOUS at 23:16

## 2019-08-11 RX ADMIN — MEROPENEM 1 G: 1 INJECTION, POWDER, FOR SOLUTION INTRAVENOUS at 08:47

## 2019-08-11 RX ADMIN — CASTOR OIL AND BALSAM, PERU: 788; 87 OINTMENT TOPICAL at 08:47

## 2019-08-11 RX ADMIN — MEROPENEM 1 G: 1 INJECTION, POWDER, FOR SOLUTION INTRAVENOUS at 16:15

## 2019-08-11 RX ADMIN — HALOPERIDOL LACTATE 1 MG: 5 INJECTION INTRAMUSCULAR at 20:13

## 2019-08-11 RX ADMIN — SODIUM CHLORIDE 30 MG/ML INHALATION SOLUTION 15 ML: 30 SOLUTION INHALANT at 19:25

## 2019-08-11 RX ADMIN — ACETAMINOPHEN 650 MG: 325 TABLET ORAL at 22:27

## 2019-08-11 RX ADMIN — ACETAMINOPHEN 650 MG: 325 TABLET ORAL at 08:54

## 2019-08-11 ASSESSMENT — ENCOUNTER SYMPTOMS
EYE PAIN: 0
EYE ITCHING: 0
COUGH: 1
TROUBLE SWALLOWING: 1
WHEEZING: 1
COLOR CHANGE: 0
ABDOMINAL PAIN: 0
CONSTIPATION: 0

## 2019-08-11 ASSESSMENT — PAIN SCALES - GENERAL
PAINLEVEL_OUTOF10: 3
PAINLEVEL_OUTOF10: 2
PAINLEVEL_OUTOF10: 2
PAINLEVEL_OUTOF10: 0
PAINLEVEL_OUTOF10: 0
PAINLEVEL_OUTOF10: 3
PAINLEVEL_OUTOF10: 3
PAINLEVEL_OUTOF10: 0

## 2019-08-11 NOTE — PROGRESS NOTES
demonstrate a 2-3 second inspiratory hold  4. Bronchodilators will be administered via Hand Held Nebulizer [facility]) to patients when ANY of the following criteria are met  a. Incognizant or uncooperative          b. Patients treated with HHN at Home        c. Unable to demonstrate proper use of MDI with spacer     d. RR > 30 bpm   5. Bronchodilators will be delivered via Metered Dose Inhaler (MDI), HHN, Aerogen to intubated patients on mechanical ventilation. 6. Inhalation medication orders will be delivered and/or substituted as outlined below. Aerosolized Medications Ordering and Administration Guidelines:    1. All Medications will be ordered by a physician, and their frequency and/or modality will be adjusted as defined by the patients Respiratory Severity Index (RSI) score. 2. If the patient does not have documented COPD, consider discontinuing anticholinergics when RSI is less than 9.  3. If the bronchospasm worsens (increased RSI), then the bronchodilator frequency can be increased to a maximum of every 4 hours. If greater than every 4 hours is required, the physician will be contacted. 4. If the bronchospasm improves, the frequency of the bronchodilator can be decreased, based on the patient's RSI, but not less than home treatment regimen frequency. 5. Bronchodilator(s) will be discontinued if patient has a RSI less than 9 and has received no scheduled or as needed treatment for 72  Hrs. Patients Ordered on a Mucolytic Agent:    1. Must always be administered with a bronchodilator. 2. Discontinue if patient experiences worsened bronchospasm, or secretions have lessened to the point that the patient is able to clear them with a cough. Anti-inflammatory and Combination Medications:    1.  If the patient lacks prior history of lung disease, is not using inhaled anti-inflammatory medication at home, and lacks wheezing by examination or by history for at least 24 hours, contact physician for possible discontinuation.

## 2019-08-11 NOTE — PROGRESS NOTES
with getting secretions out and he may develop a mucus plug and desat again.  Will monitor at least for 24 hours more in the ICU  LTACH was denied for now, will need SNF placement on [address]

## 2019-08-11 NOTE — PROGRESS NOTES
08/11/19 0836 (!) 151/75 99.9 °F (37.7 °C) Axillary 123 (!) 33 99 %   08/11/19 0800 139/70 -- -- 109 (!) 35 100 %   08/11/19 0700 121/83 -- -- 124 29 97 %   08/11/19 0600 (!) 153/58 -- -- 125 (!) 32 --   08/11/19 0500 114/67 -- -- 126 (!) 37 --   08/11/19 0400 (!) 140/70 -- -- 123 29 --       Intake/Output Summary (Last 24 hours) at 8/11/2019 1135  Last data filed at 8/11/2019 1100  Gross per 24 hour   Intake 4169.93 ml   Output 1008 ml   Net 3161.93 ml     Review of Systems   Constitutional: Positive for fever. Negative for chills and diaphoresis. HENT: Positive for mouth sores and trouble swallowing. Eyes: Negative for pain and itching. Respiratory: Positive for cough and wheezing. Cardiovascular: Negative for chest pain and leg swelling. Gastrointestinal: Negative for abdominal pain and constipation. Genitourinary: Negative for difficulty urinating and dysuria. Musculoskeletal: Negative for joint swelling and neck stiffness. Skin: Negative for color change and wound. Neurological: Positive for speech difficulty. Negative for syncope. Psychiatric/Behavioral: Negative for hallucinations. The patient is not nervous/anxious. Physical Exam   Constitutional: He appears well-developed and well-nourished. Head: Normocephalic and atraumatic. Sores around lips. MMM. Eyes: Pupils are equal, round, and reactive to light. Neck: Normal range of motion. No JVD present. Cardiovascular: Normal rate, regular rhythm, normal heart sounds and intact distal pulses. Exam reveals no gallop and no friction rub. No murmur heard. Pulmonary/Chest: Diffuse Rhonchi, improved. Normal effort on NC. Abdominal: Soft. Bowel sounds are normal. He exhibits no distension. There is no tenderness. Musculoskeletal: No edema. Has loose skin possible tenting or loose skin from rapid volume removal.   Neurological:   Awake and alert. Not following commands this AM. Oriented to name only.    Skin: Skin is warm and dry. Capillary refill takes less than 2 seconds. No pallor. LABS:    CBC:   Recent Labs     08/09/19  0608 08/10/19  0449 08/11/19  0435   WBC 13.6* 11.1* 7.1   HGB 9.5* 9.1* 7.9*   HCT 28.9* 27.8* 23.9*   * 712* 558*   MCV 93.3 93.6 95.6     Renal:    Recent Labs     08/09/19  0608 08/10/19  0449 08/10/19  1727    145 145   K 3.6 2.8* 3.3*    101 109   CO2 23 27 26   BUN 19 20 22*   CREATININE 0.8 1.0 1.0   GLUCOSE 95 122* 144*   CALCIUM 8.8 9.0 8.5   MG 2.00 2.00  --    PHOS 2.6 3.1  --    ANIONGAP 16 17* 10     Hepatic:   Recent Labs     08/09/19  0608 08/10/19  0449   LABALBU 2.8* 2.7*     Troponin:   Recent Labs     08/09/19 0608   TROPONINI 0.05*     BNP: No results for input(s): BNP in the last 72 hours. Lipids: No results for input(s): CHOL, HDL in the last 72 hours. Invalid input(s): LDLCALCU, TRIGLYCERIDE  ABGs:    Recent Labs     08/08/19  1148 08/09/19  0320   PHART 7.431 7.530*   [id number] 37.6 28.2*   PO2ART 27.2* 46.1*   ETK9EHI 25.0 23.6   BEART 1 1   [id number] 53* 87*   AXU4GUR 26 24       INR: No results for input(s): INR in the last 72 hours. Lactate: No results for input(s): LACTATE in the last 72 hours. Cultures:  -----------------------------------------------------------------  RAD:   CT CHEST PULMONARY EMBOLISM W CONTRAST   Final Result       1. No pulmonary embolism. 2.  New 3 cm cavitary lesion in the right upper lobe, possibly pulmonary    abscess given presence of multifocal patchy opacities in the most recent    exam.   3.  Overall decreased but persistent multifocal opacities and nodules,    possibly infectious or inflammatory in etiology. The 12 mm lesion in the    right upper lobe appears somewhat spiculated. Recommend short-term    follow-up in 3 months to ensure resolution. 4.  Previously seen pleural effusions have resolved. 5.  Prominent mildly dilated loops of small bowel in the upper abdomen,    partially visualized.   Cannot exclude NG tube in place as well as a right IJ central venous catheter with the tip in the mid to distal SVC, no worsening               XR CHEST PORTABLE   Final Result      Introduction of NG tube with tip excluded from the inferior edge of the film. Moderate bilateral effusions and diffuse groundglass opacities consistent with edema, infection or aspiration. This appears worse since the previous study            XR ABDOMEN (KUB) (SINGLE AP VIEW)   Final Result      Nasogastric tube extending into the left upper quadrant. XR CHEST PORTABLE   Final Result      Persistent right basilar infiltrate. XR CHEST 1 VW   Final Result      Tip of the right IJ central venous catheter overlies lower SVC with no pneumothorax evident. CT HEAD WO CONTRAST   Final Result      1. No acute intracranial process. XR CHEST PORTABLE   Final Result      Interval development of bilateral lower lobe opacities may relate to edema or pneumonia      CT SOFT TISSUE NECK WO CONTRAST   Final Result   Unremarkable CT neck          XR CHEST PORTABLE   Final Result     Normal chest x-ray. CT ABDOMEN PELVIS WO CONTRAST Additional Contrast? None   Final Result   No acute abnormality demonstrated in the abdomen or pelvis. Assessment/Plan:   Mr. Ayaz Angel is a 71 yo M with PMHx significant for HTN, HLD, GERD, opioid dependence 2/2 cervical DDD, and alcohol abuse being managed in ICU for acute respiratory failure and pneumonia. Acute hypoxemic and hypercapnic respiratory failure multifactorial due to Klebsiella and MRSA aspiration pneumonia and pulmonary edema  - extubated 8/8/2019  - improving, weaning oxygen  - nephrology following for volume management, appreciate reccomendations    Pulmonary abcess  3cm cavitary lesion in RUL on CTPA. BAL cultured MRSA. - Linezolid day 3  - Meropenem Day 3, had previous course of 7 days  - Monitor for thrombocytopenia.  Per pharmacy, switch to Ceftaroline if this

## 2019-08-11 NOTE — PLAN OF CARE
injury  Description  Absence of physical injury  Outcome: Ongoing     Problem: Nutrition  Goal: Optimal nutrition therapy  Description  . Outcome: Ongoing  Note:   Pt with TF at goal, tolerating well. Will continue to monitor and collaborate with RDs. Problem: Infection - Central Venous Catheter-Associated Bloodstream Infection:  Goal: Will show no infection signs and symptoms  Description  Will show no infection signs and symptoms  Outcome: Ongoing  Note:   R IJ CVC secured with stat lock, dressing CDI, no s/s of infection. Will continue to monitor. Problem: Urinary Elimination:  Goal: Signs and symptoms of infection will decrease  Description  Signs and symptoms of infection will decrease  Outcome: Ongoing  Note:   Pt continues with indwelling urinary catheter. No s/s of infection. Resendiz care provided. Will continue to monitor. Goal: Complications related to the disease process, condition or treatment will be avoided or minimized  Description  Resendiz remains in place for output monitoring. CHG resendiz care preformed q12h. Resendiz bag below level of bladder at all times. Stat lock in place. Outcome: Ongoing     Problem: Fluid Volume - Deficit:  Goal: Absence of fluid volume deficit signs and symptoms  Description  Absence of fluid volume deficit signs and symptoms  Outcome: Ongoing     Problem: Nutrition Deficit:  Goal: Ability to achieve adequate nutritional intake will improve  Description  Ability to achieve adequate nutritional intake will improve  Outcome: Ongoing     Problem: Violence - Risk of, Self/Other-Directed:  Goal: Knowledge of developmental care interventions  Description  Absence of violence  Outcome: Ongoing  Note:   Pt remains confused; he is oriented to self only. This shift he has been talking about wanting cigarettes but no discussion of self directed violence. Pt has not been combative with staff this shift.       Problem: Risk for Impaired Skin Integrity  Goal: Tissue integrity

## 2019-08-11 NOTE — PROGRESS NOTES
Per night JASON Berrios, patient slept only 1 hour after Seroquel administered last night and was otherwise awake throughout the night. Pt awake upon 0700 patient handoff, oriented to self only. Pt asleep at 0830, awakens easily, appearing startled and promptly falls asleep again. Pt not following commands at this time. Lights on in room, shades open to permit daylight. Will continue to monitor.

## 2019-08-12 LAB
ALBUMIN SERPL-MCNC: 2.4 G/DL (ref 3.4–5)
ANION GAP SERPL CALCULATED.3IONS-SCNC: 13 MMOL/L (ref 3–16)
BASOPHILS ABSOLUTE: 0.1 K/UL (ref 0–0.2)
BASOPHILS RELATIVE PERCENT: 2.2 %
BUN BLDV-MCNC: 17 MG/DL (ref 7–20)
CALCIUM SERPL-MCNC: 8.4 MG/DL (ref 8.3–10.6)
CHLORIDE BLD-SCNC: 109 MMOL/L (ref 99–110)
CO2: 23 MMOL/L (ref 21–32)
CREAT SERPL-MCNC: 0.8 MG/DL (ref 0.8–1.3)
EOSINOPHILS ABSOLUTE: 0.3 K/UL (ref 0–0.6)
EOSINOPHILS RELATIVE PERCENT: 5 %
GFR AFRICAN AMERICAN: >60
GFR NON-AFRICAN AMERICAN: >60
GLUCOSE BLD-MCNC: 106 MG/DL (ref 70–99)
GLUCOSE BLD-MCNC: 110 MG/DL (ref 70–99)
GLUCOSE BLD-MCNC: 135 MG/DL (ref 70–99)
HCT VFR BLD CALC: 26.3 % (ref 40.5–52.5)
HEMOGLOBIN: 8.7 G/DL (ref 13.5–17.5)
LYMPHOCYTES ABSOLUTE: 1 K/UL (ref 1–5.1)
LYMPHOCYTES RELATIVE PERCENT: 15.2 %
MAGNESIUM: 2 MG/DL (ref 1.8–2.4)
MCH RBC QN AUTO: 31.2 PG (ref 26–34)
MCHC RBC AUTO-ENTMCNC: 32.9 G/DL (ref 31–36)
MCV RBC AUTO: 94.9 FL (ref 80–100)
MONOCYTES ABSOLUTE: 0.6 K/UL (ref 0–1.3)
MONOCYTES RELATIVE PERCENT: 9.8 %
NEUTROPHILS ABSOLUTE: 4.3 K/UL (ref 1.7–7.7)
NEUTROPHILS RELATIVE PERCENT: 67.8 %
PDW BLD-RTO: 14.3 % (ref 12.4–15.4)
PERFORMED ON: ABNORMAL
PERFORMED ON: ABNORMAL
PHOSPHORUS: 4 MG/DL (ref 2.5–4.9)
PLATELET # BLD: 577 K/UL (ref 135–450)
PMV BLD AUTO: 7.7 FL (ref 5–10.5)
POTASSIUM SERPL-SCNC: 3.8 MMOL/L (ref 3.5–5.1)
RBC # BLD: 2.78 M/UL (ref 4.2–5.9)
SODIUM BLD-SCNC: 145 MMOL/L (ref 136–145)
WBC # BLD: 6.3 K/UL (ref 4–11)

## 2019-08-12 PROCEDURE — 6360000002 HC RX W HCPCS: Performed by: STUDENT IN AN ORGANIZED HEALTH CARE EDUCATION/TRAINING PROGRAM

## 2019-08-12 PROCEDURE — 2060000000 HC ICU INTERMEDIATE R&B

## 2019-08-12 PROCEDURE — 97110 THERAPEUTIC EXERCISES: CPT

## 2019-08-12 PROCEDURE — 94761 N-INVAS EAR/PLS OXIMETRY MLT: CPT

## 2019-08-12 PROCEDURE — 2580000003 HC RX 258: Performed by: STUDENT IN AN ORGANIZED HEALTH CARE EDUCATION/TRAINING PROGRAM

## 2019-08-12 PROCEDURE — 6360000002 HC RX W HCPCS: Performed by: PSYCHIATRY & NEUROLOGY

## 2019-08-12 PROCEDURE — 6370000000 HC RX 637 (ALT 250 FOR IP): Performed by: INTERNAL MEDICINE

## 2019-08-12 PROCEDURE — 2700000000 HC OXYGEN THERAPY PER DAY

## 2019-08-12 PROCEDURE — 97167 OT EVAL HIGH COMPLEX 60 MIN: CPT

## 2019-08-12 PROCEDURE — 36415 COLL VENOUS BLD VENIPUNCTURE: CPT

## 2019-08-12 PROCEDURE — 85025 COMPLETE CBC W/AUTO DIFF WBC: CPT

## 2019-08-12 PROCEDURE — 6370000000 HC RX 637 (ALT 250 FOR IP): Performed by: STUDENT IN AN ORGANIZED HEALTH CARE EDUCATION/TRAINING PROGRAM

## 2019-08-12 PROCEDURE — 6360000002 HC RX W HCPCS

## 2019-08-12 PROCEDURE — 36592 COLLECT BLOOD FROM PICC: CPT

## 2019-08-12 PROCEDURE — 83735 ASSAY OF MAGNESIUM: CPT

## 2019-08-12 PROCEDURE — C9113 INJ PANTOPRAZOLE SODIUM, VIA: HCPCS | Performed by: INTERNAL MEDICINE

## 2019-08-12 PROCEDURE — 84425 ASSAY OF VITAMIN B-1: CPT

## 2019-08-12 PROCEDURE — 97530 THERAPEUTIC ACTIVITIES: CPT

## 2019-08-12 PROCEDURE — 92526 ORAL FUNCTION THERAPY: CPT

## 2019-08-12 PROCEDURE — 6360000002 HC RX W HCPCS: Performed by: INTERNAL MEDICINE

## 2019-08-12 PROCEDURE — 94640 AIRWAY INHALATION TREATMENT: CPT

## 2019-08-12 PROCEDURE — C9113 INJ PANTOPRAZOLE SODIUM, VIA: HCPCS | Performed by: STUDENT IN AN ORGANIZED HEALTH CARE EDUCATION/TRAINING PROGRAM

## 2019-08-12 PROCEDURE — 80069 RENAL FUNCTION PANEL: CPT

## 2019-08-12 RX ORDER — HALOPERIDOL 5 MG/ML
5 INJECTION INTRAMUSCULAR EVERY 6 HOURS PRN
Status: DISCONTINUED | OUTPATIENT
Start: 2019-08-12 | End: 2019-08-20 | Stop reason: HOSPADM

## 2019-08-12 RX ORDER — METOPROLOL TARTRATE 5 MG/5ML
5 INJECTION INTRAVENOUS EVERY 6 HOURS PRN
Status: DISCONTINUED | OUTPATIENT
Start: 2019-08-12 | End: 2019-08-20 | Stop reason: HOSPADM

## 2019-08-12 RX ADMIN — HEPARIN SODIUM 5000 UNITS: 5000 INJECTION INTRAVENOUS; SUBCUTANEOUS at 15:53

## 2019-08-12 RX ADMIN — HEPARIN SODIUM 5000 UNITS: 5000 INJECTION INTRAVENOUS; SUBCUTANEOUS at 22:07

## 2019-08-12 RX ADMIN — HALOPERIDOL LACTATE 5 MG: 5 INJECTION INTRAMUSCULAR at 16:53

## 2019-08-12 RX ADMIN — HALOPERIDOL LACTATE 1 MG: 5 INJECTION INTRAMUSCULAR at 20:17

## 2019-08-12 RX ADMIN — HEPARIN SODIUM 5000 UNITS: 5000 INJECTION INTRAVENOUS; SUBCUTANEOUS at 06:48

## 2019-08-12 RX ADMIN — MEROPENEM 1 G: 1 INJECTION, POWDER, FOR SOLUTION INTRAVENOUS at 17:32

## 2019-08-12 RX ADMIN — Medication 15 ML: at 20:18

## 2019-08-12 RX ADMIN — LEVALBUTEROL HYDROCHLORIDE 1.25 MG: 1.25 SOLUTION, CONCENTRATE RESPIRATORY (INHALATION) at 17:00

## 2019-08-12 RX ADMIN — LEVALBUTEROL HYDROCHLORIDE 1.25 MG: 1.25 SOLUTION, CONCENTRATE RESPIRATORY (INHALATION) at 09:02

## 2019-08-12 RX ADMIN — LINEZOLID 600 MG: 600 INJECTION, SOLUTION INTRAVENOUS at 11:36

## 2019-08-12 RX ADMIN — PANTOPRAZOLE SODIUM 40 MG: 40 INJECTION, POWDER, LYOPHILIZED, FOR SOLUTION INTRAVENOUS at 20:18

## 2019-08-12 RX ADMIN — METOPROLOL TARTRATE 25 MG: 25 TABLET ORAL at 09:41

## 2019-08-12 RX ADMIN — LINEZOLID 600 MG: 600 INJECTION, SOLUTION INTRAVENOUS at 22:07

## 2019-08-12 RX ADMIN — Medication 10 ML: at 17:33

## 2019-08-12 RX ADMIN — SODIUM CHLORIDE 30 MG/ML INHALATION SOLUTION 15 ML: 30 SOLUTION INHALANT at 09:03

## 2019-08-12 RX ADMIN — ASPIRIN 81 MG 81 MG: 81 TABLET ORAL at 09:41

## 2019-08-12 RX ADMIN — MEROPENEM 1 G: 1 INJECTION, POWDER, FOR SOLUTION INTRAVENOUS at 01:09

## 2019-08-12 RX ADMIN — LEVALBUTEROL HYDROCHLORIDE 1.25 MG: 1.25 SOLUTION, CONCENTRATE RESPIRATORY (INHALATION) at 12:27

## 2019-08-12 RX ADMIN — HALOPERIDOL LACTATE 1 MG: 5 INJECTION INTRAMUSCULAR at 09:53

## 2019-08-12 RX ADMIN — CASTOR OIL AND BALSAM, PERU: 788; 87 OINTMENT TOPICAL at 09:57

## 2019-08-12 RX ADMIN — LEVALBUTEROL HYDROCHLORIDE 1.25 MG: 1.25 SOLUTION, CONCENTRATE RESPIRATORY (INHALATION) at 20:33

## 2019-08-12 RX ADMIN — SODIUM CHLORIDE 30 MG/ML INHALATION SOLUTION 15 ML: 30 SOLUTION INHALANT at 17:00

## 2019-08-12 RX ADMIN — CASTOR OIL AND BALSAM, PERU: 788; 87 OINTMENT TOPICAL at 20:18

## 2019-08-12 RX ADMIN — SODIUM CHLORIDE 30 MG/ML INHALATION SOLUTION 15 ML: 30 SOLUTION INHALANT at 12:27

## 2019-08-12 RX ADMIN — SODIUM CHLORIDE 30 MG/ML INHALATION SOLUTION 15 ML: 30 SOLUTION INHALANT at 20:33

## 2019-08-12 RX ADMIN — Medication 10 ML: at 09:41

## 2019-08-12 RX ADMIN — Medication 10 ML: at 20:18

## 2019-08-12 RX ADMIN — MEROPENEM 1 G: 1 INJECTION, POWDER, FOR SOLUTION INTRAVENOUS at 09:42

## 2019-08-12 RX ADMIN — Medication 15 ML: at 11:43

## 2019-08-12 RX ADMIN — PANTOPRAZOLE SODIUM 40 MG: 40 INJECTION, POWDER, LYOPHILIZED, FOR SOLUTION INTRAVENOUS at 09:41

## 2019-08-12 RX ADMIN — HALOPERIDOL LACTATE 5 MG: 5 INJECTION INTRAMUSCULAR at 04:21

## 2019-08-12 ASSESSMENT — PAIN SCALES - GENERAL
PAINLEVEL_OUTOF10: 0
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
## 2019-08-12 NOTE — PLAN OF CARE
Problem: Pain:  Goal: Pain level will decrease  Description  Pain level will decrease  Outcome: Ongoing   Pt reporting 0/10 pain this shift. Will monitor. Problem: Falls - Risk of:  Goal: Will remain free from falls  Description  Patient has been free from falls and injuries this shift. Richa fall risk assessed each shift. Bed locked in lowest position, alarm engaged. Fall bracelet in place, fall sign present outside door, fall socks present on patient. Patients call light within reach. Patient educated on use of call light. Room free of clutter. Patient making no attempts to get out of bed at this time. Will continue to round and assess needs.       Outcome: Ongoing

## 2019-08-12 NOTE — PROGRESS NOTES
diagnosis. Prominent interstitial markings in a perihilar distribution again noted. Stable cardiac mediastinal silhouette. Lines and tubes without change. XR CHEST PORTABLE   Final Result   Addendum 1 of 1      Patient: Efren Kim  Time Out: 02:22   Exam(s): FILM CXR 1 VIEW        ADDENDUM - Added by Anika Kimball MD on 7/29/2019 2:22 AM (-07:00)   IMPRESSION:        ET tube terminates 6.1 cm  FROM  the fransico.   ----------------------------------------------------------------------       EXAM:     XR Chest, 1 View       CLINICAL HISTORY:      Reason for exam: intubation. Reason for exam:->intubation. TECHNIQUE:     Frontal view of the chest.       COMPARISON:       Chest x-ray 7/20/19       IMPRESSION:        ET tube terminates 6.1 cm in the fransico. Final      CT CHEST WO CONTRAST   Final Result      1. Small bilateral pleural effusions. Multifocal pneumonia in the right upper and bilateral lower lobes. 2. 13 mm right upper lobe spiculated nodule suspicious for malignancy. Recommend PET/CT or biopsy. Additional nonspecific areas of nodular consolidation in the right lower lobe. Qzxv      XR CHEST PORTABLE   Final Result   1. Persistent but improved central pulmonary vascular congestion with    diffusely increased interstitial markings still present. 2.  Mildly decreased right greater than left pleural effusions. 3.  Right greater than left basilar atelectasis versus consolidation. 4.  No pneumothorax radiographically evident. 5.  Tubes/lines as above. XR CHEST PORTABLE   Final Result      1. Persistent hazy perihilar, groundglass basilar consolidations and pleural effusions greater in the right lung   2. NG tube in place as well as a right IJ central venous catheter with the tip in the mid to distal SVC, no worsening               XR CHEST PORTABLE   Final Result      Introduction of NG tube with tip excluded from the inferior edge of the film.       Moderate

## 2019-08-12 NOTE — PROGRESS NOTES
Patient transferred to room [phone], accompanied by RN. Report called to Carlyle. Opportunity was given to ask questions and all questions were answered to best of ability. Patient belongings were gathered and taken with patient. Vital sings were stable upon transfer.

## 2019-08-12 NOTE — PROGRESS NOTES
recommendation/strategies to reduce risk for aspiration and instruction to notify staff if any signs emerge. Pt does not indicate comprehension  Cont goal    Patient/Family/Caregiver Education:  As above    Compensatory Strategies:  n/a     Plan:  Continued daily Dysphagia treatment with goals per  plan of care. - Diet recommendations: NPO  - Oral care / small amounts of ice chips for the comfort of pt, health and hydration of oropharyngeal mucosa and swallow stimulation  - MBS when appropriate  DC recommendation: TBD closer to dc  Treatment: 15  D/W nursing Will  Needs met prior to leaving room, call button in reach. Israel Zamora M.S./[facility] #[id number]  Pg.  # [phone]  If patient is discharged prior to next treatment, this note will serve as the discharge summary

## 2019-08-12 NOTE — PROGRESS NOTES
depression. Endocrine:  increased thirst, excessive energy. Allergic/immunologic: nasal sinus drainage, rhinorrhea, hives. X all remaining systems negative. Labs  CBC:   Recent Labs     08/10/19  0449 08/11/19  0435 08/12/19  0553   WBC 11.1* 7.1 6.3   HGB 9.1* 7.9* 8.7*   HCT 27.8* 23.9* 26.3*   * 558* 577*     BMP:    Recent Labs     08/10/19  0449 08/10/19  1727 08/11/19  0435 08/12/19  0553    145 145 145   K 2.8* 3.3* 4.3 3.8    109 113* 109   CO2 27 26 24 23   BUN 20 22* 21* 17   CREATININE 1.0 1.0 1.0 0.8   GLUCOSE 122* 144* 131* 106*   MG 2.00  --  2.10 2.00   PHOS 3.1  --  1.5* 4.0       PHYSICAL EXAM:  Patient Vitals for the past 8 hrs:   BP Pulse Resp SpO2   08/12/19 0905 -- -- (!) 31 90 %   08/12/19 0600 (!) 155/84 123 (!) 34 --   08/12/19 0500 (!) 149/84 127 23 --   08/12/19 0445 135/71 120 (!) 41 --   08/12/19 0300 (!) 145/74 122 (!) 32 --   08/12/19 0200 (!) 155/83 117 (!) 31 --      Patient Vitals for the past 96 hrs (Last 3 readings):   Weight   08/10/19 0200 112 lb 14 oz (51.2 kg)   08/09/19 0600 124 lb 1.9 oz (56.3 kg)       Constitutional: not in distress,    Skin:  No rashes seen or palpated. Head, face, neck: neck symmetric, normal appearance, no neck mass, no goiter, no JVD, trachea midline. ENMT:  external ears normal, nasal mucosa unremarkable, no lip ulcers. Eyes:  No scleral icterus, no conjunctival injection. Lids unremarkable. Cardiovascular: heart regular rate and rhythm, no thrill, no heart murmur, no edema. Respiratory: lungs clear to auscultation, respiratory effort normal, No use of accessory muscles. Neurologic: No focal defects.   Mental status and Psych: Alert , Mood and speech normal.    Nephrology  Gaurav Cornelius [address] [facility], [address] [place] [address]  Office: [phone]  Cell: [phone]  Fax: [phone]

## 2019-08-12 NOTE — PROGRESS NOTES
ICU Progress Note    Admit Date: 7/22/2019  Day: 20  IV Access: Peripheral, Central Line  IV Fluids:None  Vasopressors:None                Antibiotics: Zyvox and Merem  Diet: DIET TUBE FEED CONTINUOUS/CYCLIC NPO; STANDARD WITH FIBER (Jevity 1.2); Nasogastric; Continuous; 20; 65; 24    CC: AMS d/t possible alcohol or benzo withdrawal now w/ Aspiration pneumonia and pulmonary abscess.  -[employer] positive for Klebsiella and MRSA. Interval history: Pt is AAOx3 and lying comfortably in bed. He continues to follow commands appropriately. He is receiving a breathing treatment and tolerating it. He is on 3L high flow nasal canula and saturating at 96-99%. He's been afebrile the last 24hrs with a Tmax of 100.1. Night team endorses he had some agitation overnight where he tried to climb out of bed, and pull on lines. He was started on Haldol 1mg BID w/ Haldol 5mg Q6 PRN. Patient tolerated medication well.      Medications:     Scheduled Meds:   haloperidol lactate  1 mg Intravenous BID    levalbuterol  1.25 mg Nebulization 4x daily    sodium chloride (Inhalant)  15 mL Nebulization 4x daily    meropenem  1 g Intravenous Q8H    linezolid  600 mg Intravenous Q12H    sodium chloride flush  10 mL Intravenous 2 times per day    metoprolol tartrate  25 mg Oral BID    pantoprazole  40 mg Intravenous BID    VENELEX   Topical BID    chlorhexidine  15 mL Mouth/Throat BID    heparin (porcine)  5,000 Units Subcutaneous 3 times per day    atorvastatin  20 mg Oral Nightly    aspirin  81 mg Oral Daily     Continuous Infusions:   dextrose       PRN Meds:haloperidol lactate, levalbuterol, acetaminophen, sodium chloride flush, labetalol, medicated lip ointment, aspirin, glucose, dextrose, glucagon (rDNA), dextrose, ondansetron    Objective:   Vitals:   T-max:  Patient Vitals for the past 8 hrs:   BP Temp Temp src Pulse Resp SpO2   08/12/19 1357 136/74 98.9 °F (37.2 °C) Axillary 107 22 97 %   08/12/19 1232 -- -- -- -- 24 100 % This appears worse since the previous study            XR ABDOMEN (KUB) (SINGLE AP VIEW)   Final Result      Nasogastric tube extending into the left upper quadrant. XR CHEST PORTABLE   Final Result      Persistent right basilar infiltrate. XR CHEST 1 VW   Final Result      Tip of the right IJ central venous catheter overlies lower SVC with no pneumothorax evident. CT HEAD WO CONTRAST   Final Result      1. No acute intracranial process. XR CHEST PORTABLE   Final Result      Interval development of bilateral lower lobe opacities may relate to edema or pneumonia      CT SOFT TISSUE NECK WO CONTRAST   Final Result   Unremarkable CT neck          XR CHEST PORTABLE   Final Result     Normal chest x-ray. CT ABDOMEN PELVIS WO CONTRAST Additional Contrast? None   Final Result   No acute abnormality demonstrated in the abdomen or pelvis. Assessment/Plan:   Mr. Baljeet Boyer is a 69 yo M with PMHx significant for HTN, HLD, GERD, opioid dependence 2/2 cervical DDD, and alcohol abuse being managed in ICU for acute respiratory failure and pneumonia complicated by pulmonary abscess. Pulmonary abcess     3cm cavitary lesion in RUL on CTPA. BAL cultured MRSA. - Linezolid day 4  - Meropenem Day 4, had previous course of 7 days  - Monitor for thrombocytopenia. Per pharmacy, switch to Ceftaroline if this occurs. - Abscess drainage not indicated at this time   - Will require 6 weeks of antibiotics. Will continue IV for now. Hypoxic Respiratory Failure:  - Pt extubated on 08/08/19  - On 3L high flow nasal canula  - Saturating well at %  - Weaning O2  - Hypertonic saline and Xopenex HHN respiratory therapy    Altered Mental Status secondary to metabolic Encephalopathy   - Mental status improved but Gets agitated sometimes, especially at night.  - He is AAOx3 and following commands  - Haldol 1mg IV BID.  - Haldol 5mg IV PRN.     Erosive Esophagitis  HgB 8.7 this morning.   - Continue IV Protonix  - Continue to monitor H/H     Hypokalemia  - Resolved  - likely due to diuretics  - K is 3.8 today     Malnutrition  - Did not pas swallow on 8/10/19  - Continue tube feeds.     Code Status: Full        FEN: Tube Feeds  PPX: SCD, Protonix, SQ Heparin  DISPO: GMF    Fredrick Hill MD, PGY-1  08/12/19  2:45 PM    This patient has been staffed and discussed with Dr. Crow Diaz.

## 2019-08-13 ENCOUNTER — APPOINTMENT (OUTPATIENT)
Dept: GENERAL RADIOLOGY | Age: 69
DRG: 682 | End: 2019-08-13
Payer: MEDICARE

## 2019-08-13 LAB
ALBUMIN SERPL-MCNC: 2.7 G/DL (ref 3.4–5)
ANION GAP SERPL CALCULATED.3IONS-SCNC: 14 MMOL/L (ref 3–16)
BASOPHILS ABSOLUTE: 0.1 K/UL (ref 0–0.2)
BASOPHILS RELATIVE PERCENT: 1.9 %
BUN BLDV-MCNC: 14 MG/DL (ref 7–20)
CALCIUM SERPL-MCNC: 8.7 MG/DL (ref 8.3–10.6)
CHLORIDE BLD-SCNC: 106 MMOL/L (ref 99–110)
CO2: 24 MMOL/L (ref 21–32)
CREAT SERPL-MCNC: 0.8 MG/DL (ref 0.8–1.3)
EKG ATRIAL RATE: 120 BPM
EKG DIAGNOSIS: NORMAL
EKG P AXIS: 74 DEGREES
EKG P-R INTERVAL: 126 MS
EKG Q-T INTERVAL: 340 MS
EKG QRS DURATION: 80 MS
EKG QTC CALCULATION (BAZETT): 480 MS
EKG R AXIS: -71 DEGREES
EKG T AXIS: 69 DEGREES
EKG VENTRICULAR RATE: 120 BPM
EOSINOPHILS ABSOLUTE: 0.3 K/UL (ref 0–0.6)
EOSINOPHILS RELATIVE PERCENT: 4.8 %
GFR AFRICAN AMERICAN: >60
GFR NON-AFRICAN AMERICAN: >60
GLUCOSE BLD-MCNC: 100 MG/DL (ref 70–99)
GLUCOSE BLD-MCNC: 109 MG/DL (ref 70–99)
GLUCOSE BLD-MCNC: 93 MG/DL (ref 70–99)
GLUCOSE BLD-MCNC: 94 MG/DL (ref 70–99)
HCT VFR BLD CALC: 25.7 % (ref 40.5–52.5)
HEMOGLOBIN: 8.6 G/DL (ref 13.5–17.5)
LYMPHOCYTES ABSOLUTE: 1 K/UL (ref 1–5.1)
LYMPHOCYTES RELATIVE PERCENT: 17.5 %
MAGNESIUM: 2 MG/DL (ref 1.8–2.4)
MCH RBC QN AUTO: 31.2 PG (ref 26–34)
MCHC RBC AUTO-ENTMCNC: 33.3 G/DL (ref 31–36)
MCV RBC AUTO: 93.7 FL (ref 80–100)
MONOCYTES ABSOLUTE: 0.6 K/UL (ref 0–1.3)
MONOCYTES RELATIVE PERCENT: 10.1 %
NEUTROPHILS ABSOLUTE: 3.9 K/UL (ref 1.7–7.7)
NEUTROPHILS RELATIVE PERCENT: 65.7 %
PDW BLD-RTO: 14.4 % (ref 12.4–15.4)
PERFORMED ON: ABNORMAL
PERFORMED ON: NORMAL
PERFORMED ON: NORMAL
PHOSPHORUS: 3.3 MG/DL (ref 2.5–4.9)
PLATELET # BLD: 521 K/UL (ref 135–450)
PMV BLD AUTO: 8 FL (ref 5–10.5)
POTASSIUM SERPL-SCNC: 3.5 MMOL/L (ref 3.5–5.1)
RBC # BLD: 2.74 M/UL (ref 4.2–5.9)
SODIUM BLD-SCNC: 144 MMOL/L (ref 136–145)
WBC # BLD: 6 K/UL (ref 4–11)

## 2019-08-13 PROCEDURE — 6360000002 HC RX W HCPCS: Performed by: STUDENT IN AN ORGANIZED HEALTH CARE EDUCATION/TRAINING PROGRAM

## 2019-08-13 PROCEDURE — 85025 COMPLETE CBC W/AUTO DIFF WBC: CPT

## 2019-08-13 PROCEDURE — 36592 COLLECT BLOOD FROM PICC: CPT

## 2019-08-13 PROCEDURE — 92526 ORAL FUNCTION THERAPY: CPT

## 2019-08-13 PROCEDURE — 74230 X-RAY XM SWLNG FUNCJ C+: CPT

## 2019-08-13 PROCEDURE — 2700000000 HC OXYGEN THERAPY PER DAY

## 2019-08-13 PROCEDURE — 94640 AIRWAY INHALATION TREATMENT: CPT

## 2019-08-13 PROCEDURE — 6370000000 HC RX 637 (ALT 250 FOR IP): Performed by: STUDENT IN AN ORGANIZED HEALTH CARE EDUCATION/TRAINING PROGRAM

## 2019-08-13 PROCEDURE — 94761 N-INVAS EAR/PLS OXIMETRY MLT: CPT

## 2019-08-13 PROCEDURE — 2580000003 HC RX 258: Performed by: STUDENT IN AN ORGANIZED HEALTH CARE EDUCATION/TRAINING PROGRAM

## 2019-08-13 PROCEDURE — 93010 ELECTROCARDIOGRAM REPORT: CPT | Performed by: INTERNAL MEDICINE

## 2019-08-13 PROCEDURE — 80069 RENAL FUNCTION PANEL: CPT

## 2019-08-13 PROCEDURE — 83735 ASSAY OF MAGNESIUM: CPT

## 2019-08-13 PROCEDURE — C9113 INJ PANTOPRAZOLE SODIUM, VIA: HCPCS | Performed by: STUDENT IN AN ORGANIZED HEALTH CARE EDUCATION/TRAINING PROGRAM

## 2019-08-13 PROCEDURE — 2060000000 HC ICU INTERMEDIATE R&B

## 2019-08-13 PROCEDURE — 92611 MOTION FLUOROSCOPY/SWALLOW: CPT

## 2019-08-13 PROCEDURE — 2500000003 HC RX 250 WO HCPCS: Performed by: FAMILY MEDICINE

## 2019-08-13 PROCEDURE — 36415 COLL VENOUS BLD VENIPUNCTURE: CPT

## 2019-08-13 PROCEDURE — 93005 ELECTROCARDIOGRAM TRACING: CPT | Performed by: STUDENT IN AN ORGANIZED HEALTH CARE EDUCATION/TRAINING PROGRAM

## 2019-08-13 RX ORDER — SODIUM CHLORIDE 0.9 % (FLUSH) 0.9 %
10 SYRINGE (ML) INJECTION PRN
Status: DISCONTINUED | OUTPATIENT
Start: 2019-08-13 | End: 2019-08-13

## 2019-08-13 RX ORDER — LIDOCAINE HYDROCHLORIDE 10 MG/ML
5 INJECTION, SOLUTION EPIDURAL; INFILTRATION; INTRACAUDAL; PERINEURAL ONCE
Status: DISCONTINUED | OUTPATIENT
Start: 2019-08-13 | End: 2019-08-13

## 2019-08-13 RX ORDER — SODIUM CHLORIDE 0.9 % (FLUSH) 0.9 %
10 SYRINGE (ML) INJECTION EVERY 12 HOURS SCHEDULED
Status: DISCONTINUED | OUTPATIENT
Start: 2019-08-13 | End: 2019-08-13

## 2019-08-13 RX ADMIN — PANTOPRAZOLE SODIUM 40 MG: 40 INJECTION, POWDER, LYOPHILIZED, FOR SOLUTION INTRAVENOUS at 08:36

## 2019-08-13 RX ADMIN — Medication 15 ML: at 08:34

## 2019-08-13 RX ADMIN — METOPROLOL TARTRATE 5 MG: 5 INJECTION INTRAVENOUS at 10:05

## 2019-08-13 RX ADMIN — HEPARIN SODIUM 5000 UNITS: 5000 INJECTION INTRAVENOUS; SUBCUTANEOUS at 05:31

## 2019-08-13 RX ADMIN — SODIUM CHLORIDE 30 MG/ML INHALATION SOLUTION 15 ML: 30 SOLUTION INHALANT at 22:30

## 2019-08-13 RX ADMIN — CASTOR OIL AND BALSAM, PERU: 788; 87 OINTMENT TOPICAL at 11:12

## 2019-08-13 RX ADMIN — HALOPERIDOL LACTATE 1 MG: 5 INJECTION INTRAMUSCULAR at 23:05

## 2019-08-13 RX ADMIN — Medication 10 ML: at 23:04

## 2019-08-13 RX ADMIN — SODIUM CHLORIDE 30 MG/ML INHALATION SOLUTION 15 ML: 30 SOLUTION INHALANT at 12:43

## 2019-08-13 RX ADMIN — MEROPENEM 1 G: 1 INJECTION, POWDER, FOR SOLUTION INTRAVENOUS at 00:00

## 2019-08-13 RX ADMIN — Medication 10 ML: at 09:00

## 2019-08-13 RX ADMIN — MEROPENEM 1 G: 1 INJECTION, POWDER, FOR SOLUTION INTRAVENOUS at 08:36

## 2019-08-13 RX ADMIN — LEVALBUTEROL HYDROCHLORIDE 1.25 MG: 1.25 SOLUTION, CONCENTRATE RESPIRATORY (INHALATION) at 12:43

## 2019-08-13 RX ADMIN — Medication 15 ML: at 23:01

## 2019-08-13 RX ADMIN — LINEZOLID 600 MG: 600 INJECTION, SOLUTION INTRAVENOUS at 23:19

## 2019-08-13 RX ADMIN — HALOPERIDOL LACTATE 1 MG: 5 INJECTION INTRAMUSCULAR at 08:35

## 2019-08-13 RX ADMIN — LINEZOLID 600 MG: 600 INJECTION, SOLUTION INTRAVENOUS at 11:12

## 2019-08-13 RX ADMIN — PANTOPRAZOLE SODIUM 40 MG: 40 INJECTION, POWDER, LYOPHILIZED, FOR SOLUTION INTRAVENOUS at 23:10

## 2019-08-13 RX ADMIN — MEROPENEM 1 G: 1 INJECTION, POWDER, FOR SOLUTION INTRAVENOUS at 17:49

## 2019-08-13 RX ADMIN — HEPARIN SODIUM 5000 UNITS: 5000 INJECTION INTRAVENOUS; SUBCUTANEOUS at 13:53

## 2019-08-13 RX ADMIN — LEVALBUTEROL HYDROCHLORIDE 1.25 MG: 1.25 SOLUTION, CONCENTRATE RESPIRATORY (INHALATION) at 07:57

## 2019-08-13 RX ADMIN — LEVALBUTEROL HYDROCHLORIDE 1.25 MG: 1.25 SOLUTION, CONCENTRATE RESPIRATORY (INHALATION) at 22:30

## 2019-08-13 RX ADMIN — SODIUM CHLORIDE 30 MG/ML INHALATION SOLUTION 15 ML: 30 SOLUTION INHALANT at 07:53

## 2019-08-13 RX ADMIN — CASTOR OIL AND BALSAM, PERU: 788; 87 OINTMENT TOPICAL at 23:13

## 2019-08-13 ASSESSMENT — PAIN SCALES - GENERAL
PAINLEVEL_OUTOF10: 0

## 2019-08-13 ASSESSMENT — ENCOUNTER SYMPTOMS
EYES NEGATIVE: 1
RESPIRATORY NEGATIVE: 1
ALLERGIC/IMMUNOLOGIC NEGATIVE: 1
GASTROINTESTINAL NEGATIVE: 1

## 2019-08-13 NOTE — PROGRESS NOTES
Occupational Therapy  Treatment Attempt  Approached for OT treatment. RN requested therapy in bed today has pt has been confused and was recently placed in wrist restraints. Upon initial OT entry, pt alert and talkative, requesting a tissue. This OT retrieved tissues and upon return to pt room, pt sleeping in bed and unable to be aroused despite verbal and tactile cues, wash cloth stimulation to face. Pt unable to engage in OT session. Will follow up for OT tx at a later date per POC.     Yeimy Gregg

## 2019-08-13 NOTE — PROGRESS NOTES
[address] [place] [address] Nephrology [facility].  Phone [phone]  Fax [phone]    Patient Name: Dl Cook                                                    Primary Physician: Diomedes Tipton MD  Admitting Dx: Acute kidney failure [facility]) [N17.9]  Acute kidney failure ([facility] [address] [N17.9]    CHIEF COMPLAINT/RENAL PROBLEM: AVIVA    INTERVAL HISTORY    Patient was seen for follow up evaluation. He denies new problems. Resting comfortably. Urine output not measured  He is off lasix and edema is better        ASSESSMENT AND PLAN:    Acute kidney injury  Resolved. Creatinine 0.8. Likely acute tubular necrosis  Second peak of 2.4 on 7/29/19  baseline creatinine in 12/2018 1.2-   proteinuria  258 mg, 3 to 5 red blood   Urine sodium 29.   in 05/2018 one time  creatinine  was 2.1   In 08/2015, creatinine ranged from 0.8 to 1.2.     CT abdomen kidneys normal   CT in 2015 mention some bilateral mild to moderate cortical  thinning of the renal cortex, no mention in 07/2019 CT. Traumatic Hematuria due to resendiz which pt tried to remove     Hypercalcemia  Resolved. calcium was 10.1 in 12/2018  His hypercalcemia could be due to excessive calcium intake      Hypokalemia  Likely due to recent diuretic  Better now after replacement     History of alcoholism   withdrawal  liver function tests are normal.   He does not have acute hepatitis     History of multiple lung nodules   on CT in 2015. long-term smoking and currentweight loss.  Management per primary     History of left adrenal adenoma  16 mm as seen on CT in 2015.  CT this admission in 07/2019 did not mention that.     History of emphysema  as seen on previous CT of the chest in 2015.     History of abdominal aortic aneurysm   as seen on CT in 2015 was 3.4 cm.   This admission CT without contrast did not mention      Anemia  hgb 9.1, platelets 265,611    Past Medical History:   Diagnosis Date    Allergic rhinitis     environmental    GERD (gastroesophageal reflux disease)     Q8H   linezolid 600 mg Q12H   sodium chloride flush 10 mL 2 times per day   metoprolol tartrate 25 mg BID   pantoprazole 40 mg BID   VENELEX  BID   chlorhexidine 15 mL BID   heparin (porcine) 5,000 Units 3 times per day   atorvastatin 20 mg Nightly   aspirin 81 mg Daily      Continuous Infusion:     dextrose      PRN Meds:    haloperidol lactate 5 mg Q6H PRN   metoprolol 5 mg Q6H PRN   levalbuterol 1.25 mg Q4H PRN   acetaminophen 650 mg Q4H PRN   sodium chloride flush 10 mL PRN   medicated lip ointment  PRN   aspirin 300 mg Q6H PRN   glucose 15 g PRN   dextrose 12.5 g PRN   glucagon (rDNA) 1 mg PRN   dextrose 100 mL/hr PRN   ondansetron 4 mg Q30 Min PRN       ALLERGIES  Patient has no known allergies. Diet: DIET TUBE FEED CONTINUOUS/CYCLIC NPO; STANDARD WITH FIBER (Jevity 1.2); Nasogastric; Continuous; 20; 65; 24          REVIEW OF SYSTEMS:  Positives in bold         Constitutional:  fever, chills, weakness, weight change, fatigue,      Skin:  rash, pruritus, hair loss, bruising, dry skin, petechiae. Eyes:  change in vision, diplopia, blurred vision. Head, Face, Neck   headaches, swelling, sinus infections, cervical adenopathy. ENT  hearing loss, hoarseness, store throat, nose bleeds. Hematologic and lymphatic:  lymph node swelling or tenderness. Respiratory: chest pain with breathing, sputum, shortness of breath, cough, or wheezing. Cardiovascular: chest pain, palpitations, lightheadedness, edema, claudication, PND. Gastrointestinal: nausea, vomiting, diarrhea, constipation, abdominal pain, BRBPR, melena, anorexia, jaundice . Musculoskeletal:  back pain, neck pain, muscle weakness, gait problems, joint pain or swelling. Genitourinary/GYN:  nocturia, flank pain, dysuria, gross hematuria, incontinence, difficulty voiding, abnormal menstrual bleeding, difficult pregnancies. Neurologic:  vertigo, TIAS, syncope, seizures, focal weakness, numbness, headache.   Psychosocial:  insomnia, anxiety, or
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
## 2019-08-13 NOTE — PROGRESS NOTES
to have increasing PRN haldol needs with agitation. May need escalation of medications for discharge. LP 7/31: No organism or WBC seen on LP. MRI Brain 8/7 with no intracranial abnormality.    - Waxing and waning alterness, has been combative with nurses  -- Started soft restraints. - Haldol 1mg IV BID.  - Haldol 5mg IV PRN. -Psychology consulted, reccs appreciated     Erosive Esophagitis  EGD 7/30: Grade C erosive esophagitis to 26cms.  - Continue IV Protonix  - Continue to monitor H/H    Malnutrition, Dysphagia  Failed multiple swallow studies, removes NG tubes. -Due to decreased PO intake and patient repeatedly removing NG tubes, plan for PEG tube tomorrow    Pulmonary Nodules; With long smoking history and drug use, concerning for malignancy. 1.2cm lesion in RUL appears to be spiculated. -Can f/u on an outpatient basis. -Recommended followup in  Months with imaging. EtOH Abuse;  -s/p CIWA   Hypokalemia  - Resolved  - likely due to diuretics  - K is 3.8 today    Code Status: full  FEN: NPO at midnight for PEG tube placement  PPX: sqh  DISPO: Boo Gomez MD, PGY-1  08/13/19  8:38 AM    This patient has been staffed and discussed with Grisel Barrientos MD.     I have personally seen and evaluated this patient.  I discussed findings and management with the resident, on 08/13/2019  and agree as documented in this note     [facility]  Attending Physician

## 2019-08-13 NOTE — PROGRESS NOTES
Transfer note - from ICU to Medical Floor    In brief, 71 yo male with PMHx: HTN, HLP, GERD, opioid dependence 2/2 cervical DDD, and alcohol abuse who presented with fatigue, nausea and poor appetite. Initially, there was concern for alcohol withdrawal and placed on an ativan gtt. During his course in the ICU, he started to desaturate. CXR revealed multifocal pneumonia. Patient was started on vancomycin and meropenem. He was intubated and eventually extubated on 8/8. Antibiotics were discontinued after vancomycin day 11 and meropenem day 7. After extubation, patient had increasing oxygen demand, and was placed on BIPAP and eventually Vapotherm. CTPA was ordered to rule out Pulmonary embolism. It revealed a 3cm cavitary lesion in the upper lobe. Patient was started on meropenem and linezolid. Of note, patient does easily get agitated. Prior, to examining the patient. Patient became agitated and pulled out his NG Tube. The patient was seen and examined. He is able to follow commands and occasionally whispers. He is unable to provide any history. Vitals:    08/12/19 1925   BP: (!) 142/73   Pulse: 127   Resp: 22   Temp: 97.5 °F (36.4 °C)   SpO2: 97%       Physical Exam  · General appearance: alert, appears malnourished    · Skin: Skin color, texture, turgor normal.   · HEENT: Head: Normocephalic, dried blood around nose and lower lip   · Pharynx: Normal buccal mucosa. Normal pharynx.   · Neck: supple  · Lungs: diffuse rhonchi on auscultation, on nasal cannula   · Heart: Tachycardic and regular rhythm, S1, S2 normal, no murmur  · Abdomen: soft, non-tender; bowel sounds normal; no masses,  no organomegaly  · Extremities: extremities normal, atraumatic, no cyanosis or edema  · Neurologic: no focal deficits, unable to assess mentation     Lab Results   Component Value Date    CREATININE 0.8 08/12/2019    BUN 17 08/12/2019     08/12/2019    K 3.8 08/12/2019     08/12/2019    CO2 23 08/12/2019     Lab

## 2019-08-14 ENCOUNTER — ANESTHESIA (OUTPATIENT)
Dept: ENDOSCOPY | Age: 69
DRG: 682 | End: 2019-08-14
Payer: MEDICARE

## 2019-08-14 ENCOUNTER — ANESTHESIA EVENT (OUTPATIENT)
Dept: ENDOSCOPY | Age: 69
DRG: 682 | End: 2019-08-14
Payer: MEDICARE

## 2019-08-14 VITALS — OXYGEN SATURATION: 97 % | DIASTOLIC BLOOD PRESSURE: 59 MMHG | SYSTOLIC BLOOD PRESSURE: 96 MMHG

## 2019-08-14 LAB
ALBUMIN SERPL-MCNC: 2.7 G/DL (ref 3.4–5)
ANION GAP SERPL CALCULATED.3IONS-SCNC: 14 MMOL/L (ref 3–16)
BASOPHILS ABSOLUTE: 0.1 K/UL (ref 0–0.2)
BASOPHILS RELATIVE PERCENT: 1.5 %
BUN BLDV-MCNC: 11 MG/DL (ref 7–20)
CALCIUM SERPL-MCNC: 8.8 MG/DL (ref 8.3–10.6)
CHLORIDE BLD-SCNC: 109 MMOL/L (ref 99–110)
CO2: 23 MMOL/L (ref 21–32)
CREAT SERPL-MCNC: 0.8 MG/DL (ref 0.8–1.3)
EKG ATRIAL RATE: 111 BPM
EKG DIAGNOSIS: NORMAL
EKG P AXIS: 74 DEGREES
EKG P-R INTERVAL: 172 MS
EKG Q-T INTERVAL: 342 MS
EKG QRS DURATION: 82 MS
EKG QTC CALCULATION (BAZETT): 465 MS
EKG R AXIS: -71 DEGREES
EKG T AXIS: 71 DEGREES
EKG VENTRICULAR RATE: 111 BPM
EOSINOPHILS ABSOLUTE: 0.2 K/UL (ref 0–0.6)
EOSINOPHILS RELATIVE PERCENT: 3.8 %
GFR AFRICAN AMERICAN: >60
GFR NON-AFRICAN AMERICAN: >60
GLUCOSE BLD-MCNC: 128 MG/DL (ref 70–99)
GLUCOSE BLD-MCNC: 86 MG/DL (ref 70–99)
GLUCOSE BLD-MCNC: 87 MG/DL (ref 70–99)
GLUCOSE BLD-MCNC: 87 MG/DL (ref 70–99)
HCT VFR BLD CALC: 25.2 % (ref 40.5–52.5)
HEMOGLOBIN: 8.3 G/DL (ref 13.5–17.5)
LYMPHOCYTES ABSOLUTE: 1 K/UL (ref 1–5.1)
LYMPHOCYTES RELATIVE PERCENT: 20.1 %
MAGNESIUM: 2.1 MG/DL (ref 1.8–2.4)
MCH RBC QN AUTO: 31.3 PG (ref 26–34)
MCHC RBC AUTO-ENTMCNC: 33.1 G/DL (ref 31–36)
MCV RBC AUTO: 94.6 FL (ref 80–100)
MONOCYTES ABSOLUTE: 0.4 K/UL (ref 0–1.3)
MONOCYTES RELATIVE PERCENT: 8.6 %
NEUTROPHILS ABSOLUTE: 3.4 K/UL (ref 1.7–7.7)
NEUTROPHILS RELATIVE PERCENT: 66 %
PDW BLD-RTO: 13.9 % (ref 12.4–15.4)
PERFORMED ON: ABNORMAL
PERFORMED ON: NORMAL
PERFORMED ON: NORMAL
PHOSPHORUS: 2.6 MG/DL (ref 2.5–4.9)
PLATELET # BLD: 460 K/UL (ref 135–450)
PMV BLD AUTO: 7.6 FL (ref 5–10.5)
POTASSIUM SERPL-SCNC: 2.7 MMOL/L (ref 3.5–5.1)
RBC # BLD: 2.66 M/UL (ref 4.2–5.9)
SODIUM BLD-SCNC: 146 MMOL/L (ref 136–145)
WBC # BLD: 5.1 K/UL (ref 4–11)

## 2019-08-14 PROCEDURE — 93005 ELECTROCARDIOGRAM TRACING: CPT | Performed by: PSYCHIATRY & NEUROLOGY

## 2019-08-14 PROCEDURE — 85025 COMPLETE CBC W/AUTO DIFF WBC: CPT

## 2019-08-14 PROCEDURE — 6360000002 HC RX W HCPCS: Performed by: NURSE ANESTHETIST, CERTIFIED REGISTERED

## 2019-08-14 PROCEDURE — 93005 ELECTROCARDIOGRAM TRACING: CPT | Performed by: STUDENT IN AN ORGANIZED HEALTH CARE EDUCATION/TRAINING PROGRAM

## 2019-08-14 PROCEDURE — 2580000003 HC RX 258: Performed by: NURSE ANESTHETIST, CERTIFIED REGISTERED

## 2019-08-14 PROCEDURE — 97110 THERAPEUTIC EXERCISES: CPT

## 2019-08-14 PROCEDURE — 6360000002 HC RX W HCPCS: Performed by: INTERNAL MEDICINE

## 2019-08-14 PROCEDURE — 94761 N-INVAS EAR/PLS OXIMETRY MLT: CPT

## 2019-08-14 PROCEDURE — 7100000011 HC PHASE II RECOVERY - ADDTL 15 MIN: Performed by: INTERNAL MEDICINE

## 2019-08-14 PROCEDURE — 2580000003 HC RX 258: Performed by: STUDENT IN AN ORGANIZED HEALTH CARE EDUCATION/TRAINING PROGRAM

## 2019-08-14 PROCEDURE — 3700000001 HC ADD 15 MINUTES (ANESTHESIA): Performed by: INTERNAL MEDICINE

## 2019-08-14 PROCEDURE — 3609013300 HC EGD TUBE PLACEMENT: Performed by: INTERNAL MEDICINE

## 2019-08-14 PROCEDURE — 0DH63UZ INSERTION OF FEEDING DEVICE INTO STOMACH, PERCUTANEOUS APPROACH: ICD-10-PCS | Performed by: INTERNAL MEDICINE

## 2019-08-14 PROCEDURE — 80069 RENAL FUNCTION PANEL: CPT

## 2019-08-14 PROCEDURE — 7100000010 HC PHASE II RECOVERY - FIRST 15 MIN: Performed by: INTERNAL MEDICINE

## 2019-08-14 PROCEDURE — 2700000000 HC OXYGEN THERAPY PER DAY

## 2019-08-14 PROCEDURE — 6360000002 HC RX W HCPCS: Performed by: STUDENT IN AN ORGANIZED HEALTH CARE EDUCATION/TRAINING PROGRAM

## 2019-08-14 PROCEDURE — C9113 INJ PANTOPRAZOLE SODIUM, VIA: HCPCS | Performed by: STUDENT IN AN ORGANIZED HEALTH CARE EDUCATION/TRAINING PROGRAM

## 2019-08-14 PROCEDURE — 3700000000 HC ANESTHESIA ATTENDED CARE: Performed by: INTERNAL MEDICINE

## 2019-08-14 PROCEDURE — 2580000003 HC RX 258: Performed by: INTERNAL MEDICINE

## 2019-08-14 PROCEDURE — 93010 ELECTROCARDIOGRAM REPORT: CPT | Performed by: INTERNAL MEDICINE

## 2019-08-14 PROCEDURE — 2709999900 HC NON-CHARGEABLE SUPPLY: Performed by: INTERNAL MEDICINE

## 2019-08-14 PROCEDURE — 97530 THERAPEUTIC ACTIVITIES: CPT

## 2019-08-14 PROCEDURE — 2060000000 HC ICU INTERMEDIATE R&B

## 2019-08-14 PROCEDURE — 6370000000 HC RX 637 (ALT 250 FOR IP): Performed by: STUDENT IN AN ORGANIZED HEALTH CARE EDUCATION/TRAINING PROGRAM

## 2019-08-14 PROCEDURE — 2500000003 HC RX 250 WO HCPCS: Performed by: FAMILY MEDICINE

## 2019-08-14 PROCEDURE — 83735 ASSAY OF MAGNESIUM: CPT

## 2019-08-14 PROCEDURE — 94640 AIRWAY INHALATION TREATMENT: CPT

## 2019-08-14 RX ORDER — DEXTROSE MONOHYDRATE 50 MG/ML
INJECTION, SOLUTION INTRAVENOUS CONTINUOUS
Status: DISCONTINUED | OUTPATIENT
Start: 2019-08-14 | End: 2019-08-15

## 2019-08-14 RX ORDER — POTASSIUM CHLORIDE 7.45 MG/ML
10 INJECTION INTRAVENOUS
Status: DISCONTINUED | OUTPATIENT
Start: 2019-08-14 | End: 2019-08-14

## 2019-08-14 RX ORDER — ONDANSETRON 2 MG/ML
4 INJECTION INTRAMUSCULAR; INTRAVENOUS
Status: DISCONTINUED | OUTPATIENT
Start: 2019-08-14 | End: 2019-08-14 | Stop reason: HOSPADM

## 2019-08-14 RX ORDER — POTASSIUM CHLORIDE 29.8 MG/ML
20 INJECTION INTRAVENOUS
Status: COMPLETED | OUTPATIENT
Start: 2019-08-14 | End: 2019-08-14

## 2019-08-14 RX ORDER — SODIUM CHLORIDE, SODIUM LACTATE, POTASSIUM CHLORIDE, CALCIUM CHLORIDE 600; 310; 30; 20 MG/100ML; MG/100ML; MG/100ML; MG/100ML
INJECTION, SOLUTION INTRAVENOUS CONTINUOUS PRN
Status: DISCONTINUED | OUTPATIENT
Start: 2019-08-14 | End: 2019-08-14 | Stop reason: SDUPTHER

## 2019-08-14 RX ORDER — FENTANYL CITRATE 50 UG/ML
25 INJECTION, SOLUTION INTRAMUSCULAR; INTRAVENOUS EVERY 5 MIN PRN
Status: DISCONTINUED | OUTPATIENT
Start: 2019-08-14 | End: 2019-08-14 | Stop reason: HOSPADM

## 2019-08-14 RX ORDER — POTASSIUM CHLORIDE 7.45 MG/ML
40 INJECTION INTRAVENOUS ONCE
Status: DISCONTINUED | OUTPATIENT
Start: 2019-08-14 | End: 2019-08-14

## 2019-08-14 RX ORDER — HALOPERIDOL 5 MG/ML
2 INJECTION INTRAMUSCULAR 2 TIMES DAILY
Status: DISCONTINUED | OUTPATIENT
Start: 2019-08-14 | End: 2019-08-14

## 2019-08-14 RX ORDER — HALOPERIDOL 5 MG/ML
2 INJECTION INTRAMUSCULAR 2 TIMES DAILY
Status: DISCONTINUED | OUTPATIENT
Start: 2019-08-14 | End: 2019-08-17

## 2019-08-14 RX ORDER — PROPOFOL 10 MG/ML
INJECTION, EMULSION INTRAVENOUS PRN
Status: DISCONTINUED | OUTPATIENT
Start: 2019-08-14 | End: 2019-08-14 | Stop reason: SDUPTHER

## 2019-08-14 RX ORDER — LEVALBUTEROL 1.25 MG/.5ML
1.25 SOLUTION, CONCENTRATE RESPIRATORY (INHALATION) 2 TIMES DAILY
Status: DISCONTINUED | OUTPATIENT
Start: 2019-08-14 | End: 2019-08-20 | Stop reason: HOSPADM

## 2019-08-14 RX ORDER — SODIUM CHLORIDE FOR INHALATION 3 %
15 VIAL, NEBULIZER (ML) INHALATION 2 TIMES DAILY
Status: DISCONTINUED | OUTPATIENT
Start: 2019-08-14 | End: 2019-08-20 | Stop reason: HOSPADM

## 2019-08-14 RX ADMIN — HEPARIN SODIUM 5000 UNITS: 5000 INJECTION INTRAVENOUS; SUBCUTANEOUS at 23:35

## 2019-08-14 RX ADMIN — Medication 15 ML: at 08:25

## 2019-08-14 RX ADMIN — LINEZOLID 600 MG: 600 INJECTION, SOLUTION INTRAVENOUS at 23:38

## 2019-08-14 RX ADMIN — SODIUM CHLORIDE, SODIUM LACTATE, POTASSIUM CHLORIDE, AND CALCIUM CHLORIDE: 600; 310; 30; 20 INJECTION, SOLUTION INTRAVENOUS at 14:15

## 2019-08-14 RX ADMIN — CASTOR OIL AND BALSAM, PERU: 788; 87 OINTMENT TOPICAL at 23:36

## 2019-08-14 RX ADMIN — MEROPENEM 1 G: 1 INJECTION, POWDER, FOR SOLUTION INTRAVENOUS at 08:25

## 2019-08-14 RX ADMIN — METOPROLOL TARTRATE 5 MG: 5 INJECTION INTRAVENOUS at 23:26

## 2019-08-14 RX ADMIN — SODIUM CHLORIDE 30 MG/ML INHALATION SOLUTION 15 ML: 30 SOLUTION INHALANT at 20:43

## 2019-08-14 RX ADMIN — POTASSIUM CHLORIDE 20 MEQ: 29.8 INJECTION, SOLUTION INTRAVENOUS at 17:21

## 2019-08-14 RX ADMIN — HALOPERIDOL LACTATE 2 MG: 5 INJECTION INTRAMUSCULAR at 23:32

## 2019-08-14 RX ADMIN — HALOPERIDOL LACTATE 1 MG: 5 INJECTION INTRAMUSCULAR at 08:25

## 2019-08-14 RX ADMIN — LEVALBUTEROL HYDROCHLORIDE 1.25 MG: 1.25 SOLUTION, CONCENTRATE RESPIRATORY (INHALATION) at 09:43

## 2019-08-14 RX ADMIN — PANTOPRAZOLE SODIUM 40 MG: 40 INJECTION, POWDER, LYOPHILIZED, FOR SOLUTION INTRAVENOUS at 23:16

## 2019-08-14 RX ADMIN — Medication 10 ML: at 15:48

## 2019-08-14 RX ADMIN — PROPOFOL 100 MG: 10 INJECTION, EMULSION INTRAVENOUS at 14:21

## 2019-08-14 RX ADMIN — SODIUM CHLORIDE 30 MG/ML INHALATION SOLUTION 15 ML: 30 SOLUTION INHALANT at 09:43

## 2019-08-14 RX ADMIN — MEROPENEM 1 G: 1 INJECTION, POWDER, FOR SOLUTION INTRAVENOUS at 01:23

## 2019-08-14 RX ADMIN — PROPOFOL 50 MG: 10 INJECTION, EMULSION INTRAVENOUS at 14:26

## 2019-08-14 RX ADMIN — PROPOFOL 40 MG: 10 INJECTION, EMULSION INTRAVENOUS at 14:29

## 2019-08-14 RX ADMIN — POTASSIUM CHLORIDE 20 MEQ: 29.8 INJECTION, SOLUTION INTRAVENOUS at 09:58

## 2019-08-14 RX ADMIN — METOPROLOL TARTRATE 5 MG: 5 INJECTION INTRAVENOUS at 08:25

## 2019-08-14 RX ADMIN — Medication 10 ML: at 23:16

## 2019-08-14 RX ADMIN — LINEZOLID 600 MG: 600 INJECTION, SOLUTION INTRAVENOUS at 11:00

## 2019-08-14 RX ADMIN — Medication 15 ML: at 23:36

## 2019-08-14 RX ADMIN — PROPOFOL 50 MG: 10 INJECTION, EMULSION INTRAVENOUS at 14:23

## 2019-08-14 RX ADMIN — POTASSIUM CHLORIDE 20 MEQ: 29.8 INJECTION, SOLUTION INTRAVENOUS at 08:37

## 2019-08-14 RX ADMIN — POTASSIUM CHLORIDE 20 MEQ: 29.8 INJECTION, SOLUTION INTRAVENOUS at 15:34

## 2019-08-14 RX ADMIN — DEXTROSE MONOHYDRATE: 50 INJECTION, SOLUTION INTRAVENOUS at 15:47

## 2019-08-14 RX ADMIN — LEVALBUTEROL HYDROCHLORIDE 1.25 MG: 1.25 SOLUTION, CONCENTRATE RESPIRATORY (INHALATION) at 20:43

## 2019-08-14 RX ADMIN — CASTOR OIL AND BALSAM, PERU: 788; 87 OINTMENT TOPICAL at 08:25

## 2019-08-14 RX ADMIN — PANTOPRAZOLE SODIUM 40 MG: 40 INJECTION, POWDER, LYOPHILIZED, FOR SOLUTION INTRAVENOUS at 08:25

## 2019-08-14 RX ADMIN — Medication 10 ML: at 08:26

## 2019-08-14 ASSESSMENT — PULMONARY FUNCTION TESTS
PIF_VALUE: 0

## 2019-08-14 ASSESSMENT — PAIN SCALES - GENERAL
PAINLEVEL_OUTOF10: 0

## 2019-08-14 ASSESSMENT — ENCOUNTER SYMPTOMS
RESPIRATORY NEGATIVE: 1
ALLERGIC/IMMUNOLOGIC NEGATIVE: 1
GASTROINTESTINAL NEGATIVE: 1
EYES NEGATIVE: 1

## 2019-08-14 ASSESSMENT — LIFESTYLE VARIABLES: SMOKING_STATUS: 1

## 2019-08-14 ASSESSMENT — PAIN SCALES - WONG BAKER
WONGBAKER_NUMERICALRESPONSE: 0

## 2019-08-14 NOTE — PROGRESS NOTES
Progress Note    Admit Date: 7/22/2019  Diet: Diet NPO, After Midnight Exceptions are: Other (See Comment)    CC: AMS    ON: Had a K of 2.7 today. Given 20meq/ml Potassiumx2. Plan to recheck BMP in the morning. Otherwise stable. Interval history: NAEO. Mr Laverne Andrea is more lucid today. Responding to my most of my questions. Still has moments of delirium where he requires soft restraints to prevent him from being aggressive with nurses. Is aware he is in the hospital.     HPI: 69 yo male with PMHx: HTN, HLP, GERD, opioid dependence 2/2 cervical DDD, and alcohol abuse who presented with fatigue, nausea and poor appetite. Initially, there was concern for alcohol withdrawal and placed on an ativan gtt. During his course in the ICU, he started to desaturate. CXR revealed multifocal pneumonia. Patient was started on vancomycin and meropenem. He was intubated and eventually extubated on 8/8. Antibiotics were discontinued after vancomycin day 11 and meropenem day 7. After extubation, patient had increasing oxygen demand, and was placed on BIPAP and eventually Vapotherm. CTPA was ordered to rule out Pulmonary embolism. It revealed a 3cm cavitary lesion in the upper lobe. Patient was started on meropenem and linezolid. Of note, patient does easily get agitated.      Medications:     Scheduled Meds:   haloperidol lactate  1 mg Intravenous BID    levalbuterol  1.25 mg Nebulization 4x daily    sodium chloride (Inhalant)  15 mL Nebulization 4x daily    meropenem  1 g Intravenous Q8H    linezolid  600 mg Intravenous Q12H    sodium chloride flush  10 mL Intravenous 2 times per day    metoprolol tartrate  25 mg Oral BID    pantoprazole  40 mg Intravenous BID    VENELEX   Topical BID    chlorhexidine  15 mL Mouth/Throat BID    heparin (porcine)  5,000 Units Subcutaneous 3 times per day    atorvastatin  20 mg Oral Nightly    aspirin  81 mg Oral Daily     Continuous Infusions:   dextrose       PRN Meds:haloperidol atelectasis or pneumonia. Aspiration is in the differential diagnosis. Prominent interstitial markings in a perihilar distribution again noted. Stable cardiac mediastinal silhouette. Lines and tubes without change. XR CHEST PORTABLE   Final Result   Addendum 1 of 1      Patient: Angelique Phan  Time Out: 02:22   Exam(s): FILM CXR 1 VIEW        ADDENDUM - Added by Seun Rodriguez MD on 7/29/2019 2:22 AM (-07:00)   IMPRESSION:        ET tube terminates 6.1 cm  FROM  the fransico.   ----------------------------------------------------------------------       EXAM:     XR Chest, 1 View       CLINICAL HISTORY:      Reason for exam: intubation. Reason for exam:->intubation. TECHNIQUE:     Frontal view of the chest.       COMPARISON:       Chest x-ray 7/20/19       IMPRESSION:        ET tube terminates 6.1 cm in the fransico. Final      CT CHEST WO CONTRAST   Final Result      1. Small bilateral pleural effusions. Multifocal pneumonia in the right upper and bilateral lower lobes. 2. 13 mm right upper lobe spiculated nodule suspicious for malignancy. Recommend PET/CT or biopsy. Additional nonspecific areas of nodular consolidation in the right lower lobe. Qzxv      XR CHEST PORTABLE   Final Result   1. Persistent but improved central pulmonary vascular congestion with    diffusely increased interstitial markings still present. 2.  Mildly decreased right greater than left pleural effusions. 3.  Right greater than left basilar atelectasis versus consolidation. 4.  No pneumothorax radiographically evident. 5.  Tubes/lines as above. XR CHEST PORTABLE   Final Result      1. Persistent hazy perihilar, groundglass basilar consolidations and pleural effusions greater in the right lung   2.  NG tube in place as well as a right IJ central venous catheter with the tip in the mid to distal SVC, no worsening               XR CHEST PORTABLE   Final Result      Introduction of NG tube with tip cavitary lesion in RUL on CTPA. BAL 8/6 cultured MRSA. Hx of aspiration PNA with klebsiella and MRSA per BAL    The patients original diatherix (7/29) was positive for Klebisella and MRSA. For this,  the patient was started on Vancomycin and Merrem for Gram + and - coverage. Underwent a second bronchoscopy (8/6) with thick, purulent secretions throughout the bilateral lower lobe airways with mucus plugs and a second diatherix was ordered which was positive for MRSA only, was continued ton vancomycin and Merrem. The patient was extubated on 8/8 and antibiotics were discontinued since he was improving clinically. He subsequently had increasing oxygen requirements and underwent a CTPA (8/9) was ordered which showed a 3cm cavitary lesion and the patient was restarted on Merrem and Linezolid for broad antibiotic coverage. The patient also had respiratory results that was MRSA positive. The patients antibiotics were deescalated to monotherapy with Linezolid IV. Results of the diatherix and respiratory cultures are as above (media section of epic) Abscess drainage not indicated at this time  - D/c Merrem  - Continue Linezolid 600mg IV Q12  -- Will require 6 weeks of antibiotics, Zyvox 600 mg bid        Acute Hypoxic Respiratory Failure;resolving  Bronchoscopy with copious remarkable for copious secretions in the right lower lobe as well as mucus plugging. Also history of emesis. Pleural effusions are resolved. - Extubated 08/08/19.  - On 2L NC  -- Saturating well at %; wean as tolerated  - Hypertonic saline and Xopenex HHN respiratory therapy     Altered Mental Status secondary to metabolic Encephalopathy   Continues to have increasing PRN haldol needs with agitation. May need escalation of medications for discharge. LP 7/31: No organism or WBC seen on LP.  MRI Brain 8/7 with no intracranial abnormality.  - Waxing and waning alterness, has been combative with nurses  - soft restraints in place  - Changed

## 2019-08-14 NOTE — PROGRESS NOTES
Sputum How Obtained: Spontaneous cough  Cough: Strong, productive = 1    Vital Signs   BP (!) 145/80   Pulse 116   Temp 98.2 °F (36.8 °C) (Axillary)   Resp 18   Ht 5' 5\" (1.651 m)   Wt 112 lb 14 oz (51.2 kg)   SpO2 98%   BMI 18.78 kg/m²   SPO2 (COPD values may differ): 88-89% on room air or greater than 92% on FiO2 28- 35% = 2    Peak Flow (asthma only): not applicable = 0    RSI: 7-8 = BID and Q4HPRN (every four hours as needed) for dyspnea        Plan       Goals: medication delivery, mobilize retained secretions, volume expansion and improve oxygenation    Patient/caregiver was educated on the proper method of use for Respiratory Care Devices:  Yes      Level of patient/caregiver understanding able to:   ? Verbalize understanding   ? Demonstrate understanding       ? Teach back        ? Needs reinforcement       ? No available caregiver               ? Other:     Response to education:  Lauraine Denver     Is patient being placed on Home Treatment Regimen? No     Does the patient have everything they need prior to discharge? NA     Comments: Pt follows commands, but is pretty uncooperative. Clear BS, able to clear secretions on his own.  2 liters 95%. Plan of Care: Xopenex BID, 3% sodium chloride BID    Electronically signed by [facility] on 8/14/2019 at 11:50 AM    Respiratory Protocol Guidelines     1. Assessment and treatment by Respiratory Therapy will be initiated for medication and therapeutic interventions upon initiation of aerosolized medication. 2. Physician will be contacted for respiratory rate (RR) greater than 35 breaths per minute. Therapy will be held for heart rate (HR) greater than 140 beats per minute, pending direction from physician. 3. Bronchodilators will be administered via Metered Dose Inhaler (MDI) with spacer when the following criteria are met:  a.  Alert and cooperative     b. HR < 140 bpm  c. RR < 30 bpm                d. Can demonstrate a 2-3 second inspiratory

## 2019-08-14 NOTE — PROGRESS NOTES
status: Current Every Day Smoker    Smokeless tobacco: Never Used   Substance Use Topics    Alcohol use: Yes        Family History:   Family History   Problem Relation Age of Onset    Heart Disease Mother     Cancer Father         colon          Review of Systems  Review of systems not obtained due to patient factors. Physical Exam:  Blood pressure (!) 145/80, pulse 116, temperature 98.2 °F (36.8 °C), temperature source Axillary, resp. rate 18, height 5' 5\" (1.651 m), weight 112 lb 14 oz (51.2 kg), SpO2 98 %. General appearance: fatigued, alert, non cooperative, non  Verbal.  Eyes: Anicteric  Head: Normocephalic, without obvious abnormality  Lungs: decreased effort, not following instructions. Heart: Tacycardia, regular rhythum  Abdomen: soft, unable to assess pain, bowel sound decreased   Extremities: atraumatic, no cyanosis or edema  Neuro: unable to assess        Data Review:    Recent Labs     08/12/19  0553 08/13/19  0400 08/14/19  0605   WBC 6.3 6.0 5.1   HGB 8.7* 8.6* 8.3*   HCT 26.3* 25.7* 25.2*   MCV 94.9 93.7 94.6   * 521* 460*     Recent Labs     08/12/19  0553 08/13/19  0400 08/14/19  0605    144 146*   K 3.8 3.5 2.7*    106 109   CO2 23 24 23   PHOS 4.0 3.3 2.6   BUN 17 14 11   CREATININE 0.8 0.8 0.8     No results for input(s): AST, ALT, ALB, BILIDIR, BILITOT, ALKPHOS in the last 72 hours. No results for input(s): LIPASE, AMYLASE in the last 72 hours. No results for input(s): PROTIME, INR in the last 72 hours. No results for input(s): PTT in the last 72 hours. No results for input(s): OCCULTBLD in the last 72 hours. Assessment and Recommendations      1) Dysphagia   - Acute metabolic encephalopathy, altered mental status   - Poor command follow, unable to feed himself  - present with moderate oral- and severe pharyngeal-dysphagia per SLP evaluation   - PEG today      Thank you for allowing me to participate in the care of your patient.   Please feel free to
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
## 2019-08-14 NOTE — PROGRESS NOTES
15 mL 4x daily   meropenem 1 g Q8H   linezolid 600 mg Q12H   sodium chloride flush 10 mL 2 times per day   metoprolol tartrate 25 mg BID   pantoprazole 40 mg BID   VENELEX  BID   chlorhexidine 15 mL BID   heparin (porcine) 5,000 Units 3 times per day   atorvastatin 20 mg Nightly   aspirin 81 mg Daily      Continuous Infusion:     dextrose      PRN Meds:    haloperidol lactate 5 mg Q6H PRN   metoprolol 5 mg Q6H PRN   levalbuterol 1.25 mg Q4H PRN   acetaminophen 650 mg Q4H PRN   sodium chloride flush 10 mL PRN   medicated lip ointment  PRN   aspirin 300 mg Q6H PRN   glucose 15 g PRN   dextrose 12.5 g PRN   glucagon (rDNA) 1 mg PRN   dextrose 100 mL/hr PRN   ondansetron 4 mg Q30 Min PRN       ALLERGIES  Patient has no known allergies. Diet: Diet NPO, After Midnight Exceptions are: Other (See Comment)          REVIEW OF SYSTEMS:  Positives in bold         Constitutional:  fever, chills, weakness, weight change, fatigue,      Skin:  rash, pruritus, hair loss, bruising, dry skin, petechiae. Eyes:  change in vision, diplopia, blurred vision. Head, Face, Neck   headaches, swelling, sinus infections, cervical adenopathy. ENT  hearing loss, hoarseness, store throat, nose bleeds. Hematologic and lymphatic:  lymph node swelling or tenderness. Respiratory: chest pain with breathing, sputum, shortness of breath, cough, or wheezing. Cardiovascular: chest pain, palpitations, lightheadedness, edema, claudication, PND. Gastrointestinal: nausea, vomiting, diarrhea, constipation, abdominal pain, BRBPR, melena, anorexia, jaundice . Musculoskeletal:  back pain, neck pain, muscle weakness, gait problems, joint pain or swelling. Genitourinary/GYN:  nocturia, flank pain, dysuria, gross hematuria, incontinence, difficulty voiding, abnormal menstrual bleeding, difficult pregnancies. Neurologic:  vertigo, TIAS, syncope, seizures, focal weakness, numbness, headache. Psychosocial:  insomnia, anxiety, or depression.   Endocrine: (4) rarely moist

## 2019-08-14 NOTE — CONSULTS
Psychiatry Consult Follow Up  See note Dr. Mendel Bast 8/11/19 for full initial evaluation. Psych reconsulted for follow up re: agitation. Pt admitted with PNA and cavitary lesion and was intubated, now with delirium and agitation. Pt has been agitated requiring soft restraints and Haldol IV BID plus additional 10mg yesterday. Spoke to pt at bedside and our conversation was limited. When asked his name he said \"psycho\" and laughed, then was unable to stay awake long enough to answer any other questions. Did not appear agitated at that time. Increase scheduled Haldol to 2mg BID. Can continue to push this dose higher as needed if his agitation continues, just be sure to monitor QTc. Repeat UA. Most likely delirium culprit is infection, Dr. Mendel Bast recommended considering switching the abx. Control over mild HTN and mild hypernatremia may help as well as even small changes to BP/Na  in the aging population can be a/w delirium. Repeat EKG to look for QTc Prolongation. Switch all Haldol to IM if greater than 480. If greater than 500 may need to switch Haldol to Depakote IV for agitation control.

## 2019-08-15 LAB
ALBUMIN SERPL-MCNC: 2.6 G/DL (ref 3.4–5)
ANION GAP SERPL CALCULATED.3IONS-SCNC: 12 MMOL/L (ref 3–16)
BASOPHILS ABSOLUTE: 0.1 K/UL (ref 0–0.2)
BASOPHILS RELATIVE PERCENT: 1.2 %
BUN BLDV-MCNC: 9 MG/DL (ref 7–20)
CALCIUM SERPL-MCNC: 8.4 MG/DL (ref 8.3–10.6)
CHLORIDE BLD-SCNC: 108 MMOL/L (ref 99–110)
CO2: 22 MMOL/L (ref 21–32)
CREAT SERPL-MCNC: 0.8 MG/DL (ref 0.8–1.3)
EKG ATRIAL RATE: 108 BPM
EKG ATRIAL RATE: 114 BPM
EKG DIAGNOSIS: NORMAL
EKG DIAGNOSIS: NORMAL
EKG P AXIS: 41 DEGREES
EKG P AXIS: 53 DEGREES
EKG P-R INTERVAL: 122 MS
EKG P-R INTERVAL: 132 MS
EKG Q-T INTERVAL: 344 MS
EKG Q-T INTERVAL: 362 MS
EKG QRS DURATION: 76 MS
EKG QRS DURATION: 78 MS
EKG QTC CALCULATION (BAZETT): 474 MS
EKG QTC CALCULATION (BAZETT): 485 MS
EKG R AXIS: -36 DEGREES
EKG R AXIS: -59 DEGREES
EKG T AXIS: -4 DEGREES
EKG T AXIS: 12 DEGREES
EKG VENTRICULAR RATE: 108 BPM
EKG VENTRICULAR RATE: 114 BPM
EOSINOPHILS ABSOLUTE: 0.2 K/UL (ref 0–0.6)
EOSINOPHILS RELATIVE PERCENT: 3 %
GFR AFRICAN AMERICAN: >60
GFR NON-AFRICAN AMERICAN: >60
GLUCOSE BLD-MCNC: 105 MG/DL (ref 70–99)
GLUCOSE BLD-MCNC: 116 MG/DL (ref 70–99)
GLUCOSE BLD-MCNC: 122 MG/DL (ref 70–99)
HCT VFR BLD CALC: 24.8 % (ref 40.5–52.5)
HEMOGLOBIN: 8.3 G/DL (ref 13.5–17.5)
LYMPHOCYTES ABSOLUTE: 1.1 K/UL (ref 1–5.1)
LYMPHOCYTES RELATIVE PERCENT: 20.8 %
MAGNESIUM: 1.9 MG/DL (ref 1.8–2.4)
MCH RBC QN AUTO: 31.4 PG (ref 26–34)
MCHC RBC AUTO-ENTMCNC: 33.3 G/DL (ref 31–36)
MCV RBC AUTO: 94.1 FL (ref 80–100)
MONOCYTES ABSOLUTE: 0.6 K/UL (ref 0–1.3)
MONOCYTES RELATIVE PERCENT: 12.5 %
NEUTROPHILS ABSOLUTE: 3.2 K/UL (ref 1.7–7.7)
NEUTROPHILS RELATIVE PERCENT: 62.5 %
PDW BLD-RTO: 14.2 % (ref 12.4–15.4)
PERFORMED ON: ABNORMAL
PERFORMED ON: ABNORMAL
PHOSPHORUS: 2.1 MG/DL (ref 2.5–4.9)
PLATELET # BLD: 409 K/UL (ref 135–450)
PMV BLD AUTO: 7.6 FL (ref 5–10.5)
POTASSIUM SERPL-SCNC: 3.3 MMOL/L (ref 3.5–5.1)
RBC # BLD: 2.63 M/UL (ref 4.2–5.9)
SODIUM BLD-SCNC: 142 MMOL/L (ref 136–145)
WBC # BLD: 5.1 K/UL (ref 4–11)

## 2019-08-15 PROCEDURE — 6370000000 HC RX 637 (ALT 250 FOR IP): Performed by: STUDENT IN AN ORGANIZED HEALTH CARE EDUCATION/TRAINING PROGRAM

## 2019-08-15 PROCEDURE — 94640 AIRWAY INHALATION TREATMENT: CPT

## 2019-08-15 PROCEDURE — 99233 SBSQ HOSP IP/OBS HIGH 50: CPT | Performed by: INTERNAL MEDICINE

## 2019-08-15 PROCEDURE — 6360000002 HC RX W HCPCS: Performed by: INTERNAL MEDICINE

## 2019-08-15 PROCEDURE — 2580000003 HC RX 258: Performed by: STUDENT IN AN ORGANIZED HEALTH CARE EDUCATION/TRAINING PROGRAM

## 2019-08-15 PROCEDURE — 2580000003 HC RX 258: Performed by: INTERNAL MEDICINE

## 2019-08-15 PROCEDURE — 6360000002 HC RX W HCPCS: Performed by: STUDENT IN AN ORGANIZED HEALTH CARE EDUCATION/TRAINING PROGRAM

## 2019-08-15 PROCEDURE — 97530 THERAPEUTIC ACTIVITIES: CPT

## 2019-08-15 PROCEDURE — 84425 ASSAY OF VITAMIN B-1: CPT

## 2019-08-15 PROCEDURE — 97535 SELF CARE MNGMENT TRAINING: CPT

## 2019-08-15 PROCEDURE — 2700000000 HC OXYGEN THERAPY PER DAY

## 2019-08-15 PROCEDURE — C9113 INJ PANTOPRAZOLE SODIUM, VIA: HCPCS | Performed by: STUDENT IN AN ORGANIZED HEALTH CARE EDUCATION/TRAINING PROGRAM

## 2019-08-15 PROCEDURE — 85025 COMPLETE CBC W/AUTO DIFF WBC: CPT

## 2019-08-15 PROCEDURE — 2060000000 HC ICU INTERMEDIATE R&B

## 2019-08-15 PROCEDURE — 92526 ORAL FUNCTION THERAPY: CPT | Performed by: SPEECH-LANGUAGE PATHOLOGIST

## 2019-08-15 PROCEDURE — 93005 ELECTROCARDIOGRAM TRACING: CPT | Performed by: STUDENT IN AN ORGANIZED HEALTH CARE EDUCATION/TRAINING PROGRAM

## 2019-08-15 PROCEDURE — 83735 ASSAY OF MAGNESIUM: CPT

## 2019-08-15 PROCEDURE — 80069 RENAL FUNCTION PANEL: CPT

## 2019-08-15 PROCEDURE — 93010 ELECTROCARDIOGRAM REPORT: CPT | Performed by: INTERNAL MEDICINE

## 2019-08-15 RX ORDER — POTASSIUM CHLORIDE 29.8 MG/ML
20 INJECTION INTRAVENOUS
Status: COMPLETED | OUTPATIENT
Start: 2019-08-15 | End: 2019-08-15

## 2019-08-15 RX ADMIN — HALOPERIDOL LACTATE 2 MG: 5 INJECTION INTRAMUSCULAR at 21:41

## 2019-08-15 RX ADMIN — POTASSIUM CHLORIDE 20 MEQ: 29.8 INJECTION, SOLUTION INTRAVENOUS at 09:35

## 2019-08-15 RX ADMIN — SODIUM CHLORIDE 30 MG/ML INHALATION SOLUTION 15 ML: 30 SOLUTION INHALANT at 20:18

## 2019-08-15 RX ADMIN — DEXTROSE MONOHYDRATE: 50 INJECTION, SOLUTION INTRAVENOUS at 02:37

## 2019-08-15 RX ADMIN — PANTOPRAZOLE SODIUM 40 MG: 40 INJECTION, POWDER, LYOPHILIZED, FOR SOLUTION INTRAVENOUS at 21:41

## 2019-08-15 RX ADMIN — Medication 15 ML: at 21:42

## 2019-08-15 RX ADMIN — METOPROLOL TARTRATE 25 MG: 25 TABLET ORAL at 21:40

## 2019-08-15 RX ADMIN — LEVALBUTEROL HYDROCHLORIDE 1.25 MG: 1.25 SOLUTION, CONCENTRATE RESPIRATORY (INHALATION) at 20:18

## 2019-08-15 RX ADMIN — HEPARIN SODIUM 5000 UNITS: 5000 INJECTION INTRAVENOUS; SUBCUTANEOUS at 17:03

## 2019-08-15 RX ADMIN — HEPARIN SODIUM 5000 UNITS: 5000 INJECTION INTRAVENOUS; SUBCUTANEOUS at 21:40

## 2019-08-15 RX ADMIN — CASTOR OIL AND BALSAM, PERU: 788; 87 OINTMENT TOPICAL at 21:57

## 2019-08-15 RX ADMIN — ASPIRIN 81 MG 81 MG: 81 TABLET ORAL at 09:24

## 2019-08-15 RX ADMIN — Medication 10 ML: at 21:41

## 2019-08-15 RX ADMIN — ATORVASTATIN CALCIUM 20 MG: 20 TABLET, FILM COATED ORAL at 21:40

## 2019-08-15 RX ADMIN — SODIUM CHLORIDE 30 MG/ML INHALATION SOLUTION 15 ML: 30 SOLUTION INHALANT at 09:37

## 2019-08-15 RX ADMIN — CASTOR OIL AND BALSAM, PERU: 788; 87 OINTMENT TOPICAL at 09:25

## 2019-08-15 RX ADMIN — HEPARIN SODIUM 5000 UNITS: 5000 INJECTION INTRAVENOUS; SUBCUTANEOUS at 06:20

## 2019-08-15 RX ADMIN — METOPROLOL TARTRATE 25 MG: 25 TABLET ORAL at 09:24

## 2019-08-15 RX ADMIN — HALOPERIDOL LACTATE 2 MG: 5 INJECTION INTRAMUSCULAR at 09:24

## 2019-08-15 RX ADMIN — LEVALBUTEROL HYDROCHLORIDE 1.25 MG: 1.25 SOLUTION, CONCENTRATE RESPIRATORY (INHALATION) at 09:37

## 2019-08-15 RX ADMIN — POTASSIUM CHLORIDE: 2 INJECTION, SOLUTION, CONCENTRATE INTRAVENOUS at 09:35

## 2019-08-15 RX ADMIN — LINEZOLID 600 MG: 600 INJECTION, SOLUTION INTRAVENOUS at 21:42

## 2019-08-15 RX ADMIN — LINEZOLID 600 MG: 600 INJECTION, SOLUTION INTRAVENOUS at 11:15

## 2019-08-15 RX ADMIN — PANTOPRAZOLE SODIUM 40 MG: 40 INJECTION, POWDER, LYOPHILIZED, FOR SOLUTION INTRAVENOUS at 09:25

## 2019-08-15 RX ADMIN — Medication 10 ML: at 09:25

## 2019-08-15 ASSESSMENT — ENCOUNTER SYMPTOMS
CONSTIPATION: 0
COUGH: 1
COLOR CHANGE: 0
EYE PAIN: 0
EYE ITCHING: 0
WHEEZING: 1
TROUBLE SWALLOWING: 1
ABDOMINAL PAIN: 0

## 2019-08-15 ASSESSMENT — PAIN SCALES - GENERAL
PAINLEVEL_OUTOF10: 0

## 2019-08-15 NOTE — ADT AUTH CERT
nephrology, likely ATN.      Asymptomatic AAA; 3.4 cm in 2015    -ctm         EtOH Abuse;    -s/p CIWA        Renal Failure, Acute - Care Day 13 (8/13/2019) by Eddie Randall RN         Review Status Review Entered   Completed 8/15/2019 14:44       Criteria Review      Care Day: 13 Care Date: 8/13/2019 Level of Care:    Guideline Day 4    Level Of Care    (X) Floor to discharge    8/12/2019 1:50 PM EDT by Dana Velasquez remains in ICU    Clinical Status    ( ) * Hemodynamic stability    ( ) * Mental status at baseline    ( ) * Tachypnea absent    ( ) * Hypoxemia absent    8/12/2019 1:50 PM EDT by Marii Burnett      on bipap, resp rate 31, on o2 @ 3L, spo2 100%    ( ) * Etiology requiring continued inpatient care absent    ( ) * Electrolyte abnormalities absent or acceptable for next level of care    ( ) * Acid-base abnormalities absent    8/12/2019 1:50 PM EDT by Daisy Frias      alb 2.4    ( ) * Volume status at baseline or acceptable for next level of care    ( ) * Diet tolerated    ( ) * Dialysis not needed, or access and plan established    ( ) * Discharge plans and education understood    Activity    ( ) * Ambulatory [I]    8/12/2019 1:50 PM EDT by Marii Burnett      sitter ar bedside    Routes    ( ) * Oral hydration, medications, and diet    8/15/2019 2:44 PM EDT by Naz Raygoza, remains in tube feeds    8/15/2019 2:44 PM EDT by Marii Burnett      tube feeds , npo    8/12/2019 1:50 PM EDT by Daisy Frias      npo    (X) Possible calorie count, nutritional assessment    8/12/2019 1:50 PM EDT by Daisy Frias      tube feeds continuous, npo    Interventions    (X) Monitor electrolytes, renal function tests, acid-base, and volume status    Medications    (X) Possible medical therapies    * Milestone   Additional Notes   8/13 nephrology:    He denies new problems. Resting comfortably.     Urine output not measured    He is off lasix and edema is better    Acute kidney injury Resolved. Creatinine 0.8. Likely acute tubular necrosis    Second peak of 2.4 on 7/29/19    baseline creatinine in 12/2018 1.2-     proteinuria  258 mg, 3 to 5 red blood     Urine sodium 29.     in 05/2018 one time  creatinine  was 2.1     In 08/2015, creatinine ranged from 0.8 to 1.2.         CT abdomen kidneys normal    CT in 2015 mention some bilateral mild to moderate cortical    thinning of the renal cortex, no mention in 07/2019 CT. Traumatic Hematuria due to resendiz which pt tried to remove         Hypercalcemia    Resolved. calcium was 10.1 in 12/2018    His hypercalcemia could be due to excessive calcium intake         Hypokalemia    Likely due to recent diuretic    Better now after replacement         History of alcoholism     withdrawal    liver function tests are normal.    He does not have acute hepatitis         History of multiple lung nodules     on CT in 2015.  long-term smoking and currentweight loss. Management per primary         History of left adrenal adenoma    16 mm as seen on CT in 2015.  CT this admission in 07/2019 did not mention that.         History of emphysema    as seen on previous CT of the chest in 2015.         History of abdominal aortic aneurysm    as seen on CT in 2015 was 3.4 cm.     This admission CT without contrast did not mention          Anemia    hgb 9.1, platelets 171,335     Medicine 8/13: Diet: DIET TUBE FEED CONTINUOUS/CYCLIC NPO; STANDARD WITH FIBER (Jevity 1.2); Nasogastric; Continuous; 20; 65; 24         CC: fatigue, malaise.         Interval history: Continues to become agitated as Haldol wears off. Today, responsive to commands, but unable to verbalize responses. Per nuring, Tried to Throw IV pole earlier in the day. Due to patients combativeness, order was put in for soft restraints. No NVD, CP.         HPI: 71 yo male with PMHx: HTN, HLP, GERD, opioid dependence 2/2 cervical DDD, and alcohol abuse who presented with fatigue, nausea and poor appetite. Initially, there was concern for alcohol withdrawal and placed on an ativan gtt. During his course in the ICU, he started to desaturate. CXR revealed multifocal pneumonia. Patient was started on vancomycin and meropenem. He was intubated and eventually extubated on 8/8. Antibiotics were discontinued after vancomycin day 11 and meropenem day 7. After extubation, patient had increasing oxygen demand, and was placed on BIPAP and eventually Vapotherm. CTPA was ordered to rule out Pulmonary embolism. It revealed a 3cm cavitary lesion in the upper lobe. Patient was started on meropenem and linezolid. Of note, patient does easily get agitated.         Prior, to examining the patient. Patient became agitated and pulled out his NG Tube. The patient was seen and examined. He is able to follow commands and occasionally whispers. He is unable to provide any history.     08/13/19 0830 (!) 144/76 98.4 °F (36.9 °C) Axillary 125 16 98 %    Assessment/Plan:    Mr. Betty Reyes is a 69 yo M with PMHx significant for HTN, HLD, GERD, opioid dependence 2/2 cervical DDD, and alcohol abuse being managed in ICU for acute respiratory failure and pneumonia complicated by pulmonary abscess.         Pulmonary Caviary Lesion     3cm cavitary lesion in RUL on CTPA. BAL 8/6 cultured MRSA. Hx of aspiration PNA with klebsiella and MRSA per BAL    - Linezolid    - Meropenem    - Monitor for thrombocytopenia. Per pharmacy, switch to Ceftaroline if this occurs. - Abscess drainage not indicated at this time    - Will require 6 weeks of antibiotics. Will continue IV for now.          Acute Hypoxic Respiratory Failure;resolving    . Bronchoscopy with copious remarkable for copious secretions in the right lower lobe as well as mucus plugging. Also history of emesis. Pleural effusions are resolved.     - Pt extubated on 08/08/19    - On 3L high flow nasal canula    - Saturating well at %    - Hypertonic saline and Xopenex HHN respiratory therapy

## 2019-08-15 NOTE — PLAN OF CARE
Problem: Pain:  Goal: Control of acute pain  Description  Patient denies being in any pain currently. Patient pain assessed routinely using 0-10 scale which patient is able to use appropriately at this time. Patient is currently resting in bed with eyes closed. Patient instructed to call out if experiencing breakthrough pain. Patient rounded on frequently. Will continue to monitor and reassess.        Note:   Pt denies pain this shift  continue monitor and assess     Problem: Falls - Risk of:  Goal: Absence of physical injury  Description  Absence of physical injury  Note:   Bed low with wheels locked  bed/chair alarm on  nonskid socks on when OOB  avasys monitor/increased rounding frequency due to impulsive behavior  continue fall prevention measures     Problem: Gas Exchange - Impaired:  Goal: Levels of oxygenation will improve  Description  Levels of oxygenation will improve  Note:   Pt alert with intermittent confusion to situation, time  RR 20-26, nonlabored  resp shallow at times  semiproductive cough thin white sputum  lungs rhonchi with auscultation  NC 2L with pulse ox mid 90's  continue abx per order, RT per order

## 2019-08-15 NOTE — PROGRESS NOTES
aspirin, glucose, dextrose, glucagon (rDNA), dextrose, ondansetron    Objective:   Vitals:   T-max:  Patient Vitals for the past 24 hrs:   BP Temp Temp src Pulse Resp SpO2   08/15/19 0504 (!) 144/88 98 °F (36.7 °C) Oral 102 22 100 %   08/14/19 2359 (!) 155/92 97.6 °F (36.4 °C) Oral 98 22 98 %   08/14/19 2038 -- -- -- -- -- 92 %   08/14/19 1939 (!) 155/81 98.3 °F (36.8 °C) Oral 118 22 91 %   08/14/19 1543 (!) 140/75 99.3 °F (37.4 °C) Oral 111 24 93 %   08/14/19 1510 138/83 -- -- 109 29 92 %   08/14/19 1505 131/79 -- -- 111 24 91 %   08/14/19 1500 (!) 145/78 -- -- 111 18 92 %   08/14/19 1450 134/80 -- -- 106 18 94 %   08/14/19 1445 134/80 -- -- 101 15 94 %   08/14/19 1440 126/71 -- -- 103 14 96 %   08/14/19 1230 137/76 98 °F (36.7 °C) Axillary 120 20 99 %   08/14/19 0944 -- -- -- -- 18 98 %       Intake/Output Summary (Last 24 hours) at 8/15/2019 0826  Last data filed at 8/15/2019 0630  Gross per 24 hour   Intake 1410.7 ml   Output 0 ml   Net 1410.7 ml     Review of Systems   Constitutional: Negative. HENT: Negative. Eyes: Negative. Respiratory: Negative. Cardiovascular: Negative. Gastrointestinal: Negative. Endocrine: Negative. Genitourinary: Negative. Musculoskeletal: Negative. Skin: Negative. Allergic/Immunologic: Negative. Neurological: Positive for speech difficulty. Psychiatric/Behavioral: Positive for agitation and behavioral problems. The patient is nervous/anxious.       Constitutional:   Cachetic appearing   HENT:   Head: Normocephalic. Eyes: Pupils are equal, round, and reactive to light. Neck: Neck supple. Cardiovascular: Normal rate and regular rhythm. Pulmonary/Chest: Effort normal. Crackles in RUL. Abdominal: Abdominal binder in place. Skin: Skin is warm. Soft restraints in place.     LABS:    CBC:   Recent Labs     08/13/19  0400 08/14/19  0605 08/15/19  0630   WBC 6.0 5.1 5.1   HGB 8.6* 8.3* 8.3*   HCT 25.7* 25.2* 24.8*   * 460* 409   MCV 93.7 94.6 completely imaged. However there may be some    atelectasis in the right lung base. ET tube has been removed. Other lines and tubes are unchanged. MRI BRAIN W WO CONTRAST   Final Result      1. No acute intracranial abnormality. No evidence of acute ischemia. 2. Bilateral mastoid air cell effusions, middle ear effusions, and fluid in the nasopharynx and sphenoid sinuses suggesting retained secretions and altered mental status. XR CHEST PORTABLE   Final Result   Impression: Stable evaluation of the chest, including multifocal airspace disease. CT ABDOMEN PELVIS W IV CONTRAST Additional Contrast? None   Final Result   1. Interval development of patchy infiltrates and patchy areas of consolidation within the lower lobes. Correlate for pneumonia. 2. Uncomplicated sigmoid colon diverticulosis. 3. Stable aortic aneurysm. 4. Stable T12 compression fracture. XR CHEST 1 VW   Final Result      Lines and tubes as above. Multifocal airspace disease, similar to the prior study. MRI BRAIN WO CONTRAST   Final Result      1. Overall limited exam due to sessile motion artifact. Repeated imaging sequences with little benefit. No acute intracranial abnormality identified. 2. Ethmoid and sphenoid sinus disease with air-fluid level. Findings may reflect altered mental status and retained secretions. 3. Minimal nonspecific periventricular white matter signal and body on FLAIR imaging suggesting mild chronic small vessel ischemic disease and/or age related degenerative change. XR CHEST PORTABLE   Final Result      1. Multifocal airspace disease consistent with pneumonia as described. This has progressed in the right upper and left lower lobes when compared to the prior exam.  Correlate clinically. CT HEAD WO CONTRAST   Final Result   1. Mild atrophy. 2.  No evidence of an acute intracranial process.       XR CHEST PORTABLE   Final Result      New consolidation in

## 2019-08-15 NOTE — FLOWSHEET NOTE
08/15/19 1637   Encounter Summary   Services provided to: Patient   Referral/Consult From: Rounding   Continue Visiting   (8/15, arielle )   Complexity of Encounter Low   Length of Encounter 15 minutes   Routine   Type Initial   Assessment Calm; Approachable; Hopeful   Intervention Active listening;Explored feelings, thoughts, concerns;Nurtured hope   Outcome Comfort;Expressed gratitude   Staff neville [place]ende[place], [place]
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
## 2019-08-15 NOTE — PROGRESS NOTES
altered mental status   - Poor command follow, unable to feed himself  - present with moderate oral- and severe pharyngeal-dysphagia per SLP evaluation   - Sp PEG. Increase tube feeds to goal.      Thank you for allowing me to participate in the care of your patient.   Please feel free to contact me with any questions or concerns.      Adia Baker MD  Will staff with Dr. Sheri Gotti, [address] [facility] and [address]

## 2019-08-15 NOTE — PROGRESS NOTES
8.8 8.4   MG 2.00 2.10 1.90   PHOS 3.3 2.6 2.1*   ANIONGAP 14 14 12     Hepatic:   Recent Labs     08/13/19  0400 08/14/19  0605 08/15/19  0630   LABALBU 2.7* 2.7* 2.6*     Troponin:   No results for input(s): TROPONINI in the last 72 hours. BNP: No results for input(s): BNP in the last 72 hours. Lipids: No results for input(s): CHOL, HDL in the last 72 hours. Invalid input(s): LDLCALCU, TRIGLYCERIDE  ABGs:    No results for input(s): PHART, EDH6SYJ, PO2ART, IUZ5IHL, BEART, THGBART, N8MXADYZ, NRL1NCL in the last 72 hours. INR: No results for input(s): INR in the last 72 hours. Lactate: No results for input(s): LACTATE in the last 72 hours. Cultures:  -----------------------------------------------------------------  RAD:   FL MODIFIED BARIUM SWALLOW W VIDEO   Final Result   1. Severe oral and pharyngeal dysphagia with aspiration of all consistencies. CT CHEST PULMONARY EMBOLISM W CONTRAST   Final Result       1. No pulmonary embolism. 2.  New 3 cm cavitary lesion in the right upper lobe, possibly pulmonary    abscess given presence of multifocal patchy opacities in the most recent    exam.   3.  Overall decreased but persistent multifocal opacities and nodules,    possibly infectious or inflammatory in etiology. The 12 mm lesion in the    right upper lobe appears somewhat spiculated. Recommend short-term    follow-up in 3 months to ensure resolution. 4.  Previously seen pleural effusions have resolved. 5.  Prominent mildly dilated loops of small bowel in the upper abdomen,    partially visualized. Cannot exclude ileus or small bowel obstruction. Consider CT abdomen and pelvis. XR CHEST PORTABLE   Final Result       Unchanged heart size. The lung bases are not completely imaged. However there may be some    atelectasis in the right lung base. ET tube has been removed. Other lines and tubes are unchanged. MRI BRAIN W WO CONTRAST   Final Result      1.  No acute intracranial abnormality. No evidence of acute ischemia. 2. Bilateral mastoid air cell effusions, middle ear effusions, and fluid in the nasopharynx and sphenoid sinuses suggesting retained secretions and altered mental status. XR CHEST PORTABLE   Final Result   Impression: Stable evaluation of the chest, including multifocal airspace disease. CT ABDOMEN PELVIS W IV CONTRAST Additional Contrast? None   Final Result   1. Interval development of patchy infiltrates and patchy areas of consolidation within the lower lobes. Correlate for pneumonia. 2. Uncomplicated sigmoid colon diverticulosis. 3. Stable aortic aneurysm. 4. Stable T12 compression fracture. XR CHEST 1 VW   Final Result      Lines and tubes as above. Multifocal airspace disease, similar to the prior study. MRI BRAIN WO CONTRAST   Final Result      1. Overall limited exam due to sessile motion artifact. Repeated imaging sequences with little benefit. No acute intracranial abnormality identified. 2. Ethmoid and sphenoid sinus disease with air-fluid level. Findings may reflect altered mental status and retained secretions. 3. Minimal nonspecific periventricular white matter signal and body on FLAIR imaging suggesting mild chronic small vessel ischemic disease and/or age related degenerative change. XR CHEST PORTABLE   Final Result      1. Multifocal airspace disease consistent with pneumonia as described. This has progressed in the right upper and left lower lobes when compared to the prior exam.  Correlate clinically. CT HEAD WO CONTRAST   Final Result   1. Mild atrophy. 2.  No evidence of an acute intracranial process. XR CHEST PORTABLE   Final Result      New consolidation in the right lower lung compatible with atelectasis or pneumonia. Aspiration is in the differential diagnosis. Prominent interstitial markings in a perihilar distribution again noted.       Stable cardiac mediastinal

## 2019-08-15 NOTE — PROGRESS NOTES
multiple fractures       Past Surgical History:   Procedure Laterality Date    CERVICAL LAMINECTOMY      GASTROSTOMY TUBE PLACEMENT N/A 8/14/2019    EGD PEG TUBE PLACEMENT performed by Joanne Potter MD at [address] [place] [address] Pkw[address]      left, reattached tendons    UPPER GASTROINTESTINAL ENDOSCOPY N/A 7/30/2019    EGD ESOPHAGOGASTRODUODENOSCOPY performed by Spencer Rolle MD at [address] [facility] History     Socioeconomic History    Marital status:      Spouse name: Not on file    Number of children: Not on file    Years of education: Not on file    Highest education level: Not on file   Occupational History    Not on file   Social Needs    Financial resource strain: Not on file    Food insecurity:     Worry: Not on file     Inability: Not on file    Transportation needs:     Medical: Not on file     Non-medical: Not on file   Tobacco Use    Smoking status: Current Every Day Smoker    Smokeless tobacco: Never Used   Substance and Sexual Activity    Alcohol use:  Yes    Drug use: Never    Sexual activity: Not on file   Lifestyle    Physical activity:     Days per week: Not on file     Minutes per session: Not on file    Stress: Not on file   Relationships    Social connections:     Talks on phone: Not on file     Gets together: Not on file     Attends Quaker service: Not on file     Active member of club or organization: Not on file     Attends meetings of clubs or organizations: Not on file     Relationship status: Not on file    Intimate partner violence:     Fear of current or ex partner: Not on file     Emotionally abused: Not on file     Physically abused: Not on file     Forced sexual activity: Not on file   Other Topics Concern    Not on file   Social History Narrative    Not on file            Problem Relation Age of Onset    Heart Disease Mother     Cancer Father         colon       Scheduled Meds:     levalbuterol 1.25 mg BID   sodium chloride headache. Psychosocial:  insomnia, anxiety, or depression. Endocrine:  increased thirst, excessive energy. Allergic/immunologic: nasal sinus drainage, rhinorrhea, hives. X all remaining systems negative. Labs  CBC:   Recent Labs     08/13/19  0400 08/14/19  0605 08/15/19  0630   WBC 6.0 5.1 5.1   HGB 8.6* 8.3* 8.3*   HCT 25.7* 25.2* 24.8*   * 460* 409     BMP:    Recent Labs     08/13/19  0400 08/14/19  0605 08/15/19  0630    146* 142   K 3.5 2.7* 3.3*    109 108   CO2 24 23 22   BUN 14 11 9   CREATININE 0.8 0.8 0.8   GLUCOSE 100* 86 105*   MG 2.00 2.10 1.90   PHOS 3.3 2.6 2.1*       PHYSICAL EXAM:  Patient Vitals for the past 8 hrs:   BP Temp Temp src Pulse Resp SpO2   08/15/19 0504 (!) 144/88 98 °F (36.7 °C) Oral 102 22 100 %      No data found. Constitutional: not in distress,    Skin:  No rashes seen or palpated. Head, face, neck: neck symmetric, normal appearance, no neck mass, no goiter, no JVD, trachea midline. ENMT:  external ears normal, nasal mucosa unremarkable, no lip ulcers. Eyes:  No scleral icterus, no conjunctival injection. Lids unremarkable. Cardiovascular: heart regular rate and rhythm, no thrill, no heart murmur, no edema. Respiratory: lungs clear to auscultation, respiratory effort normal, No use of accessory muscles. Neurologic: No focal defects.   Mental status and Psych: Alert , Mood and speech normal.    Nephrology  Gaurav Cornelius [id number] [facility], [address] [place] [address]  Office: [phone]  Cell: [phone]  Fax: [phone]

## 2019-08-15 NOTE — PLAN OF CARE
Problem: Pain:  Goal: Control of acute pain  Description  Patient denies being in any pain currently. Patient pain assessed routinely using 0-10 scale which patient is able to use appropriately at this time. Patient is currently resting in bed with eyes closed. Patient instructed to call out if experiencing breakthrough pain. Patient rounded on frequently. Will continue to monitor and reassess. Note:   Pt denies pain this shift  continue monitor and assess     Problem: Falls - Risk of:  Goal: Absence of physical injury  Description  Absence of physical injury  Note:   Bed low with wheels locked  bed/chair alarm on  nonskid socks on when OOB  avasys monitor/increased rounding frequency due to impulsive behavior  continue fall prevention measures     Problem: Infection - Central Venous Catheter-Associated Bloodstream Infection:  Goal: Will show no infection signs and symptoms  Description  Will show no infection signs and symptoms. Central line dressing remains clean dry and intact. All lumen flushed with blood return noted and alcohol cap applied. Will continue to monitor and reassess. Note:   Dressing C/D/I  no redness or edema at insertion site    pt afebrile  continue monitor      Problem: Risk for Impaired Skin Integrity  Goal: Tissue integrity - skin and mucous membranes  Description  Patient's skin assessed q4h hours and prn. Patient turned q2h and prn. Skin is kept clean and dry. Preventative dressings have been applied. Sacral heart placed and assessed to be CDI. Jani scale is assessed and documented each shift. Pt family is educated on importance of frequent repositioning and assessment. Patient has multiple skin integrity issues, including blisters/ abrasions on his lower lip being treated with venelex, scattered bruising and abrasions mostly on his upper extremities and redness on his L cheek. Patient's skin integrity maintained throughout shift.      Note:   Palma care per staff after incontinenet episodes  assist pt with repositioning  bilat heels elevated off mattress  continue assess tissue integrity

## 2019-08-15 NOTE — PLAN OF CARE
Problem: Falls - Risk of:  Goal: Will remain free from falls  Description   Outcome: Ongoing   Fall precautions are in place. Bed alarm is on and in lowest position. Avas[employer] monitor in room. Will continue to monitor. Problem: Nutrition  Goal: Optimal nutrition therapy  Description  Outcome: Ongoing   Pt is NPO. PEG tube in place. IV fluids are running. Awaiting dietician consult for nutrition recommendations.

## 2019-08-15 NOTE — PROGRESS NOTES
imaged. Fara Scrape there may be some    atelectasis in the right lung base. ET tube has been removed.  Other lines and tubes are unchanged.             Previous MBS - none    Chart reviewed. Medical Diagnosis: AVIVA  Treatment Diagnosis: dysphagia    BSE Impression 8/10/19  Patient appears to present with moderate oral- and severe pharyngeal-dysphagia. Only analyzed pt with ice chips this date given altered mental status/poor command following, aphonic voice, wet cough, high O2 demands, recent prolonged intubation, and concern for aspiration pneumonia per cxr results. Pt with positive oral acceptance of ice chips and good oral clearance. Suspect reduced laryngeal elevation. Recommend continue NPO with alternative means of nutrition, and continued oral care. Consider ice chips when oral completed and pt fully alert, with 1:1 supervision. Recommend repeat BSSE and instrumental evaluation of swallowing when able/appropriate. MBS results 8/13/19  Pt presenting with severe oropharyngeal phase dysphagia, with risk for aspiration of all consistencies (puree, honey and nectar). Pt with slow formation and propulsion of bolus posteriorly in oral cavity. Swallow was delayed with bolus spilling over base of tongue. Decreased tongue base strength and reduced laryngeal elevation then occurred, resulting in incomplete epiglottal closure/laryngeal vestibule closure. Deep penetration and eventual aspiration of all consistencies was identified. Chin tuck was attempted which resulted in gross aspiration of honey thick trial.  Study was discontinued due to concern for pt safety    Pain: none reported    Current Diet : NPO    Treatment:  Pt seen bedside to address the following goals:  Goal 1Patient will tolerate repeat BSSE  8/12: pt alert, fairly cooperative. Lip is scabbed however oral cavity is clear. Pt with wet voice, requiring suction with Yankauer for secretions. Pt presented with ice chips and tsp of applesauce.  Pt with

## 2019-08-16 LAB
ALBUMIN SERPL-MCNC: 2.7 G/DL (ref 3.4–5)
ANION GAP SERPL CALCULATED.3IONS-SCNC: 12 MMOL/L (ref 3–16)
BASOPHILS ABSOLUTE: 0.1 K/UL (ref 0–0.2)
BASOPHILS RELATIVE PERCENT: 0.8 %
BUN BLDV-MCNC: 9 MG/DL (ref 7–20)
CALCIUM SERPL-MCNC: 8.8 MG/DL (ref 8.3–10.6)
CHLORIDE BLD-SCNC: 107 MMOL/L (ref 99–110)
CO2: 22 MMOL/L (ref 21–32)
CREAT SERPL-MCNC: 0.9 MG/DL (ref 0.8–1.3)
EOSINOPHILS ABSOLUTE: 0.2 K/UL (ref 0–0.6)
EOSINOPHILS RELATIVE PERCENT: 3.4 %
GFR AFRICAN AMERICAN: >60
GFR NON-AFRICAN AMERICAN: >60
GLUCOSE BLD-MCNC: 110 MG/DL (ref 70–99)
GLUCOSE BLD-MCNC: 111 MG/DL (ref 70–99)
GLUCOSE BLD-MCNC: 118 MG/DL (ref 70–99)
GLUCOSE BLD-MCNC: 118 MG/DL (ref 70–99)
GLUCOSE BLD-MCNC: 145 MG/DL (ref 70–99)
GLUCOSE BLD-MCNC: 97 MG/DL (ref 70–99)
HCT VFR BLD CALC: 24.7 % (ref 40.5–52.5)
HEMOGLOBIN: 8.4 G/DL (ref 13.5–17.5)
LYMPHOCYTES ABSOLUTE: 1.3 K/UL (ref 1–5.1)
LYMPHOCYTES RELATIVE PERCENT: 22.6 %
MAGNESIUM: 1.8 MG/DL (ref 1.8–2.4)
MCH RBC QN AUTO: 31.7 PG (ref 26–34)
MCHC RBC AUTO-ENTMCNC: 33.9 G/DL (ref 31–36)
MCV RBC AUTO: 93.6 FL (ref 80–100)
MONOCYTES ABSOLUTE: 0.7 K/UL (ref 0–1.3)
MONOCYTES RELATIVE PERCENT: 12.5 %
NEUTROPHILS ABSOLUTE: 3.6 K/UL (ref 1.7–7.7)
NEUTROPHILS RELATIVE PERCENT: 60.7 %
PDW BLD-RTO: 14.2 % (ref 12.4–15.4)
PERFORMED ON: ABNORMAL
PERFORMED ON: NORMAL
PHOSPHORUS: 2.6 MG/DL (ref 2.5–4.9)
PLATELET # BLD: 362 K/UL (ref 135–450)
PMV BLD AUTO: 7.2 FL (ref 5–10.5)
POTASSIUM SERPL-SCNC: 3.5 MMOL/L (ref 3.5–5.1)
RBC # BLD: 2.64 M/UL (ref 4.2–5.9)
SODIUM BLD-SCNC: 141 MMOL/L (ref 136–145)
WBC # BLD: 6 K/UL (ref 4–11)

## 2019-08-16 PROCEDURE — 85025 COMPLETE CBC W/AUTO DIFF WBC: CPT

## 2019-08-16 PROCEDURE — 2580000003 HC RX 258: Performed by: STUDENT IN AN ORGANIZED HEALTH CARE EDUCATION/TRAINING PROGRAM

## 2019-08-16 PROCEDURE — 94640 AIRWAY INHALATION TREATMENT: CPT

## 2019-08-16 PROCEDURE — 6370000000 HC RX 637 (ALT 250 FOR IP): Performed by: STUDENT IN AN ORGANIZED HEALTH CARE EDUCATION/TRAINING PROGRAM

## 2019-08-16 PROCEDURE — 6360000002 HC RX W HCPCS: Performed by: STUDENT IN AN ORGANIZED HEALTH CARE EDUCATION/TRAINING PROGRAM

## 2019-08-16 PROCEDURE — 2060000000 HC ICU INTERMEDIATE R&B

## 2019-08-16 PROCEDURE — 99222 1ST HOSP IP/OBS MODERATE 55: CPT | Performed by: NURSE PRACTITIONER

## 2019-08-16 PROCEDURE — 94761 N-INVAS EAR/PLS OXIMETRY MLT: CPT

## 2019-08-16 PROCEDURE — 2580000003 HC RX 258: Performed by: INTERNAL MEDICINE

## 2019-08-16 PROCEDURE — C9113 INJ PANTOPRAZOLE SODIUM, VIA: HCPCS | Performed by: STUDENT IN AN ORGANIZED HEALTH CARE EDUCATION/TRAINING PROGRAM

## 2019-08-16 PROCEDURE — 6360000002 HC RX W HCPCS: Performed by: INTERNAL MEDICINE

## 2019-08-16 PROCEDURE — 31720 CLEARANCE OF AIRWAYS: CPT

## 2019-08-16 PROCEDURE — 99232 SBSQ HOSP IP/OBS MODERATE 35: CPT | Performed by: INTERNAL MEDICINE

## 2019-08-16 PROCEDURE — 83735 ASSAY OF MAGNESIUM: CPT

## 2019-08-16 PROCEDURE — 92526 ORAL FUNCTION THERAPY: CPT

## 2019-08-16 PROCEDURE — 80069 RENAL FUNCTION PANEL: CPT

## 2019-08-16 RX ORDER — MAGNESIUM SULFATE IN WATER 40 MG/ML
2 INJECTION, SOLUTION INTRAVENOUS ONCE
Status: COMPLETED | OUTPATIENT
Start: 2019-08-16 | End: 2019-08-16

## 2019-08-16 RX ADMIN — LINEZOLID 600 MG: 600 INJECTION, SOLUTION INTRAVENOUS at 09:55

## 2019-08-16 RX ADMIN — Medication 15 ML: at 21:11

## 2019-08-16 RX ADMIN — ATORVASTATIN CALCIUM 20 MG: 20 TABLET, FILM COATED ORAL at 21:11

## 2019-08-16 RX ADMIN — HALOPERIDOL LACTATE 2 MG: 5 INJECTION INTRAMUSCULAR at 21:12

## 2019-08-16 RX ADMIN — SODIUM CHLORIDE 30 MG/ML INHALATION SOLUTION 15 ML: 30 SOLUTION INHALANT at 21:51

## 2019-08-16 RX ADMIN — LEVALBUTEROL HYDROCHLORIDE 1.25 MG: 1.25 SOLUTION, CONCENTRATE RESPIRATORY (INHALATION) at 08:16

## 2019-08-16 RX ADMIN — Medication 10 ML: at 09:51

## 2019-08-16 RX ADMIN — LINEZOLID 600 MG: 600 INJECTION, SOLUTION INTRAVENOUS at 22:49

## 2019-08-16 RX ADMIN — POTASSIUM CHLORIDE: 2 INJECTION, SOLUTION, CONCENTRATE INTRAVENOUS at 15:01

## 2019-08-16 RX ADMIN — MAGNESIUM SULFATE HEPTAHYDRATE 2 G: 40 INJECTION, SOLUTION INTRAVENOUS at 11:20

## 2019-08-16 RX ADMIN — HEPARIN SODIUM 5000 UNITS: 5000 INJECTION INTRAVENOUS; SUBCUTANEOUS at 14:17

## 2019-08-16 RX ADMIN — LEVALBUTEROL HYDROCHLORIDE 1.25 MG: 1.25 SOLUTION, CONCENTRATE RESPIRATORY (INHALATION) at 21:51

## 2019-08-16 RX ADMIN — ASPIRIN 81 MG 81 MG: 81 TABLET ORAL at 09:58

## 2019-08-16 RX ADMIN — METOPROLOL TARTRATE 25 MG: 25 TABLET ORAL at 09:58

## 2019-08-16 RX ADMIN — METOPROLOL TARTRATE 25 MG: 25 TABLET ORAL at 21:11

## 2019-08-16 RX ADMIN — HALOPERIDOL LACTATE 2 MG: 5 INJECTION INTRAMUSCULAR at 09:52

## 2019-08-16 RX ADMIN — HEPARIN SODIUM 5000 UNITS: 5000 INJECTION INTRAVENOUS; SUBCUTANEOUS at 21:12

## 2019-08-16 RX ADMIN — Medication 10 ML: at 21:13

## 2019-08-16 RX ADMIN — HEPARIN SODIUM 5000 UNITS: 5000 INJECTION INTRAVENOUS; SUBCUTANEOUS at 06:19

## 2019-08-16 RX ADMIN — CASTOR OIL AND BALSAM, PERU: 788; 87 OINTMENT TOPICAL at 21:12

## 2019-08-16 RX ADMIN — SODIUM CHLORIDE 30 MG/ML INHALATION SOLUTION 15 ML: 30 SOLUTION INHALANT at 08:16

## 2019-08-16 RX ADMIN — PANTOPRAZOLE SODIUM 40 MG: 40 INJECTION, POWDER, LYOPHILIZED, FOR SOLUTION INTRAVENOUS at 21:11

## 2019-08-16 RX ADMIN — POTASSIUM CHLORIDE: 2 INJECTION, SOLUTION, CONCENTRATE INTRAVENOUS at 00:16

## 2019-08-16 RX ADMIN — PANTOPRAZOLE SODIUM 40 MG: 40 INJECTION, POWDER, LYOPHILIZED, FOR SOLUTION INTRAVENOUS at 09:51

## 2019-08-16 RX ADMIN — CASTOR OIL AND BALSAM, PERU: 788; 87 OINTMENT TOPICAL at 09:51

## 2019-08-16 ASSESSMENT — ENCOUNTER SYMPTOMS
EYE ITCHING: 0
CONSTIPATION: 0
COUGH: 1
ABDOMINAL PAIN: 0
TROUBLE SWALLOWING: 1
COLOR CHANGE: 0
WHEEZING: 1
GASTROINTESTINAL NEGATIVE: 1
RESPIRATORY NEGATIVE: 1
ALLERGIC/IMMUNOLOGIC NEGATIVE: 1
EYES NEGATIVE: 1
EYE PAIN: 0

## 2019-08-16 ASSESSMENT — PAIN SCALES - GENERAL
PAINLEVEL_OUTOF10: 0

## 2019-08-16 NOTE — CONSULTS
 HAND SURGERY      left, reattached tendons    UPPER GASTROINTESTINAL ENDOSCOPY N/A 7/30/2019    EGD ESOPHAGOGASTRODUODENOSCOPY performed by Sariah Harris MD at [facility]       Current Medications:    Current Facility-Administered Medications: magnesium sulfate 2 g in 50 mL IVPB premix, 2 g, Intravenous, Once  potassium chloride 20 mEq in dextrose 5 % 1,000 mL infusion, , Intravenous, Continuous  levalbuterol (XOPENEX) nebulizer solution 1.25 mg, 1.25 mg, Nebulization, BID  sodium chloride (Inhalant) 3 % nebulizer solution 15 mL, 15 mL, Nebulization, BID  haloperidol lactate (HALDOL) injection 2 mg, 2 mg, Intramuscular, BID  haloperidol lactate (HALDOL) injection 5 mg, 5 mg, Intramuscular, Q6H PRN  metoprolol (LOPRESSOR) injection 5 mg, 5 mg, Intravenous, Q6H PRN  levalbuterol (XOPENEX) nebulizer solution 1.25 mg, 1.25 mg, Nebulization, Q4H PRN  linezolid (ZYVOX) IVPB 600 mg, 600 mg, Intravenous, Q12H  acetaminophen (TYLENOL) tablet 650 mg, 650 mg, Oral, Q4H PRN  sodium chloride flush 0.9 % injection 10 mL, 10 mL, Intravenous, 2 times per day  sodium chloride flush 0.9 % injection 10 mL, 10 mL, Intravenous, PRN  metoprolol tartrate (LOPRESSOR) tablet 25 mg, 25 mg, Oral, BID  pantoprazole (PROTONIX) injection 40 mg, 40 mg, Intravenous, BID  VENELEX ointment, , Topical, BID  chlorhexidine (PERIDEX) 0.12 % solution 15 mL, 15 mL, Mouth/Throat, BID  heparin (porcine) injection 5,000 Units, 5,000 Units, Subcutaneous, 3 times per day  atorvastatin (LIPITOR) tablet 20 mg, 20 mg, Oral, Nightly  medicated lip ointment (BLISTEX), , Topical, PRN  aspirin suppository 300 mg, 300 mg, Rectal, Q6H PRN  aspirin chewable tablet 81 mg, 81 mg, Oral, Daily  glucose (GLUTOSE) 40 % oral gel 15 g, 15 g, Oral, PRN  dextrose 50 % IV solution, 12.5 g, Intravenous, PRN  glucagon (rDNA) injection 1 mg, 1 mg, Intramuscular, PRN  dextrose 5 % solution, 100 mL/hr, Intravenous, PRN  ondansetron (ZOFRAN) injection 4 mg, 4 mg,

## 2019-08-16 NOTE — PROGRESS NOTES
Speech Language Pathology  Facility/Department: [address] ICU  Dysphagia Daily Treatment Note    NAME: Nayeli Schmitt  : 1950  MRN: [id number]    Patient Diagnosis(es):   Patient Active Problem List    Diagnosis Date Noted    Acute respiratory failure with hypoxia (HCC)     Diffuse pneumonia     Mucus plugging of bronchi     Hypoxemia 2019    Pulmonary edema cardiac cause ([address] 2019    Acute encephalopathy 2019    Alcohol withdrawal syndrome, with delirium ([address] 2019    Narcotic dependence ([address] 2019    AVIVA (acute kidney injury) ([address]     Acute kidney failure ([address] 2019    Spondylitis, cervical ([address] 2010    DDD (degenerative disc disease), cervical 2010    Myofascial pain syndrome 2010    HTN (hypertension) 2010    Lumbar radiculopathy 2010    BPH (benign prostatic hyperplasia) 2010    DJD (degenerative joint disease), cervical 2010    Allergic rhinitis, seasonal 2010    Hyperlipemia 2010    GERD (gastroesophageal reflux disease) 2010     Allergies: No Known Allergies    Recent CT of Chest: (19)      Impression       1.  No pulmonary embolism. 2.  New 3 cm cavitary lesion in the right upper lobe, possibly pulmonary    abscess given presence of multifocal patchy opacities in the most recent    exam.   3.  Overall decreased but persistent multifocal opacities and nodules,    possibly infectious or inflammatory in etiology.  The 12 mm lesion in the    right upper lobe appears somewhat spiculated.  Recommend short-term    follow-up in 3 months to ensure resolution. 4.  Previously seen pleural effusions have resolved. 5.  Prominent mildly dilated loops of small bowel in the upper abdomen,    partially visualized.  Cannot exclude ileus or small bowel obstruction.     Consider CT abdomen and pelvis.       CXR 19      Unchanged heart size.    The lung bases are not completely imaged. Sam Plush there may be some    atelectasis in the right lung base. ET tube has been removed.  Other lines and tubes are unchanged.           Previous MBS - none    Chart reviewed. Medical Diagnosis: AVIVA  Treatment Diagnosis: dysphagia    BSE Impression 8/10/19  Patient appears to present with moderate oral- and severe pharyngeal-dysphagia. Only analyzed pt with ice chips this date given altered mental status/poor command following, aphonic voice, wet cough, high O2 demands, recent prolonged intubation, and concern for aspiration pneumonia per cxr results. Pt with positive oral acceptance of ice chips and good oral clearance. Suspect reduced laryngeal elevation. Recommend continue NPO with alternative means of nutrition, and continued oral care. Consider ice chips when oral completed and pt fully alert, with 1:1 supervision. Recommend repeat BSSE and instrumental evaluation of swallowing when able/appropriate. MBS results 8/13/19  Pt presenting with severe oropharyngeal phase dysphagia, with risk for aspiration of all consistencies (puree, honey and nectar). Pt with slow formation and propulsion of bolus posteriorly in oral cavity. Swallow was delayed with bolus spilling over base of tongue. Decreased tongue base strength and reduced laryngeal elevation then occurred, resulting in incomplete epiglottal closure/laryngeal vestibule closure. Deep penetration and eventual aspiration of all consistencies was identified. Chin tuck was attempted which resulted in gross aspiration of honey thick trial.  Study was discontinued due to concern for pt safety    Pain: none reported    Current Diet : NPO- has tube feeds    Treatment:  Pt seen bedside to address the following goals:  Goal 1Patient will tolerate repeat BSE  8/12: pt alert, fairly cooperative. Lip is scabbed however oral cavity is clear. Pt with wet voice, requiring suction with Yankauer for secretions.  Pt presented with ice chips and tsp of applesauce. Pt with increased wet voice, weak cough, extra reflexive swallows, which may indicate pharyngeal residue. Pt did then cough further on command and used yankauer to suction further. Pt not appropriate for PO or MBS at this time  Goal met, continue  8/13:  Pt sitting upright in bed, able to voice, however weak. Pt analyzed with ice chips, tsp and sips of water and nectar thick water, as well as puree. Pt cont with weak cough across all consistencies. Recommend MBS at this time, as pt now has NG removed. Goal met    Goal 2: Patient will tolerate instrumental swallow evaluation. 8/12: not yet appropriate  Cont goal  8/13: scheduled this date  8/13: Goal met    Goal 3: Patient/caregiver will demonstrate understanding of dysphagia recommendations/compensatory strategies for improved swallowing safety  08/10: Reviewed   8/12: Educated pt to purpose of visit, s/s of aspiration, concern if aspiration occurs, rationale for diet recommendation/strategies to reduce risk for aspiration and instruction to notify staff if any signs emerge. Pt does not indicate comprehension  Cont goal  8/13: pt educated to plan for MBS this date. Question full comprehension  Cont goal   8/13: pt educated to results of MBS study and rationale for NPO. Explained concern for aspiration. Educated to importance of oral care and ice chips. Pt will require cont education  Cont goal  8/15: Pt partially reclined in bed, appeared agitated and not easily aroused. Responded to questions w/ head shakes (yes/no) but would not verbalize. Nodded yes to oral care, but no to PO trials. Noted wet respirations w/ rattling of the chest during exhalation; weak, frequent cough as attempt to clear secretions. Pt sat upright in attempt to clear secretions, suction used. Pt unable to be cued for strong cough, hence unable to clear secretions. Oral care completed w/ suction swab and mouth wash. Pt did not open eyes until end of session, agreed to PO.

## 2019-08-16 NOTE — PROGRESS NOTES
levalbuterol, acetaminophen, sodium chloride flush, medicated lip ointment, aspirin, glucose, dextrose, glucagon (rDNA), dextrose, ondansetron    Objective:   Vitals:   T-max:  Patient Vitals for the past 24 hrs:   BP Temp Temp src Pulse Resp SpO2 Height Weight   08/16/19 0612 135/82 97.1 °F (36.2 °C) Oral 118 18 97 % -- --   08/16/19 0020 -- -- -- -- -- -- -- 135 lb 12.9 oz (61.6 kg)   08/16/19 0007 137/78 97 °F (36.1 °C) Oral 106 19 97 % -- --   08/15/19 2129 (!) 146/75 100 °F (37.8 °C) Axillary 117 21 95 % -- --   08/15/19 2016 -- -- -- -- -- 95 % -- --   08/15/19 1600 (!) 144/76 97.4 °F (36.3 °C) Axillary 126 20 94 % -- --   08/15/19 1405 -- -- -- -- -- -- 5' 5\" (1.651 m) --   08/15/19 1130 (!) 161/81 97.6 °F (36.4 °C) Axillary 117 22 94 % -- --   08/15/19 0939 -- -- -- -- 20 97 % -- --   08/15/19 0915 (!) 148/80 98.8 °F (37.1 °C) Axillary 108 24 97 % -- --       Intake/Output Summary (Last 24 hours) at 8/16/2019 0618  Last data filed at 8/16/2019 0019  Gross per 24 hour   Intake 1798.5 ml   Output --   Net 1798.5 ml       Review of Systems   Constitutional: Negative. HENT: Negative. Eyes: Negative. Respiratory: Negative. Cardiovascular: Negative. Gastrointestinal: Negative. Endocrine: Negative. Genitourinary: Negative. Musculoskeletal: Negative. Allergic/Immunologic: Negative. Neurological: Positive for speech difficulty. Hematological: Negative. Psychiatric/Behavioral: Negative. Physical Exam   Constitutional:   Cachetic appearing   HENT:   Head: Normocephalic. Eyes: Pupils are equal, round, and reactive to light. Conjunctivae are normal.   Cardiovascular: Normal rate and regular rhythm. Pulmonary/Chest: Effort normal.   Aeration is improved. Rales in RUL improved as well. Abdominal:   Abdominal binding with PEG tube in place. Musculoskeletal: Normal range of motion. Skin: Skin is warm and dry.        LABS:    CBC:   Recent Labs     08/14/19  0605

## 2019-08-16 NOTE — PROGRESS NOTES
edema or pneumonia      CT SOFT TISSUE NECK WO CONTRAST   Final Result   Unremarkable CT neck          XR CHEST PORTABLE   Final Result     Normal chest x-ray. CT ABDOMEN PELVIS WO CONTRAST Additional Contrast? None   Final Result   No acute abnormality demonstrated in the abdomen or pelvis.               Assessment  1.  Acute hypoxemic/hypercapnic respiratory failure -extubated 1 week ago. On 1 L nasal cannula  2.  Aspiration pneumonia -[employer] positive for Klebsiella and MRSA   3.  Mucous plugs on bronchoscopy July 30  4.  Shock -resolved  5.  Acute encephalopathy/alcohol withdrawal -still quite problematic  6.  AVIVA -resolved  7.  Pulmonary abscess     Plan  1.  Cont Zyvox for a total of 6 weeks  2.  Continue hypertonic saline and Xopenex HHN  3.  Wean oxygen as tolerated with goal oxygen saturation of 88%  4. Continue tube feeds at goal  5.  Full Code     Will need CT chest in 6 weeks.  Close to D/C from pulm standpoint  Dr. Emilia Coppola covering this weekend     Apolinar Diaz MD

## 2019-08-17 LAB
ALBUMIN SERPL-MCNC: 2.7 G/DL (ref 3.4–5)
ANION GAP SERPL CALCULATED.3IONS-SCNC: 12 MMOL/L (ref 3–16)
BASOPHILS ABSOLUTE: 0.1 K/UL (ref 0–0.2)
BASOPHILS RELATIVE PERCENT: 1.2 %
BUN BLDV-MCNC: 11 MG/DL (ref 7–20)
CALCIUM SERPL-MCNC: 8.4 MG/DL (ref 8.3–10.6)
CHLORIDE BLD-SCNC: 108 MMOL/L (ref 99–110)
CO2: 23 MMOL/L (ref 21–32)
CREAT SERPL-MCNC: 0.9 MG/DL (ref 0.8–1.3)
EKG ATRIAL RATE: 106 BPM
EKG ATRIAL RATE: 96 BPM
EKG DIAGNOSIS: NORMAL
EKG DIAGNOSIS: NORMAL
EKG P AXIS: 32 DEGREES
EKG P AXIS: 43 DEGREES
EKG P-R INTERVAL: 104 MS
EKG P-R INTERVAL: 114 MS
EKG Q-T INTERVAL: 354 MS
EKG Q-T INTERVAL: 386 MS
EKG QRS DURATION: 78 MS
EKG QRS DURATION: 80 MS
EKG QTC CALCULATION (BAZETT): 470 MS
EKG QTC CALCULATION (BAZETT): 487 MS
EKG R AXIS: -27 DEGREES
EKG R AXIS: -35 DEGREES
EKG T AXIS: -26 DEGREES
EKG T AXIS: -44 DEGREES
EKG VENTRICULAR RATE: 106 BPM
EKG VENTRICULAR RATE: 96 BPM
EOSINOPHILS ABSOLUTE: 0.2 K/UL (ref 0–0.6)
EOSINOPHILS RELATIVE PERCENT: 3.5 %
GFR AFRICAN AMERICAN: >60
GFR NON-AFRICAN AMERICAN: >60
GLUCOSE BLD-MCNC: 106 MG/DL (ref 70–99)
GLUCOSE BLD-MCNC: 110 MG/DL (ref 70–99)
GLUCOSE BLD-MCNC: 117 MG/DL (ref 70–99)
GLUCOSE BLD-MCNC: 132 MG/DL (ref 70–99)
GLUCOSE BLD-MCNC: 139 MG/DL (ref 70–99)
GLUCOSE BLD-MCNC: 150 MG/DL (ref 70–99)
HCT VFR BLD CALC: 25.2 % (ref 40.5–52.5)
HEMOGLOBIN: 8.5 G/DL (ref 13.5–17.5)
LYMPHOCYTES ABSOLUTE: 1 K/UL (ref 1–5.1)
LYMPHOCYTES RELATIVE PERCENT: 15.1 %
MAGNESIUM: 2.2 MG/DL (ref 1.8–2.4)
MCH RBC QN AUTO: 32 PG (ref 26–34)
MCHC RBC AUTO-ENTMCNC: 33.8 G/DL (ref 31–36)
MCV RBC AUTO: 94.7 FL (ref 80–100)
MONOCYTES ABSOLUTE: 0.7 K/UL (ref 0–1.3)
MONOCYTES RELATIVE PERCENT: 10.3 %
NEUTROPHILS ABSOLUTE: 4.6 K/UL (ref 1.7–7.7)
NEUTROPHILS RELATIVE PERCENT: 69.9 %
PDW BLD-RTO: 14.6 % (ref 12.4–15.4)
PERFORMED ON: ABNORMAL
PHOSPHORUS: 3.6 MG/DL (ref 2.5–4.9)
PLATELET # BLD: 324 K/UL (ref 135–450)
PMV BLD AUTO: 7.8 FL (ref 5–10.5)
POTASSIUM SERPL-SCNC: 3.5 MMOL/L (ref 3.5–5.1)
RBC # BLD: 2.67 M/UL (ref 4.2–5.9)
SODIUM BLD-SCNC: 143 MMOL/L (ref 136–145)
WBC # BLD: 6.6 K/UL (ref 4–11)

## 2019-08-17 PROCEDURE — 6360000002 HC RX W HCPCS: Performed by: STUDENT IN AN ORGANIZED HEALTH CARE EDUCATION/TRAINING PROGRAM

## 2019-08-17 PROCEDURE — 2060000000 HC ICU INTERMEDIATE R&B

## 2019-08-17 PROCEDURE — 83735 ASSAY OF MAGNESIUM: CPT

## 2019-08-17 PROCEDURE — 6370000000 HC RX 637 (ALT 250 FOR IP): Performed by: STUDENT IN AN ORGANIZED HEALTH CARE EDUCATION/TRAINING PROGRAM

## 2019-08-17 PROCEDURE — 80069 RENAL FUNCTION PANEL: CPT

## 2019-08-17 PROCEDURE — 6360000002 HC RX W HCPCS: Performed by: INTERNAL MEDICINE

## 2019-08-17 PROCEDURE — 85025 COMPLETE CBC W/AUTO DIFF WBC: CPT

## 2019-08-17 PROCEDURE — 2580000003 HC RX 258: Performed by: STUDENT IN AN ORGANIZED HEALTH CARE EDUCATION/TRAINING PROGRAM

## 2019-08-17 PROCEDURE — 2580000003 HC RX 258: Performed by: INTERNAL MEDICINE

## 2019-08-17 PROCEDURE — 94640 AIRWAY INHALATION TREATMENT: CPT

## 2019-08-17 PROCEDURE — 93010 ELECTROCARDIOGRAM REPORT: CPT | Performed by: INTERNAL MEDICINE

## 2019-08-17 PROCEDURE — C9113 INJ PANTOPRAZOLE SODIUM, VIA: HCPCS | Performed by: STUDENT IN AN ORGANIZED HEALTH CARE EDUCATION/TRAINING PROGRAM

## 2019-08-17 PROCEDURE — 94761 N-INVAS EAR/PLS OXIMETRY MLT: CPT

## 2019-08-17 PROCEDURE — 93005 ELECTROCARDIOGRAM TRACING: CPT | Performed by: STUDENT IN AN ORGANIZED HEALTH CARE EDUCATION/TRAINING PROGRAM

## 2019-08-17 PROCEDURE — 6370000000 HC RX 637 (ALT 250 FOR IP): Performed by: INTERNAL MEDICINE

## 2019-08-17 RX ORDER — HALOPERIDOL 1 MG/1
2 TABLET ORAL 2 TIMES DAILY
Status: DISCONTINUED | OUTPATIENT
Start: 2019-08-17 | End: 2019-08-17

## 2019-08-17 RX ORDER — HALOPERIDOL 5 MG/ML
2 INJECTION INTRAMUSCULAR 2 TIMES DAILY
Status: DISCONTINUED | OUTPATIENT
Start: 2019-08-17 | End: 2019-08-19

## 2019-08-17 RX ADMIN — METOPROLOL TARTRATE 25 MG: 25 TABLET ORAL at 20:35

## 2019-08-17 RX ADMIN — METOPROLOL TARTRATE 25 MG: 25 TABLET ORAL at 08:58

## 2019-08-17 RX ADMIN — LEVALBUTEROL HYDROCHLORIDE 1.25 MG: 1.25 SOLUTION, CONCENTRATE RESPIRATORY (INHALATION) at 20:58

## 2019-08-17 RX ADMIN — ASPIRIN 81 MG 81 MG: 81 TABLET ORAL at 08:58

## 2019-08-17 RX ADMIN — Medication 15 ML: at 20:35

## 2019-08-17 RX ADMIN — LEVALBUTEROL HYDROCHLORIDE 1.25 MG: 1.25 SOLUTION, CONCENTRATE RESPIRATORY (INHALATION) at 09:15

## 2019-08-17 RX ADMIN — HEPARIN SODIUM 5000 UNITS: 5000 INJECTION INTRAVENOUS; SUBCUTANEOUS at 21:33

## 2019-08-17 RX ADMIN — SODIUM CHLORIDE 30 MG/ML INHALATION SOLUTION 15 ML: 30 SOLUTION INHALANT at 09:15

## 2019-08-17 RX ADMIN — ATORVASTATIN CALCIUM 20 MG: 20 TABLET, FILM COATED ORAL at 20:35

## 2019-08-17 RX ADMIN — CASTOR OIL AND BALSAM, PERU: 788; 87 OINTMENT TOPICAL at 20:43

## 2019-08-17 RX ADMIN — HALOPERIDOL LACTATE 2 MG: 5 INJECTION INTRAMUSCULAR at 20:46

## 2019-08-17 RX ADMIN — Medication 10 ML: at 20:35

## 2019-08-17 RX ADMIN — HEPARIN SODIUM 5000 UNITS: 5000 INJECTION INTRAVENOUS; SUBCUTANEOUS at 14:16

## 2019-08-17 RX ADMIN — CASTOR OIL AND BALSAM, PERU: 788; 87 OINTMENT TOPICAL at 09:15

## 2019-08-17 RX ADMIN — PANTOPRAZOLE SODIUM 40 MG: 40 INJECTION, POWDER, LYOPHILIZED, FOR SOLUTION INTRAVENOUS at 08:58

## 2019-08-17 RX ADMIN — HALOPERIDOL 2 MG: 1 TABLET ORAL at 08:58

## 2019-08-17 RX ADMIN — PANTOPRAZOLE SODIUM 40 MG: 40 INJECTION, POWDER, LYOPHILIZED, FOR SOLUTION INTRAVENOUS at 20:41

## 2019-08-17 RX ADMIN — Medication 15 ML: at 08:58

## 2019-08-17 RX ADMIN — POTASSIUM CHLORIDE: 2 INJECTION, SOLUTION, CONCENTRATE INTRAVENOUS at 03:54

## 2019-08-17 RX ADMIN — SODIUM CHLORIDE 30 MG/ML INHALATION SOLUTION 15 ML: 30 SOLUTION INHALANT at 20:58

## 2019-08-17 RX ADMIN — Medication 10 ML: at 10:14

## 2019-08-17 RX ADMIN — LINEZOLID 600 MG: 600 INJECTION, SOLUTION INTRAVENOUS at 21:34

## 2019-08-17 RX ADMIN — HEPARIN SODIUM 5000 UNITS: 5000 INJECTION INTRAVENOUS; SUBCUTANEOUS at 05:24

## 2019-08-17 RX ADMIN — LINEZOLID 600 MG: 600 INJECTION, SOLUTION INTRAVENOUS at 11:09

## 2019-08-17 ASSESSMENT — PAIN SCALES - GENERAL
PAINLEVEL_OUTOF10: 0

## 2019-08-17 ASSESSMENT — ENCOUNTER SYMPTOMS
ABDOMINAL PAIN: 0
SORE THROAT: 0
CONSTIPATION: 0
SHORTNESS OF BREATH: 0
DIARRHEA: 0

## 2019-08-17 NOTE — PROGRESS NOTES
XR CHEST 1 VW   Final Result      Lines and tubes as above. Multifocal airspace disease, similar to the prior study. MRI BRAIN WO CONTRAST   Final Result      1. Overall limited exam due to sessile motion artifact. Repeated imaging sequences with little benefit. No acute intracranial abnormality identified. 2. Ethmoid and sphenoid sinus disease with air-fluid level. Findings may reflect altered mental status and retained secretions. 3. Minimal nonspecific periventricular white matter signal and body on FLAIR imaging suggesting mild chronic small vessel ischemic disease and/or age related degenerative change. XR CHEST PORTABLE   Final Result      1. Multifocal airspace disease consistent with pneumonia as described. This has progressed in the right upper and left lower lobes when compared to the prior exam.  Correlate clinically. CT HEAD WO CONTRAST   Final Result   1. Mild atrophy. 2.  No evidence of an acute intracranial process. XR CHEST PORTABLE   Final Result      New consolidation in the right lower lung compatible with atelectasis or pneumonia. Aspiration is in the differential diagnosis. Prominent interstitial markings in a perihilar distribution again noted. Stable cardiac mediastinal silhouette. Lines and tubes without change. XR CHEST PORTABLE   Final Result   Addendum 1 of 1      Patient: Antonio Devlin  Time Out: 02:22   Exam(s): FILM CXR 1 VIEW        ADDENDUM - Added by William Oliva MD on 7/29/2019 2:22 AM (-07:00)   IMPRESSION:        ET tube terminates 6.1 cm  FROM  the fransico.   ----------------------------------------------------------------------       EXAM:     XR Chest, 1 View       CLINICAL HISTORY:      Reason for exam: intubation. Reason for exam:->intubation. TECHNIQUE:     Frontal view of the chest.       COMPARISON:       Chest x-ray 7/20/19       IMPRESSION:        ET tube terminates 6.1 cm in the fransico.       Final CT CHEST WO CONTRAST   Final Result      1. Small bilateral pleural effusions. Multifocal pneumonia in the right upper and bilateral lower lobes. 2. 13 mm right upper lobe spiculated nodule suspicious for malignancy. Recommend PET/CT or biopsy. Additional nonspecific areas of nodular consolidation in the right lower lobe. Qzxv      XR CHEST PORTABLE   Final Result   1. Persistent but improved central pulmonary vascular congestion with    diffusely increased interstitial markings still present. 2.  Mildly decreased right greater than left pleural effusions. 3.  Right greater than left basilar atelectasis versus consolidation. 4.  No pneumothorax radiographically evident. 5.  Tubes/lines as above. XR CHEST PORTABLE   Final Result      1. Persistent hazy perihilar, groundglass basilar consolidations and pleural effusions greater in the right lung   2. NG tube in place as well as a right IJ central venous catheter with the tip in the mid to distal SVC, no worsening               XR CHEST PORTABLE   Final Result      Introduction of NG tube with tip excluded from the inferior edge of the film. Moderate bilateral effusions and diffuse groundglass opacities consistent with edema, infection or aspiration. This appears worse since the previous study            XR ABDOMEN (KUB) (SINGLE AP VIEW)   Final Result      Nasogastric tube extending into the left upper quadrant. XR CHEST PORTABLE   Final Result      Persistent right basilar infiltrate. XR CHEST 1 VW   Final Result      Tip of the right IJ central venous catheter overlies lower SVC with no pneumothorax evident. CT HEAD WO CONTRAST   Final Result      1. No acute intracranial process.          XR CHEST PORTABLE   Final Result      Interval development of bilateral lower lobe opacities may relate to edema or pneumonia      CT SOFT TISSUE NECK WO CONTRAST   Final Result   Unremarkable CT neck          XR CHEST PORTABLE Final Result     Normal chest x-ray. CT ABDOMEN PELVIS WO CONTRAST Additional Contrast? None   Final Result   No acute abnormality demonstrated in the abdomen or pelvis. ASSESSMENT AND PLAN:     Aspiration Pneumonia-Admitted for severe sepsis requiring pressor support. Patient underwent broch as well. Cultures are positive for MRSA, Klebseila on BAL. Initially started on Vanc/merrem. Now switched to Zyvox for MRSA coverage.   -Continue Zyvox IV on 600mg Q12 hours. ^ weeks indicated IV with repeat CT scan in 6 weeks.   -No ID coverage at this point. If long term antibiotics needed may be beneficial for follow up and the risk of recurrence. Acute hypoxic respiratory failure-Required brief period of intubation. (Resolved)  -Followed by pulm. -recommend continue Hypertonic saline and Xopenex treatments.   -Saturating 94 percent on room air. AVIVA secondary to ATN (Resolved)  -nephro signed off    Septic Shock-(resolved)    Acute encephalopathy secondary to alcohol abuse  -No requirement for CIWA at this time. He is s/p ativan drip for withdrawals.   -On haldol per psych recs 2mg BID. Dysphagia-PEG tube placed with failure to swallow  -Tube feeds at goal.     Severe Protein Malnutrition  -Dietician following for Tube feeds    Hypokalemia (Resolved)    Erosive Esophagitis   -IV protonix  -H and H stable    Will discuss with attending physician Dr. Mackenzie Serra    Code Status: FULL (PC following)  FEN: Tube feeds Jevity 1.2  PPX: Heparin  DISPO: PCU to SNF on Rachana Navarro MD, PGY-3  [employer]  8/17/2019,  6:27 AM    I have personally seen and evaluated this patient.  I discussed findings and management with the resident, on 08/17/19  and agree as documented in this note     Yeison See [facility]  Attending Physician

## 2019-08-18 LAB
ALBUMIN SERPL-MCNC: 3.1 G/DL (ref 3.4–5)
ANION GAP SERPL CALCULATED.3IONS-SCNC: 13 MMOL/L (ref 3–16)
BASOPHILS ABSOLUTE: 0.1 K/UL (ref 0–0.2)
BASOPHILS RELATIVE PERCENT: 1.5 %
BUN BLDV-MCNC: 11 MG/DL (ref 7–20)
CALCIUM SERPL-MCNC: 8.8 MG/DL (ref 8.3–10.6)
CHLORIDE BLD-SCNC: 108 MMOL/L (ref 99–110)
CO2: 22 MMOL/L (ref 21–32)
CREAT SERPL-MCNC: 0.9 MG/DL (ref 0.8–1.3)
EOSINOPHILS ABSOLUTE: 0.2 K/UL (ref 0–0.6)
EOSINOPHILS RELATIVE PERCENT: 3.2 %
GFR AFRICAN AMERICAN: >60
GFR NON-AFRICAN AMERICAN: >60
GLUCOSE BLD-MCNC: 103 MG/DL (ref 70–99)
GLUCOSE BLD-MCNC: 109 MG/DL (ref 70–99)
GLUCOSE BLD-MCNC: 112 MG/DL (ref 70–99)
GLUCOSE BLD-MCNC: 114 MG/DL (ref 70–99)
GLUCOSE BLD-MCNC: 127 MG/DL (ref 70–99)
HCT VFR BLD CALC: 27 % (ref 40.5–52.5)
HEMOGLOBIN: 9 G/DL (ref 13.5–17.5)
LYMPHOCYTES ABSOLUTE: 1.2 K/UL (ref 1–5.1)
LYMPHOCYTES RELATIVE PERCENT: 15.6 %
MAGNESIUM: 2.1 MG/DL (ref 1.8–2.4)
MCH RBC QN AUTO: 31.6 PG (ref 26–34)
MCHC RBC AUTO-ENTMCNC: 33.1 G/DL (ref 31–36)
MCV RBC AUTO: 95.5 FL (ref 80–100)
MONOCYTES ABSOLUTE: 0.8 K/UL (ref 0–1.3)
MONOCYTES RELATIVE PERCENT: 9.7 %
NEUTROPHILS ABSOLUTE: 5.5 K/UL (ref 1.7–7.7)
NEUTROPHILS RELATIVE PERCENT: 70 %
PDW BLD-RTO: 14.1 % (ref 12.4–15.4)
PERFORMED ON: ABNORMAL
PHOSPHORUS: 3.2 MG/DL (ref 2.5–4.9)
PLATELET # BLD: 313 K/UL (ref 135–450)
PMV BLD AUTO: 7.9 FL (ref 5–10.5)
POTASSIUM SERPL-SCNC: 3.9 MMOL/L (ref 3.5–5.1)
RBC # BLD: 2.83 M/UL (ref 4.2–5.9)
SODIUM BLD-SCNC: 143 MMOL/L (ref 136–145)
VITAMIN B1 WHOLE BLOOD: 99 NMOL/L (ref 70–180)
WBC # BLD: 7.8 K/UL (ref 4–11)

## 2019-08-18 PROCEDURE — 6360000002 HC RX W HCPCS: Performed by: STUDENT IN AN ORGANIZED HEALTH CARE EDUCATION/TRAINING PROGRAM

## 2019-08-18 PROCEDURE — C9113 INJ PANTOPRAZOLE SODIUM, VIA: HCPCS | Performed by: STUDENT IN AN ORGANIZED HEALTH CARE EDUCATION/TRAINING PROGRAM

## 2019-08-18 PROCEDURE — 85025 COMPLETE CBC W/AUTO DIFF WBC: CPT

## 2019-08-18 PROCEDURE — 80069 RENAL FUNCTION PANEL: CPT

## 2019-08-18 PROCEDURE — 2580000003 HC RX 258: Performed by: STUDENT IN AN ORGANIZED HEALTH CARE EDUCATION/TRAINING PROGRAM

## 2019-08-18 PROCEDURE — 6370000000 HC RX 637 (ALT 250 FOR IP): Performed by: STUDENT IN AN ORGANIZED HEALTH CARE EDUCATION/TRAINING PROGRAM

## 2019-08-18 PROCEDURE — 94640 AIRWAY INHALATION TREATMENT: CPT

## 2019-08-18 PROCEDURE — 83735 ASSAY OF MAGNESIUM: CPT

## 2019-08-18 PROCEDURE — 97535 SELF CARE MNGMENT TRAINING: CPT

## 2019-08-18 PROCEDURE — 2060000000 HC ICU INTERMEDIATE R&B

## 2019-08-18 PROCEDURE — 6370000000 HC RX 637 (ALT 250 FOR IP): Performed by: INTERNAL MEDICINE

## 2019-08-18 PROCEDURE — 97530 THERAPEUTIC ACTIVITIES: CPT

## 2019-08-18 PROCEDURE — 97116 GAIT TRAINING THERAPY: CPT

## 2019-08-18 PROCEDURE — 6360000002 HC RX W HCPCS: Performed by: INTERNAL MEDICINE

## 2019-08-18 RX ORDER — PANTOPRAZOLE SODIUM 40 MG/10ML
40 INJECTION, POWDER, LYOPHILIZED, FOR SOLUTION INTRAVENOUS DAILY
Status: DISCONTINUED | OUTPATIENT
Start: 2019-08-19 | End: 2019-08-20 | Stop reason: ALTCHOICE

## 2019-08-18 RX ORDER — LINEZOLID 600 MG/1
600 TABLET, FILM COATED ORAL EVERY 12 HOURS SCHEDULED
Status: DISCONTINUED | OUTPATIENT
Start: 2019-08-18 | End: 2019-08-20 | Stop reason: HOSPADM

## 2019-08-18 RX ORDER — PANTOPRAZOLE SODIUM 40 MG/1
40 TABLET, DELAYED RELEASE ORAL
Status: DISCONTINUED | OUTPATIENT
Start: 2019-08-19 | End: 2019-08-18

## 2019-08-18 RX ADMIN — Medication 10 ML: at 20:22

## 2019-08-18 RX ADMIN — HEPARIN SODIUM 5000 UNITS: 5000 INJECTION INTRAVENOUS; SUBCUTANEOUS at 20:21

## 2019-08-18 RX ADMIN — METOPROLOL TARTRATE 25 MG: 25 TABLET ORAL at 20:21

## 2019-08-18 RX ADMIN — SODIUM CHLORIDE 30 MG/ML INHALATION SOLUTION 15 ML: 30 SOLUTION INHALANT at 07:33

## 2019-08-18 RX ADMIN — Medication 10 ML: at 09:21

## 2019-08-18 RX ADMIN — CASTOR OIL AND BALSAM, PERU: 788; 87 OINTMENT TOPICAL at 09:20

## 2019-08-18 RX ADMIN — ASPIRIN 81 MG 81 MG: 81 TABLET ORAL at 08:48

## 2019-08-18 RX ADMIN — HEPARIN SODIUM 5000 UNITS: 5000 INJECTION INTRAVENOUS; SUBCUTANEOUS at 14:31

## 2019-08-18 RX ADMIN — PANTOPRAZOLE SODIUM 40 MG: 40 INJECTION, POWDER, LYOPHILIZED, FOR SOLUTION INTRAVENOUS at 08:48

## 2019-08-18 RX ADMIN — LINEZOLID 600 MG: 600 INJECTION, SOLUTION INTRAVENOUS at 10:51

## 2019-08-18 RX ADMIN — SODIUM CHLORIDE 30 MG/ML INHALATION SOLUTION 15 ML: 30 SOLUTION INHALANT at 21:52

## 2019-08-18 RX ADMIN — LINEZOLID 600 MG: 600 TABLET, FILM COATED ORAL at 20:23

## 2019-08-18 RX ADMIN — ATORVASTATIN CALCIUM 20 MG: 20 TABLET, FILM COATED ORAL at 20:21

## 2019-08-18 RX ADMIN — HALOPERIDOL LACTATE 5 MG: 5 INJECTION INTRAMUSCULAR at 15:51

## 2019-08-18 RX ADMIN — METOPROLOL TARTRATE 25 MG: 25 TABLET ORAL at 08:48

## 2019-08-18 RX ADMIN — CASTOR OIL AND BALSAM, PERU: 788; 87 OINTMENT TOPICAL at 20:22

## 2019-08-18 RX ADMIN — LEVALBUTEROL HYDROCHLORIDE 1.25 MG: 1.25 SOLUTION, CONCENTRATE RESPIRATORY (INHALATION) at 21:52

## 2019-08-18 RX ADMIN — HEPARIN SODIUM 5000 UNITS: 5000 INJECTION INTRAVENOUS; SUBCUTANEOUS at 05:12

## 2019-08-18 RX ADMIN — Medication 15 ML: at 08:48

## 2019-08-18 RX ADMIN — LEVALBUTEROL HYDROCHLORIDE 1.25 MG: 1.25 SOLUTION, CONCENTRATE RESPIRATORY (INHALATION) at 07:33

## 2019-08-18 RX ADMIN — HALOPERIDOL LACTATE 2 MG: 5 INJECTION INTRAMUSCULAR at 20:37

## 2019-08-18 RX ADMIN — HALOPERIDOL LACTATE 2 MG: 5 INJECTION INTRAMUSCULAR at 05:28

## 2019-08-18 ASSESSMENT — ENCOUNTER SYMPTOMS
EYES NEGATIVE: 1
GASTROINTESTINAL NEGATIVE: 1
COUGH: 1

## 2019-08-18 ASSESSMENT — PAIN SCALES - GENERAL
PAINLEVEL_OUTOF10: 0

## 2019-08-18 NOTE — PROGRESS NOTES
evaluation of the chest, including multifocal airspace disease. CT ABDOMEN PELVIS W IV CONTRAST Additional Contrast? None   Final Result   1. Interval development of patchy infiltrates and patchy areas of consolidation within the lower lobes. Correlate for pneumonia. 2. Uncomplicated sigmoid colon diverticulosis. 3. Stable aortic aneurysm. 4. Stable T12 compression fracture. XR CHEST 1 VW   Final Result      Lines and tubes as above. Multifocal airspace disease, similar to the prior study. MRI BRAIN WO CONTRAST   Final Result      1. Overall limited exam due to sessile motion artifact. Repeated imaging sequences with little benefit. No acute intracranial abnormality identified. 2. Ethmoid and sphenoid sinus disease with air-fluid level. Findings may reflect altered mental status and retained secretions. 3. Minimal nonspecific periventricular white matter signal and body on FLAIR imaging suggesting mild chronic small vessel ischemic disease and/or age related degenerative change. XR CHEST PORTABLE   Final Result      1. Multifocal airspace disease consistent with pneumonia as described. This has progressed in the right upper and left lower lobes when compared to the prior exam.  Correlate clinically. CT HEAD WO CONTRAST   Final Result   1. Mild atrophy. 2.  No evidence of an acute intracranial process. XR CHEST PORTABLE   Final Result      New consolidation in the right lower lung compatible with atelectasis or pneumonia. Aspiration is in the differential diagnosis. Prominent interstitial markings in a perihilar distribution again noted. Stable cardiac mediastinal silhouette. Lines and tubes without change.       XR CHEST PORTABLE   Final Result   Addendum 1 of 1      Patient: Sheridan Jefferson  Time Out: 02:22   Exam(s): FILM CXR 1 VIEW        ADDENDUM - Added by Andrae William MD on 7/29/2019 2:22 AM (-07:00)   IMPRESSION:        ET tube Haldol 8/19  - Psychology consulted, reccs appreciated      Severe Protein Calorie Malnutrition, Dysphagia  Failed swallow study 8/10, 8/12. PEG tube placed 8/15. Per Nutrition: Patient has severe malnutrition. Please monitory K+/Phos/Mg to determine ability to advance feeding.     -Continue to increase PEG tube feeds to goal.     Hypokalemia; 2.7->3.3-> resolved  -- AM RFP     Erosive Esophagitis  EGD 7/30: Grade C erosive esophagitis to 26cms.  - Continue IV Protonix  - Continue to monitor H/H     Pulmonary Nodules;  1.2cm lesion in RUL appears to be spiculated. -Can f/u on an outpatient basis. -Recommended followup in  6 Months with imaging.     Chronic/Resolved Issues    AVIVA; resolved; Cr stable  Per nephrology, likely ATN.    Asymptomatic AAA; 3.4 cm in 2015  -ctm     EtOH Abuse  -S/P CIWA     SW:  -[facility] Admissions staff came in to see pt today. Taj Mariee state they can start pre-cert on Monday 8/57/56 after Pt has had updated PT/OT notes, Psych clearance note, decreased PRN Haldol and no restraints.  -Palliative care consult placed. Will likely need to have conversation with medical decision maker (son), for future plan of care. Code Status: full  FEN: yube feeds  PPX: sqh  DISPO: ip    Leona Giraldo MD, PGY-1  08/18/19  6:36 AM    This patient has been staffed and discussed with Summer Call MD.     I have personally seen and evaluated this patient. I discussed findings and management with the resident, on 08/18/19  and agree as documented in this note     D/c Central line. Changed all IV meds to oral. Agitated this am but calm with family arround. Goals of care were discussed and changed to [facility] (see ACP note).     [facility]  Attending Physician

## 2019-08-18 NOTE — PROGRESS NOTES
RESPIRATORY THERAPY ASSESSMENT    Name:  [facility] Record Number:  [id number]  Age: 70 y.o. Gender: male  : 1950  Today's Date:  2019  Room:  [address] [place] [address]-    Assessment     Patient Admission Diagnosis: AVIVA and ETOH      Allergies  No Known Allergies      Pulmonary History:Current smoker  Home Oxygen Therapy:  room air  Home Respiratory Therapy:None   Current Respiratory Therapy:  Xopenex and 3%  Treatment Type: HHN  Medications: Ipratropium Bromide, Sodium Chloride    Respiratory Severity Index(RSI)   Patients with orders for inhalation medications, oxygen, or any therapeutic treatment modality will be placed on Respiratory Protocol. They will be assessed with the first treatment and at least every 72 hours thereafter. The following severity scale will be used to determine frequency of treatment intervention. Smoking History: Pulmonary Disease or Smoking History, Greater than 15 pack year = 2    Social History  Social History     Tobacco Use    Smoking status: Current Every Day Smoker    Smokeless tobacco: Never Used   Substance Use Topics    Alcohol use: Yes    Drug use: Never       Recent Surgical History: None = 0  Past Surgical History  Past Surgical History:   Procedure Laterality Date    CERVICAL LAMINECTOMY      GASTROSTOMY TUBE PLACEMENT N/A 2019    EGD PEG TUBE PLACEMENT performed by Ashley Dumont MD at [address] [place] [address] [address]kw[address]      left, reattached tendons    UPPER GASTROINTESTINAL ENDOSCOPY N/A 2019    EGD ESOPHAGOGASTRODUODENOSCOPY performed by Tonia Rmoo MD at [facility] ENDOSCOPY       Level of Consciousness: Alert, Follows Commands but Disoriented = 1    Level of Activity: Non weight bearing- transfers bed to chair only = 3    Respiratory Pattern: Regular Pattern; RR 8-20 = 0    Breath Sounds: Clear = 0    Sputum  Sputum Color: White, Tenacity:  Thick, Sputum How Obtained: Spontaneous cough  Cough: Strong, productive = 1    Vital Signs

## 2019-08-19 LAB
ANION GAP SERPL CALCULATED.3IONS-SCNC: 10 MMOL/L (ref 3–16)
BUN BLDV-MCNC: 14 MG/DL (ref 7–20)
CALCIUM SERPL-MCNC: 9.1 MG/DL (ref 8.3–10.6)
CHLORIDE BLD-SCNC: 109 MMOL/L (ref 99–110)
CO2: 24 MMOL/L (ref 21–32)
CREAT SERPL-MCNC: 0.9 MG/DL (ref 0.8–1.3)
GFR AFRICAN AMERICAN: >60
GFR NON-AFRICAN AMERICAN: >60
GLUCOSE BLD-MCNC: 117 MG/DL (ref 70–99)
GLUCOSE BLD-MCNC: 121 MG/DL (ref 70–99)
GLUCOSE BLD-MCNC: 123 MG/DL (ref 70–99)
MAGNESIUM: 2 MG/DL (ref 1.8–2.4)
PERFORMED ON: ABNORMAL
PERFORMED ON: ABNORMAL
POTASSIUM REFLEX MAGNESIUM: 3.4 MMOL/L (ref 3.5–5.1)
SODIUM BLD-SCNC: 143 MMOL/L (ref 136–145)

## 2019-08-19 PROCEDURE — 6360000002 HC RX W HCPCS: Performed by: STUDENT IN AN ORGANIZED HEALTH CARE EDUCATION/TRAINING PROGRAM

## 2019-08-19 PROCEDURE — 6370000000 HC RX 637 (ALT 250 FOR IP): Performed by: STUDENT IN AN ORGANIZED HEALTH CARE EDUCATION/TRAINING PROGRAM

## 2019-08-19 PROCEDURE — 83735 ASSAY OF MAGNESIUM: CPT

## 2019-08-19 PROCEDURE — 6370000000 HC RX 637 (ALT 250 FOR IP): Performed by: INTERNAL MEDICINE

## 2019-08-19 PROCEDURE — 2060000000 HC ICU INTERMEDIATE R&B

## 2019-08-19 PROCEDURE — 92526 ORAL FUNCTION THERAPY: CPT

## 2019-08-19 PROCEDURE — 2580000003 HC RX 258: Performed by: STUDENT IN AN ORGANIZED HEALTH CARE EDUCATION/TRAINING PROGRAM

## 2019-08-19 PROCEDURE — 80048 BASIC METABOLIC PNL TOTAL CA: CPT

## 2019-08-19 PROCEDURE — 6360000002 HC RX W HCPCS: Performed by: INTERNAL MEDICINE

## 2019-08-19 PROCEDURE — C9113 INJ PANTOPRAZOLE SODIUM, VIA: HCPCS | Performed by: STUDENT IN AN ORGANIZED HEALTH CARE EDUCATION/TRAINING PROGRAM

## 2019-08-19 PROCEDURE — 94640 AIRWAY INHALATION TREATMENT: CPT

## 2019-08-19 RX ORDER — THIAMINE MONONITRATE (VIT B1) 100 MG
100 TABLET ORAL DAILY
Status: DISCONTINUED | OUTPATIENT
Start: 2019-08-19 | End: 2019-08-20 | Stop reason: HOSPADM

## 2019-08-19 RX ORDER — MULTIVIT-MIN/FERROUS GLUCONATE 9 MG/15 ML
15 LIQUID (ML) ORAL DAILY
Status: DISCONTINUED | OUTPATIENT
Start: 2019-08-19 | End: 2019-08-20 | Stop reason: HOSPADM

## 2019-08-19 RX ORDER — POTASSIUM CHLORIDE 29.8 MG/ML
20 INJECTION INTRAVENOUS
Status: COMPLETED | OUTPATIENT
Start: 2019-08-19 | End: 2019-08-19

## 2019-08-19 RX ORDER — HALOPERIDOL 5 MG
2.5 TABLET ORAL 3 TIMES DAILY
Status: DISCONTINUED | OUTPATIENT
Start: 2019-08-19 | End: 2019-08-20 | Stop reason: HOSPADM

## 2019-08-19 RX ORDER — FOLIC ACID 1 MG/1
1 TABLET ORAL DAILY
Status: DISCONTINUED | OUTPATIENT
Start: 2019-08-19 | End: 2019-08-20 | Stop reason: HOSPADM

## 2019-08-19 RX ADMIN — LINEZOLID 600 MG: 600 TABLET, FILM COATED ORAL at 20:45

## 2019-08-19 RX ADMIN — Medication 10 ML: at 09:20

## 2019-08-19 RX ADMIN — SODIUM CHLORIDE 30 MG/ML INHALATION SOLUTION 15 ML: 30 SOLUTION INHALANT at 20:11

## 2019-08-19 RX ADMIN — METOPROLOL TARTRATE 25 MG: 25 TABLET ORAL at 20:45

## 2019-08-19 RX ADMIN — CASTOR OIL AND BALSAM, PERU: 788; 87 OINTMENT TOPICAL at 09:20

## 2019-08-19 RX ADMIN — Medication 10 ML: at 20:45

## 2019-08-19 RX ADMIN — LEVALBUTEROL HYDROCHLORIDE 1.25 MG: 1.25 SOLUTION, CONCENTRATE RESPIRATORY (INHALATION) at 20:10

## 2019-08-19 RX ADMIN — HALOPERIDOL 2.5 MG: 5 TABLET ORAL at 13:55

## 2019-08-19 RX ADMIN — METOPROLOL TARTRATE 25 MG: 25 TABLET ORAL at 09:20

## 2019-08-19 RX ADMIN — HEPARIN SODIUM 5000 UNITS: 5000 INJECTION INTRAVENOUS; SUBCUTANEOUS at 05:20

## 2019-08-19 RX ADMIN — HALOPERIDOL 2.5 MG: 5 TABLET ORAL at 20:45

## 2019-08-19 RX ADMIN — Medication 15 ML: at 20:45

## 2019-08-19 RX ADMIN — HALOPERIDOL 2.5 MG: 5 TABLET ORAL at 09:20

## 2019-08-19 RX ADMIN — MULTIVITAMIN 15 ML: LIQUID ORAL at 17:26

## 2019-08-19 RX ADMIN — POTASSIUM CHLORIDE 20 MEQ: 29.8 INJECTION, SOLUTION INTRAVENOUS at 17:27

## 2019-08-19 RX ADMIN — Medication 100 MG: at 12:52

## 2019-08-19 RX ADMIN — SODIUM CHLORIDE 30 MG/ML INHALATION SOLUTION 15 ML: 30 SOLUTION INHALANT at 10:10

## 2019-08-19 RX ADMIN — CASTOR OIL AND BALSAM, PERU: 788; 87 OINTMENT TOPICAL at 20:47

## 2019-08-19 RX ADMIN — HEPARIN SODIUM 5000 UNITS: 5000 INJECTION INTRAVENOUS; SUBCUTANEOUS at 13:55

## 2019-08-19 RX ADMIN — ATORVASTATIN CALCIUM 20 MG: 20 TABLET, FILM COATED ORAL at 20:45

## 2019-08-19 RX ADMIN — LINEZOLID 600 MG: 600 TABLET, FILM COATED ORAL at 09:20

## 2019-08-19 RX ADMIN — PANTOPRAZOLE SODIUM 40 MG: 40 INJECTION, POWDER, LYOPHILIZED, FOR SOLUTION INTRAVENOUS at 09:20

## 2019-08-19 RX ADMIN — ASPIRIN 81 MG 81 MG: 81 TABLET ORAL at 09:20

## 2019-08-19 RX ADMIN — POTASSIUM CHLORIDE 20 MEQ: 29.8 INJECTION, SOLUTION INTRAVENOUS at 21:04

## 2019-08-19 RX ADMIN — HEPARIN SODIUM 5000 UNITS: 5000 INJECTION INTRAVENOUS; SUBCUTANEOUS at 23:11

## 2019-08-19 RX ADMIN — LEVALBUTEROL HYDROCHLORIDE 1.25 MG: 1.25 SOLUTION, CONCENTRATE RESPIRATORY (INHALATION) at 10:10

## 2019-08-19 RX ADMIN — FOLIC ACID 1 MG: 1 TABLET ORAL at 12:52

## 2019-08-19 ASSESSMENT — PAIN SCALES - PAIN ASSESSMENT IN ADVANCED DEMENTIA (PAINAD)
BREATHING: 0
BODYLANGUAGE: 0
FACIALEXPRESSION: 0
BODYLANGUAGE: 0
NEGVOCALIZATION: 0
TOTALSCORE: 0
FACIALEXPRESSION: 0
TOTALSCORE: 0
BODYLANGUAGE: 0
TOTALSCORE: 0
BREATHING: 0
TOTALSCORE: 0
CONSOLABILITY: 0
CONSOLABILITY: 0
NEGVOCALIZATION: 0
TOTALSCORE: 0
BREATHING: 0
BREATHING: 0
FACIALEXPRESSION: 0
NEGVOCALIZATION: 0
BREATHING: 0
TOTALSCORE: 0
CONSOLABILITY: 0
TOTALSCORE: 0
BODYLANGUAGE: 0
CONSOLABILITY: 0
BREATHING: 0
CONSOLABILITY: 0
CONSOLABILITY: 0
BREATHING: 0
CONSOLABILITY: 0
FACIALEXPRESSION: 0
NEGVOCALIZATION: 0
BODYLANGUAGE: 0
BODYLANGUAGE: 0
FACIALEXPRESSION: 0
FACIALEXPRESSION: 0
BODYLANGUAGE: 0
FACIALEXPRESSION: 0
NEGVOCALIZATION: 0

## 2019-08-19 ASSESSMENT — ENCOUNTER SYMPTOMS
EYES NEGATIVE: 1
RHINORRHEA: 1
COUGH: 1
GASTROINTESTINAL NEGATIVE: 1

## 2019-08-19 ASSESSMENT — PAIN SCALES - GENERAL
PAINLEVEL_OUTOF10: 0

## 2019-08-19 NOTE — PROGRESS NOTES
Palliative Care Chart Review  and Check in Note:     NAME:  Mina Bain Date: 7/22/2019  Hospital Day:  Hospital Day: 29   Current Code status: DNR-CCA    Palliative care is continuing to following Mr. Ann Lemus for symptom management,  and goals of care discussion as needed. Patient's chart reviewed today 8/19/19. The following are the currently established goals/code status, and Symptom management. Goals of care: Patient sitting up in the bed, confused. He continues on tube feeding and free water and still didn't do well with speech therapy today. PC talked with patient's son on Friday and he and his brother were coming to hospital over the weekend and they did change his code status to a DNR/CCA. If patient doesnt improve would definitely recommend a admission to hospice at a NH. Code status: DNR/CCA. As a DNR/CCA, this patient will continue to receive standard medical care until the time he or she experiences a cardiac or respiratory arrest.  In the event of respiratory distress, he/she would be okay with intubation prior to cardiac/pulmonary arrest occurring. Discharge plan: to a SNF, would benefit form outpatient palliative care services if family is not opting for hospice at this time.       Nuris Osman, APRN/CNP  Palliative Care  [phone]

## 2019-08-19 NOTE — PROGRESS NOTES
Speech Language Pathology  Facility/Department: [address] ICU  Dysphagia Daily Treatment Note    NAME: Zackery Castillo  : 1950  MRN: [id number]    Patient Diagnosis(es):   Patient Active Problem List    Diagnosis Date Noted    Weakness     Disorientation     Severe protein-calorie malnutrition ([address]     SOB (shortness of breath)     DNR (do not resuscitate) discussion     Encounter for palliative care     Acute respiratory failure with hypoxia ([address]     Diffuse pneumonia     Mucus plugging of bronchi     Hypoxemia 2019    Pulmonary edema cardiac cause ([address] 2019    Acute encephalopathy 2019    Alcohol withdrawal syndrome, with delirium ([address] 2019    Narcotic dependence ([address] 2019    AVIVA (acute kidney injury) ([address]     Acute kidney failure ([address] 2019    Spondylitis, cervical ([address] 2010    DDD (degenerative disc disease), cervical 2010    Myofascial pain syndrome 2010    HTN (hypertension) 2010    Lumbar radiculopathy 2010    BPH (benign prostatic hyperplasia) 2010    DJD (degenerative joint disease), cervical 2010    Allergic rhinitis, seasonal 2010    Hyperlipemia 2010    GERD (gastroesophageal reflux disease) 2010     Allergies: No Known Allergies    Recent CT of Chest: (19)      Impression       1.  No pulmonary embolism. 2.  New 3 cm cavitary lesion in the right upper lobe, possibly pulmonary    abscess given presence of multifocal patchy opacities in the most recent    exam.   3.  Overall decreased but persistent multifocal opacities and nodules,    possibly infectious or inflammatory in etiology.  The 12 mm lesion in the    right upper lobe appears somewhat spiculated.  Recommend short-term    follow-up in 3 months to ensure resolution. 4.  Previously seen pleural effusions have resolved.    5.  Prominent mildly dilated loops of small bowel in the upper abdomen,    partially

## 2019-08-19 NOTE — PROGRESS NOTES
20 95 % --   08/18/19 1452 (!) 151/89 96.6 °F (35.9 °C) Oral 118 26 96 % --       Intake/Output Summary (Last 24 hours) at 8/19/2019 0952  Last data filed at 8/18/2019 2350  Gross per 24 hour   Intake 1121 ml   Output 900 ml   Net 221 ml       Review of Systems   Constitutional: Negative. HENT: Positive for rhinorrhea. Eyes: Negative. Respiratory: Positive for cough. Cardiovascular: Negative. Gastrointestinal: Negative. Endocrine: Negative. Genitourinary: Negative. Musculoskeletal: Negative. Skin: Negative. Physical Exam   Constitutional:   Cachectic appearing   Eyes: Conjunctivae are normal.   Neck: Neck supple. Cardiovascular: Regular rhythm. Tachycardic but regular. Pulmonary/Chest: Effort normal. He has rales. Diffuse rales. Worse in upper lobes. Moderate aeration. Abdominal: Soft. Neurological: He is alert. Oreinted to person. Had to be reminded he was in the hospital.       LABS:    CBC:   Recent Labs     08/17/19  0635 08/18/19  0500   WBC 6.6 7.8   HGB 8.5* 9.0*   HCT 25.2* 27.0*    313   MCV 94.7 95.5     Renal:    Recent Labs     08/17/19  0635 08/18/19  0500 08/19/19  0431    143 143   K 3.5 3.9 3.4*    108 109   CO2 23 22 24   BUN 11 11 14   CREATININE 0.9 0.9 0.9   GLUCOSE 110* 114* 123*   CALCIUM 8.4 8.8 9.1   MG 2.20 2.10 2.00   PHOS 3.6 3.2  --    ANIONGAP 12 13 10     Hepatic:   Recent Labs     08/17/19  0635 08/18/19  0500   LABALBU 2.7* 3.1*     Troponin: No results for input(s): TROPONINI in the last 72 hours. BNP: No results for input(s): BNP in the last 72 hours. Lipids: No results for input(s): CHOL, HDL in the last 72 hours. Invalid input(s): LDLCALCU, TRIGLYCERIDE  ABGs:  No results for input(s): PHART, RGT6FYJ, PO2ART, EBV7DLW, BEART, THGBART, W8CINNBT, CAE5EQU in the last 72 hours. INR: No results for input(s): INR in the last 72 hours.   Lactate: No results for input(s): LACTATE in the last 72 hours.  Cultures:  -----------------------------------------------------------------  RAD:   FL MODIFIED BARIUM SWALLOW W VIDEO   Final Result   1. Severe oral and pharyngeal dysphagia with aspiration of all consistencies. CT CHEST PULMONARY EMBOLISM W CONTRAST   Final Result       1. No pulmonary embolism. 2.  New 3 cm cavitary lesion in the right upper lobe, possibly pulmonary    abscess given presence of multifocal patchy opacities in the most recent    exam.   3.  Overall decreased but persistent multifocal opacities and nodules,    possibly infectious or inflammatory in etiology. The 12 mm lesion in the    right upper lobe appears somewhat spiculated. Recommend short-term    follow-up in 3 months to ensure resolution. 4.  Previously seen pleural effusions have resolved. 5.  Prominent mildly dilated loops of small bowel in the upper abdomen,    partially visualized. Cannot exclude ileus or small bowel obstruction. Consider CT abdomen and pelvis. XR CHEST PORTABLE   Final Result       Unchanged heart size. The lung bases are not completely imaged. However there may be some    atelectasis in the right lung base. ET tube has been removed. Other lines and tubes are unchanged. MRI BRAIN W WO CONTRAST   Final Result      1. No acute intracranial abnormality. No evidence of acute ischemia. 2. Bilateral mastoid air cell effusions, middle ear effusions, and fluid in the nasopharynx and sphenoid sinuses suggesting retained secretions and altered mental status. XR CHEST PORTABLE   Final Result   Impression: Stable evaluation of the chest, including multifocal airspace disease. CT ABDOMEN PELVIS W IV CONTRAST Additional Contrast? None   Final Result   1. Interval development of patchy infiltrates and patchy areas of consolidation within the lower lobes. Correlate for pneumonia. 2. Uncomplicated sigmoid colon diverticulosis. 3. Stable aortic aneurysm.    4. Stable T12 compression fracture. XR CHEST 1 VW   Final Result      Lines and tubes as above. Multifocal airspace disease, similar to the prior study. MRI BRAIN WO CONTRAST   Final Result      1. Overall limited exam due to sessile motion artifact. Repeated imaging sequences with little benefit. No acute intracranial abnormality identified. 2. Ethmoid and sphenoid sinus disease with air-fluid level. Findings may reflect altered mental status and retained secretions. 3. Minimal nonspecific periventricular white matter signal and body on FLAIR imaging suggesting mild chronic small vessel ischemic disease and/or age related degenerative change. XR CHEST PORTABLE   Final Result      1. Multifocal airspace disease consistent with pneumonia as described. This has progressed in the right upper and left lower lobes when compared to the prior exam.  Correlate clinically. CT HEAD WO CONTRAST   Final Result   1. Mild atrophy. 2.  No evidence of an acute intracranial process. XR CHEST PORTABLE   Final Result      New consolidation in the right lower lung compatible with atelectasis or pneumonia. Aspiration is in the differential diagnosis. Prominent interstitial markings in a perihilar distribution again noted. Stable cardiac mediastinal silhouette. Lines and tubes without change. XR CHEST PORTABLE   Final Result   Addendum 1 of 1      Patient: Gibran Altman  Time Out: 02:22   Exam(s): FILM CXR 1 VIEW        ADDENDUM - Added by Lester Harmon MD on 7/29/2019 2:22 AM (-07:00)   IMPRESSION:        ET tube terminates 6.1 cm  FROM  the fransico.   ----------------------------------------------------------------------       EXAM:     XR Chest, 1 View       CLINICAL HISTORY:      Reason for exam: intubation. Reason for exam:->intubation.        TECHNIQUE:     Frontal view of the chest.       COMPARISON:       Chest x-ray 7/20/19       IMPRESSION:        ET tube terminates 6.1 cm in the fransico. Final      CT CHEST WO CONTRAST   Final Result      1. Small bilateral pleural effusions. Multifocal pneumonia in the right upper and bilateral lower lobes. 2. 13 mm right upper lobe spiculated nodule suspicious for malignancy. Recommend PET/CT or biopsy. Additional nonspecific areas of nodular consolidation in the right lower lobe. Qzxv      XR CHEST PORTABLE   Final Result   1. Persistent but improved central pulmonary vascular congestion with    diffusely increased interstitial markings still present. 2.  Mildly decreased right greater than left pleural effusions. 3.  Right greater than left basilar atelectasis versus consolidation. 4.  No pneumothorax radiographically evident. 5.  Tubes/lines as above. XR CHEST PORTABLE   Final Result      1. Persistent hazy perihilar, groundglass basilar consolidations and pleural effusions greater in the right lung   2. NG tube in place as well as a right IJ central venous catheter with the tip in the mid to distal SVC, no worsening               XR CHEST PORTABLE   Final Result      Introduction of NG tube with tip excluded from the inferior edge of the film. Moderate bilateral effusions and diffuse groundglass opacities consistent with edema, infection or aspiration. This appears worse since the previous study            XR ABDOMEN (KUB) (SINGLE AP VIEW)   Final Result      Nasogastric tube extending into the left upper quadrant. XR CHEST PORTABLE   Final Result      Persistent right basilar infiltrate. XR CHEST 1 VW   Final Result      Tip of the right IJ central venous catheter overlies lower SVC with no pneumothorax evident. CT HEAD WO CONTRAST   Final Result      1. No acute intracranial process.          XR CHEST PORTABLE   Final Result      Interval development of bilateral lower lobe opacities may relate to edema or pneumonia      CT SOFT TISSUE NECK WO CONTRAST   Final Result   Unremarkable CT neck

## 2019-08-20 VITALS
SYSTOLIC BLOOD PRESSURE: 136 MMHG | DIASTOLIC BLOOD PRESSURE: 82 MMHG | HEIGHT: 65 IN | TEMPERATURE: 98.7 F | RESPIRATION RATE: 16 BRPM | WEIGHT: 118.61 LBS | OXYGEN SATURATION: 96 % | BODY MASS INDEX: 19.76 KG/M2 | HEART RATE: 99 BPM

## 2019-08-20 LAB
BASOPHILS ABSOLUTE: 0.1 K/UL (ref 0–0.2)
BASOPHILS RELATIVE PERCENT: 1 %
EOSINOPHILS ABSOLUTE: 0.2 K/UL (ref 0–0.6)
EOSINOPHILS RELATIVE PERCENT: 1.8 %
GLUCOSE BLD-MCNC: 117 MG/DL (ref 70–99)
GLUCOSE BLD-MCNC: 125 MG/DL (ref 70–99)
HCT VFR BLD CALC: 25.4 % (ref 40.5–52.5)
HEMOGLOBIN: 8.4 G/DL (ref 13.5–17.5)
LYMPHOCYTES ABSOLUTE: 0.9 K/UL (ref 1–5.1)
LYMPHOCYTES RELATIVE PERCENT: 10.1 %
MCH RBC QN AUTO: 31.5 PG (ref 26–34)
MCHC RBC AUTO-ENTMCNC: 33 G/DL (ref 31–36)
MCV RBC AUTO: 95.5 FL (ref 80–100)
MONOCYTES ABSOLUTE: 0.8 K/UL (ref 0–1.3)
MONOCYTES RELATIVE PERCENT: 9 %
NEUTROPHILS ABSOLUTE: 6.8 K/UL (ref 1.7–7.7)
NEUTROPHILS RELATIVE PERCENT: 78.1 %
PDW BLD-RTO: 14.9 % (ref 12.4–15.4)
PERFORMED ON: ABNORMAL
PERFORMED ON: ABNORMAL
PLATELET # BLD: 299 K/UL (ref 135–450)
PMV BLD AUTO: 7.8 FL (ref 5–10.5)
RBC # BLD: 2.66 M/UL (ref 4.2–5.9)
WBC # BLD: 8.7 K/UL (ref 4–11)

## 2019-08-20 PROCEDURE — 92526 ORAL FUNCTION THERAPY: CPT

## 2019-08-20 PROCEDURE — 6360000002 HC RX W HCPCS: Performed by: STUDENT IN AN ORGANIZED HEALTH CARE EDUCATION/TRAINING PROGRAM

## 2019-08-20 PROCEDURE — 6370000000 HC RX 637 (ALT 250 FOR IP): Performed by: STUDENT IN AN ORGANIZED HEALTH CARE EDUCATION/TRAINING PROGRAM

## 2019-08-20 PROCEDURE — 97530 THERAPEUTIC ACTIVITIES: CPT

## 2019-08-20 PROCEDURE — 97116 GAIT TRAINING THERAPY: CPT

## 2019-08-20 PROCEDURE — 97535 SELF CARE MNGMENT TRAINING: CPT

## 2019-08-20 PROCEDURE — 6360000002 HC RX W HCPCS: Performed by: INTERNAL MEDICINE

## 2019-08-20 PROCEDURE — C9113 INJ PANTOPRAZOLE SODIUM, VIA: HCPCS | Performed by: STUDENT IN AN ORGANIZED HEALTH CARE EDUCATION/TRAINING PROGRAM

## 2019-08-20 PROCEDURE — 6370000000 HC RX 637 (ALT 250 FOR IP): Performed by: INTERNAL MEDICINE

## 2019-08-20 PROCEDURE — 85025 COMPLETE CBC W/AUTO DIFF WBC: CPT

## 2019-08-20 PROCEDURE — 94640 AIRWAY INHALATION TREATMENT: CPT

## 2019-08-20 RX ORDER — LINEZOLID 600 MG/1
600 TABLET, FILM COATED ORAL EVERY 12 HOURS SCHEDULED
Qty: 62 TABLET | Refills: 0 | Status: SHIPPED | OUTPATIENT
Start: 2019-08-20 | End: 2019-08-20

## 2019-08-20 RX ORDER — LINEZOLID 600 MG/1
600 TABLET, FILM COATED ORAL EVERY 12 HOURS SCHEDULED
Qty: 62 TABLET | Refills: 0 | Status: ON HOLD | OUTPATIENT
Start: 2019-08-20 | End: 2019-09-04 | Stop reason: HOSPADM

## 2019-08-20 RX ORDER — LANOLIN ALCOHOL/MO/W.PET/CERES
100 CREAM (GRAM) TOPICAL DAILY
Qty: 30 TABLET | Refills: 3 | Status: SHIPPED | OUTPATIENT
Start: 2019-08-21

## 2019-08-20 RX ORDER — METOPROLOL TARTRATE 50 MG/1
50 TABLET, FILM COATED ORAL 2 TIMES DAILY
Qty: 60 TABLET | Refills: 3 | Status: SHIPPED | OUTPATIENT
Start: 2019-08-20

## 2019-08-20 RX ORDER — LINEZOLID 600 MG/1
600 TABLET, FILM COATED ORAL EVERY 12 HOURS SCHEDULED
Qty: 28 TABLET | Refills: 0 | Status: SHIPPED | OUTPATIENT
Start: 2019-08-20 | End: 2019-08-20

## 2019-08-20 RX ORDER — HALOPERIDOL 0.5 MG/1
2.5 TABLET ORAL 3 TIMES DAILY
Qty: 120 TABLET | Refills: 3 | Status: ON HOLD | OUTPATIENT
Start: 2019-08-20 | End: 2019-09-05 | Stop reason: HOSPADM

## 2019-08-20 RX ORDER — PANTOPRAZOLE SODIUM 40 MG/1
40 GRANULE, DELAYED RELEASE ORAL
Status: DISCONTINUED | OUTPATIENT
Start: 2019-08-21 | End: 2019-08-20 | Stop reason: HOSPADM

## 2019-08-20 RX ORDER — MORPHINE SULFATE 2 MG/ML
2 INJECTION, SOLUTION INTRAMUSCULAR; INTRAVENOUS ONCE
Status: DISCONTINUED | OUTPATIENT
Start: 2019-08-20 | End: 2019-08-20

## 2019-08-20 RX ORDER — ASPIRIN 81 MG/1
81 TABLET, CHEWABLE ORAL DAILY
Qty: 30 TABLET | Refills: 3 | Status: ON HOLD | OUTPATIENT
Start: 2019-08-21 | End: 2019-09-04 | Stop reason: HOSPADM

## 2019-08-20 RX ORDER — ATORVASTATIN CALCIUM 20 MG/1
20 TABLET, FILM COATED ORAL NIGHTLY
Qty: 30 TABLET | Refills: 3 | Status: SHIPPED | OUTPATIENT
Start: 2019-08-20

## 2019-08-20 RX ORDER — METOPROLOL TARTRATE 50 MG/1
50 TABLET, FILM COATED ORAL 2 TIMES DAILY
Status: DISCONTINUED | OUTPATIENT
Start: 2019-08-20 | End: 2019-08-20 | Stop reason: HOSPADM

## 2019-08-20 RX ORDER — FOLIC ACID 1 MG/1
1 TABLET ORAL DAILY
Qty: 30 TABLET | Refills: 3 | Status: SHIPPED | OUTPATIENT
Start: 2019-08-21

## 2019-08-20 RX ORDER — CASTOR OIL AND BALSAM, PERU 788; 87 MG/G; MG/G
OINTMENT TOPICAL 2 TIMES DAILY
Qty: 1 TUBE | Refills: 1 | Status: ON HOLD | OUTPATIENT
Start: 2019-08-20 | End: 2019-09-04 | Stop reason: HOSPADM

## 2019-08-20 RX ORDER — LISINOPRIL 10 MG/1
10 TABLET ORAL DAILY
Qty: 90 TABLET | Refills: 1 | Status: SHIPPED | OUTPATIENT
Start: 2019-08-20

## 2019-08-20 RX ADMIN — FOLIC ACID 1 MG: 1 TABLET ORAL at 08:17

## 2019-08-20 RX ADMIN — CASTOR OIL AND BALSAM, PERU: 788; 87 OINTMENT TOPICAL at 10:09

## 2019-08-20 RX ADMIN — LEVALBUTEROL HYDROCHLORIDE 1.25 MG: 1.25 SOLUTION, CONCENTRATE RESPIRATORY (INHALATION) at 07:28

## 2019-08-20 RX ADMIN — HALOPERIDOL LACTATE 5 MG: 5 INJECTION INTRAMUSCULAR at 00:56

## 2019-08-20 RX ADMIN — Medication 100 MG: at 08:18

## 2019-08-20 RX ADMIN — Medication 15 ML: at 08:18

## 2019-08-20 RX ADMIN — SODIUM CHLORIDE 30 MG/ML INHALATION SOLUTION 15 ML: 30 SOLUTION INHALANT at 07:28

## 2019-08-20 RX ADMIN — ASPIRIN 81 MG 81 MG: 81 TABLET ORAL at 08:18

## 2019-08-20 RX ADMIN — HALOPERIDOL 2.5 MG: 5 TABLET ORAL at 08:32

## 2019-08-20 RX ADMIN — HEPARIN SODIUM 5000 UNITS: 5000 INJECTION INTRAVENOUS; SUBCUTANEOUS at 14:10

## 2019-08-20 RX ADMIN — HALOPERIDOL LACTATE 5 MG: 5 INJECTION INTRAMUSCULAR at 08:33

## 2019-08-20 RX ADMIN — LINEZOLID 600 MG: 600 TABLET, FILM COATED ORAL at 08:17

## 2019-08-20 RX ADMIN — METOPROLOL TARTRATE 25 MG: 25 TABLET ORAL at 08:17

## 2019-08-20 RX ADMIN — HALOPERIDOL 2.5 MG: 5 TABLET ORAL at 14:10

## 2019-08-20 RX ADMIN — HEPARIN SODIUM 5000 UNITS: 5000 INJECTION INTRAVENOUS; SUBCUTANEOUS at 06:29

## 2019-08-20 RX ADMIN — MULTIVITAMIN 15 ML: LIQUID ORAL at 08:17

## 2019-08-20 RX ADMIN — PANTOPRAZOLE SODIUM 40 MG: 40 INJECTION, POWDER, LYOPHILIZED, FOR SOLUTION INTRAVENOUS at 10:37

## 2019-08-20 ASSESSMENT — PAIN SCALES - PAIN ASSESSMENT IN ADVANCED DEMENTIA (PAINAD)
BREATHING: 0
BREATHING: 0
NEGVOCALIZATION: 0
FACIALEXPRESSION: 0
BODYLANGUAGE: 0
CONSOLABILITY: 0
BODYLANGUAGE: 0
TOTALSCORE: 0
CONSOLABILITY: 0
CONSOLABILITY: 0
NEGVOCALIZATION: 0
BREATHING: 0
FACIALEXPRESSION: 0
BREATHING: 0
BREATHING: 0
NEGVOCALIZATION: 0
FACIALEXPRESSION: 0
CONSOLABILITY: 0
TOTALSCORE: 0
TOTALSCORE: 0
CONSOLABILITY: 0
BODYLANGUAGE: 0
CONSOLABILITY: 0
FACIALEXPRESSION: 0
TOTALSCORE: 0
NEGVOCALIZATION: 0
NEGVOCALIZATION: 0
BREATHING: 0
CONSOLABILITY: 0
NEGVOCALIZATION: 0
FACIALEXPRESSION: 0
NEGVOCALIZATION: 0
FACIALEXPRESSION: 0
BREATHING: 0
FACIALEXPRESSION: 0
BODYLANGUAGE: 0
CONSOLABILITY: 0
BODYLANGUAGE: 0
TOTALSCORE: 0
TOTALSCORE: 0
FACIALEXPRESSION: 0
BREATHING: 0
BODYLANGUAGE: 0
NEGVOCALIZATION: 0

## 2019-08-20 ASSESSMENT — PAIN SCALES - GENERAL
PAINLEVEL_OUTOF10: 0

## 2019-08-20 ASSESSMENT — PAIN SCALES - WONG BAKER: WONGBAKER_NUMERICALRESPONSE: 0

## 2019-08-20 NOTE — CARE COORDINATION
CM contacted son, Bryant Portillo [phone] to discuss disposition plans, may be more appropriate for SNF placement v/s LTACH. Left a v/m awaiting return call.     Tony Lobo RN  [phone]
CM following. [facility] staff came in to see pt today. They state they can start pre-cert on Monday 1/20/68 after Pt has had updated PT/OT notes, Psych clearance note, decreased Haldol and no restraints.  CM department to continue to follow and update SNF.     Yun Bryant RN, BSN, [address]  Case Management Department  [phone]
Case Management Assessment           Daily Note                 Date/ Time of Note: 8/19/2019 3:19 PM         Note completed by: Angela Medina    Patient Name: Afua Agent  YOB: 1950    Diagnosis:Acute kidney failure [facility]) [N17.9]  Acute kidney failure ([facility] [N17.9]  Patient Admission Status: Inpatient    Date of Admission:7/22/2019  9:27 PM Length of Stay: 32 GLOS: GMLOS: 4.5      Current Plan of Care: awaiting pre-cert for SNF at [facility]  ________________________________________________________________________________________  PT AM-PAC: 10 / 24 per last evaluation on: 8.18.2019    OT AM-PAC: 9 / 24 per last evaluation on: 8.18.2019    DME Needs for discharge: deferred to next level of care    ________________________________________________________________________________________  Discharge Plan: SNF: [facility]    Tentative discharge date: 08/20/19     Current barriers to discharge: pending pre-cert for SNF [facility])    Referrals completed: Not Applicable    Resources/ information provided: Not indicated at this time  ________________________________________________________________________________________  Case Management Notes: CM continues to follow for discharge planning and needs. Patient to go to [facility] SNF at discharge. Pre-cert remains pending at this time. CM has called and left voicemail with admissions coordinator in regards to current status of pre-cert, awaiting return call. Uma Bledsoe and his family were provided with choice of provider; he and his family are in agreement with the discharge plan.     Care Transition Patient: Cici Ocampo  Case Management Department  Ph: [phone]  Fax: [phone]
Case Management Assessment           Daily Note                 Date/ Time of Note: 8/5/2019 11:42 AM         Note completed by: Hugh William    Patient Name: Preet Patterson  YOB: 1950    Diagnosis:Acute kidney failure [facility]) [N17.9]  [facility] [N17.9]  Patient Admission Status: Inpatient    Date of Admission:7/22/2019  9:27 PM Length of Stay: 13 GLOS: GMLOS: 4.5      Current Plan of Care:  Precedex gtt, IV Keppra, IV Merrem, IV Vanco - Intubated - Restraints  ___________________________________________________________________________    Therapy evals when medically appropriate.  ___________________________________________________________________________    Discharge Plan: [address] [place] [address] (LTACH): [facility]    Tentative discharge date:  TBD    Current barriers to discharge: plan of care    Referrals completed: LTACH: [facility] RN CM made referral to [facility]    Resources/ information provided: Not indicated at this time  ___________________________________________________________________________    Case Management Notes:     Met with son Randolph Gonzalez at bedside to discuss discharge planning. Explained LTAC level of care. Stated he's not sure his dad wants to remain on the vent at this time. He addressed his father about this and patient shook his head YES he wants to remain on vent. Son is agreeable to [facility].  Will require precert through [facility]. Salas Obrien and his family were provided with choice of provider; he and his family are in agreement with the discharge plan.     Care Transition Patient: Sanna William RN  [facility]  Case Management Department  Ph: [phone]
Nursing Facility (SNF): [facility] Phone: [phone] Fax: [phone]    LOC at discharge: Skilled  JACKELINE Completed: Yes    Notification completed in HENS/PAS?:  Yes : CM has completed HENS online through secure website for SNF admission at [address] [place] [address]. Document ID #: Document ID: [id number]      IMM Completed:   No    Transportation:  Transportation PLAN for discharge: EMS transportation   Mode of Transport: Ambulance stretcher - BLS  Reason for medical transport: Confused due to encephalopathy and AVIVA and requires ambulance transport due to two person assist and supervision  Name of [address]: [address]  Phone: [phone]  Time of Transport: 16:00pm    Transport form completed: Yes    Home Care:  [address] ordered at discharge: No  [address]: Not Applicable  Orders faxed: No    Durable Medical Equipment:  DME Provider: n/a  Equipment obtained during hospitalization: none    Home Oxygen and Respiratory Equipment:  Oxygen needed at discharge?: Not [address]: Not Applicable  Portable tank available for discharge?: Not Indicated    Dialysis:  Dialysis patient: No    Dialysis Center:  Not Applicable    Hospice Services:  Location: Not Applicable  Agency: Not Applicable    Consents signed: Not Indicated    Referrals made at [facility] for outpatient continued care:  Not Applicable    Additional CM Notes: Pt to [facility] via [address] [place] [address] Road. Son notified. AVS faxed. Kwadwo Martel and his family were provided with choice of provider; he and his family are in agreement with the discharge plan.     Care Transitions patient: No    Khadijah Aranda RN  [facility]  Case Management Department  Ph: [phone]  Fax: [phone]

## 2019-08-20 NOTE — FLOWSHEET NOTE
08/20/19 0735   Assessment   Charting Type Shift assessment   Neurological   Neuro (WDL) X   Level of Consciousness 0   Orientation Level Oriented to person;Disoriented to place; Disoriented to time;Disoriented to situation   Cognition Poor judgement;Poor safety awareness;Poor attention/concentration; Impulsive; Follows commands   Language Delayed responses   Size R Pupil (mm) 3   R Pupil Shape Round   R Pupil Reaction Brisk   Size L Pupil (mm) 3   L Pupil Shape Round   L Pupil Reaction Brisk   R Hand  Moderate   L Hand  Moderate   R Foot Dorsiflexion Moderate   L Foot Dorsiflexion Moderate   R Foot Plantar Flexion Moderate   L Foot Plantar Flexion Moderate   RUE Motor Response Responds to command;Normal extension;Normal flexion   Sensation RUE Full sensation   LUE Motor Response Responds to command;Normal extension;Normal flexion   Sensation LUE Full sensation   RLE Motor Response Responds to command;Normal extension;Normal flexion   Sensation RLE Full sensation   LLE Motor Response Responds to command;Normal extension;Normal flexion   Sensation LLE Full sensation   Gag Weak   Tongue Deviation None   [place] Coma Scale   Eye Opening 4   Best Verbal Response 4   Best Motor Response 6   [place] Coma Scale Score 14   HEENT   HEENT (WDL) X   Right Eye Impaired vision  (glasses)   Left Eye Impaired vision  (glasses)   Voice Raspy;Hoarse   Teeth Missing teeth   Throat Cough with swallow   Nose Intact   Mucous Membrane Intact; Moist   Respiratory   Respiratory (WDL) X   Respiratory Pattern Regular   Respiratory Depth Normal   Respiratory Quality/Effort Unlabored   Chest Assessment Chest expansion symmetrical;Trachea midline   L Breath Sounds Clear   R Breath Sounds Clear   Breath Sounds   Right Upper Lobe Clear   Right Middle Lobe Clear   Right Lower Lobe Clear   Left Upper Lobe Clear   Left Lower Lobe Clear   Cough/Sputum   Cough Congested;Productive   Frequency Infrequent   Cardiac   Cardiac (WDL) X   Cardiac

## 2019-08-20 NOTE — DISCHARGE INSTR - COC
Continuity of Care Form    Patient Name: Carla Gann   :  1950  MRN:  [id number]    Admit date:  2019  Discharge date:  19    Code Status Order: DNR-CCA   Advance Directives:   [facility] Documentation     Date/Time Healthcare Directive Type of Healthcare Directive Copy in [facility] [address] Agent's Name Healthcare Agent's Phone Number    19 [phone]  No, patient does not have an advance directive for healthcare treatment -- -- -- -- --          Admitting Physician:  Lennie Mccollum MD  PCP: Nakul Harmon    Discharging Nurse: Maryan Bray [address] Unit/Room#: 3145/1172-22  Discharging Unit Phone Number: [phone]    Emergency Contact:   Extended Emergency Contact Information  Primary Emergency Contact: Nat Erickson [address] Phone: [phone]  Mobile Phone: [phone]  Relation: Child  Secondary Emergency Contact: Bulmaro Kingston  Mobile Phone: [phone]  Relation: Child  Preferred language: English   needed?  No    Past Surgical History:  Past Surgical History:   Procedure Laterality Date    CERVICAL LAMINECTOMY      GASTROSTOMY TUBE PLACEMENT N/A 2019    EGD PEG TUBE PLACEMENT performed by Ashley Dumont MD at [address] [place], [place] [address]      left, reattached tendons    UPPER GASTROINTESTINAL ENDOSCOPY N/A 2019    EGD ESOPHAGOGASTRODUODENOSCOPY performed by Tonia Romo MD at [facility] ENDOSCOPY       Immunization History:   Immunization History   Administered Date(s) Administered    Influenza Vaccine, unspecified formulation 10/23/2014    Influenza Virus Vaccine 2013, 10/23/2014, 10/05/2015, 10/12/2016    Influenza, High Dose (Fluzone 65 yrs and older) 10/18/2017, 2018    Pneumococcal Conjugate 13-valent (Rqxtsgy30) 2018    Pneumococcal Polysaccharide (Ewfirqcdn25) 2016    Tdap (Boostrix, Adacel) 2016       Active Problems:  Patient Documentation and Therapy:  Wound 08/12/19 Arm Anterior;Left;Lower (Active)   Wound Skin Tear 8/20/2019  7:35 AM   Dressing Status Clean;Dry; Intact 8/20/2019  7:35 AM   Dressing Changed Changed/New 8/12/2019 12:00 PM   Dressing/Treatment Foam 8/20/2019  7:35 AM   Wound Length (cm) 1.5 cm 8/12/2019 12:00 PM   Wound Width (cm) 2 cm 8/12/2019 12:00 PM   Wound Surface Area (cm^2) 3 cm^2 8/12/2019 12:00 PM   Wound Assessment Intact; Pink 8/20/2019  7:35 AM   Drainage Amount None 8/20/2019  7:35 AM   Drainage Description Sanguinous 8/12/2019 12:00 PM   Palma-wound Assessment Clean;Dry; Intact 8/20/2019  7:35 AM   Number of days: 8        Elimination:  Continence:   · Bowel: Yes  · Bladder: Yes  Urinary Catheter: None   Colostomy/Ileostomy/Ileal Conduit: No       Date of Last BM: 8/19/19    Intake/Output Summary (Last 24 hours) at 8/20/2019 1323  Last data filed at 8/20/2019 1103  Gross per 24 hour   Intake 1946 ml   Output 950 ml   Net 996 ml     I/O last 3 completed shifts: In: 6321 [NG/GT:1523]  Out: 600 [Urine:600]    Safety Concerns:     History of Falls (last 30 days)    Impairments/Disabilities:      Speech    Nutrition Therapy:  Current Nutrition Therapy:   - Tube Feedings:  Standard with fiber    Routes of Feeding: Gastrostomy Tube  Liquids: No Liquids  Daily Fluid Restriction: no  Last Modified Barium Swallow with Video (Video Swallowing Test): done on 8/13    Treatments at the Time of Hospital Discharge:   Respiratory Treatments: Xopenex BID, Saline nebulizer BID  Oxygen Therapy:  is not on home oxygen therapy.   Ventilator:    - No ventilator support    Rehab Therapies: Physical Therapy/Occupational Therapy  Weight Bearing Status/Restrictions: No weight bearing restirctions  Other Medical Equipment (for information only, NOT a DME order):  N/A  Other Treatments:  N/A    Patient's personal belongings (please select all that are sent with patient):  Glasses    RN SIGNATURE:  Electronically signed by Maribel Mace RN
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
## 2019-08-20 NOTE — PROGRESS NOTES
cued for strong cough, hence unable to clear secretions. Oral care completed w/ suction swab and mouth wash. Pt did not open eyes until end of session, agreed to PO. Trialed ice chip 2x w/ immediate cough observed, shook head no to additional PO. Unable to follow directions or answer questions verbally. Severe difficulties managing secretions at this time; recommend use of suction and continue NPO. Cont goal.   8/16: pt not able to state why he is NPO or answer questions appropriately. Pt educated to results of MBS, rationale for NPO, importance of oral care and small amounts of ice chips to aide in maintaining integrity of swallow mechanism. Pt does not state full comprehension  Cont goal  8/19: Pt unable to follow directions, intermittent eye contact to SLP for all utterances. Provided education re: importance of oral care, NPO status at this time. Does not state comprehension. Continue goal.   8/20-  Pt educated to the purpose of the visit, rational for oral care and effortful swallow- pt with no evidence of comprehension. con't goal     Goal 4: pt will participate in swallowing exercises with mod cues  8/16: pt alert, sounds clear prior to ice chips. Pt presented with 3 ice chips, with immediate wet/congested sounding cough emerging, used yankauer to suction. Pt initiated effortful swallow after only 1 ice chip, with max cues provided for additional swallows. Did not present further ice chips due to concern for pt safety. Recommend aggressive oral care with suction toothbrush and 1- 2 ice chips at at time, per RN only,  throughout the day encouraging pt to swallow hard. D/w Rn [address] goal  8/19: Pt with congested coughing prior to trials and noted to have moderate amount of material in suction. Patient with left sided oral weakness and loss of copious secretions noted prior to and after treatment.  Oral care completed dependently with severely reduced baseline oral hygiene appreciated (multiple broken

## 2019-08-21 ENCOUNTER — HOSPITAL ENCOUNTER (OUTPATIENT)
Age: 69
Setting detail: OBSERVATION
LOS: 1 days | Discharge: NURSING, REHAB OR LONG-TERM CARE | End: 2019-08-22
Attending: EMERGENCY MEDICINE | Admitting: INTERNAL MEDICINE
Payer: MEDICARE

## 2019-08-21 ENCOUNTER — APPOINTMENT (OUTPATIENT)
Dept: GENERAL RADIOLOGY | Age: 69
End: 2019-08-21
Payer: MEDICARE
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
## 2019-08-21 LAB
ANION GAP SERPL CALCULATED.3IONS-SCNC: 10 MMOL/L (ref 3–16)
BASE EXCESS VENOUS: -1 (ref -3–3)
BASOPHILS ABSOLUTE: 0.1 K/UL (ref 0–0.2)
BASOPHILS RELATIVE PERCENT: 0.7 %
BUN BLDV-MCNC: 24 MG/DL (ref 7–20)
CALCIUM SERPL-MCNC: 8.8 MG/DL (ref 8.3–10.6)
CHLORIDE BLD-SCNC: 111 MMOL/L (ref 99–110)
CO2: 24 MMOL/L (ref 21–32)
CREAT SERPL-MCNC: 1.2 MG/DL (ref 0.8–1.3)
EOSINOPHILS ABSOLUTE: 0 K/UL (ref 0–0.6)
EOSINOPHILS RELATIVE PERCENT: 0.4 %
GFR AFRICAN AMERICAN: >60
GFR NON-AFRICAN AMERICAN: 60
GLUCOSE BLD-MCNC: 93 MG/DL (ref 70–99)
HCO3 VENOUS: 22.9 MMOL/L (ref 23–29)
HCT VFR BLD CALC: 23.3 % (ref 40.5–52.5)
HEMOGLOBIN: 7.7 G/DL (ref 13.5–17.5)
LACTATE: 0.68 MMOL/L (ref 0.4–2)
LYMPHOCYTES ABSOLUTE: 1 K/UL (ref 1–5.1)
LYMPHOCYTES RELATIVE PERCENT: 13.2 %
MCH RBC QN AUTO: 31.8 PG (ref 26–34)
MCHC RBC AUTO-ENTMCNC: 33 G/DL (ref 31–36)
MCV RBC AUTO: 96.5 FL (ref 80–100)
MONOCYTES ABSOLUTE: 0.7 K/UL (ref 0–1.3)
MONOCYTES RELATIVE PERCENT: 8.7 %
NEUTROPHILS ABSOLUTE: 5.8 K/UL (ref 1.7–7.7)
NEUTROPHILS RELATIVE PERCENT: 77 %
O2 SAT, VEN: 62 %
PCO2, VEN: 31.8 MM HG (ref 40–50)
PDW BLD-RTO: 14.7 % (ref 12.4–15.4)
PERFORMED ON: ABNORMAL
PH VENOUS: 7.46 (ref 7.35–7.45)
PLATELET # BLD: 297 K/UL (ref 135–450)
PMV BLD AUTO: 7.8 FL (ref 5–10.5)
PO2, VEN: 30 MM HG
POC SAMPLE TYPE: ABNORMAL
POTASSIUM SERPL-SCNC: 3.9 MMOL/L (ref 3.5–5.1)
RBC # BLD: 2.41 M/UL (ref 4.2–5.9)
SODIUM BLD-SCNC: 145 MMOL/L (ref 136–145)
TCO2 CALC VENOUS: 24 MMOL/L
TROPONIN: 0.03 NG/ML
WBC # BLD: 7.6 K/UL (ref 4–11)

## 2019-08-21 PROCEDURE — 85025 COMPLETE CBC W/AUTO DIFF WBC: CPT

## 2019-08-21 PROCEDURE — 99285 EMERGENCY DEPT VISIT HI MDM: CPT

## 2019-08-21 PROCEDURE — 93005 ELECTROCARDIOGRAM TRACING: CPT | Performed by: STUDENT IN AN ORGANIZED HEALTH CARE EDUCATION/TRAINING PROGRAM

## 2019-08-21 PROCEDURE — 82803 BLOOD GASES ANY COMBINATION: CPT

## 2019-08-21 PROCEDURE — 80048 BASIC METABOLIC PNL TOTAL CA: CPT

## 2019-08-21 PROCEDURE — 84484 ASSAY OF TROPONIN QUANT: CPT

## 2019-08-21 PROCEDURE — 36415 COLL VENOUS BLD VENIPUNCTURE: CPT

## 2019-08-21 PROCEDURE — 81001 URINALYSIS AUTO W/SCOPE: CPT

## 2019-08-21 PROCEDURE — 71045 X-RAY EXAM CHEST 1 VIEW: CPT

## 2019-08-21 PROCEDURE — 83605 ASSAY OF LACTIC ACID: CPT

## 2019-08-22 ENCOUNTER — HOSPITAL ENCOUNTER (EMERGENCY)
Age: 69
Discharge: HOME | DRG: 871 | End: 2019-08-23
Attending: EMERGENCY MEDICINE
Payer: MEDICARE

## 2019-08-22 ENCOUNTER — APPOINTMENT (OUTPATIENT)
Dept: GENERAL RADIOLOGY | Age: 69
DRG: 871 | End: 2019-08-22
Payer: MEDICARE

## 2019-08-22 ENCOUNTER — APPOINTMENT (OUTPATIENT)
Dept: CT IMAGING | Age: 69
End: 2019-08-22
Payer: MEDICARE

## 2019-08-22 VITALS
WEIGHT: 118 LBS | SYSTOLIC BLOOD PRESSURE: 124 MMHG | HEIGHT: 65 IN | RESPIRATION RATE: 18 BRPM | OXYGEN SATURATION: 95 % | DIASTOLIC BLOOD PRESSURE: 69 MMHG | TEMPERATURE: 98.4 F | BODY MASS INDEX: 19.66 KG/M2 | HEART RATE: 106 BPM

## 2019-08-22 DIAGNOSIS — T85.528A DISLODGED GASTROSTOMY TUBE: Primary | ICD-10-CM

## 2019-08-22 PROBLEM — T17.908A ASPIRATION INTO AIRWAY: Status: ACTIVE | Noted: 2019-08-22

## 2019-08-22 LAB
BILIRUBIN URINE: NEGATIVE
BLOOD, URINE: NEGATIVE
CLARITY: CLEAR
COLOR: YELLOW
EKG ATRIAL RATE: 112 BPM
EKG DIAGNOSIS: NORMAL
EKG P AXIS: 33 DEGREES
EKG P-R INTERVAL: 120 MS
EKG Q-T INTERVAL: 336 MS
EKG QRS DURATION: 68 MS
EKG QTC CALCULATION (BAZETT): 458 MS
EKG R AXIS: -32 DEGREES
EKG T AXIS: -15 DEGREES
EKG VENTRICULAR RATE: 112 BPM
EPITHELIAL CELLS, UA: NORMAL /HPF
GLUCOSE URINE: NEGATIVE MG/DL
KETONES, URINE: NEGATIVE MG/DL
LACTIC ACID, SEPSIS: 0.5 MMOL/L (ref 0.4–1.9)
LACTIC ACID, SEPSIS: 1.3 MMOL/L (ref 0.4–1.9)
LEUKOCYTE ESTERASE, URINE: NEGATIVE
MICROSCOPIC EXAMINATION: YES
NITRITE, URINE: NEGATIVE
PH UA: 7 (ref 5–8)
PROTEIN UA: 30 MG/DL
RBC UA: NORMAL /HPF (ref 0–2)
SPECIFIC GRAVITY UA: 1.01 (ref 1–1.03)
TROPONIN: 0.03 NG/ML
URINE REFLEX TO CULTURE: ABNORMAL
URINE TYPE: ABNORMAL
UROBILINOGEN, URINE: 0.2 E.U./DL
WBC UA: NORMAL /HPF (ref 0–5)

## 2019-08-22 PROCEDURE — 6370000000 HC RX 637 (ALT 250 FOR IP): Performed by: INTERNAL MEDICINE

## 2019-08-22 PROCEDURE — 84484 ASSAY OF TROPONIN QUANT: CPT

## 2019-08-22 PROCEDURE — 6360000004 HC RX CONTRAST MEDICATION

## 2019-08-22 PROCEDURE — G0378 HOSPITAL OBSERVATION PER HR: HCPCS

## 2019-08-22 PROCEDURE — 94640 AIRWAY INHALATION TREATMENT: CPT

## 2019-08-22 PROCEDURE — 96372 THER/PROPH/DIAG INJ SC/IM: CPT

## 2019-08-22 PROCEDURE — 36415 COLL VENOUS BLD VENIPUNCTURE: CPT

## 2019-08-22 PROCEDURE — 70450 CT HEAD/BRAIN W/O DYE: CPT

## 2019-08-22 PROCEDURE — 6370000000 HC RX 637 (ALT 250 FOR IP): Performed by: STUDENT IN AN ORGANIZED HEALTH CARE EDUCATION/TRAINING PROGRAM

## 2019-08-22 PROCEDURE — 96365 THER/PROPH/DIAG IV INF INIT: CPT

## 2019-08-22 PROCEDURE — 2580000003 HC RX 258: Performed by: INTERNAL MEDICINE

## 2019-08-22 PROCEDURE — 2580000003 HC RX 258: Performed by: EMERGENCY MEDICINE

## 2019-08-22 PROCEDURE — 99283 EMERGENCY DEPT VISIT LOW MDM: CPT

## 2019-08-22 PROCEDURE — 74177 CT ABD & PELVIS W/CONTRAST: CPT

## 2019-08-22 PROCEDURE — 6360000004 HC RX CONTRAST MEDICATION: Performed by: EMERGENCY MEDICINE

## 2019-08-22 PROCEDURE — 83605 ASSAY OF LACTIC ACID: CPT

## 2019-08-22 PROCEDURE — 6360000002 HC RX W HCPCS: Performed by: EMERGENCY MEDICINE

## 2019-08-22 PROCEDURE — 96367 TX/PROPH/DG ADDL SEQ IV INF: CPT

## 2019-08-22 PROCEDURE — 72125 CT NECK SPINE W/O DYE: CPT

## 2019-08-22 PROCEDURE — 4500000023 HC ED LEVEL 3 PROCEDURE

## 2019-08-22 PROCEDURE — 87040 BLOOD CULTURE FOR BACTERIA: CPT

## 2019-08-22 PROCEDURE — 6360000002 HC RX W HCPCS: Performed by: INTERNAL MEDICINE

## 2019-08-22 PROCEDURE — 49465 FLUORO EXAM OF G/COLON TUBE: CPT

## 2019-08-22 RX ORDER — FOLIC ACID 1 MG/1
1 TABLET ORAL DAILY
Status: DISCONTINUED | OUTPATIENT
Start: 2019-08-22 | End: 2019-08-22 | Stop reason: HOSPADM

## 2019-08-22 RX ORDER — LINEZOLID 2 MG/ML
600 INJECTION, SOLUTION INTRAVENOUS EVERY 12 HOURS
Status: DISCONTINUED | OUTPATIENT
Start: 2019-08-22 | End: 2019-08-22 | Stop reason: HOSPADM

## 2019-08-22 RX ORDER — THIAMINE MONONITRATE (VIT B1) 100 MG
100 TABLET ORAL DAILY
Status: DISCONTINUED | OUTPATIENT
Start: 2019-08-22 | End: 2019-08-22 | Stop reason: HOSPADM

## 2019-08-22 RX ORDER — ASPIRIN 81 MG/1
81 TABLET, CHEWABLE ORAL DAILY
Status: DISCONTINUED | OUTPATIENT
Start: 2019-08-22 | End: 2019-08-22 | Stop reason: HOSPADM

## 2019-08-22 RX ORDER — ALBUTEROL SULFATE 2.5 MG/3ML
2.5 SOLUTION RESPIRATORY (INHALATION) EVERY 6 HOURS PRN
Status: DISCONTINUED | OUTPATIENT
Start: 2019-08-22 | End: 2019-08-22 | Stop reason: HOSPADM

## 2019-08-22 RX ORDER — METOPROLOL TARTRATE 50 MG/1
50 TABLET, FILM COATED ORAL 2 TIMES DAILY
Status: DISCONTINUED | OUTPATIENT
Start: 2019-08-22 | End: 2019-08-22 | Stop reason: HOSPADM

## 2019-08-22 RX ORDER — ACETAMINOPHEN 325 MG/1
650 TABLET ORAL EVERY 4 HOURS PRN
Status: DISCONTINUED | OUTPATIENT
Start: 2019-08-22 | End: 2019-08-22 | Stop reason: HOSPADM

## 2019-08-22 RX ORDER — SODIUM CHLORIDE 0.9 % (FLUSH) 0.9 %
10 SYRINGE (ML) INJECTION PRN
Status: DISCONTINUED | OUTPATIENT
Start: 2019-08-22 | End: 2019-08-22 | Stop reason: HOSPADM

## 2019-08-22 RX ORDER — HALOPERIDOL 5 MG
2.5 TABLET ORAL 3 TIMES DAILY
Status: DISCONTINUED | OUTPATIENT
Start: 2019-08-22 | End: 2019-08-22 | Stop reason: HOSPADM

## 2019-08-22 RX ORDER — IPRATROPIUM BROMIDE AND ALBUTEROL SULFATE 2.5; .5 MG/3ML; MG/3ML
1 SOLUTION RESPIRATORY (INHALATION)
Status: DISCONTINUED | OUTPATIENT
Start: 2019-08-22 | End: 2019-08-22 | Stop reason: HOSPADM

## 2019-08-22 RX ORDER — ATORVASTATIN CALCIUM 20 MG/1
20 TABLET, FILM COATED ORAL NIGHTLY
Status: DISCONTINUED | OUTPATIENT
Start: 2019-08-22 | End: 2019-08-22 | Stop reason: HOSPADM

## 2019-08-22 RX ORDER — SODIUM CHLORIDE 0.9 % (FLUSH) 0.9 %
10 SYRINGE (ML) INJECTION EVERY 12 HOURS SCHEDULED
Status: DISCONTINUED | OUTPATIENT
Start: 2019-08-22 | End: 2019-08-22 | Stop reason: HOSPADM

## 2019-08-22 RX ADMIN — ASPIRIN 81 MG 81 MG: 81 TABLET ORAL at 11:10

## 2019-08-22 RX ADMIN — METOPROLOL TARTRATE 50 MG: 50 TABLET ORAL at 11:54

## 2019-08-22 RX ADMIN — IOHEXOL 20 ML: 350 INJECTION, SOLUTION INTRAVENOUS at 22:00

## 2019-08-22 RX ADMIN — IPRATROPIUM BROMIDE AND ALBUTEROL SULFATE 1 AMPULE: .5; 3 SOLUTION RESPIRATORY (INHALATION) at 13:40

## 2019-08-22 RX ADMIN — PIPERACILLIN SODIUM,TAZOBACTAM SODIUM 3.38 G: 3; .375 INJECTION, POWDER, FOR SOLUTION INTRAVENOUS at 03:01

## 2019-08-22 RX ADMIN — HALOPERIDOL 2.5 MG: 5 TABLET ORAL at 14:00

## 2019-08-22 RX ADMIN — FOLIC ACID 1 MG: 1 TABLET ORAL at 11:10

## 2019-08-22 RX ADMIN — IPRATROPIUM BROMIDE AND ALBUTEROL SULFATE 1 AMPULE: .5; 3 SOLUTION RESPIRATORY (INHALATION) at 09:24

## 2019-08-22 RX ADMIN — LINEZOLID 600 MG: 600 INJECTION, SOLUTION INTRAVENOUS at 11:10

## 2019-08-22 RX ADMIN — IOHEXOL: 350 INJECTION, SOLUTION INTRAVENOUS at 23:14

## 2019-08-22 RX ADMIN — Medication 100 MG: at 11:10

## 2019-08-22 RX ADMIN — IPRATROPIUM BROMIDE AND ALBUTEROL SULFATE 1 AMPULE: .5; 3 SOLUTION RESPIRATORY (INHALATION) at 16:55

## 2019-08-22 RX ADMIN — IOPAMIDOL 80 ML: 755 INJECTION, SOLUTION INTRAVENOUS at 00:41

## 2019-08-22 RX ADMIN — VANCOMYCIN HYDROCHLORIDE 1000 MG: 10 INJECTION, POWDER, LYOPHILIZED, FOR SOLUTION INTRAVENOUS at 03:46

## 2019-08-22 RX ADMIN — ENOXAPARIN SODIUM 40 MG: 40 INJECTION SUBCUTANEOUS at 08:18

## 2019-08-22 RX ADMIN — Medication 10 ML: at 09:38

## 2019-08-22 ASSESSMENT — ENCOUNTER SYMPTOMS
SHORTNESS OF BREATH: 1
VOMITING: 0
NAUSEA: 0
COUGH: 0
ABDOMINAL PAIN: 1

## 2019-08-22 ASSESSMENT — PAIN SCALES - GENERAL
PAINLEVEL_OUTOF10: 6
PAINLEVEL_OUTOF10: 0

## 2019-08-22 NOTE — PLAN OF CARE
Nutrition Problem: Severe malnutrition  Intervention: Food and/or Nutrient Delivery: Continue NPO, Start Tube Feeding  Nutritional Goals: Patient will tolerate EN to meet 100% of nutrition needs

## 2019-08-22 NOTE — PROGRESS NOTES
Patient oriented to self only. 4 eyes performed with RN. Scattered bruising, abrasions, scabs. Suctioning set up at patient bedside. Will continue to monitor.

## 2019-08-22 NOTE — PROGRESS NOTES
Pattern: Increased; RR 21-30 = 1    Breath Sounds: Absent bilaterally and/or with wheezes = 3    Sputum   ,  ,    Cough: Weak, productive = 2    Vital Signs   /79   Pulse 99   Temp 97.9 °F (36.6 °C) (Oral)   Resp 16   Ht 5' 5\" (1.651 m)   Wt 118 lb (53.5 kg)   SpO2 97%   BMI 19.64 kg/m²   SPO2 (COPD values may differ): Greater than or equal to 92% on room air = 0    Peak Flow (asthma only): not applicable = 0    RSI: 79-47 = Q6H or QID and Q4HPRN for dyspnea        Plan       Goals: medication delivery, mobilize retained secretions, volume expansion and improve oxygenation    Patient/caregiver was educated on the proper method of use for Respiratory Care Devices:  Yes      Level of patient/caregiver understanding able to:   ? Verbalize understanding   ? Demonstrate understanding       ? Teach back        ? Needs reinforcement       ? No available caregiver               ? Other:     Response to education:  Poor     Is patient being placed on Home Treatment Regimen? No     Does the patient have everything they need prior to discharge? NA     Comments:chart reviewed. Pt unable to verbally give history. Plan of Care: continue hhn with duoneb q4wa and prn    Electronically signed by Henry Mcgrath RCP on 8/22/2019 at 6:12 AM    Respiratory Protocol Guidelines     1. Assessment and treatment by Respiratory Therapy will be initiated for medication and therapeutic interventions upon initiation of aerosolized medication. 2. Physician will be contacted for respiratory rate (RR) greater than 35 breaths per minute. Therapy will be held for heart rate (HR) greater than 140 beats per minute, pending direction from physician. 3. Bronchodilators will be administered via Metered Dose Inhaler (MDI) with spacer when the following criteria are met:  a. Alert and cooperative     b. HR < 140 bpm  c. RR < 30 bpm                d. Can demonstrate a 2-3 second inspiratory hold  4.  Bronchodilators will be administered via Hand Held Nebulizer [facility]) to patients when ANY of the following criteria are met  a. Incognizant or uncooperative          b. Patients treated with HHN at Home        c. Unable to demonstrate proper use of MDI with spacer     d. RR > 30 bpm   5. Bronchodilators will be delivered via Metered Dose Inhaler (MDI), HHN, Aerogen to intubated patients on mechanical ventilation. 6. Inhalation medication orders will be delivered and/or substituted as outlined below. Aerosolized Medications Ordering and Administration Guidelines:    1. All Medications will be ordered by a physician, and their frequency and/or modality will be adjusted as defined by the patients Respiratory Severity Index (RSI) score. 2. If the patient does not have documented COPD, consider discontinuing anticholinergics when RSI is less than 9.  3. If the bronchospasm worsens (increased RSI), then the bronchodilator frequency can be increased to a maximum of every 4 hours. If greater than every 4 hours is required, the physician will be contacted. 4. If the bronchospasm improves, the frequency of the bronchodilator can be decreased, based on the patient's RSI, but not less than home treatment regimen frequency. 5. Bronchodilator(s) will be discontinued if patient has a RSI less than 9 and has received no scheduled or as needed treatment for 72  Hrs. Patients Ordered on a Mucolytic Agent:    1. Must always be administered with a bronchodilator. 2. Discontinue if patient experiences worsened bronchospasm, or secretions have lessened to the point that the patient is able to clear them with a cough. Anti-inflammatory and Combination Medications:    1. If the patient lacks prior history of lung disease, is not using inhaled anti-inflammatory medication at home, and lacks wheezing by examination or by history for at least 24 hours, contact physician for possible discontinuation.

## 2019-08-23 ENCOUNTER — APPOINTMENT (OUTPATIENT)
Dept: CT IMAGING | Age: 69
DRG: 871 | End: 2019-08-23
Payer: MEDICARE

## 2019-08-23 ENCOUNTER — APPOINTMENT (OUTPATIENT)
Dept: GENERAL RADIOLOGY | Age: 69
DRG: 871 | End: 2019-08-23
Payer: MEDICARE

## 2019-08-23 ENCOUNTER — HOSPITAL ENCOUNTER (INPATIENT)
Age: 69
LOS: 25 days | Discharge: HOSPICE | DRG: 871 | End: 2019-09-17
Attending: EMERGENCY MEDICINE | Admitting: INTERNAL MEDICINE
Payer: MEDICARE

## 2019-08-23 VITALS
HEART RATE: 116 BPM | SYSTOLIC BLOOD PRESSURE: 154 MMHG | DIASTOLIC BLOOD PRESSURE: 93 MMHG | TEMPERATURE: 98.1 F | RESPIRATION RATE: 16 BRPM | OXYGEN SATURATION: 95 % | BODY MASS INDEX: 18.48 KG/M2 | HEIGHT: 67 IN

## 2019-08-23 DIAGNOSIS — N17.9 ACUTE KIDNEY INJURY (HCC): ICD-10-CM

## 2019-08-23 DIAGNOSIS — J18.9 HCAP (HEALTHCARE-ASSOCIATED PNEUMONIA): ICD-10-CM

## 2019-08-23 DIAGNOSIS — E87.20 LACTIC ACIDOSIS: ICD-10-CM

## 2019-08-23 DIAGNOSIS — R77.8 ELEVATED TROPONIN: ICD-10-CM

## 2019-08-23 DIAGNOSIS — R65.21 SEVERE SEPSIS WITH SEPTIC SHOCK (CODE) (HCC): Primary | ICD-10-CM

## 2019-08-23 DIAGNOSIS — R91.8 MASS OF RIGHT LUNG: ICD-10-CM

## 2019-08-23 DIAGNOSIS — J96.01 ACUTE RESPIRATORY FAILURE WITH HYPOXIA (HCC): ICD-10-CM

## 2019-08-23 PROBLEM — A41.9 SEVERE SEPSIS (HCC): Status: ACTIVE | Noted: 2019-08-23

## 2019-08-23 PROBLEM — R65.20 SEVERE SEPSIS (HCC): Status: ACTIVE | Noted: 2019-08-23

## 2019-08-23 LAB
A/G RATIO: 0.8 (ref 1.1–2.2)
ALBUMIN SERPL-MCNC: 3.1 G/DL (ref 3.4–5)
ALP BLD-CCNC: 130 U/L (ref 40–129)
ALT SERPL-CCNC: 34 U/L (ref 10–40)
ANION GAP SERPL CALCULATED.3IONS-SCNC: 31 MMOL/L (ref 3–16)
ANISOCYTOSIS: ABNORMAL
APTT: 19 SEC (ref 26–36)
AST SERPL-CCNC: 28 U/L (ref 15–37)
BANDED NEUTROPHILS RELATIVE PERCENT: 21 % (ref 0–7)
BASE EXCESS ARTERIAL: -15.1 MMOL/L (ref -3–3)
BASE EXCESS ARTERIAL: -9 (ref -3–3)
BASOPHILS ABSOLUTE: 0 K/UL (ref 0–0.2)
BASOPHILS RELATIVE PERCENT: 0 %
BILIRUB SERPL-MCNC: 0.7 MG/DL (ref 0–1)
BILIRUBIN URINE: ABNORMAL
BLOOD, URINE: ABNORMAL
BUN BLDV-MCNC: 19 MG/DL (ref 7–20)
CALCIUM IONIZED: 1.26 MMOL/L (ref 1.12–1.32)
CALCIUM SERPL-MCNC: 9.8 MG/DL (ref 8.3–10.6)
CARBOXYHEMOGLOBIN ARTERIAL: 0.9 % (ref 0–1.5)
CASTS: ABNORMAL /LPF
CHLORIDE BLD-SCNC: 107 MMOL/L (ref 99–110)
CLARITY: ABNORMAL
CO2: 10 MMOL/L (ref 21–32)
COLOR: YELLOW
CREAT SERPL-MCNC: 1.9 MG/DL (ref 0.8–1.3)
EKG ATRIAL RATE: 126 BPM
EKG DIAGNOSIS: NORMAL
EKG P AXIS: 32 DEGREES
EKG P-R INTERVAL: 90 MS
EKG Q-T INTERVAL: 338 MS
EKG QRS DURATION: 70 MS
EKG QTC CALCULATION (BAZETT): 489 MS
EKG R AXIS: -37 DEGREES
EKG T AXIS: 2 DEGREES
EKG VENTRICULAR RATE: 126 BPM
EOSINOPHILS ABSOLUTE: 0 K/UL (ref 0–0.6)
EOSINOPHILS RELATIVE PERCENT: 0 %
EPITHELIAL CELLS, UA: ABNORMAL /HPF
ETHANOL: NORMAL MG/DL (ref 0–0.08)
GFR AFRICAN AMERICAN: 43
GFR NON-AFRICAN AMERICAN: 35
GLOBULIN: 3.8 G/DL
GLUCOSE BLD-MCNC: 105 MG/DL (ref 70–99)
GLUCOSE BLD-MCNC: 110 MG/DL (ref 70–99)
GLUCOSE URINE: NEGATIVE MG/DL
HCO3 ARTERIAL: 10.4 MMOL/L (ref 21–29)
HCO3 ARTERIAL: 13.7 MMOL/L (ref 21–29)
HCT VFR BLD CALC: 21.6 % (ref 40.5–52.5)
HCT VFR BLD CALC: 27.8 % (ref 40.5–52.5)
HEMOGLOBIN, ART, EXTENDED: 7.1 G/DL (ref 13.5–17.5)
HEMOGLOBIN: 6.5 GM/DL (ref 13.5–17.5)
HEMOGLOBIN: 7 G/DL (ref 13.5–17.5)
HEMOGLOBIN: 8.7 G/DL (ref 13.5–17.5)
INR BLD: 1.19 (ref 0.86–1.14)
KETONES, URINE: 15 MG/DL
LACTATE: 5.97 MMOL/L (ref 0.4–2)
LACTIC ACID: 11.4 MMOL/L (ref 0.4–2)
LACTIC ACID: 14.6 MMOL/L (ref 0.4–2)
LEUKOCYTE ESTERASE, URINE: NEGATIVE
LIPASE: 119 U/L (ref 13–60)
LYMPHOCYTES ABSOLUTE: 0.2 K/UL (ref 1–5.1)
LYMPHOCYTES RELATIVE PERCENT: 2 %
MCH RBC QN AUTO: 31.9 PG (ref 26–34)
MCHC RBC AUTO-ENTMCNC: 31.3 G/DL (ref 31–36)
MCV RBC AUTO: 102 FL (ref 80–100)
METAMYELOCYTES RELATIVE PERCENT: 2 %
METHEMOGLOBIN ARTERIAL: 0.5 %
MICROSCOPIC EXAMINATION: YES
MONOCYTES ABSOLUTE: 0.9 K/UL (ref 0–1.3)
MONOCYTES RELATIVE PERCENT: 10 %
MUCUS: ABNORMAL /LPF
NEUTROPHILS ABSOLUTE: 8 K/UL (ref 1.7–7.7)
NEUTROPHILS RELATIVE PERCENT: 65 %
NITRITE, URINE: NEGATIVE
O2 CONTENT ARTERIAL: 11 ML/DL
O2 SAT, ARTERIAL: 92 % (ref 93–100)
O2 SAT, ARTERIAL: 99.9 %
O2 THERAPY: ABNORMAL
PCO2 ARTERIAL: 16.4 MM HG (ref 35–45)
PCO2 ARTERIAL: 23 MMHG (ref 35–45)
PDW BLD-RTO: 15.4 % (ref 12.4–15.4)
PERFORMED ON: ABNORMAL
PH ARTERIAL: 7.26 (ref 7.35–7.45)
PH ARTERIAL: 7.53 (ref 7.35–7.45)
PH UA: 5.5 (ref 5–8)
PLATELET # BLD: 357 K/UL (ref 135–450)
PLATELET SLIDE REVIEW: ADEQUATE
PMV BLD AUTO: 8 FL (ref 5–10.5)
PO2 ARTERIAL: 260 MMHG (ref 75–108)
PO2 ARTERIAL: 54.6 MM HG (ref 75–108)
POC HEMATOCRIT: 19 % (ref 40.5–52.5)
POC POTASSIUM: 4.1 MMOL/L (ref 3.5–5.1)
POC SAMPLE TYPE: ABNORMAL
POC SODIUM: 148 MMOL/L (ref 136–145)
POLYCHROMASIA: ABNORMAL
POTASSIUM REFLEX MAGNESIUM: 3.9 MMOL/L (ref 3.5–5.1)
PRO-BNP: 3819 PG/ML (ref 0–124)
PROCALCITONIN: 2.3 NG/ML (ref 0–0.15)
PROTEIN UA: >=300 MG/DL
PROTHROMBIN TIME: 13.6 SEC (ref 9.8–13)
RBC # BLD: 2.72 M/UL (ref 4.2–5.9)
RBC UA: ABNORMAL /HPF (ref 0–2)
SALICYLATE, SERUM: <0.3 MG/DL (ref 15–30)
SLIDE REVIEW: ABNORMAL
SODIUM BLD-SCNC: 148 MMOL/L (ref 136–145)
SPECIFIC GRAVITY UA: 1.02 (ref 1–1.03)
SPECIMEN STATUS: NORMAL
TCO2 ARTERIAL: 14 MMOL/L
TCO2 ARTERIAL: 24.9 MMOL/L
TOTAL PROTEIN: 6.9 G/DL (ref 6.4–8.2)
TROPONIN: 0.08 NG/ML
URINE REFLEX TO CULTURE: ABNORMAL
URINE TYPE: ABNORMAL
UROBILINOGEN, URINE: 0.2 E.U./DL
WBC # BLD: 9.1 K/UL (ref 4–11)
WBC UA: ABNORMAL /HPF (ref 0–5)

## 2019-08-23 PROCEDURE — 84145 PROCALCITONIN (PCT): CPT

## 2019-08-23 PROCEDURE — 80053 COMPREHEN METABOLIC PANEL: CPT

## 2019-08-23 PROCEDURE — 2580000003 HC RX 258: Performed by: INTERNAL MEDICINE

## 2019-08-23 PROCEDURE — 85014 HEMATOCRIT: CPT

## 2019-08-23 PROCEDURE — 96368 THER/DIAG CONCURRENT INF: CPT

## 2019-08-23 PROCEDURE — 2500000003 HC RX 250 WO HCPCS

## 2019-08-23 PROCEDURE — P9016 RBC LEUKOCYTES REDUCED: HCPCS

## 2019-08-23 PROCEDURE — 83930 ASSAY OF BLOOD OSMOLALITY: CPT

## 2019-08-23 PROCEDURE — 86901 BLOOD TYPING SEROLOGIC RH(D): CPT

## 2019-08-23 PROCEDURE — 82947 ASSAY GLUCOSE BLOOD QUANT: CPT

## 2019-08-23 PROCEDURE — 96367 TX/PROPH/DG ADDL SEQ IV INF: CPT

## 2019-08-23 PROCEDURE — 6360000002 HC RX W HCPCS: Performed by: EMERGENCY MEDICINE

## 2019-08-23 PROCEDURE — 72125 CT NECK SPINE W/O DYE: CPT

## 2019-08-23 PROCEDURE — 2700000000 HC OXYGEN THERAPY PER DAY

## 2019-08-23 PROCEDURE — 70450 CT HEAD/BRAIN W/O DYE: CPT

## 2019-08-23 PROCEDURE — 82803 BLOOD GASES ANY COMBINATION: CPT

## 2019-08-23 PROCEDURE — 36415 COLL VENOUS BLD VENIPUNCTURE: CPT

## 2019-08-23 PROCEDURE — 2000000000 HC ICU R&B

## 2019-08-23 PROCEDURE — 84484 ASSAY OF TROPONIN QUANT: CPT

## 2019-08-23 PROCEDURE — 96366 THER/PROPH/DIAG IV INF ADDON: CPT

## 2019-08-23 PROCEDURE — 81001 URINALYSIS AUTO W/SCOPE: CPT

## 2019-08-23 PROCEDURE — 85730 THROMBOPLASTIN TIME PARTIAL: CPT

## 2019-08-23 PROCEDURE — 94761 N-INVAS EAR/PLS OXIMETRY MLT: CPT

## 2019-08-23 PROCEDURE — 93005 ELECTROCARDIOGRAM TRACING: CPT | Performed by: EMERGENCY MEDICINE

## 2019-08-23 PROCEDURE — 85025 COMPLETE CBC W/AUTO DIFF WBC: CPT

## 2019-08-23 PROCEDURE — 86900 BLOOD TYPING SEROLOGIC ABO: CPT

## 2019-08-23 PROCEDURE — 85018 HEMOGLOBIN: CPT

## 2019-08-23 PROCEDURE — 2500000003 HC RX 250 WO HCPCS: Performed by: EMERGENCY MEDICINE

## 2019-08-23 PROCEDURE — 83690 ASSAY OF LIPASE: CPT

## 2019-08-23 PROCEDURE — 94660 CPAP INITIATION&MGMT: CPT

## 2019-08-23 PROCEDURE — 2500000003 HC RX 250 WO HCPCS: Performed by: INTERNAL MEDICINE

## 2019-08-23 PROCEDURE — 96365 THER/PROPH/DIAG IV INF INIT: CPT

## 2019-08-23 PROCEDURE — 2580000003 HC RX 258

## 2019-08-23 PROCEDURE — 86850 RBC ANTIBODY SCREEN: CPT

## 2019-08-23 PROCEDURE — 85610 PROTHROMBIN TIME: CPT

## 2019-08-23 PROCEDURE — 83605 ASSAY OF LACTIC ACID: CPT

## 2019-08-23 PROCEDURE — 99285 EMERGENCY DEPT VISIT HI MDM: CPT

## 2019-08-23 PROCEDURE — 2580000003 HC RX 258: Performed by: EMERGENCY MEDICINE

## 2019-08-23 PROCEDURE — G0480 DRUG TEST DEF 1-7 CLASSES: HCPCS

## 2019-08-23 PROCEDURE — 71045 X-RAY EXAM CHEST 1 VIEW: CPT

## 2019-08-23 PROCEDURE — 87040 BLOOD CULTURE FOR BACTERIA: CPT

## 2019-08-23 PROCEDURE — 82330 ASSAY OF CALCIUM: CPT

## 2019-08-23 PROCEDURE — 86923 COMPATIBILITY TEST ELECTRIC: CPT

## 2019-08-23 PROCEDURE — 96376 TX/PRO/DX INJ SAME DRUG ADON: CPT

## 2019-08-23 PROCEDURE — 84295 ASSAY OF SERUM SODIUM: CPT

## 2019-08-23 PROCEDURE — 6360000002 HC RX W HCPCS: Performed by: INTERNAL MEDICINE

## 2019-08-23 PROCEDURE — 84132 ASSAY OF SERUM POTASSIUM: CPT

## 2019-08-23 PROCEDURE — 83880 ASSAY OF NATRIURETIC PEPTIDE: CPT

## 2019-08-23 RX ORDER — SODIUM CHLORIDE 9 MG/ML
INJECTION, SOLUTION INTRAVENOUS
Status: COMPLETED
Start: 2019-08-23 | End: 2019-08-23

## 2019-08-23 RX ORDER — PANTOPRAZOLE SODIUM 40 MG/10ML
40 INJECTION, POWDER, LYOPHILIZED, FOR SOLUTION INTRAVENOUS 2 TIMES DAILY
Status: DISCONTINUED | OUTPATIENT
Start: 2019-08-23 | End: 2019-08-23

## 2019-08-23 RX ORDER — CEFEPIME HYDROCHLORIDE 2 G/1
2 INJECTION, POWDER, FOR SOLUTION INTRAVENOUS ONCE
Status: DISCONTINUED | OUTPATIENT
Start: 2019-08-23 | End: 2019-08-23 | Stop reason: SDUPTHER

## 2019-08-23 RX ORDER — SODIUM CHLORIDE, SODIUM LACTATE, POTASSIUM CHLORIDE, AND CALCIUM CHLORIDE .6; .31; .03; .02 G/100ML; G/100ML; G/100ML; G/100ML
1510 INJECTION, SOLUTION INTRAVENOUS ONCE
Status: DISCONTINUED | OUTPATIENT
Start: 2019-08-23 | End: 2019-08-23 | Stop reason: CLARIF

## 2019-08-23 RX ORDER — THIAMINE HYDROCHLORIDE 100 MG/ML
100 INJECTION, SOLUTION INTRAMUSCULAR; INTRAVENOUS ONCE
Status: COMPLETED | OUTPATIENT
Start: 2019-08-23 | End: 2019-08-23

## 2019-08-23 RX ORDER — 0.9 % SODIUM CHLORIDE 0.9 %
250 INTRAVENOUS SOLUTION INTRAVENOUS ONCE
Status: DISCONTINUED | OUTPATIENT
Start: 2019-08-23 | End: 2019-08-23

## 2019-08-23 RX ORDER — SODIUM CHLORIDE 0.9 % (FLUSH) 0.9 %
10 SYRINGE (ML) INJECTION PRN
Status: DISCONTINUED | OUTPATIENT
Start: 2019-08-23 | End: 2019-09-12 | Stop reason: SDUPTHER

## 2019-08-23 RX ORDER — SODIUM CHLORIDE 0.9 % (FLUSH) 0.9 %
10 SYRINGE (ML) INJECTION EVERY 12 HOURS SCHEDULED
Status: DISCONTINUED | OUTPATIENT
Start: 2019-08-23 | End: 2019-08-27

## 2019-08-23 RX ORDER — SODIUM CHLORIDE, SODIUM LACTATE, POTASSIUM CHLORIDE, CALCIUM CHLORIDE 600; 310; 30; 20 MG/100ML; MG/100ML; MG/100ML; MG/100ML
1510 INJECTION, SOLUTION INTRAVENOUS ONCE
Status: COMPLETED | OUTPATIENT
Start: 2019-08-23 | End: 2019-08-23

## 2019-08-23 RX ORDER — SODIUM CHLORIDE, SODIUM LACTATE, POTASSIUM CHLORIDE, CALCIUM CHLORIDE 600; 310; 30; 20 MG/100ML; MG/100ML; MG/100ML; MG/100ML
2000 INJECTION, SOLUTION INTRAVENOUS ONCE
Status: COMPLETED | OUTPATIENT
Start: 2019-08-23 | End: 2019-08-23

## 2019-08-23 RX ORDER — PANTOPRAZOLE SODIUM 40 MG/10ML
80 INJECTION, POWDER, LYOPHILIZED, FOR SOLUTION INTRAVENOUS ONCE
Status: COMPLETED | OUTPATIENT
Start: 2019-08-24 | End: 2019-08-24

## 2019-08-23 RX ORDER — SODIUM CHLORIDE 9 MG/ML
INJECTION, SOLUTION INTRAVENOUS CONTINUOUS
Status: DISCONTINUED | OUTPATIENT
Start: 2019-08-23 | End: 2019-08-24 | Stop reason: SDUPTHER

## 2019-08-23 RX ORDER — THIAMINE HYDROCHLORIDE 100 MG/ML
500 INJECTION, SOLUTION INTRAMUSCULAR; INTRAVENOUS ONCE
Status: DISCONTINUED | OUTPATIENT
Start: 2019-08-23 | End: 2019-08-23 | Stop reason: ALTCHOICE

## 2019-08-23 RX ORDER — SODIUM CHLORIDE, SODIUM LACTATE, POTASSIUM CHLORIDE, CALCIUM CHLORIDE 600; 310; 30; 20 MG/100ML; MG/100ML; MG/100ML; MG/100ML
INJECTION, SOLUTION INTRAVENOUS CONTINUOUS
Status: DISCONTINUED | OUTPATIENT
Start: 2019-08-23 | End: 2019-08-23

## 2019-08-23 RX ORDER — SODIUM CHLORIDE 9 MG/ML
INJECTION, SOLUTION INTRAVENOUS
Status: DISPENSED
Start: 2019-08-23 | End: 2019-08-24

## 2019-08-23 RX ORDER — THIAMINE HYDROCHLORIDE 100 MG/ML
500 INJECTION, SOLUTION INTRAMUSCULAR; INTRAVENOUS EVERY 8 HOURS
Status: DISCONTINUED | OUTPATIENT
Start: 2019-08-24 | End: 2019-08-23 | Stop reason: SDUPTHER

## 2019-08-23 RX ADMIN — SODIUM CHLORIDE 250 ML: 9 INJECTION, SOLUTION INTRAVENOUS at 22:53

## 2019-08-23 RX ADMIN — THIAMINE HYDROCHLORIDE 100 MG: 100 INJECTION, SOLUTION INTRAMUSCULAR; INTRAVENOUS at 18:12

## 2019-08-23 RX ADMIN — CEFEPIME HYDROCHLORIDE 2 G: 2 INJECTION, POWDER, FOR SOLUTION INTRAVENOUS at 16:40

## 2019-08-23 RX ADMIN — SODIUM CHLORIDE 250 ML: 9 INJECTION, SOLUTION INTRAVENOUS at 23:53

## 2019-08-23 RX ADMIN — VANCOMYCIN HYDROCHLORIDE 750 MG: 750 INJECTION, POWDER, LYOPHILIZED, FOR SOLUTION INTRAVENOUS at 19:11

## 2019-08-23 RX ADMIN — SODIUM CHLORIDE, POTASSIUM CHLORIDE, SODIUM LACTATE AND CALCIUM CHLORIDE: 600; 310; 30; 20 INJECTION, SOLUTION INTRAVENOUS at 17:57

## 2019-08-23 RX ADMIN — Medication 2 MCG/MIN: at 17:57

## 2019-08-23 RX ADMIN — SODIUM CHLORIDE, POTASSIUM CHLORIDE, SODIUM LACTATE AND CALCIUM CHLORIDE 1510 ML: 600; 310; 30; 20 INJECTION, SOLUTION INTRAVENOUS at 16:36

## 2019-08-23 RX ADMIN — Medication 10 ML: at 23:00

## 2019-08-23 RX ADMIN — SODIUM BICARBONATE: 84 INJECTION, SOLUTION INTRAVENOUS at 22:53

## 2019-08-23 RX ADMIN — THIAMINE HYDROCHLORIDE 500 MG: 100 INJECTION, SOLUTION INTRAMUSCULAR; INTRAVENOUS at 21:41

## 2019-08-23 RX ADMIN — Medication 0.04 MCG/KG/MIN: at 18:04

## 2019-08-23 RX ADMIN — SODIUM CHLORIDE, POTASSIUM CHLORIDE, SODIUM LACTATE AND CALCIUM CHLORIDE 2000 ML: 600; 310; 30; 20 INJECTION, SOLUTION INTRAVENOUS at 22:07

## 2019-08-23 RX ADMIN — SODIUM CHLORIDE: 9 INJECTION, SOLUTION INTRAVENOUS at 23:32

## 2019-08-23 RX ADMIN — METRONIDAZOLE 500 MG: 500 INJECTION, SOLUTION INTRAVENOUS at 22:53

## 2019-08-23 ASSESSMENT — PAIN SCALES - GENERAL: PAINLEVEL_OUTOF10: 0

## 2019-08-23 NOTE — ED NOTES
Pt attempted to climb out of bed, bed alarm on and ed tech and charge RN in room to stop pt. Charge RN explained to pt the need to stay in bed.       Case Ortiz RN  08/23/19 0479

## 2019-08-23 NOTE — ED PROVIDER NOTES
History:   Procedure Laterality Date    CERVICAL LAMINECTOMY      GASTROSTOMY TUBE PLACEMENT N/A 8/14/2019    EGD PEG TUBE PLACEMENT performed by Doc Breaux MD at [address] [place] [address] Pkw[address]      left, reattached tendons    UPPER GASTROINTESTINAL ENDOSCOPY N/A 7/30/2019    EGD ESOPHAGOGASTRODUODENOSCOPY performed by Todd Mendoza MD at [address] [place] [address] medications:   Previous Medications    ASPIRIN 81 MG CHEWABLE TABLET    Take 1 tablet by mouth daily    ATORVASTATIN (LIPITOR) 20 MG TABLET    Take 1 tablet by mouth nightly    BALSAM PERU-CASTOR OIL (VENELEX) OINT OINTMENT    Apply topically 2 times daily    DICLOFENAC SODIUM 1 % GEL    Apply 2 g topically 2 times daily. FOLIC ACID (FOLVITE) 1 MG TABLET    1 tablet by Per G Tube route daily    HALOPERIDOL (HALDOL) 0.5 MG TABLET    Take 5 tablets by mouth 3 times daily    LINEZOLID (ZYVOX) 600 MG TABLET    Take 1 tablet by mouth every 12 hours Twice per day until 9/20/2019    LISINOPRIL (PRINIVIL;ZESTRIL) 10 MG TABLET    Take 1 tablet by mouth daily Hold for systolic blood pressure less than 100mmHg    METOPROLOL TARTRATE (LOPRESSOR) 50 MG TABLET    Take 1 tablet by mouth 2 times daily    THIAMINE 100 MG TABLET    Take 1 tablet by mouth daily       Social history:  reports that he has been smoking. He has never used smokeless tobacco. He reports that he drinks alcohol. He reports that he does not use drugs. Family history:    Family History   Problem Relation Age of Onset    Heart Disease Mother     Cancer Father         colon         Exam  ED Triage Vitals   BP Temp Temp Source Pulse Resp SpO2 Height Weight   08/23/19 1605 08/23/19 1701 08/23/19 1701 08/23/19 1605 08/23/19 1605 08/23/19 1605 -- 08/23/19 1640   (!) 75/48 98.1 °F (36.7 °C) Infrared 121 (!) 37 (!) 81 %  111 lb (50.3 kg)     Nursing note and vitals reviewed. Constitutional: Thin, ill-appearingHENT:      Head: Normocephalic and atraumatic.       Ears: External ears normal.      Nose: Nose normal.     Mouth: Membrane mucosa moist and pink. Eyes: Anicteric sclera. No discharge. PERRL  Neck: Supple. Trachea midline. Cardiovascular: Tachycardia, reg rhythm; no murmurs, rubs, or gallops. Pulmonary/Chest: Tachypnea, severe respiratory distress. Diffuse rhonchi. No stridor. No wheezes. No rales. Abdominal: Soft. No distension. Nontender, PEG tube noted in place  Musculoskeletal: Moves all extremities. No gross deformity. Neurological: Somnolent. Face symmetric. Unable to cooperate with neuro exam, spontaneously moves all extremities, downward babinskis  Skin: Pale. Mottling of lower extremities    Procedures    Central Venous Catheter (CVC, Central Line) Placement  Date: 08/23/2019  Time: 1745  Indication: Need for centrally administered medications  Consent: Verbal, discussed with patient's son over phone    's hands were cleansed with alcohol based solution. A face mask and surgical cap were worn. Sterile gown and gloves were worn. A time-out was completed verifying correct patient, procedure, site, and positioning. The patient was placed in a dependent position appropriate for central line placement based on the vein to be cannulated. The patients right groin was prepped with chlorhexidine and draped in sterile fashion. 1% Lidocaine was used to anesthetize the surrounding skin area. A triple lumen 7-Dutch catheter was introduced into the the common femoral vein using the Seldinger technique and under ultrasound guidance with sterile probe cover. The catheter was threaded smoothly over the guide wire and appropriate blood return was obtained. Each lumen of the catheter was evacuated of air and flushed with sterile saline. The catheter was then sutured in place to the skin and a sterile dressing applied. Perfusion to the extremity distal to the point of catheter insertion was checked and found to be adequate.     Estimated Blood Loss: <5 mL    The patient Neutrophils Absolute 8.0 (H) 1.7 - 7.7 K/uL    Lymphocytes Absolute 0.2 (L) 1.0 - 5.1 K/uL    Monocytes Absolute 0.9 0.0 - 1.3 K/uL    Eosinophils Absolute 0.0 0.0 - 0.6 K/uL    Basophils Absolute 0.0 0.0 - 0.2 K/uL    Bands Relative 21 (H) 0 - 7 %    Metamyelocytes Relative 2 (A) %    Anisocytosis 1+ (A)     Polychromasia 1+ (A)    Comprehensive Metabolic Panel w/ Reflex to MG   Result Value Ref Range    Sodium 148 (H) 136 - 145 mmol/L    Potassium reflex Magnesium 3.9 3.5 - 5.1 mmol/L    Chloride 107 99 - 110 mmol/L    CO2 10 (LL) 21 - 32 mmol/L    Anion Gap 31 (H) 3 - 16    Glucose 105 (H) 70 - 99 mg/dL    BUN 19 7 - 20 mg/dL    CREATININE 1.9 (H) 0.8 - 1.3 mg/dL    GFR Non-African American 35 (A) >60    GFR  43 (A) >60    Calcium 9.8 8.3 - 10.6 mg/dL    Total Protein 6.9 6.4 - 8.2 g/dL    Alb 3.1 (L) 3.4 - 5.0 g/dL    Albumin/Globulin Ratio 0.8 (L) 1.1 - 2.2    Total Bilirubin 0.7 0.0 - 1.0 mg/dL    Alkaline Phosphatase 130 (H) 40 - 129 U/L    ALT 34 10 - 40 U/L    AST 28 15 - 37 U/L    Globulin 3.8 g/dL   Lipase   Result Value Ref Range    Lipase 119.0 (H) 13.0 - 60.0 U/L   Troponin   Result Value Ref Range    Troponin 0.08 (H) <0.01 ng/mL   Brain Natriuretic Peptide   Result Value Ref Range    Pro-BNP 3,819 (H) 0 - 124 pg/mL   Protime-INR   Result Value Ref Range    Protime 13.6 (H) 9.8 - 13.0 sec    INR 1.19 (H) 0.86 - 1.14   APTT   Result Value Ref Range    aPTT 19.0 (L) 26.0 - 36.0 sec   Blood Gas, Arterial   Result Value Ref Range    pH, Arterial 7.264 (L) 7.350 - 7.450    pCO2, Arterial 23.0 (L) 35.0 - 45.0 mmHg    pO2, Arterial 260.0 (H) 75.0 - 108.0 mmHg    HCO3, Arterial 10.4 (L) 21.0 - 29.0 mmol/L    Base Excess, Arterial -15.1 (L) -3.0 - 3.0 mmol/L    Hemoglobin, Art, Extended 7.1 (L) 13.5 - 17.5 g/dL    O2 Sat, Arterial 99.9 >92 %    Carboxyhgb, Arterial 0.9 0.0 - 1.5 %    Methemoglobin, Arterial 0.5 <1.5 %    TCO2, Arterial 24.9 Not Established mmol/L    O2 Content, Arterial 11 Not high probability of imminent or life-threatening deterioration due to severe sepsis with septic shock, acute respiratory failure which required my direct attention, intervention, and personal management. The total critical care time personally spent while evaluating and treating this patient was at least 40 minutes exclusive of any time spent doing separately billable procedures. This includes time at the bedside, data interpretation, medication management, monitoring for potential decompensation and physician consultation. Specifics of interventions taken and potentially life-threatening diagnostic considerations are listed above in the medical decision making. Final Impression  1. Severe sepsis with septic shock (CODE) ([facility]    2. Acute respiratory failure with hypoxia (HCC)    3. HCAP (healthcare-associated pneumonia)    4. Lactic acidosis    5. Acute kidney injury ([facility]    6. Elevated troponin        Blood pressure 127/70, pulse 133, temperature 98.1 °F (36.7 °C), temperature source Infrared, resp. rate (!) 32, weight 111 lb (50.3 kg), SpO2 100 %. Disposition:  DISPOSITION Decision To Admit 08/23/2019 07:53:12 PM      Patient Referrals:  No follow-up provider specified. Discharge Medications:  New Prescriptions    No medications on file       Discontinued Medications:  Discontinued Medications    No medications on file       This chart was generated using the [address] [place] [address] [address]  dictation system. I created this record but it may contain dictation errors given the limitations of this technology.     Avelino Alvarez DO (electronically signed)  Attending Emergency Physician       Avelion Alvarez DO  08/23/19 2051

## 2019-08-23 NOTE — DISCHARGE SUMMARY
voice changes. Videostroboscopy on 7/2018 indicated a possible mass lesion on the right vocal cord. Additionally, patient has had poor appetite over the last several months with associated weight loss. He follows closely with PCP and underwent MBS and fluoro esophagram which demonstrated a small sliding hiatal hernia and mild esophogeal dysmotility. No significant abnormalities noted on MBS. He has lost a significant amount of weight over the last month weighing 134lb on June 27 at PCP visit and now currently weighs 120lbs. Patient says he drinks \" a lot of milkshakes,\" but also went on to say that he mixes vodka with his milkshakes. He endorses drinking 1-2 vodka shots per day and states he smokes 1 pack of cigarettes daily \"for a long time. \" He denies CP, SOB (despite requiring 2L NC), abdominal pain, constipation or diarrhea.      While in the ED, /69, , RR 18, afebrile. CBC without leukocytosis. Hemoglobin 12.4 (baseline). BMP was significant for a K of 5.9 (EKG not available at the moment). BUN/Cr of 104/6.8 (BUN/Cr 33/1.2 on 12/2018). Calcium elevated to 13.1 CXR negative. CT A/P WO was obtained which was negative for any metastatic disease or acute processes. Patient received 10 units regular insulin, D50 and calcium gluconate. Nephrology consulted and recommended aggressive fluid resuscitation, no need for emergent HD at this time. Brief Hospital Course:   Pt was originally admitted to the floor. One day after admission, pt had AMS and became hypotensive requiring IV boluses and 4 mg Ativan and 5 mg Haldol. Decision was then made to transfer to ICU. His troponin was 0.0 on admission and trended up to 1.1 during the first night in the ICU. EKG only showed increased number of PVCs w/o evidence of STEMI. Heparin gtt was started anyways and cardiology consulted. Cardiology suspected demand ischemia and recommended elective cath once AMS improves.  Pt continued to be altered in the ICU requiring 2 agitated, and was placed in soft restraints. The increased dose of haldol improved his agitation. And he was removed from restraints on 8/15. His mental status continued to improve. He was discharged on Haldol 2.5mg PO TID. The patient continued to have dysphagia during his stay. He failed swallow study 8/10, 8/12. And NG tube was attempted but the patient continued to remove it. Patient was unable to feed himself and had severe oral and pharyngeal dysphagia. PEG Tube was placed, his tube feeds were increased satisfactory to his goal feeds which were Jevity 1.2 @ 65 mL/hr w/ no water bolus. The patient was discharged to SNF with the medications as detailed below. Exam:   /82   Pulse 99   Temp 98.7 °F (37.1 °C) (Axillary)   Resp 16   Ht 5' 5\" (1.651 m)   Wt 118 lb 9.7 oz (53.8 kg)   SpO2 96%   BMI 19.74 kg/m²     Physical Exam   Constitutional:   Cachectic appearing   Eyes: Pupils are equal, round, and reactive to light. Conjunctivae are normal.   Neck: Neck supple. R side slightly erythematous after IJ removal.   Cardiovascular: Normal rate. tachycardic   Pulmonary/Chest: He has rales. Mild RUL rales. End expiratory wheezes   Abdominal: Soft. Bowel sounds are normal.   Abdominal binder in place. Neurological: He is alert. Significant Test Results   Radiology:  FL MODIFIED BARIUM SWALLOW W VIDEO   Final Result   1. Severe oral and pharyngeal dysphagia with aspiration of all consistencies. CT CHEST PULMONARY EMBOLISM W CONTRAST   Final Result       1. No pulmonary embolism. 2.  New 3 cm cavitary lesion in the right upper lobe, possibly pulmonary    abscess given presence of multifocal patchy opacities in the most recent    exam.   3.  Overall decreased but persistent multifocal opacities and nodules,    possibly infectious or inflammatory in etiology. The 12 mm lesion in the    right upper lobe appears somewhat spiculated.   Recommend short-term    follow-up in 3 convenience and continuity at follow-up the following most recent labs are provided:    Lab Results   Component Value Date    WBC 9.1 08/23/2019    HGB 8.7 08/23/2019    HCT 27.8 08/23/2019    .0 08/23/2019     08/23/2019     08/23/2019    K 3.9 08/23/2019     08/23/2019    CO2 10 08/23/2019    BUN 19 08/23/2019    CREATININE 1.9 08/23/2019    CALCIUM 9.8 08/23/2019    PHOS 3.2 08/18/2019    ALKPHOS 130 08/23/2019    ALT 34 08/23/2019    AST 28 08/23/2019    BILITOT 0.7 08/23/2019    BILIDIR <0.2 07/22/2019    LABALBU 3.1 08/23/2019    LDLCALC 137 07/28/2010    TRIG 87 07/28/2010     Lab Results   Component Value Date    INR 1.19 (H) 08/23/2019    INR 1.25 (H) 08/02/2019    INR 1.04 07/24/2019       Treatments: as mentioned above. DISCHARGE MEDICATION:     Medication List      START taking these medications    aspirin 81 MG chewable tablet  Take 1 tablet by mouth daily  Replaces:  aspirin 81 MG EC tablet     atorvastatin 20 MG tablet  Commonly known as:  LIPITOR  Take 1 tablet by mouth nightly     folic acid 1 MG tablet  Commonly known as:  FOLVITE  1 tablet by Per G Tube route daily     haloperidol 0.5 MG tablet  Commonly known as:  HALDOL  Take 5 tablets by mouth 3 times daily     linezolid 600 MG tablet  Commonly known as:  ZYVOX  Take 1 tablet by mouth every 12 hours Twice per day until 9/20/2019     metoprolol tartrate 50 MG tablet  Commonly known as:  LOPRESSOR  Take 1 tablet by mouth 2 times daily     thiamine 100 MG tablet  Take 1 tablet by mouth daily     VENELEX Oint ointment  Apply topically 2 times daily  Notes to patient:  Apply to lip        CHANGE how you take these medications    lisinopril 10 MG tablet  Commonly known as:  PRINIVIL;ZESTRIL  Take 1 tablet by mouth daily Hold for systolic blood pressure less than 100mmHg  What changed:  See the new instructions.         CONTINUE taking these medications    diclofenac sodium 1 % Gel        STOP taking these medications
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
## 2019-08-23 NOTE — ED PROVIDER NOTES
I independently performed a history and physical on eHidi Braden. All diagnostic, treatment, and disposition decisions were made by myself in conjunction with the advanced practice provider. I have participated in the medical decision making and directed the treatment plan and disposition of the patient. For further details of [address] emergency department encounter, please see the advanced practice provider's documentation. CHIEF COMPLAINT  Chief Complaint   Patient presents with    J Tube Complications     Emlyn transported from [facility] stating pt just pulled out j tube, it is covered with a dressing, squad reports tachy hr, but other vitals stable. J tube at the bedside. Briefly, Heidi Braden is a 70 y.o. male  who presents to the ED complaining of feeding tube dislodgment. It is a G-tube, not a J-tube, as indicated in the triage note. Pt is otherwise without complaint. FOCUSED PHYSICAL EXAMINATION  BP (!) 157/75   Pulse 100   Temp 98.1 °F (36.7 °C) (Oral)   Resp 16   Ht 5' 7\" (1.702 m)   SpO2 98%   BMI 18.48 kg/m²    Focused physical examination notable for no acute distress, well-appearing, abd soft NTND. G-tube in place (replaced by KRISTIN by time I evaluated him). 18Fr in place, no active bleeding or drainage or erythema at the site. MDM:  ED course was notable for initial attempt unsuccessful by KRISTIN to replace G-tube, successful on readjustment with 18Fr G-tube and XR confirms satisfactory alignment. Pt tolerated well. OK to use. Discharged. During the patient's ED course, the patient was given:  Medications   iohexol (OMNIPAQUE 350) 350 MG/ML solution (20 mLs  Given 8/22/19 2200)   iohexol (OMNIPAQUE 350) 350 MG/ML solution (  Given 8/22/19 2314)        CLINICAL IMPRESSION  1. Dislodged gastrostomy tube ([address]        DISPOSITION  Heidi Braden was discharged to home in stable condition.       Follow-up with:  Kieran Lopez 94

## 2019-08-24 ENCOUNTER — APPOINTMENT (OUTPATIENT)
Dept: CT IMAGING | Age: 69
DRG: 871 | End: 2019-08-24
Payer: MEDICARE

## 2019-08-24 PROBLEM — R65.21 SEVERE SEPSIS WITH SEPTIC SHOCK (CODE) (HCC): Status: ACTIVE | Noted: 2019-08-23

## 2019-08-24 LAB
ABO/RH: NORMAL
ANION GAP SERPL CALCULATED.3IONS-SCNC: 13 MMOL/L (ref 3–16)
ANTIBODY SCREEN: NORMAL
BASE EXCESS ARTERIAL: -9 (ref -3–3)
BLOOD BANK DISPENSE STATUS: NORMAL
BLOOD BANK PRODUCT CODE: NORMAL
BPU ID: NORMAL
BUN BLDV-MCNC: 22 MG/DL (ref 7–20)
CALCIUM IONIZED: 1.3 MMOL/L (ref 1.12–1.32)
CALCIUM SERPL-MCNC: 8.2 MG/DL (ref 8.3–10.6)
CHLORIDE BLD-SCNC: 119 MMOL/L (ref 99–110)
CO2: 19 MMOL/L (ref 21–32)
CREAT SERPL-MCNC: 1.7 MG/DL (ref 0.8–1.3)
DESCRIPTION BLOOD BANK: NORMAL
EKG ATRIAL RATE: 127 BPM
EKG DIAGNOSIS: NORMAL
EKG P AXIS: 54 DEGREES
EKG P-R INTERVAL: 124 MS
EKG Q-T INTERVAL: 342 MS
EKG QRS DURATION: 72 MS
EKG QTC CALCULATION (BAZETT): 497 MS
EKG R AXIS: -46 DEGREES
EKG T AXIS: 38 DEGREES
EKG VENTRICULAR RATE: 127 BPM
EMERGENCY ISSUE BLOOD PRODUCTS SIGNED FORM: NORMAL
GFR AFRICAN AMERICAN: 48
GFR NON-AFRICAN AMERICAN: 40
GLUCOSE BLD-MCNC: 103 MG/DL (ref 70–99)
GLUCOSE BLD-MCNC: 95 MG/DL (ref 70–99)
HCO3 ARTERIAL: 14.5 MMOL/L (ref 21–29)
HCT VFR BLD CALC: 28 % (ref 40.5–52.5)
HCT VFR BLD CALC: 29.2 % (ref 40.5–52.5)
HCT VFR BLD CALC: 29.9 % (ref 40.5–52.5)
HEMOGLOBIN: 10 G/DL (ref 13.5–17.5)
HEMOGLOBIN: 8.7 GM/DL (ref 13.5–17.5)
HEMOGLOBIN: 9.4 G/DL (ref 13.5–17.5)
HEMOGLOBIN: 9.8 G/DL (ref 13.5–17.5)
LACTATE: 1.26 MMOL/L (ref 0.4–2)
LACTIC ACID: 2.1 MMOL/L (ref 0.4–2)
MCH RBC QN AUTO: 31.8 PG (ref 26–34)
MCHC RBC AUTO-ENTMCNC: 33.5 G/DL (ref 31–36)
MCV RBC AUTO: 95.1 FL (ref 80–100)
O2 SAT, ARTERIAL: 97 % (ref 93–100)
OSMOLALITY: 316 MOSM/KG (ref 278–305)
PCO2 ARTERIAL: 20.1 MM HG (ref 35–45)
PDW BLD-RTO: 14.6 % (ref 12.4–15.4)
PERFORMED ON: ABNORMAL
PH ARTERIAL: 7.47 (ref 7.35–7.45)
PLATELET # BLD: 217 K/UL (ref 135–450)
PMV BLD AUTO: 7.7 FL (ref 5–10.5)
PO2 ARTERIAL: 85.6 MM HG (ref 75–108)
POC HEMATOCRIT: 26 % (ref 40.5–52.5)
POC POTASSIUM: 3.6 MMOL/L (ref 3.5–5.1)
POC SAMPLE TYPE: ABNORMAL
POC SODIUM: 150 MMOL/L (ref 136–145)
POTASSIUM SERPL-SCNC: 3.5 MMOL/L (ref 3.5–5.1)
RBC # BLD: 3.07 M/UL (ref 4.2–5.9)
SODIUM BLD-SCNC: 151 MMOL/L (ref 136–145)
TCO2 ARTERIAL: 15 MMOL/L
WBC # BLD: 7 K/UL (ref 4–11)

## 2019-08-24 PROCEDURE — 87070 CULTURE OTHR SPECIMN AEROBIC: CPT

## 2019-08-24 PROCEDURE — 85018 HEMOGLOBIN: CPT

## 2019-08-24 PROCEDURE — 93010 ELECTROCARDIOGRAM REPORT: CPT | Performed by: INTERNAL MEDICINE

## 2019-08-24 PROCEDURE — 85014 HEMATOCRIT: CPT

## 2019-08-24 PROCEDURE — 74174 CTA ABD&PLVS W/CONTRAST: CPT

## 2019-08-24 PROCEDURE — 6370000000 HC RX 637 (ALT 250 FOR IP): Performed by: INTERNAL MEDICINE

## 2019-08-24 PROCEDURE — 83605 ASSAY OF LACTIC ACID: CPT

## 2019-08-24 PROCEDURE — 99222 1ST HOSP IP/OBS MODERATE 55: CPT | Performed by: SURGERY

## 2019-08-24 PROCEDURE — 84132 ASSAY OF SERUM POTASSIUM: CPT

## 2019-08-24 PROCEDURE — 2580000003 HC RX 258: Performed by: INTERNAL MEDICINE

## 2019-08-24 PROCEDURE — 85027 COMPLETE CBC AUTOMATED: CPT

## 2019-08-24 PROCEDURE — 94640 AIRWAY INHALATION TREATMENT: CPT

## 2019-08-24 PROCEDURE — 2700000000 HC OXYGEN THERAPY PER DAY

## 2019-08-24 PROCEDURE — 99223 1ST HOSP IP/OBS HIGH 75: CPT | Performed by: PSYCHIATRY & NEUROLOGY

## 2019-08-24 PROCEDURE — 80048 BASIC METABOLIC PNL TOTAL CA: CPT

## 2019-08-24 PROCEDURE — 71260 CT THORAX DX C+: CPT

## 2019-08-24 PROCEDURE — 6360000002 HC RX W HCPCS: Performed by: INTERNAL MEDICINE

## 2019-08-24 PROCEDURE — 87205 SMEAR GRAM STAIN: CPT

## 2019-08-24 PROCEDURE — 94761 N-INVAS EAR/PLS OXIMETRY MLT: CPT

## 2019-08-24 PROCEDURE — 2000000000 HC ICU R&B

## 2019-08-24 PROCEDURE — 82947 ASSAY GLUCOSE BLOOD QUANT: CPT

## 2019-08-24 PROCEDURE — 82803 BLOOD GASES ANY COMBINATION: CPT

## 2019-08-24 PROCEDURE — 6360000002 HC RX W HCPCS: Performed by: HOSPITALIST

## 2019-08-24 PROCEDURE — 82330 ASSAY OF CALCIUM: CPT

## 2019-08-24 PROCEDURE — 84295 ASSAY OF SERUM SODIUM: CPT

## 2019-08-24 PROCEDURE — 99291 CRITICAL CARE FIRST HOUR: CPT | Performed by: INTERNAL MEDICINE

## 2019-08-24 PROCEDURE — C9113 INJ PANTOPRAZOLE SODIUM, VIA: HCPCS | Performed by: INTERNAL MEDICINE

## 2019-08-24 PROCEDURE — 6360000004 HC RX CONTRAST MEDICATION: Performed by: INTERNAL MEDICINE

## 2019-08-24 PROCEDURE — 36592 COLLECT BLOOD FROM PICC: CPT

## 2019-08-24 RX ORDER — VANCOMYCIN HYDROCHLORIDE 1 G/200ML
1000 INJECTION, SOLUTION INTRAVENOUS
Status: DISCONTINUED | OUTPATIENT
Start: 2019-08-25 | End: 2019-08-24 | Stop reason: DRUGHIGH

## 2019-08-24 RX ORDER — SODIUM CHLORIDE 450 MG/100ML
INJECTION, SOLUTION INTRAVENOUS CONTINUOUS
Status: DISCONTINUED | OUTPATIENT
Start: 2019-08-24 | End: 2019-08-25

## 2019-08-24 RX ORDER — IPRATROPIUM BROMIDE AND ALBUTEROL SULFATE 2.5; .5 MG/3ML; MG/3ML
1 SOLUTION RESPIRATORY (INHALATION) 4 TIMES DAILY
Status: DISCONTINUED | OUTPATIENT
Start: 2019-08-24 | End: 2019-08-25

## 2019-08-24 RX ORDER — SODIUM CHLORIDE 9 MG/ML
INJECTION, SOLUTION INTRAVENOUS
Status: DISPENSED
Start: 2019-08-24 | End: 2019-08-24

## 2019-08-24 RX ORDER — LORAZEPAM 2 MG/ML
0.5 INJECTION INTRAMUSCULAR ONCE
Status: COMPLETED | OUTPATIENT
Start: 2019-08-24 | End: 2019-08-24

## 2019-08-24 RX ORDER — 0.9 % SODIUM CHLORIDE 0.9 %
1000 INTRAVENOUS SOLUTION INTRAVENOUS ONCE
Status: COMPLETED | OUTPATIENT
Start: 2019-08-24 | End: 2019-08-24

## 2019-08-24 RX ORDER — 0.9 % SODIUM CHLORIDE 0.9 %
250 INTRAVENOUS SOLUTION INTRAVENOUS ONCE
Status: COMPLETED | OUTPATIENT
Start: 2019-08-24 | End: 2019-08-24

## 2019-08-24 RX ADMIN — MEROPENEM 1 G: 1 INJECTION, POWDER, FOR SOLUTION INTRAVENOUS at 16:41

## 2019-08-24 RX ADMIN — SODIUM CHLORIDE 1000 ML: 9 INJECTION, SOLUTION INTRAVENOUS at 03:11

## 2019-08-24 RX ADMIN — THIAMINE HYDROCHLORIDE 500 MG: 100 INJECTION, SOLUTION INTRAMUSCULAR; INTRAVENOUS at 23:02

## 2019-08-24 RX ADMIN — VANCOMYCIN HYDROCHLORIDE 750 MG: 750 INJECTION, POWDER, LYOPHILIZED, FOR SOLUTION INTRAVENOUS at 20:29

## 2019-08-24 RX ADMIN — IOPAMIDOL 100 ML: 755 INJECTION, SOLUTION INTRAVENOUS at 00:52

## 2019-08-24 RX ADMIN — THIAMINE HYDROCHLORIDE 500 MG: 100 INJECTION, SOLUTION INTRAMUSCULAR; INTRAVENOUS at 17:52

## 2019-08-24 RX ADMIN — SODIUM CHLORIDE: 4.5 INJECTION, SOLUTION INTRAVENOUS at 20:29

## 2019-08-24 RX ADMIN — IPRATROPIUM BROMIDE AND ALBUTEROL SULFATE 1 AMPULE: .5; 3 SOLUTION RESPIRATORY (INHALATION) at 12:24

## 2019-08-24 RX ADMIN — Medication 10 ML: at 12:14

## 2019-08-24 RX ADMIN — SODIUM CHLORIDE 250 ML: 9 INJECTION, SOLUTION INTRAVENOUS at 04:07

## 2019-08-24 RX ADMIN — PANTOPRAZOLE SODIUM 80 MG: 40 INJECTION, POWDER, LYOPHILIZED, FOR SOLUTION INTRAVENOUS at 01:26

## 2019-08-24 RX ADMIN — MEROPENEM 1 G: 1 INJECTION, POWDER, FOR SOLUTION INTRAVENOUS at 03:16

## 2019-08-24 RX ADMIN — SODIUM CHLORIDE 8 MG/HR: 9 INJECTION, SOLUTION INTRAVENOUS at 01:27

## 2019-08-24 RX ADMIN — LORAZEPAM 0.5 MG: 2 INJECTION INTRAMUSCULAR; INTRAVENOUS at 17:52

## 2019-08-24 RX ADMIN — SODIUM CHLORIDE: 4.5 INJECTION, SOLUTION INTRAVENOUS at 09:00

## 2019-08-24 RX ADMIN — THIAMINE HYDROCHLORIDE 500 MG: 100 INJECTION, SOLUTION INTRAMUSCULAR; INTRAVENOUS at 06:02

## 2019-08-24 RX ADMIN — SODIUM CHLORIDE: 9 INJECTION, SOLUTION INTRAVENOUS at 06:02

## 2019-08-24 RX ADMIN — Medication 10 ML: at 20:29

## 2019-08-24 RX ADMIN — SODIUM CHLORIDE 8 MG/HR: 9 INJECTION, SOLUTION INTRAVENOUS at 10:42

## 2019-08-24 ASSESSMENT — PAIN SCALES - GENERAL
PAINLEVEL_OUTOF10: 0

## 2019-08-24 NOTE — CONSULTS
Inability: None    Transportation needs:     Medical: None     Non-medical: None   Tobacco Use    Smoking status: Current Every Day Smoker    Smokeless tobacco: Never Used   Substance and Sexual Activity    Alcohol use:  Yes    Drug use: Never    Sexual activity: None   Lifestyle    Physical activity:     Days per week: None     Minutes per session: None    Stress: None   Relationships    Social connections:     Talks on phone: None     Gets together: None     Attends Restoration[employer] service: None     Active member of club or organization: None     Attends meetings of clubs or organizations: None     Relationship status: None    Intimate partner violence:     Fear of current or ex partner: None     Emotionally abused: None     Physically abused: None     Forced sexual activity: None   Other Topics Concern    None   Social History Narrative    None     Current Facility-Administered Medications   Medication Dose Route Frequency Provider Last Rate Last Dose    sodium chloride 0.9 % infusion             0.45 % sodium chloride infusion   Intravenous Continuous Yelitza Modi  mL/hr at 08/24/19 0900      vancomycin (VANCOCIN) 750 mg in dextrose 5 % 250 mL IVPB  750 mg Intravenous Q24H Yelitza Modi MD        ipratropium-albuterol (DUONEB) nebulizer solution 1 ampule  1 ampule Inhalation 4x daily Kash Lopes MD   1 ampule at 08/24/19 1224    sodium chloride flush 0.9 % injection 10 mL  10 mL Intravenous 2 times per day Felipe Ramirez MD   10 mL at 08/24/19 1214    sodium chloride flush 0.9 % injection 10 mL  10 mL Intravenous PRN Felipe Ramirez MD        norepinephrine (LEVOPHED) 16 mg in dextrose 5% 250 mL infusion  10 mcg/min Intravenous Continuous Felipe Ramirez MD   Stopped at 08/24/19 1219    thiamine (B-1) 500 mg in sodium chloride 0.9 % 100 mL IVPB  500 mg Intravenous Q8H Jj Rm MD   Stopped at 08/24/19 8829    pantoprazole (PROTONIX) 80 mg in

## 2019-08-24 NOTE — CONSULTS
and Sexual Activity    Alcohol use: Yes    Drug use: Never    Sexual activity: Not on file   Lifestyle    Physical activity:     Days per week: Not on file     Minutes per session: Not on file    Stress: Not on file   Relationships    Social connections:     Talks on phone: Not on file     Gets together: Not on file     Attends [employer] service: Not on file     Active member of club or organization: Not on file     Attends meetings of clubs or organizations: Not on file     Relationship status: Not on file    Intimate partner violence:     Fear of current or ex partner: Not on file     Emotionally abused: Not on file     Physically abused: Not on file     Forced sexual activity: Not on file   Other Topics Concern    Not on file   Social History Narrative    Not on file       Allergies: No Known Allergies    Prior to Admission medications    Medication Sig Start Date End Date Taking?  Authorizing Provider   aspirin 81 MG chewable tablet Take 1 tablet by mouth daily 8/21/19  Yes Sal Rivera MD   atorvastatin (LIPITOR) 20 MG tablet Take 1 tablet by mouth nightly 8/20/19  Yes Sal Rivera MD   haloperidol (HALDOL) 0.5 MG tablet Take 5 tablets by mouth 3 times daily 8/20/19  Yes Sal Rivera MD   metoprolol tartrate (LOPRESSOR) 50 MG tablet Take 1 tablet by mouth 2 times daily 8/20/19  Yes Sal Rivera MD   [facility]) OINT ointment Apply topically 2 times daily 8/20/19  Yes Sal Rivera MD   folic acid (FOLVITE) 1 MG tablet 1 tablet by Per G Tube route daily 8/21/19  Yes Sal Rivera MD   thiamine 100 MG tablet Take 1 tablet by mouth daily 8/21/19  Yes Sal Rivera MD   lisinopril (PRINIVIL;ZESTRIL) 10 MG tablet Take 1 tablet by mouth daily Hold for systolic blood pressure less than 100mmHg 8/20/19  Yes Sal Rivera MD   linezolid (ZYVOX) 600 MG tablet Take 1 tablet by mouth every 12 hours Twice per day until 9/20/2019 8/20/19 9/20/19 Yes Sal Rivera MD There is old blood draining from the site. Overall findings are consistent with a rectus sheath hematoma. The air within the left rectus sheath is likely related to repositioning the tube in the emergency room. There are no findings to suggest a necrotizing infection. The main concern is whether or not he is leaking gastric contents intra-abdominally. This is felt to be of low probability given the CAT scan findings (contained collection within the left rectus sheath with only minimal free fluid in the pelvis) and physical exam findings. Plan:  No surgical plans at the present time. We will continue to monitor for worsening abdominal pain. Check culture of the G-tube site drainage. Will need to address method of feeding within the next few days.     Lottie Ojeda MD  8/24/2019

## 2019-08-24 NOTE — PROGRESS NOTES
Peg tube removed by Dr. Jean Molina. Order to collect fluid for culture. Fluid is serosanguinous, not foul smelling.   Bebo Conrad RN, BSN

## 2019-08-24 NOTE — PROGRESS NOTES
Message sent to Dr. Joaquin Keys. pt is very anxious, and probably hallucinating. is awake, but not oriented. HR 140s. do you want to treat? Awaiting new orders.

## 2019-08-24 NOTE — ED NOTES
Fluid boluses stopped per Dr field at 1500ml. Pt pulse ox switched to forehead for better reading 100% at this time. Pt now more responsive to voice commands. Able to make eye contact, motioning towards gtube, indicating pain in abdomen around tube.        Daryn Liu RN  08/23/19 1108
Pt appears anxious, states the mask hurts his face. Writer has been in room several times to adjust bipap mask because pt keeps trying to pull it off. Writer spoke with Wendy RUDOLPH and RT. Plan is to keep pt calm and keep on bipap at this time.      Davi Chandra RN  08/23/19 1958
Pt attempting to take mask off. Still more alert and responsive than ER arrival. Still able to answer yes or no to questions.      Althea Lee RN  08/23/19 6950
Pt back from Ct. Back in bed and back on monitor. Patient resting comfortably with no signs of distress. Denies any needs at this time. Bed locked and in lowest position with both side rails raised. Call light within reach.      Trevor Perdomo RN  08/23/19 9027
Pt taken off bipap per James RUDOLPH and placed on 4LPM NC. SpO2 maintaining above 94%.      Davi Chandra RN  08/23/19 4332
Trevor Perdomo RN  08/23/19 8690
Xray at bedside     Trevor Perdomo RN  08/23/19 6235
solution 1 ampule  1 ampule Inhalation Q4H WA Juan Louie MD   1 ampule at 08/22/19 1655    [DISCONTINUED] albuterol (PROVENTIL) nebulizer solution 2.5 mg  2.5 mg Nebulization Q6H PRN Juan Louie MD        [DISCONTINUED] linezolid (ZYVOX) IVPB 600 mg  600 mg Intravenous Q12H Yeni Bucio MD   Stopped at 08/22/19 1148    [DISCONTINUED] metoprolol tartrate (LOPRESSOR) tablet 50 mg  50 mg Oral BID Sheryl Carver MD   50 mg at 08/22/19 1154    [DISCONTINUED] haloperidol (HALDOL) tablet 2.5 mg  2.5 mg Oral TID Sheryl Carver MD   2.5 mg at 08/22/19 1400     Current Outpatient Medications on File Prior to Encounter   Medication Sig Dispense Refill    aspirin 81 MG chewable tablet Take 1 tablet by mouth daily 30 tablet 3    atorvastatin (LIPITOR) 20 MG tablet Take 1 tablet by mouth nightly 30 tablet 3    haloperidol (HALDOL) 0.5 MG tablet Take 5 tablets by mouth 3 times daily 120 tablet 3    metoprolol tartrate (LOPRESSOR) 50 MG tablet Take 1 tablet by mouth 2 times daily 60 tablet 3    Balsam [place]-Castor Oil (VENELEX) OINT ointment Apply topically 2 times daily 1 Tube 1    folic acid (FOLVITE) 1 MG tablet 1 tablet by Per G Tube route daily 30 tablet 3    thiamine 100 MG tablet Take 1 tablet by mouth daily 30 tablet 3    lisinopril (PRINIVIL;ZESTRIL) 10 MG tablet Take 1 tablet by mouth daily Hold for systolic blood pressure less than 100mmHg 90 tablet 1    linezolid (ZYVOX) 600 MG tablet Take 1 tablet by mouth every 12 hours Twice per day until 9/20/2019 62 tablet 0    diclofenac sodium 1 % GEL Apply 2 g topically 2 times daily.
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
## 2019-08-24 NOTE — PROGRESS NOTES
Return from CT. VSS. Awaiting results. Will continue to monitor.  Jeancarlos Monsivais RN, BSN, 1:23 AM

## 2019-08-24 NOTE — PROGRESS NOTES
PerfectServe message sent to Renal as follows:     ED admission for septic shock. AVIVA. Creat 1.9, NS @ 150ml/hr. Large volume UGIB. 1 unit PRBCs. No need for call back. Will continue to monitor.  Danika De Luna RN, BSN, 1:44 AM

## 2019-08-24 NOTE — PROGRESS NOTES
index is 18.15 kg/m². Estimated Creatinine Clearance: 31 mL/min (A) (based on SCr of 1.7 mg/dL (H)). Goal Trough Level: 15-20 mcg/mL    Assessment/Plan:   Will initiate vancomycin 750 mg IV every 24 hours, in anticipation renal function will improve.  First trough scheduled for 8/25/19 @ 2000, prior to 3rd dose of dosing regimen.  HOLD DOSE IF TROUGH >20.  Timing of future regimen and trough levels will be determined based on culture results, renal function, and clinical response. Thank you for the consult. Will continue to follow.      Tahira Pro, PharmD  Clinical Pharmacist [id number]  8/24/2019

## 2019-08-24 NOTE — PROGRESS NOTES
Received from ER per bed to ICU 12. Bedside monitor applied, ST 140s. RR shallow and labored, >30/min. Rhonchi noted throughout lung fields. Attempted to check placement of Gtube and flush, bright red blood puring out when unclamped. Bleeding noted to site as well. BS active x4. IVF and Levo gtt infusing at time of admisison. See MAR. Pt oriented to person only. Yes/No response only. Oriented to room and call system. Plan of care, medications, and safety reviewed. Admission assessment completed, see flowsheets. No visitors present at time of admission. Patient is resting in bed at this time, currently semi fowlers. Palma care provided, mepilex to coccyx. Redness noted. Call light is in reach and bed alarm is engaged. Pt is a high fall risk. Will continue to monitor.  Betsy William 2:50 AM

## 2019-08-24 NOTE — PROGRESS NOTES
Verbal orders received for 1 additional unit PRBCs per Dr. Adi Ochoa.  Melly Jernigan RN, BSN, 3:20 AM

## 2019-08-24 NOTE — PROGRESS NOTES
please see follow-up guidelines below. RECOMMENDATIONS:   3.3 cm abdominal aortic aneurysm. Recommend follow-up every 3 years. Reference: J Am Maribell Radiol 8341;80:[id number]. CTA ABDOMEN PELVIS W WO CONTRAST   Final Result   1. Interval dislodgement of the G-tube with development of markedly enlarged   hemorrhagic infected collection along the left rectus sheath as measured and   described above. G-tube is within the medial aspect of the collection. Collection becomes ill-defined along its inferior aspect in the pelvis with   areas of fluid containing superimposed foci of gas scattered within the   anterior lower abdomen and pelvis. Finding was discussed with [facility] ABRAHAM BARILLAS at 3:24 am on 8/24/2019.   2. Pulmonary arteries are suboptimally opacified for evaluation with limited   assessment of the distal segmental and subsegmental vessels. No evidence of   central/proximal intraluminal filling defect to suggest pulmonary embolism. 3. Interval decrease size of pleural based cavitary opacity in the posterior   right upper lobe as measured above. Additional scattered opacities appear to   resolved or decreased in size. Findings suggest resolving multifocal   infectious/inflammatory process. 4. Stable 1.4 cm slightly spiculated nodular opacity in the anterior upper   lobe, malignancy remains a diagnostic consideration   5. Persistent right basilar opacification which may represent atelectasis or   pneumonia. Persistent but interval decreased left retrocardiac atelectasis. 6. Redemonstration of indeterminate left adrenal nodule measured above. 7. Mild wall thickening along the gastric antrum and proximal to mid duodenum   suggesting gastritis/duodenitis. 8. Mild wall thickening throughout the descending and sigmoid colon   suggesting colitis.    9. Multiple incidental/chronic findings as detailed above including   redemonstration of infrarenal abdominal aortic aneurysm as measured above,   please see follow-up guidelines below. RECOMMENDATIONS:   3.3 cm abdominal aortic aneurysm. Recommend follow-up every 3 years. Reference: J Am Maribell Radiol 3121;82:863-546. CT CERVICAL SPINE WO CONTRAST   Final Result   No acute abnormality of the cervical spine. No change since the study   performed 1 day ago. CT HEAD WO CONTRAST   Final Result   No acute intracranial abnormality. Stable small polyp or retention cyst left sphenoid sinus and tiny amount of   fluid in the mastoid tips bilaterally. XR CHEST PORTABLE   Final Result   Persistent right upper lobe mass seen to better advantage on recent CT. Follow-up CT as was recommended on report from 08/09/2019 is warranted. No   new or acute finding is seen.                  Kirsten Parks MD

## 2019-08-24 NOTE — CONSULTS
Endoscopic History: EGD with PEG 8/14/2019 - normal EGD. Admission Meds  No current facility-administered medications on file prior to encounter. Current Outpatient Medications on File Prior to Encounter   Medication Sig Dispense Refill    aspirin 81 MG chewable tablet Take 1 tablet by mouth daily 30 tablet 3    atorvastatin (LIPITOR) 20 MG tablet Take 1 tablet by mouth nightly 30 tablet 3    haloperidol (HALDOL) 0.5 MG tablet Take 5 tablets by mouth 3 times daily 120 tablet 3    metoprolol tartrate (LOPRESSOR) 50 MG tablet Take 1 tablet by mouth 2 times daily 60 tablet 3    Balsam [place]-Castor Oil (VENELEX) OINT ointment Apply topically 2 times daily 1 Tube 1    folic acid (FOLVITE) 1 MG tablet 1 tablet by Per G Tube route daily 30 tablet 3    thiamine 100 MG tablet Take 1 tablet by mouth daily 30 tablet 3    lisinopril (PRINIVIL;ZESTRIL) 10 MG tablet Take 1 tablet by mouth daily Hold for systolic blood pressure less than 100mmHg 90 tablet 1    linezolid (ZYVOX) 600 MG tablet Take 1 tablet by mouth every 12 hours Twice per day until 9/20/2019 62 tablet 0    diclofenac sodium 1 % GEL Apply 2 g topically 2 times daily. Patient denies ASA, NSAID use. Allergies  No Known Allergies   Social   Social History     Tobacco Use    Smoking status: Current Every Day Smoker    Smokeless tobacco: Never Used   Substance Use Topics    Alcohol use: Yes        Family History   Problem Relation Age of Onset    Heart Disease Mother     Cancer Father         colon       Review of Systems  Pertinent items are noted in HPI. Physical Exam  Blood pressure (!) 117/54, pulse 125, temperature 99.3 °F (37.4 °C), temperature source Temporal, resp. rate (!) 31, height 5' 7\" (1.702 m), weight 115 lb 14.4 oz (52.6 kg), SpO2 99 %.     General appearance: Thin, ill-appearing, somnolent, cooperative, no distress, appears stated age  Eyes: Anicteric  Head: Normocephalic, without obvious abnormality  Lungs: clear

## 2019-08-24 NOTE — PROGRESS NOTES
PerfectServe message sent to Pulmonology as follows:     ED admission for septic shock. AVIVA. Large volume UGIB. 1 unit PRBCs. STAT Chest/abdominal CT. RR 30s, HR 140s. Will continue to monitor.  Maru Cash RN, BSN, 1:49 AM

## 2019-08-24 NOTE — CONSULTS
first to try to clear out that right side. Continues to require norepinephrine and support of his blood pressure  Surgical consult is requested  He is on Maxipime and vancomycin pending culture results. Also on IV Protonix    Addendum: Discussed with Dr. Carolynn Colon. Does not need to go to surgery at the moment. We will try to treat the mucus plugging noninvasively. If his condition stabilizes bronchoscopy may be beneficial.    Total critical care time caring for this patient with life threatening illness, including direct patient contact, management of life support systems, review of data including imaging and labs, discussions with other team members and physicians is at least 32 minutes so far today, excluding procedures.

## 2019-08-24 NOTE — H&P
[address]                     [address] [facility], [address]                              HISTORY AND PHYSICAL    PATIENT NAME: Naty Overton                     :        1950  MED REC NO:   [id number]                          ROOM:       0374  ACCOUNT NO:   [de-identified]                           ADMIT DATE: 2019  PROVIDER:     Spike Ferrell MD    I obtained the history and performed the physical exam on the patient in  the emergency room and in the intensive care unit on 2019. CHIEF COMPLAINT:  Mental status changes with severe hypotension. HISTORY OF PRESENT ILLNESS:  This 59-year-old  male who looks  critically ill, unable to provide any history whatsoever, all history  obtained from the chart. The patient apparently was recently seen at  [facility], [facility]. and had a PEG tube placed 10 days ago, and was  discharged to the [facility], went there, was having frequent falls, and a as  consequence, presented back us to the [facility] Emergency Room for  dislodgement of the PET tube, had PET tube repositioned, and was sent  back, and today presents with increasing tachycardia, confusion, and not  acting right with severe hypotension. The patient is unable to provide  any history whatsoever. PAST MEDICAL/PAST SURGICAL HISTORY:  1. Chronic alcoholism. 2.  Hypertension. 3.  Dyslipidemia. PAST SURGICAL HISTORY:  The patient has had a PEG tube placement. ALLERGIC HISTORY:  No known allergies. FAMILY HISTORY:  Reviewed by me and is currently noncontributory. SOCIAL HISTORY:  Currently lives in a nursing home. MEDICATIONS:  1. Aspirin. 2.  Lipitor. 3.  Haldol. 4.  Lopressor. 5.  Folvite. 6.  Thiamine. 7.  Lisinopril. 8.  Zyvox. 9.  Diclofenac.     REVIEW OF SYSTEMS:  The patient's review of systems cannot be obtained  because of the patient's critically ill nature, and his significant  obtundation and

## 2019-08-25 ENCOUNTER — APPOINTMENT (OUTPATIENT)
Dept: GENERAL RADIOLOGY | Age: 69
DRG: 871 | End: 2019-08-25
Payer: MEDICARE

## 2019-08-25 LAB
ANION GAP SERPL CALCULATED.3IONS-SCNC: 11 MMOL/L (ref 3–16)
BUN BLDV-MCNC: 22 MG/DL (ref 7–20)
CALCIUM SERPL-MCNC: 9 MG/DL (ref 8.3–10.6)
CHLORIDE BLD-SCNC: 119 MMOL/L (ref 99–110)
CO2: 19 MMOL/L (ref 21–32)
CREAT SERPL-MCNC: 1.3 MG/DL (ref 0.8–1.3)
GFR AFRICAN AMERICAN: >60
GFR NON-AFRICAN AMERICAN: 55
GLUCOSE BLD-MCNC: 93 MG/DL (ref 70–99)
HCT VFR BLD CALC: 27.3 % (ref 40.5–52.5)
HCT VFR BLD CALC: 27.8 % (ref 40.5–52.5)
HCT VFR BLD CALC: 29.2 % (ref 40.5–52.5)
HEMOGLOBIN: 9 G/DL (ref 13.5–17.5)
HEMOGLOBIN: 9.3 G/DL (ref 13.5–17.5)
HEMOGLOBIN: 9.5 G/DL (ref 13.5–17.5)
MCH RBC QN AUTO: 31.5 PG (ref 26–34)
MCHC RBC AUTO-ENTMCNC: 32.7 G/DL (ref 31–36)
MCV RBC AUTO: 96.5 FL (ref 80–100)
PDW BLD-RTO: 15.6 % (ref 12.4–15.4)
PLATELET # BLD: 220 K/UL (ref 135–450)
PMV BLD AUTO: 7.7 FL (ref 5–10.5)
POTASSIUM SERPL-SCNC: 3.4 MMOL/L (ref 3.5–5.1)
POTASSIUM SERPL-SCNC: 3.6 MMOL/L (ref 3.5–5.1)
RBC # BLD: 3.02 M/UL (ref 4.2–5.9)
SODIUM BLD-SCNC: 149 MMOL/L (ref 136–145)
VANCOMYCIN TROUGH: 8.6 UG/ML (ref 10–20)
WBC # BLD: 10.7 K/UL (ref 4–11)

## 2019-08-25 PROCEDURE — 99232 SBSQ HOSP IP/OBS MODERATE 35: CPT | Performed by: SURGERY

## 2019-08-25 PROCEDURE — 2580000003 HC RX 258: Performed by: INTERNAL MEDICINE

## 2019-08-25 PROCEDURE — 85014 HEMATOCRIT: CPT

## 2019-08-25 PROCEDURE — 94640 AIRWAY INHALATION TREATMENT: CPT

## 2019-08-25 PROCEDURE — C9113 INJ PANTOPRAZOLE SODIUM, VIA: HCPCS | Performed by: INTERNAL MEDICINE

## 2019-08-25 PROCEDURE — 2580000003 HC RX 258

## 2019-08-25 PROCEDURE — 6360000002 HC RX W HCPCS: Performed by: INTERNAL MEDICINE

## 2019-08-25 PROCEDURE — 94669 MECHANICAL CHEST WALL OSCILL: CPT

## 2019-08-25 PROCEDURE — 80048 BASIC METABOLIC PNL TOTAL CA: CPT

## 2019-08-25 PROCEDURE — 71045 X-RAY EXAM CHEST 1 VIEW: CPT

## 2019-08-25 PROCEDURE — 85027 COMPLETE CBC AUTOMATED: CPT

## 2019-08-25 PROCEDURE — 36592 COLLECT BLOOD FROM PICC: CPT

## 2019-08-25 PROCEDURE — 2700000000 HC OXYGEN THERAPY PER DAY

## 2019-08-25 PROCEDURE — 80202 ASSAY OF VANCOMYCIN: CPT

## 2019-08-25 PROCEDURE — 99232 SBSQ HOSP IP/OBS MODERATE 35: CPT | Performed by: PSYCHIATRY & NEUROLOGY

## 2019-08-25 PROCEDURE — 2000000000 HC ICU R&B

## 2019-08-25 PROCEDURE — 85018 HEMOGLOBIN: CPT

## 2019-08-25 PROCEDURE — 99233 SBSQ HOSP IP/OBS HIGH 50: CPT | Performed by: INTERNAL MEDICINE

## 2019-08-25 PROCEDURE — 84132 ASSAY OF SERUM POTASSIUM: CPT

## 2019-08-25 RX ORDER — SODIUM CHLORIDE 9 MG/ML
INJECTION, SOLUTION INTRAVENOUS
Status: COMPLETED
Start: 2019-08-25 | End: 2019-08-25

## 2019-08-25 RX ORDER — POTASSIUM CHLORIDE 29.8 MG/ML
20 INJECTION INTRAVENOUS PRN
Status: DISCONTINUED | OUTPATIENT
Start: 2019-08-25 | End: 2019-08-27

## 2019-08-25 RX ORDER — MORPHINE SULFATE 2 MG/ML
2 INJECTION, SOLUTION INTRAMUSCULAR; INTRAVENOUS
Status: DISCONTINUED | OUTPATIENT
Start: 2019-08-25 | End: 2019-08-31

## 2019-08-25 RX ORDER — LEVALBUTEROL INHALATION SOLUTION 1.25 MG/3ML
1.25 SOLUTION RESPIRATORY (INHALATION) EVERY 8 HOURS PRN
Status: DISCONTINUED | OUTPATIENT
Start: 2019-08-25 | End: 2019-08-26

## 2019-08-25 RX ORDER — DEXTROSE AND SODIUM CHLORIDE 5; .2 G/100ML; G/100ML
INJECTION, SOLUTION INTRAVENOUS CONTINUOUS
Status: DISCONTINUED | OUTPATIENT
Start: 2019-08-25 | End: 2019-08-26

## 2019-08-25 RX ADMIN — MORPHINE SULFATE 2 MG: 2 INJECTION, SOLUTION INTRAMUSCULAR; INTRAVENOUS at 23:00

## 2019-08-25 RX ADMIN — Medication 10 ML: at 08:09

## 2019-08-25 RX ADMIN — MORPHINE SULFATE 2 MG: 2 INJECTION, SOLUTION INTRAMUSCULAR; INTRAVENOUS at 10:09

## 2019-08-25 RX ADMIN — IPRATROPIUM BROMIDE 0.5 MG: 0.5 SOLUTION RESPIRATORY (INHALATION) at 16:50

## 2019-08-25 RX ADMIN — MEROPENEM 1 G: 1 INJECTION, POWDER, FOR SOLUTION INTRAVENOUS at 03:57

## 2019-08-25 RX ADMIN — SODIUM CHLORIDE 8 MG/HR: 9 INJECTION, SOLUTION INTRAVENOUS at 18:56

## 2019-08-25 RX ADMIN — POTASSIUM CHLORIDE 20 MEQ: 29.8 INJECTION, SOLUTION INTRAVENOUS at 08:09

## 2019-08-25 RX ADMIN — POTASSIUM CHLORIDE 20 MEQ: 29.8 INJECTION, SOLUTION INTRAVENOUS at 06:56

## 2019-08-25 RX ADMIN — SODIUM CHLORIDE 250 ML: 9 INJECTION, SOLUTION INTRAVENOUS at 06:55

## 2019-08-25 RX ADMIN — IPRATROPIUM BROMIDE 0.5 MG: 0.5 SOLUTION RESPIRATORY (INHALATION) at 12:51

## 2019-08-25 RX ADMIN — Medication 10 ML: at 20:45

## 2019-08-25 RX ADMIN — IPRATROPIUM BROMIDE 0.5 MG: 0.5 SOLUTION RESPIRATORY (INHALATION) at 19:56

## 2019-08-25 RX ADMIN — THIAMINE HYDROCHLORIDE 500 MG: 100 INJECTION, SOLUTION INTRAMUSCULAR; INTRAVENOUS at 05:41

## 2019-08-25 RX ADMIN — VANCOMYCIN HYDROCHLORIDE 750 MG: 750 INJECTION, POWDER, LYOPHILIZED, FOR SOLUTION INTRAVENOUS at 20:40

## 2019-08-25 RX ADMIN — THIAMINE HYDROCHLORIDE 500 MG: 100 INJECTION, SOLUTION INTRAMUSCULAR; INTRAVENOUS at 22:08

## 2019-08-25 RX ADMIN — MORPHINE SULFATE 2 MG: 2 INJECTION, SOLUTION INTRAMUSCULAR; INTRAVENOUS at 15:39

## 2019-08-25 RX ADMIN — MEROPENEM 1 G: 1 INJECTION, POWDER, FOR SOLUTION INTRAVENOUS at 15:42

## 2019-08-25 RX ADMIN — SODIUM CHLORIDE 8 MG/HR: 9 INJECTION, SOLUTION INTRAVENOUS at 05:41

## 2019-08-25 RX ADMIN — DEXTROSE AND SODIUM CHLORIDE: 5; 200 INJECTION, SOLUTION INTRAVENOUS at 09:31

## 2019-08-25 RX ADMIN — THIAMINE HYDROCHLORIDE 500 MG: 100 INJECTION, SOLUTION INTRAMUSCULAR; INTRAVENOUS at 13:46

## 2019-08-25 ASSESSMENT — PAIN SCALES - GENERAL
PAINLEVEL_OUTOF10: 0
PAINLEVEL_OUTOF10: 2
PAINLEVEL_OUTOF10: 4
PAINLEVEL_OUTOF10: 8
PAINLEVEL_OUTOF10: 0
PAINLEVEL_OUTOF10: 6
PAINLEVEL_OUTOF10: 0
PAINLEVEL_OUTOF10: 0
PAINLEVEL_OUTOF10: 8
PAINLEVEL_OUTOF10: 0

## 2019-08-25 NOTE — PROGRESS NOTES
Femoral line day 2. Discussed plan for removal and IV access change with Dr. Nallely Rodriguez. Order or plan includes continued need for central line. Wilda Welch RN, BSN, CCRN.

## 2019-08-25 NOTE — PROGRESS NOTES
Verbal orders received for K replacement per Dr. Vicente Fraser for K 3.4, see orders.  Ramon Eisenberg RN, BSN, 6:49 AM

## 2019-08-25 NOTE — PROGRESS NOTES
PRN  norepinephrine (LEVOPHED) 16 mg in dextrose 5% 250 mL infusion, 10 mcg/min, Intravenous, Continuous  thiamine (B-1) 500 mg in sodium chloride 0.9 % 100 mL IVPB, 500 mg, Intravenous, Q8H  pantoprazole (PROTONIX) 80 mg in sodium chloride 0.9 % 100 mL infusion, 8 mg/hr, Intravenous, Continuous  meropenem (MERREM) 1 g in sodium chloride 0.9 % 100 mL IVPB (mini-bag), 1 g, Intravenous, Q12H    No Known Allergies    Social History:    TOBACCO:   reports that he has been smoking. He has never used smokeless tobacco.  ETOH:   reports that he drinks alcohol. Patient currently lives independently  Environmental/chemical exposure: None known    Family History:       Problem Relation Age of Onset    Heart Disease Mother     Cancer Father         colon     REVIEW OF SYSTEMS:    CHRISTELLE is unobtainable due to his critical illness. Objective:   PHYSICAL EXAM:      VITALS:  /76   Pulse 131   Temp 99.2 °F (37.3 °C) (Temporal)   Resp (!) 37   Ht 5' 7\" (1.702 m)   Wt 116 lb 12.8 oz (53 kg)   SpO2 96%   BMI 18.29 kg/m²      24HR INTAKE/OUTPUT:      Intake/Output Summary (Last 24 hours) at 8/25/2019 0924  Last data filed at 8/25/2019 0541  Gross per 24 hour   Intake 3317.9 ml   Output 1175 ml   Net 2142.9 ml     CONSTITUTIONAL: Alert and responsive but not really giving any history. No apparent distress, and appears stated age  NECK:  Supple, symmetrical, trachea midline, no adenopathy, thyroid symmetric, not enlarged and no tenderness, skin normal  LUNGS:  no increased work of breathing and clear to auscultation. No accessory muscle use  CARDIOVASCULAR: S1 and S2, no edema and no JVD  ABDOMEN: Tender. G-tube remains at the moment. Firm more to the left of the abdomen. LYMPHADENOPATHY:  no axillary or supraclavicular adenopathy. No cervical adnenopathy  PSYCHIATRIC: Oriented to person place and time. No obvious depression or anxiety. MUSCULOSKELETAL: No obvious misalignment or effusion of the joints.  No

## 2019-08-25 NOTE — PROGRESS NOTES
Noon assessment done and recorded. Repeat labs drawn per central line and specimen to lab. Patient appears more comfortable after medication. Respiratory rate 28 and heart rate 126 at present time. Dr Ethel Davis here for rounds. Discussed need for nutrition and he will speak with Dr Casimiro Gifford. See flow sheet for details.

## 2019-08-25 NOTE — PROGRESS NOTES
Continues to have very labored rapid respirations. Congested cough and tachycardia. NT suctioned for large amount thick tan secretions with large plug on catheter. Post suctioning patient expectorated huge amount thick tan mucous.
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
## 2019-08-25 NOTE — CONSULTS
of bronchi    Weakness    Disorientation    Severe protein-calorie malnutrition (HCC)    SOB (shortness of breath)    DNR (do not resuscitate) discussion    Encounter for palliative care    Aspiration into airway    Severe sepsis with septic shock (CODE) (HCC)    Lactic acidosis    Hematoma of rectus sheath    Left leg weakness     : other supportive care :   - Check daily renal function panel with electrolytes-phosphorus  - Strict monitoring of I/Os, daily weight  - Renal feeds/diet  - Current medications reviewed. - Nephrotoxic medications have been discontinued. - Dose adjusted and appropriate. - Dose meds for eGFR <15 mL/min/1.73m2 during AVIVA    - Avoid heavy opioids due to renal failure - may use very low dose dilaudid / fentanyl with close monitoring of CNS and respiratory depression. Please refer to the orders. High Complexity. Multiple complex problems. Discussed with  and treatment team-ICU nurse  Severally ill, at risk of impending organ failure needing ICU higher level of care/monitoring. Time spent 31 minutes that included face-to-face meeting/discussion with ICU treatment team (including primary/referring team and other consultants; included coordination of care with the treatment team; and review of patient's electronic medical records and ordering appropriates tests. Thank you for allowing me to participate in this patient's care. Please do not hesitate to contact me with any questions/concerns. We will follow along with you. Lizzy Walsh MD       [facility] [address] [place]: [phone] or [address]  Fax: [phone]          Subjective:  Seen in ICU, resting,    AVIVA improving  Shock better  UOP decent  hyperNA stable    Below mentioned Past medical / Surgical, Social, Family medical history reviewed by me.    PAST MEDICAL HISTORY:   Past Medical History:   Diagnosis Date    Allergic rhinitis     environmental    GERD 1. 7* 1.3     Recent Labs     08/23/19  1624 08/24/19  0615 08/25/19  0532   CALCIUM 9.8 8.2* 9.0     No results for input(s): PH, PCO2, PO2 in the last 72 hours.     Invalid input(s): Melva Roberts    ABG:  No results found for: PH, PCO2, PO2, HCO3, BE, THGB, TCO2, O2SAT  VBG:    Lab Results   Component Value Date    PHVEN 7.465 08/21/2019    RGN1MZH 31.8 08/21/2019    BEVEN -1 08/21/2019    H0FWHHPL 62 08/21/2019       LDH:  No results found for: LDH  Uric Acid:  No results found for: LABURIC, URICACID    PT/INR:    Lab Results   Component Value Date    PROTIME 13.6 08/23/2019    INR 1.19 08/23/2019     Warfarin PT/INR:  No components found for: Janessa Blocker  PTT:    Lab Results   Component Value Date    APTT 19.0 08/23/2019   [APTT}  Last 3 Troponin:    Lab Results   Component Value Date    TROPONINI 0.08 08/23/2019    TROPONINI 0.03 08/22/2019    TROPONINI 0.03 08/21/2019       U/A:    Lab Results   Component Value Date    COLORU Yellow 08/23/2019    PROTEINU >=300 08/23/2019    PHUR 5.5 08/23/2019    LABCAST 1-3 Hyaline 08/23/2019    WBCUA 3-5 08/23/2019    RBCUA 0-2 08/23/2019    MUCUS 1+ 08/23/2019    CLARITYU SLCLOUDY 08/23/2019    SPECGRAV 1.020 08/23/2019    LEUKOCYTESUR Negative 08/23/2019    UROBILINOGEN 0.2 08/23/2019    BILIRUBINUR SMALL 08/23/2019    BLOODU MODERATE 08/23/2019    GLUCOSEU Negative 08/23/2019     Microalbumen/Creatinine ratio:  No components found for: RUCREAT  24 Hour Urine for Protein:  No components found for: RAWUPRO, UHRS3, XTXC91HY, UTV3  24 Hour Urine for Creatinine Clearance:  No components found for: CREAT4, UHRS10, UTV10  Urine Toxicology:  No components found for: IAMMENTA, IBARBIT, IBENZO, ICOCAINE, IMARTHC, IOPIATES, IPHENCYC    HgBA1c:  No results found for: LABA1C  RPR:  No results found for: RPR  HIV:  No results found for: HIV  JASMINE:  No results found for: ANATITER, JASMINE  RF:  No results found for: RF  DSDNA:  No components found for: DNA  AMYLASE:  No results found for: AMYLASE  LIPASE:    Lab Results   Component Value Date    LIPASE 119.0 08/23/2019     Fibrinogen Level:  No components found for: FIB       BELOW MENTIONED RADIOLOGY STUDY RESULTS BY ME:    Xr Abdomen (kub) (single Ap View)    Result Date: 7/25/2019  EXAM: AP abdomen INDICATION: NG tube placement COMPARISON: Radiograph 1 hour prior FINDINGS: Nasogastric tube has been placed. The tube is faintly visible with the tip projecting in the left upper quadrant in the mid stomach. Bowel gas pattern appears nonobstructive. Chronic T12 vertebral compression fracture again noted. Nasogastric tube extending into the left upper quadrant. Ct Head Wo Contrast    Result Date: 8/23/2019  EXAMINATION: CT OF THE HEAD WITHOUT CONTRAST,  8/23/2019 6:53 pm TECHNIQUE: CT of the head was performed without the administration of intravenous contrast. Dose modulation, iterative reconstruction, and/or weight based adjustment of the mA/kV was utilized to reduce the radiation dose to as low as reasonably achievable. COMPARISON: 08/22/2019 HISTORY: ORDERING SYSTEM PROVIDED HISTORY: AMS TECHNOLOGIST PROVIDED HISTORY: Has a \"code stroke\" or \"stroke alert\" been called? ->No Reason for Exam: AMS Acuity: Unknown Type of Exam: Unknown FINDINGS: BRAIN/VENTRICLES: There is prominence of the sulci and ventricles commensurate with the patient's age underlying brain parenchymal atrophy. No mass or hemorrhage is seen intracranially. No midline shift is noted. Atherosclerotic vascular calcifications are present. ORBITS: The visualized portion of the orbits demonstrate no acute abnormality. SINUSES: Tiny polyp retention cyst left sphenoid sinus is present. Tiny amount of fluid is seen in the tips of the mastoid air cells. These findings are unchanged from 1 day earlier. SOFT TISSUES/SKULL:  No acute abnormality of the visualized skull or soft tissues. No acute intracranial abnormality.  Stable small polyp or retention cyst left base which appears similar extent the prior study. Right IJ central line remains. Persistent right basilar infiltrate. Xr Chest 1 Vw    Result Date: 8/2/2019  Single image of the chest HISTORY: Central line placement. COMPARISON: August 1, 2019. FINDINGS: Endotracheal tube is present with the tip in the lower trachea. There is a right IJ approach central venous catheter overlying the lower SVC. An enteric tube extends into the abdomen, its tip is in the left upper quadrant. There is no pneumothorax. There is bibasilar consolidation. A focal opacity is noted in the right upper lobe which was also seen previously and is similar. Lines and tubes as above. Multifocal airspace disease, similar to the prior study. Ct Chest Pulmonary Embolism W Contrast    Result Date: 8/24/2019  EXAMINATION: CTA OF THE CHEST; CTA OF THE ABDOMEN AND PELVIS WITH AND WITHOUT CONTRAST 8/24/2019 12:52 am TECHNIQUE: CTA of the chest was performed after the administration of intravenous contrast.  Multiplanar reformatted images are provided for review. MIP images are provided for review. Dose modulation, iterative reconstruction, and/or weight based adjustment of the mA/kV was utilized to reduce the radiation dose to as low as reasonably achievable.; CTA of the abdomen and pelvis was performed without and with the administration of intravenous contrast. Multiplanar reformatted images are provided for review. MIP images are provided for review. Dose modulation, iterative reconstruction, and/or weight based adjustment of the mA/kV was utilized to reduce the radiation dose to as low as reasonably achievable. COMPARISON: CT abdomen pelvis 08/22/2019, CT chest 08/9/2019 HISTORY: ORDERING SYSTEM PROVIDED HISTORY: severe sepsis with SOB. FINDINGS: Pulmonary Arteries: Pulmonary arteries are suboptimally opacified for evaluation with limited assessment of the distal segmental and subsegmental vessels.   No evidence of central/proximal percutaneous gastrostomy tube over the left upper quadrant. Subsequent images demonstrate contrast injection which is since and irregular shape but is not seen within the gastric body, even out to 2 minute images. Visualized lung bases are clear. Visualized bowel loops are without acute process. Contrast is extraluminal with no contrast in the stomach. Repositioning and reinjection recommended. Mri Brain W Wo Contrast    Result Date: 8/7/2019  EXAM: MRI BRAIN W WO CONTRAST INDICATION:Hoarseness and dysphagia r/o medullary CVA COMPARISON: 8/1/2019 TECHNIQUE: Multiplanar, multisequence MR imaging of the head obtained with and without IV contrast. IV contrast: 13 mL ProHance FINDINGS: SINUSES AND OSSEOUS STRUCTURES: Bilateral mastoid air cell effusions. Middle ear opacification bilaterally. Fluid level in the sphenoid sinuses. Ethmoid sinus opacification. Fluid in the nasopharynx. Marrow signal is unremarkable. ORBITS: Unremarkable MIDLINE STRUCTURES: The sella turcica and pituitary gland are normal. Craniovertebral alignment and cerebellar tonsils are normal. VENTRICLES: Normal size and configuration for patient age. Cisternal spaces are intact. INTRACRANIAL HEMORRHAGE: None. BRAIN PARENCHYMA: Brain parenchyma is normal signal. There is no diffusion restriction. Chryl Caper white differentiation is intact. No mass. Mild nonspecific periventricular white matter signal abnormality most commonly associated with chronic small vessel ischemic disease and/or age related degenerative change. No enhancing abnormality identified. INTRACRANIAL VASCULATURE: Vascular flow voids are intact. 1. No acute intracranial abnormality. No evidence of acute ischemia. 2. Bilateral mastoid air cell effusions, middle ear effusions, and fluid in the nasopharynx and sphenoid sinuses suggesting retained secretions and altered mental status.      Mri Brain Wo Contrast    Result Date: 8/1/2019  EXAM: MRI BRAIN WO CONTRAST 8/24/2019  EXAMINATION: CTA OF THE CHEST; CTA OF THE ABDOMEN AND PELVIS WITH AND WITHOUT CONTRAST 8/24/2019 12:52 am TECHNIQUE: CTA of the chest was performed after the administration of intravenous contrast.  Multiplanar reformatted images are provided for review. MIP images are provided for review. Dose modulation, iterative reconstruction, and/or weight based adjustment of the mA/kV was utilized to reduce the radiation dose to as low as reasonably achievable.; CTA of the abdomen and pelvis was performed without and with the administration of intravenous contrast. Multiplanar reformatted images are provided for review. MIP images are provided for review. Dose modulation, iterative reconstruction, and/or weight based adjustment of the mA/kV was utilized to reduce the radiation dose to as low as reasonably achievable. COMPARISON: CT abdomen pelvis 08/22/2019, CT chest 08/9/2019 HISTORY: ORDERING SYSTEM PROVIDED HISTORY: severe sepsis with SOB. FINDINGS: Pulmonary Arteries: Pulmonary arteries are suboptimally opacified for evaluation with limited assessment of the distal segmental and subsegmental vessels. No evidence of central/proximal intraluminal filling defect to suggest pulmonary embolism. Main pulmonary artery is normal in caliber. Mediastinum: No evidence of mediastinal lymphadenopathy. Normal caliber thoracic aorta with mild atherosclerosis. Lungs/pleura: Interval decrease size of pleural based cavitary opacity in the posterior right upper lobe now measuring approximately 1.5 cm in AP dimension compared to 2.3 cm on prior examination. Additional scattered opacities appear to resolved or decreased in size. Stable 1.4 cm slightly spiculated nodular opacity in the anterior upper lobe. Persistent right basilar opacification. Persistent but interval decreased left retrocardiac atelectasis. Organs: The liver, pancreas and kidneys appear grossly unremarkable.  Redemonstration of 2.6 cm indeterminate left

## 2019-08-25 NOTE — PROGRESS NOTES
No acute changes noted on reassessment, see flowsheets. VSS. Low grade temp noted, 99.9. IVF continue @ 75ml/hr. H/H drawn per R fem, awaiting results. PEG site continues with bloody output. Pt resting comfortably at this time, repositioned to semi fowlers. Will continue to monitor.  Haile Reyes RN, BSN, 1:05 AM

## 2019-08-26 PROBLEM — E43 SEVERE MALNUTRITION (HCC): Chronic | Status: ACTIVE | Noted: 2019-08-26

## 2019-08-26 LAB
ANION GAP SERPL CALCULATED.3IONS-SCNC: 9 MMOL/L (ref 3–16)
BUN BLDV-MCNC: 19 MG/DL (ref 7–20)
CALCIUM SERPL-MCNC: 8.9 MG/DL (ref 8.3–10.6)
CHLORIDE BLD-SCNC: 120 MMOL/L (ref 99–110)
CO2: 21 MMOL/L (ref 21–32)
CREAT SERPL-MCNC: 0.9 MG/DL (ref 0.8–1.3)
GFR AFRICAN AMERICAN: >60
GFR NON-AFRICAN AMERICAN: >60
GLUCOSE BLD-MCNC: 109 MG/DL (ref 70–99)
HCT VFR BLD CALC: 26.4 % (ref 40.5–52.5)
HCT VFR BLD CALC: 26.7 % (ref 40.5–52.5)
HCT VFR BLD CALC: 26.7 % (ref 40.5–52.5)
HCT VFR BLD CALC: 27 % (ref 40.5–52.5)
HEMOGLOBIN: 8.7 G/DL (ref 13.5–17.5)
HEMOGLOBIN: 8.8 G/DL (ref 13.5–17.5)
HEMOGLOBIN: 8.8 G/DL (ref 13.5–17.5)
HEMOGLOBIN: 9 G/DL (ref 13.5–17.5)
MAGNESIUM: 2 MG/DL (ref 1.8–2.4)
MCH RBC QN AUTO: 31.7 PG (ref 26–34)
MCHC RBC AUTO-ENTMCNC: 32.8 G/DL (ref 31–36)
MCV RBC AUTO: 96.6 FL (ref 80–100)
PDW BLD-RTO: 15.1 % (ref 12.4–15.4)
PHOSPHORUS: 2.4 MG/DL (ref 2.5–4.9)
PLATELET # BLD: 185 K/UL (ref 135–450)
PMV BLD AUTO: 7.6 FL (ref 5–10.5)
POTASSIUM SERPL-SCNC: 3.2 MMOL/L (ref 3.5–5.1)
POTASSIUM SERPL-SCNC: 3.6 MMOL/L (ref 3.5–5.1)
RBC # BLD: 2.73 M/UL (ref 4.2–5.9)
SODIUM BLD-SCNC: 150 MMOL/L (ref 136–145)
WBC # BLD: 9.6 K/UL (ref 4–11)

## 2019-08-26 PROCEDURE — 94640 AIRWAY INHALATION TREATMENT: CPT

## 2019-08-26 PROCEDURE — 2000000000 HC ICU R&B

## 2019-08-26 PROCEDURE — 84100 ASSAY OF PHOSPHORUS: CPT

## 2019-08-26 PROCEDURE — 85018 HEMOGLOBIN: CPT

## 2019-08-26 PROCEDURE — 02HV33Z INSERTION OF INFUSION DEVICE INTO SUPERIOR VENA CAVA, PERCUTANEOUS APPROACH: ICD-10-PCS | Performed by: INTERNAL MEDICINE

## 2019-08-26 PROCEDURE — 85014 HEMATOCRIT: CPT

## 2019-08-26 PROCEDURE — C1751 CATH, INF, PER/CENT/MIDLINE: HCPCS

## 2019-08-26 PROCEDURE — 2580000003 HC RX 258: Performed by: INTERNAL MEDICINE

## 2019-08-26 PROCEDURE — 99233 SBSQ HOSP IP/OBS HIGH 50: CPT | Performed by: INTERNAL MEDICINE

## 2019-08-26 PROCEDURE — 2500000003 HC RX 250 WO HCPCS: Performed by: INTERNAL MEDICINE

## 2019-08-26 PROCEDURE — 80048 BASIC METABOLIC PNL TOTAL CA: CPT

## 2019-08-26 PROCEDURE — 36415 COLL VENOUS BLD VENIPUNCTURE: CPT

## 2019-08-26 PROCEDURE — 85027 COMPLETE CBC AUTOMATED: CPT

## 2019-08-26 PROCEDURE — 6360000002 HC RX W HCPCS: Performed by: INTERNAL MEDICINE

## 2019-08-26 PROCEDURE — 94669 MECHANICAL CHEST WALL OSCILL: CPT

## 2019-08-26 PROCEDURE — 2700000000 HC OXYGEN THERAPY PER DAY

## 2019-08-26 PROCEDURE — 84132 ASSAY OF SERUM POTASSIUM: CPT

## 2019-08-26 PROCEDURE — 2580000003 HC RX 258: Performed by: PHYSICIAN ASSISTANT

## 2019-08-26 PROCEDURE — 83735 ASSAY OF MAGNESIUM: CPT

## 2019-08-26 PROCEDURE — 36569 INSJ PICC 5 YR+ W/O IMAGING: CPT

## 2019-08-26 PROCEDURE — 99232 SBSQ HOSP IP/OBS MODERATE 35: CPT | Performed by: SURGERY

## 2019-08-26 PROCEDURE — 94761 N-INVAS EAR/PLS OXIMETRY MLT: CPT

## 2019-08-26 PROCEDURE — C9113 INJ PANTOPRAZOLE SODIUM, VIA: HCPCS | Performed by: INTERNAL MEDICINE

## 2019-08-26 PROCEDURE — 36592 COLLECT BLOOD FROM PICC: CPT

## 2019-08-26 RX ORDER — LIDOCAINE HYDROCHLORIDE 10 MG/ML
5 INJECTION, SOLUTION EPIDURAL; INFILTRATION; INTRACAUDAL; PERINEURAL ONCE
Status: COMPLETED | OUTPATIENT
Start: 2019-08-26 | End: 2019-08-26

## 2019-08-26 RX ORDER — LEVALBUTEROL INHALATION SOLUTION 1.25 MG/3ML
1.25 SOLUTION RESPIRATORY (INHALATION) EVERY 6 HOURS
Status: DISCONTINUED | OUTPATIENT
Start: 2019-08-26 | End: 2019-08-26

## 2019-08-26 RX ORDER — SODIUM CHLORIDE 0.9 % (FLUSH) 0.9 %
10 SYRINGE (ML) INJECTION EVERY 12 HOURS SCHEDULED
Status: DISCONTINUED | OUTPATIENT
Start: 2019-08-26 | End: 2019-09-12 | Stop reason: SDUPTHER

## 2019-08-26 RX ORDER — DEXTROSE MONOHYDRATE 50 MG/ML
INJECTION, SOLUTION INTRAVENOUS CONTINUOUS
Status: DISCONTINUED | OUTPATIENT
Start: 2019-08-26 | End: 2019-08-28

## 2019-08-26 RX ORDER — SODIUM CHLORIDE 0.9 % (FLUSH) 0.9 %
10 SYRINGE (ML) INJECTION EVERY 12 HOURS SCHEDULED
Status: DISCONTINUED | OUTPATIENT
Start: 2019-08-26 | End: 2019-08-27

## 2019-08-26 RX ORDER — LEVALBUTEROL INHALATION SOLUTION 1.25 MG/3ML
SOLUTION RESPIRATORY (INHALATION)
Status: DISPENSED
Start: 2019-08-26 | End: 2019-08-27

## 2019-08-26 RX ORDER — SODIUM CHLORIDE 0.9 % (FLUSH) 0.9 %
10 SYRINGE (ML) INJECTION PRN
Status: DISCONTINUED | OUTPATIENT
Start: 2019-08-26 | End: 2019-09-12 | Stop reason: SDUPTHER

## 2019-08-26 RX ORDER — LEVALBUTEROL INHALATION SOLUTION 1.25 MG/3ML
1.25 SOLUTION RESPIRATORY (INHALATION) EVERY 6 HOURS
Status: DISCONTINUED | OUTPATIENT
Start: 2019-08-26 | End: 2019-09-05

## 2019-08-26 RX ADMIN — POTASSIUM CHLORIDE 20 MEQ: 29.8 INJECTION, SOLUTION INTRAVENOUS at 10:33

## 2019-08-26 RX ADMIN — Medication 10 ML: at 21:25

## 2019-08-26 RX ADMIN — THIAMINE HYDROCHLORIDE 500 MG: 100 INJECTION, SOLUTION INTRAMUSCULAR; INTRAVENOUS at 05:57

## 2019-08-26 RX ADMIN — LEVALBUTEROL HYDROCHLORIDE 1.25 MG: 1.25 SOLUTION RESPIRATORY (INHALATION) at 20:45

## 2019-08-26 RX ADMIN — MORPHINE SULFATE 2 MG: 2 INJECTION, SOLUTION INTRAMUSCULAR; INTRAVENOUS at 06:00

## 2019-08-26 RX ADMIN — LIDOCAINE HYDROCHLORIDE 5 ML: 10 INJECTION, SOLUTION EPIDURAL; INFILTRATION; INTRACAUDAL; PERINEURAL at 19:13

## 2019-08-26 RX ADMIN — THIAMINE HYDROCHLORIDE 500 MG: 100 INJECTION, SOLUTION INTRAMUSCULAR; INTRAVENOUS at 16:16

## 2019-08-26 RX ADMIN — MORPHINE SULFATE 2 MG: 2 INJECTION, SOLUTION INTRAMUSCULAR; INTRAVENOUS at 21:32

## 2019-08-26 RX ADMIN — DEXTROSE AND SODIUM CHLORIDE: 5; 200 INJECTION, SOLUTION INTRAVENOUS at 04:07

## 2019-08-26 RX ADMIN — Medication 10 ML: at 09:11

## 2019-08-26 RX ADMIN — SODIUM CHLORIDE 8 MG/HR: 9 INJECTION, SOLUTION INTRAVENOUS at 04:07

## 2019-08-26 RX ADMIN — THIAMINE HYDROCHLORIDE 500 MG: 100 INJECTION, SOLUTION INTRAMUSCULAR; INTRAVENOUS at 21:24

## 2019-08-26 RX ADMIN — Medication 10 ML: at 10:35

## 2019-08-26 RX ADMIN — MEROPENEM 1 G: 1 INJECTION, POWDER, FOR SOLUTION INTRAVENOUS at 04:00

## 2019-08-26 RX ADMIN — IPRATROPIUM BROMIDE 0.5 MG: 0.5 SOLUTION RESPIRATORY (INHALATION) at 13:33

## 2019-08-26 RX ADMIN — IPRATROPIUM BROMIDE 0.5 MG: 0.5 SOLUTION RESPIRATORY (INHALATION) at 16:19

## 2019-08-26 RX ADMIN — POTASSIUM CHLORIDE 20 MEQ: 29.8 INJECTION, SOLUTION INTRAVENOUS at 12:08

## 2019-08-26 RX ADMIN — MORPHINE SULFATE 2 MG: 2 INJECTION, SOLUTION INTRAMUSCULAR; INTRAVENOUS at 09:10

## 2019-08-26 RX ADMIN — MEROPENEM 1 G: 1 INJECTION, POWDER, FOR SOLUTION INTRAVENOUS at 16:17

## 2019-08-26 RX ADMIN — IPRATROPIUM BROMIDE 0.5 MG: 0.5 SOLUTION RESPIRATORY (INHALATION) at 20:45

## 2019-08-26 RX ADMIN — LEVALBUTEROL HYDROCHLORIDE 1.25 MG: 1.25 SOLUTION RESPIRATORY (INHALATION) at 13:33

## 2019-08-26 RX ADMIN — MORPHINE SULFATE 2 MG: 2 INJECTION, SOLUTION INTRAMUSCULAR; INTRAVENOUS at 04:00

## 2019-08-26 RX ADMIN — IPRATROPIUM BROMIDE 0.5 MG: 0.5 SOLUTION RESPIRATORY (INHALATION) at 08:09

## 2019-08-26 RX ADMIN — DEXTROSE MONOHYDRATE: 50 INJECTION, SOLUTION INTRAVENOUS at 09:28

## 2019-08-26 RX ADMIN — Medication 10 ML: at 21:24

## 2019-08-26 ASSESSMENT — PAIN SCALES - GENERAL
PAINLEVEL_OUTOF10: 4
PAINLEVEL_OUTOF10: 0
PAINLEVEL_OUTOF10: 4
PAINLEVEL_OUTOF10: 0
PAINLEVEL_OUTOF10: 5
PAINLEVEL_OUTOF10: 0
PAINLEVEL_OUTOF10: 7
PAINLEVEL_OUTOF10: 5

## 2019-08-26 ASSESSMENT — PAIN SCALES - WONG BAKER
WONGBAKER_NUMERICALRESPONSE: 0

## 2019-08-26 ASSESSMENT — ENCOUNTER SYMPTOMS
EYE DISCHARGE: 0
SHORTNESS OF BREATH: 0
FACIAL SWELLING: 0
CONSTIPATION: 0
DIARRHEA: 0
ABDOMINAL PAIN: 0
ABDOMINAL DISTENTION: 0
NAUSEA: 0
PHOTOPHOBIA: 0
VOMITING: 0
COUGH: 0
BLOOD IN STOOL: 0
CHEST TIGHTNESS: 0
EYE REDNESS: 0

## 2019-08-26 NOTE — PROGRESS NOTES
3. Fat Loss-Severe subcutaneous fat loss, Orbital, Triceps  4. Muscle Loss-Severe muscle mass loss, Temples (temporalis muscle), Clavicles (pectoralis and deltoids), Thigh (quadriceps)  5. Fluid Accumulation-No significant fluid accumulation,    6.  Strength-Not measured    Nutrition Risk Level: High    Nutrient Needs:  · Estimated Daily Total Kcal: 8366-2943   · Estimated Daily Protein (g):  grams   · Estimated Daily Total Fluid (ml/day): 1 mL per kcal     Nutrition Diagnosis:   · Problem: Inadequate energy intake  · Etiology: related to Insufficient energy/nutrient consumption     Signs and symptoms:  as evidenced by NPO status due to medical condition, Other (Comment)(Dislodged PEG tube )    Objective Information:  · Nutrition-Focused Physical Findings: No edema noted. +7.9 liters. Na+ 150. K+ 3.2.    · Wound Type: None  · Current Nutrition Therapies:  · Oral Diet Orders: NPO   · Oral Diet intake: NPO  · Oral Nutrition Supplement (ONS) Orders: None  · ONS intake: NPO  · Anthropometric Measures:  · Ht: 5' 7\" (170.2 cm)   · Current Body Wt: 129 lb (58.5 kg)  · Admission Body Wt: 115 lb (52.2 kg)  · Ideal Body Wt: 148 lb (67.1 kg)   · BMI Classification: BMI 18.5 - 24.9 Normal Weight    Nutrition Interventions:   Start Tube Feeding  Continued Inpatient Monitoring, Education Not Indicated    Nutrition Evaluation:   · Evaluation: Goals set   · Goals: Pt will tolerate EN at goal     · Monitoring: TF Intake, TF Tolerance, I&O, Weight, Pertinent Labs      Electronically signed by Melissa Martinez RD, LD on 8/26/19 at 10:49 AM    Contact Number: [phone]

## 2019-08-26 NOTE — PROGRESS NOTES
Small left pleural effusion with associated basilar atelectasis/pneumonia,   new from 08/23/2019.       Unchanged mild right basilar airspace disease.       Right upper lobe nodule again noted. ASSESSMENT :  Hemorrhage from G-tube - due to dislodgement of G-tube from stomach - G-tube now removed and blood draining into ostomy bag from large rectus sheath hematoma. He is now stable after resuscitation. Agree with antibiotics, NPO and ICU care. Surgery following. hgb stable at 9. PLAN  :  1) NPO, IVF, Antibiotics, ICU care as you are doing. 2) Per d/w surgery, plan EGD with PEG replacement tomorrow. Discussed with pt's son, Porfirio Peguero, who is in agreement. 3) Continue IV Protonix and mouth care. Discussed with Dr. Nikia Mcgovern PA-C  [facility]  I have personally performed a face to face diagnostic evaluation on this patient. I have interviewed and examined the patient and I agree with the findings and recommended plan of care. In summary, my findings and plan are the following: As above, he is more alert, but still non verbal.  Abd is edematous on left flank>right. Gastrostomy opening draining small volume of old blood into ostomy bag. After discussion with patient, Dr. Wilda Rob and patient's son, we will plan to replace PEG endoscopically tomorrow (8/27/19) with surgery standby in case we encounter perforated stomach.     Silvio Cherry MD  [address]  8/26/2019

## 2019-08-26 NOTE — PROGRESS NOTES
Introduced self to pt. Assessment completed see flow records for details. Pt not interactive. No signs of discomfort noted at this time. Repositioned for comfort. Call light in reach.

## 2019-08-27 ENCOUNTER — APPOINTMENT (OUTPATIENT)
Dept: GENERAL RADIOLOGY | Age: 69
DRG: 871 | End: 2019-08-27
Payer: MEDICARE

## 2019-08-27 ENCOUNTER — ANESTHESIA EVENT (OUTPATIENT)
Dept: ENDOSCOPY | Age: 69
DRG: 871 | End: 2019-08-27
Payer: MEDICARE

## 2019-08-27 ENCOUNTER — ANESTHESIA (OUTPATIENT)
Dept: ENDOSCOPY | Age: 69
DRG: 871 | End: 2019-08-27
Payer: MEDICARE

## 2019-08-27 VITALS — OXYGEN SATURATION: 100 % | SYSTOLIC BLOOD PRESSURE: 166 MMHG | DIASTOLIC BLOOD PRESSURE: 83 MMHG

## 2019-08-27 VITALS
SYSTOLIC BLOOD PRESSURE: 144 MMHG | OXYGEN SATURATION: 100 % | RESPIRATION RATE: 33 BRPM | DIASTOLIC BLOOD PRESSURE: 77 MMHG

## 2019-08-27 LAB
ANION GAP SERPL CALCULATED.3IONS-SCNC: 9 MMOL/L (ref 3–16)
BLOOD CULTURE, ROUTINE: NORMAL
BUN BLDV-MCNC: 14 MG/DL (ref 7–20)
CALCIUM SERPL-MCNC: 8.1 MG/DL (ref 8.3–10.6)
CHLORIDE BLD-SCNC: 114 MMOL/L (ref 99–110)
CO2: 21 MMOL/L (ref 21–32)
CREAT SERPL-MCNC: 0.7 MG/DL (ref 0.8–1.3)
CULTURE, BLOOD 2: NORMAL
GFR AFRICAN AMERICAN: >60
GFR NON-AFRICAN AMERICAN: >60
GLUCOSE BLD-MCNC: 97 MG/DL (ref 70–99)
HCT VFR BLD CALC: 25.2 % (ref 40.5–52.5)
HCT VFR BLD CALC: 26 % (ref 40.5–52.5)
HCT VFR BLD CALC: 26.9 % (ref 40.5–52.5)
HCT VFR BLD CALC: 27.3 % (ref 40.5–52.5)
HEMOGLOBIN: 8.4 G/DL (ref 13.5–17.5)
HEMOGLOBIN: 8.6 G/DL (ref 13.5–17.5)
HEMOGLOBIN: 8.8 G/DL (ref 13.5–17.5)
HEMOGLOBIN: 9 G/DL (ref 13.5–17.5)
MCH RBC QN AUTO: 31.5 PG (ref 26–34)
MCHC RBC AUTO-ENTMCNC: 33.3 G/DL (ref 31–36)
MCV RBC AUTO: 94.5 FL (ref 80–100)
PDW BLD-RTO: 15 % (ref 12.4–15.4)
PLATELET # BLD: 192 K/UL (ref 135–450)
PMV BLD AUTO: 7.7 FL (ref 5–10.5)
POTASSIUM SERPL-SCNC: 2.9 MMOL/L (ref 3.5–5.1)
POTASSIUM SERPL-SCNC: 3.1 MMOL/L (ref 3.5–5.1)
POTASSIUM SERPL-SCNC: 4 MMOL/L (ref 3.5–5.1)
RBC # BLD: 2.67 M/UL (ref 4.2–5.9)
SODIUM BLD-SCNC: 144 MMOL/L (ref 136–145)
WBC # BLD: 7.6 K/UL (ref 4–11)

## 2019-08-27 PROCEDURE — 2580000003 HC RX 258: Performed by: INTERNAL MEDICINE

## 2019-08-27 PROCEDURE — 2500000003 HC RX 250 WO HCPCS: Performed by: NURSE ANESTHETIST, CERTIFIED REGISTERED

## 2019-08-27 PROCEDURE — 6370000000 HC RX 637 (ALT 250 FOR IP): Performed by: INTERNAL MEDICINE

## 2019-08-27 PROCEDURE — 94640 AIRWAY INHALATION TREATMENT: CPT

## 2019-08-27 PROCEDURE — 6360000002 HC RX W HCPCS: Performed by: INTERNAL MEDICINE

## 2019-08-27 PROCEDURE — 3700000001 HC ADD 15 MINUTES (ANESTHESIA): Performed by: INTERNAL MEDICINE

## 2019-08-27 PROCEDURE — 94669 MECHANICAL CHEST WALL OSCILL: CPT

## 2019-08-27 PROCEDURE — 6360000002 HC RX W HCPCS: Performed by: NURSE ANESTHETIST, CERTIFIED REGISTERED

## 2019-08-27 PROCEDURE — 2709999900 HC NON-CHARGEABLE SUPPLY: Performed by: INTERNAL MEDICINE

## 2019-08-27 PROCEDURE — 6360000002 HC RX W HCPCS: Performed by: PHYSICIAN ASSISTANT

## 2019-08-27 PROCEDURE — 85027 COMPLETE CBC AUTOMATED: CPT

## 2019-08-27 PROCEDURE — 87070 CULTURE OTHR SPECIMN AEROBIC: CPT

## 2019-08-27 PROCEDURE — 7100000000 HC PACU RECOVERY - FIRST 15 MIN: Performed by: INTERNAL MEDICINE

## 2019-08-27 PROCEDURE — 3E0G76Z INTRODUCTION OF NUTRITIONAL SUBSTANCE INTO UPPER GI, VIA NATURAL OR ARTIFICIAL OPENING: ICD-10-PCS | Performed by: SURGERY

## 2019-08-27 PROCEDURE — 0DH63UZ INSERTION OF FEEDING DEVICE INTO STOMACH, PERCUTANEOUS APPROACH: ICD-10-PCS | Performed by: SURGERY

## 2019-08-27 PROCEDURE — 85018 HEMOGLOBIN: CPT

## 2019-08-27 PROCEDURE — 2700000000 HC OXYGEN THERAPY PER DAY

## 2019-08-27 PROCEDURE — 87106 FUNGI IDENTIFICATION YEAST: CPT

## 2019-08-27 PROCEDURE — 87205 SMEAR GRAM STAIN: CPT

## 2019-08-27 PROCEDURE — 2580000003 HC RX 258: Performed by: NURSE ANESTHETIST, CERTIFIED REGISTERED

## 2019-08-27 PROCEDURE — 3609013300 HC EGD TUBE PLACEMENT: Performed by: INTERNAL MEDICINE

## 2019-08-27 PROCEDURE — 85014 HEMATOCRIT: CPT

## 2019-08-27 PROCEDURE — 7100000001 HC PACU RECOVERY - ADDTL 15 MIN: Performed by: INTERNAL MEDICINE

## 2019-08-27 PROCEDURE — 0BJ08ZZ INSPECTION OF TRACHEOBRONCHIAL TREE, VIA NATURAL OR ARTIFICIAL OPENING ENDOSCOPIC: ICD-10-PCS | Performed by: INTERNAL MEDICINE

## 2019-08-27 PROCEDURE — 87102 FUNGUS ISOLATION CULTURE: CPT

## 2019-08-27 PROCEDURE — 80048 BASIC METABOLIC PNL TOTAL CA: CPT

## 2019-08-27 PROCEDURE — 3700000000 HC ANESTHESIA ATTENDED CARE: Performed by: INTERNAL MEDICINE

## 2019-08-27 PROCEDURE — 2580000003 HC RX 258: Performed by: PHYSICIAN ASSISTANT

## 2019-08-27 PROCEDURE — 99233 SBSQ HOSP IP/OBS HIGH 50: CPT | Performed by: INTERNAL MEDICINE

## 2019-08-27 PROCEDURE — 88305 TISSUE EXAM BY PATHOLOGIST: CPT

## 2019-08-27 PROCEDURE — 71045 X-RAY EXAM CHEST 1 VIEW: CPT

## 2019-08-27 PROCEDURE — 88112 CYTOPATH CELL ENHANCE TECH: CPT

## 2019-08-27 PROCEDURE — 31622 DX BRONCHOSCOPE/WASH: CPT | Performed by: INTERNAL MEDICINE

## 2019-08-27 PROCEDURE — 3609011500 HC BRONCHOSCOPY DIAGNOSTIC OR CELL WASH ONLY: Performed by: INTERNAL MEDICINE

## 2019-08-27 PROCEDURE — 84132 ASSAY OF SERUM POTASSIUM: CPT

## 2019-08-27 PROCEDURE — 2000000000 HC ICU R&B

## 2019-08-27 PROCEDURE — 2580000003 HC RX 258: Performed by: ANESTHESIOLOGY

## 2019-08-27 RX ORDER — SODIUM CHLORIDE 9 MG/ML
INJECTION, SOLUTION INTRAVENOUS CONTINUOUS
Status: DISCONTINUED | OUTPATIENT
Start: 2019-08-27 | End: 2019-08-27

## 2019-08-27 RX ORDER — 0.9 % SODIUM CHLORIDE 0.9 %
10 VIAL (ML) INJECTION PRN
Status: DISCONTINUED | OUTPATIENT
Start: 2019-08-27 | End: 2019-09-12 | Stop reason: SDUPTHER

## 2019-08-27 RX ORDER — LIDOCAINE HYDROCHLORIDE 20 MG/ML
INJECTION, SOLUTION EPIDURAL; INFILTRATION; INTRACAUDAL; PERINEURAL PRN
Status: DISCONTINUED | OUTPATIENT
Start: 2019-08-27 | End: 2019-08-27 | Stop reason: SDUPTHER

## 2019-08-27 RX ORDER — PROPOFOL 10 MG/ML
INJECTION, EMULSION INTRAVENOUS PRN
Status: DISCONTINUED | OUTPATIENT
Start: 2019-08-27 | End: 2019-08-27 | Stop reason: SDUPTHER

## 2019-08-27 RX ORDER — POTASSIUM CHLORIDE 7.45 MG/ML
10 INJECTION INTRAVENOUS
Status: DISCONTINUED | OUTPATIENT
Start: 2019-08-27 | End: 2019-08-27

## 2019-08-27 RX ORDER — PROMETHAZINE HYDROCHLORIDE 25 MG/ML
6.25 INJECTION, SOLUTION INTRAMUSCULAR; INTRAVENOUS
Status: DISCONTINUED | OUTPATIENT
Start: 2019-08-27 | End: 2019-08-27 | Stop reason: HOSPADM

## 2019-08-27 RX ORDER — SODIUM CHLORIDE 9 MG/ML
INJECTION, SOLUTION INTRAVENOUS CONTINUOUS PRN
Status: DISCONTINUED | OUTPATIENT
Start: 2019-08-27 | End: 2019-08-27 | Stop reason: SDUPTHER

## 2019-08-27 RX ORDER — LABETALOL HYDROCHLORIDE 5 MG/ML
5 INJECTION, SOLUTION INTRAVENOUS EVERY 10 MIN PRN
Status: DISCONTINUED | OUTPATIENT
Start: 2019-08-27 | End: 2019-08-27 | Stop reason: HOSPADM

## 2019-08-27 RX ORDER — ONDANSETRON 2 MG/ML
4 INJECTION INTRAMUSCULAR; INTRAVENOUS
Status: DISCONTINUED | OUTPATIENT
Start: 2019-08-27 | End: 2019-08-27 | Stop reason: HOSPADM

## 2019-08-27 RX ORDER — SODIUM CHLORIDE 0.9 % (FLUSH) 0.9 %
10 SYRINGE (ML) INJECTION EVERY 12 HOURS SCHEDULED
Status: DISCONTINUED | OUTPATIENT
Start: 2019-08-27 | End: 2019-09-17 | Stop reason: HOSPADM

## 2019-08-27 RX ORDER — SUCCINYLCHOLINE CHLORIDE 20 MG/ML
INJECTION INTRAMUSCULAR; INTRAVENOUS PRN
Status: DISCONTINUED | OUTPATIENT
Start: 2019-08-27 | End: 2019-08-27 | Stop reason: SDUPTHER

## 2019-08-27 RX ORDER — LEVALBUTEROL INHALATION SOLUTION 1.25 MG/3ML
SOLUTION RESPIRATORY (INHALATION)
Status: DISPENSED
Start: 2019-08-27 | End: 2019-08-27

## 2019-08-27 RX ORDER — ONDANSETRON 2 MG/ML
4 INJECTION INTRAMUSCULAR; INTRAVENOUS EVERY 6 HOURS PRN
Status: DISCONTINUED | OUTPATIENT
Start: 2019-08-27 | End: 2019-09-17 | Stop reason: HOSPADM

## 2019-08-27 RX ORDER — CEFAZOLIN SODIUM 2 G/100ML
2 INJECTION, SOLUTION INTRAVENOUS
Status: COMPLETED | OUTPATIENT
Start: 2019-08-27 | End: 2019-08-27

## 2019-08-27 RX ORDER — POTASSIUM CHLORIDE 7.45 MG/ML
10 INJECTION INTRAVENOUS PRN
Status: DISCONTINUED | OUTPATIENT
Start: 2019-08-27 | End: 2019-09-17 | Stop reason: HOSPADM

## 2019-08-27 RX ORDER — FENTANYL CITRATE 50 UG/ML
INJECTION, SOLUTION INTRAMUSCULAR; INTRAVENOUS PRN
Status: DISCONTINUED | OUTPATIENT
Start: 2019-08-27 | End: 2019-08-27 | Stop reason: SDUPTHER

## 2019-08-27 RX ORDER — SODIUM CHLORIDE 0.9 % (FLUSH) 0.9 %
10 SYRINGE (ML) INJECTION PRN
Status: DISCONTINUED | OUTPATIENT
Start: 2019-08-27 | End: 2019-09-17 | Stop reason: HOSPADM

## 2019-08-27 RX ORDER — POTASSIUM CHLORIDE 20 MEQ/1
40 TABLET, EXTENDED RELEASE ORAL PRN
Status: DISCONTINUED | OUTPATIENT
Start: 2019-08-27 | End: 2019-09-17 | Stop reason: HOSPADM

## 2019-08-27 RX ORDER — POTASSIUM CHLORIDE 29.8 MG/ML
20 INJECTION INTRAVENOUS PRN
Status: DISCONTINUED | OUTPATIENT
Start: 2019-08-27 | End: 2019-09-17 | Stop reason: HOSPADM

## 2019-08-27 RX ADMIN — CEFAZOLIN SODIUM 2 G: 2 INJECTION, SOLUTION INTRAVENOUS at 09:42

## 2019-08-27 RX ADMIN — SUCCINYLCHOLINE CHLORIDE 100 MG: 20 INJECTION, SOLUTION INTRAMUSCULAR; INTRAVENOUS at 16:05

## 2019-08-27 RX ADMIN — SODIUM CHLORIDE: 9 INJECTION, SOLUTION INTRAVENOUS at 09:16

## 2019-08-27 RX ADMIN — SUCCINYLCHOLINE CHLORIDE 140 MG: 20 INJECTION, SOLUTION INTRAMUSCULAR; INTRAVENOUS at 09:49

## 2019-08-27 RX ADMIN — THIAMINE HYDROCHLORIDE 500 MG: 100 INJECTION, SOLUTION INTRAMUSCULAR; INTRAVENOUS at 14:46

## 2019-08-27 RX ADMIN — MORPHINE SULFATE 2 MG: 2 INJECTION, SOLUTION INTRAMUSCULAR; INTRAVENOUS at 20:20

## 2019-08-27 RX ADMIN — Medication 10 ML: at 11:43

## 2019-08-27 RX ADMIN — DEXTROSE MONOHYDRATE: 50 INJECTION, SOLUTION INTRAVENOUS at 05:52

## 2019-08-27 RX ADMIN — LIDOCAINE HYDROCHLORIDE 100 MG: 20 INJECTION, SOLUTION EPIDURAL; INFILTRATION; INTRACAUDAL; PERINEURAL at 09:49

## 2019-08-27 RX ADMIN — POTASSIUM CHLORIDE 20 MEQ: 29.8 INJECTION, SOLUTION INTRAVENOUS at 11:43

## 2019-08-27 RX ADMIN — PROPOFOL 120 MG: 10 INJECTION, EMULSION INTRAVENOUS at 16:05

## 2019-08-27 RX ADMIN — SODIUM CHLORIDE: 9 INJECTION, SOLUTION INTRAVENOUS at 09:38

## 2019-08-27 RX ADMIN — DEXTROSE MONOHYDRATE: 50 INJECTION, SOLUTION INTRAVENOUS at 22:51

## 2019-08-27 RX ADMIN — POTASSIUM CHLORIDE 20 MEQ: 29.8 INJECTION, SOLUTION INTRAVENOUS at 07:38

## 2019-08-27 RX ADMIN — IPRATROPIUM BROMIDE 0.5 MG: 0.5 SOLUTION RESPIRATORY (INHALATION) at 12:57

## 2019-08-27 RX ADMIN — POTASSIUM BICARBONATE 40 MEQ: 782 TABLET, EFFERVESCENT ORAL at 17:54

## 2019-08-27 RX ADMIN — MORPHINE SULFATE 2 MG: 2 INJECTION, SOLUTION INTRAMUSCULAR; INTRAVENOUS at 04:25

## 2019-08-27 RX ADMIN — MEROPENEM 1 G: 1 INJECTION, POWDER, FOR SOLUTION INTRAVENOUS at 04:11

## 2019-08-27 RX ADMIN — Medication 10 ML: at 20:09

## 2019-08-27 RX ADMIN — THIAMINE HYDROCHLORIDE 500 MG: 100 INJECTION, SOLUTION INTRAMUSCULAR; INTRAVENOUS at 21:41

## 2019-08-27 RX ADMIN — THIAMINE HYDROCHLORIDE 500 MG: 100 INJECTION, SOLUTION INTRAMUSCULAR; INTRAVENOUS at 05:12

## 2019-08-27 RX ADMIN — PHENYLEPHRINE HYDROCHLORIDE 100 MCG: 10 INJECTION INTRAVENOUS at 16:14

## 2019-08-27 RX ADMIN — MEROPENEM 1 G: 1 INJECTION, POWDER, FOR SOLUTION INTRAVENOUS at 17:53

## 2019-08-27 RX ADMIN — FENTANYL CITRATE 100 MCG: 50 INJECTION, SOLUTION INTRAMUSCULAR; INTRAVENOUS at 09:48

## 2019-08-27 RX ADMIN — PHENYLEPHRINE HYDROCHLORIDE 150 MCG: 10 INJECTION INTRAVENOUS at 16:08

## 2019-08-27 RX ADMIN — LEVALBUTEROL HYDROCHLORIDE 1.25 MG: 1.25 SOLUTION RESPIRATORY (INHALATION) at 08:31

## 2019-08-27 RX ADMIN — LEVALBUTEROL HYDROCHLORIDE 1.25 MG: 1.25 SOLUTION RESPIRATORY (INHALATION) at 12:58

## 2019-08-27 RX ADMIN — LIDOCAINE HYDROCHLORIDE 100 MG: 20 INJECTION, SOLUTION EPIDURAL; INFILTRATION; INTRACAUDAL; PERINEURAL at 16:05

## 2019-08-27 RX ADMIN — IPRATROPIUM BROMIDE 0.5 MG: 0.5 SOLUTION RESPIRATORY (INHALATION) at 20:21

## 2019-08-27 RX ADMIN — SODIUM CHLORIDE: 9 INJECTION, SOLUTION INTRAVENOUS at 16:02

## 2019-08-27 RX ADMIN — LEVALBUTEROL HYDROCHLORIDE 1.25 MG: 1.25 SOLUTION RESPIRATORY (INHALATION) at 20:22

## 2019-08-27 RX ADMIN — PROPOFOL 175 MG: 10 INJECTION, EMULSION INTRAVENOUS at 09:49

## 2019-08-27 RX ADMIN — IPRATROPIUM BROMIDE 0.5 MG: 0.5 SOLUTION RESPIRATORY (INHALATION) at 08:31

## 2019-08-27 RX ADMIN — POTASSIUM CHLORIDE 20 MEQ: 29.8 INJECTION, SOLUTION INTRAVENOUS at 06:18

## 2019-08-27 ASSESSMENT — PAIN SCALES - PAIN ASSESSMENT IN ADVANCED DEMENTIA (PAINAD)
BODYLANGUAGE: 0
CONSOLABILITY: 0
NEGVOCALIZATION: 0
BREATHING: 0
FACIALEXPRESSION: 0
FACIALEXPRESSION: 0
BODYLANGUAGE: 0
NEGVOCALIZATION: 0
BREATHING: 0
TOTALSCORE: 0
TOTALSCORE: 0
FACIALEXPRESSION: 0
TOTALSCORE: 0
BREATHING: 1
CONSOLABILITY: 0
NEGVOCALIZATION: 0
FACIALEXPRESSION: 0
BREATHING: 0
CONSOLABILITY: 2
TOTALSCORE: 0
NEGVOCALIZATION: 0
FACIALEXPRESSION: 0
CONSOLABILITY: 0
TOTALSCORE: 0
NEGVOCALIZATION: 0
BREATHING: 0
FACIALEXPRESSION: 0
TOTALSCORE: 6
BREATHING: 0
CONSOLABILITY: 0
CONSOLABILITY: 0
BREATHING: 0
NEGVOCALIZATION: 0
BODYLANGUAGE: 0
BODYLANGUAGE: 0
CONSOLABILITY: 0
TOTALSCORE: 0
FACIALEXPRESSION: 0
BODYLANGUAGE: 0
TOTALSCORE: 0
NEGVOCALIZATION: 0
BREATHING: 0
BODYLANGUAGE: 2
NEGVOCALIZATION: 1
CONSOLABILITY: 0
FACIALEXPRESSION: 0
BODYLANGUAGE: 0
BODYLANGUAGE: 0

## 2019-08-27 ASSESSMENT — ENCOUNTER SYMPTOMS
SHORTNESS OF BREATH: 1
PHOTOPHOBIA: 0
EYE REDNESS: 0
VOMITING: 0
SHORTNESS OF BREATH: 1
BLOOD IN STOOL: 0
NAUSEA: 0
FACIAL SWELLING: 0
ABDOMINAL PAIN: 0
CHEST TIGHTNESS: 0
ABDOMINAL DISTENTION: 0
CONSTIPATION: 0
COUGH: 0
SHORTNESS OF BREATH: 0
DIARRHEA: 0
EYE DISCHARGE: 0

## 2019-08-27 ASSESSMENT — PAIN SCALES - GENERAL
PAINLEVEL_OUTOF10: 2
PAINLEVEL_OUTOF10: 6
PAINLEVEL_OUTOF10: 0
PAINLEVEL_OUTOF10: 7
PAINLEVEL_OUTOF10: 0
PAINLEVEL_OUTOF10: 1
PAINLEVEL_OUTOF10: 0

## 2019-08-27 ASSESSMENT — PAIN - FUNCTIONAL ASSESSMENT: PAIN_FUNCTIONAL_ASSESSMENT: FACES

## 2019-08-27 ASSESSMENT — PAIN SCALES - WONG BAKER
WONGBAKER_NUMERICALRESPONSE: 0

## 2019-08-27 NOTE — PROGRESS NOTES
See flow sheet for VS. Reassessment complete. No acute changes. Turned & repositioned for comfort/skin care. Oral care and suctioning performed. Will monitor.

## 2019-08-27 NOTE — PROGRESS NOTES
Patient arrived to endoscopy unit with bilat soft wrist restraints in place. ICU RN reports patient is combative and pulls at invasive devices. This behavior was noted when I released one restraint for assessment. Dr. Tamia Morocho notified.
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
## 2019-08-27 NOTE — PROGRESS NOTES
Patient returned by bed from [facility];alert and calm,no distress. Mouth care,rosibel care,pad change,repositioning provided. Abd binder in place. Wrist restraints in place d/t patient pulling at lines and tubes.

## 2019-08-27 NOTE — PROGRESS NOTES
Report received from [address] and CRNA, [place]. Patient arouses to voice. On room air. ST on the monitor. Tachypneic. VSS. In bilateral soft wrist restraints from prior to procedure, no signs of injury.       Electronically signed by Ari Francois RN on 8/27/2019 at 6163

## 2019-08-27 NOTE — OP NOTE
with #11 scalpel blade. A #14 gauge Angiocath was then passed through the skin incision puncturing the gastric lumen under endoscopic visualization. The metal trocar was removed and a guidewire was passed through the Angiocath into the gastric lumen where it was grasped with an electrocautery snare, passed through the scope. The scope snare and guidewire were then pulled back through the proximal stomach, esophagus and out through the patient's mouth, leaving the guidewire protruding from the mouth and protruding from the skin at the skin incision. A #20 [place] percutaneous endoscopic gastrostomy tube was then attached to the guidewire at the mouth. The guidewire at the skin was grasped and pulled, pulling the gastrostomy tube and guidewire though the gastrostomy incision until the intragastric bumper was snug against the intragastric wall. A second external bumper was advance over the external portion of the gastrostomy tube and fixed at the 4 cm lorne at the skin. The gastrostomy incision was then dressed with iodoform ointment and a dry, sterile, gauze dressing. A Y connector was attached to the end of the gastrostomy tube. The patient tolerated the procedure well and was taken to the post anesthesia care unit in good condition. Impression:    1) Normal Esophagogastroduodenoscopy   2) Successful placement of PEG tube    Recommendations:    1) See PEG orders in Epic   2) Abd binder applied at ALL times. 3) F/u as inpatient.     Ismael Johnston MD  [address] [place] [address]  8/27/2019

## 2019-08-27 NOTE — OP NOTE
Bronchoscopy Procedure Note    Date of Operation: 8/27/19  Pre-op Diagnosis: Mucus plugging  Post-op Diagnosis: same  Surgeon: Early Fudge  Anesthesia: Monitored Local Anesthesia with Sedation  Estimate Blood Loss: Minimal   Complications: None    Indications and History:  The patient is 70 y.o. male with coarse breath sounds and history of aspiration CT showed probable mucus in the RBI. The risks, benefits, complications, treatment options and expected outcomes were discussed with the patient. The possibilities of reaction to medication, pulmonary aspiration, perforation of a viscus, bleeding, failure to diagnose a condition and creating a complication requiring transfusion or operation were discussed with the patient who freely signed the consent. Description of Procedure: The patient was taken to endoscopy suite, identified as Ezekiel Buchanan and the procedure verified as flexible fiberoptic bronchoscopy. A time out was held and the above information confirmed. After the induction of topical nasopharyngeal anesthesia, the patient was placed in appropriate position and the bronchoscope was passed through the LMA . The vocal cords were visualized and total of 3 ml of 2% Lidocaine was topically placed onto the cords. The cords were normal. The scope was then passed into the trachea. Lidocaine 2% 3 ml was used topically on the fransico. Careful inspection of the tracheal lumen was accomplished. The scope was sequentially passed into the left main and then left upper and lower bronchi and segmental bronchi. The scope was then withdrawn and advanced into the right main bronchus and then into the RUL, RML, and RLL bronchi and segmental bronchi.      Endobronchial findings:   Trachea: Normal mucosa   Fransico: Normal mucosa   Right main bronchus: Normal mucosa   Right upper lobe bronchus: Normal mucosa   Right middle lobe bronchus: Normal mucosa   Right lower lobe bronchus: Normal mucosa   Left main bronchus: Normal mucosa   Left upper lobe bronchus: Normal mucosa   Left lower lobe bronchus: Normal mucosa     Frothy white sputum was suctioned both R and L. No foreign body noted. Lasix 20 mg IVP x 1 given    The patient was taken to the endoscopy recovery area in satisfactory condition. Recommendation:   1. F/U on culture results   2. F/U on cytology results     Attestation: I performed the procedure.   Asa Holliston

## 2019-08-27 NOTE — ANESTHESIA PRE PROCEDURE
morphine (PF) injection 2 mg  2 mg Intravenous Q1H PRN Gabriella Escobar MD   2 mg at 08/27/19 0425    sodium chloride flush 0.9 % injection 10 mL  10 mL Intravenous PRN Gabriella Escobar MD        norepinephrine (LEVOPHED) 16 mg in dextrose 5% 250 mL infusion  10 mcg/min Intravenous Continuous Gabriella Escobar MD   Stopped at 08/24/19 1219    thiamine (B-1) 500 mg in sodium chloride 0.9 % 100 mL IVPB  500 mg Intravenous Q8H Gabriella Escobar  mL/hr at 08/27/19 1446 500 mg at 08/27/19 1446    meropenem (MERREM) 1 g in sodium chloride 0.9 % 100 mL IVPB (mini-bag)  1 g Intravenous Q12H Gabriella Escobar MD   Stopped at 08/27/19 0733     Current Outpatient Medications on File Prior to Visit   Medication Sig Dispense Refill    aspirin 81 MG chewable tablet Take 1 tablet by mouth daily 30 tablet 3    atorvastatin (LIPITOR) 20 MG tablet Take 1 tablet by mouth nightly 30 tablet 3    haloperidol (HALDOL) 0.5 MG tablet Take 5 tablets by mouth 3 times daily 120 tablet 3    metoprolol tartrate (LOPRESSOR) 50 MG tablet Take 1 tablet by mouth 2 times daily 60 tablet 3    Balsam Peru-Castor Oil (VENELEX) OINT ointment Apply topically 2 times daily 1 Tube 1    folic acid (FOLVITE) 1 MG tablet 1 tablet by Per G Tube route daily 30 tablet 3    thiamine 100 MG tablet Take 1 tablet by mouth daily 30 tablet 3    lisinopril (PRINIVIL;ZESTRIL) 10 MG tablet Take 1 tablet by mouth daily Hold for systolic blood pressure less than 100mmHg 90 tablet 1    linezolid (ZYVOX) 600 MG tablet Take 1 tablet by mouth every 12 hours Twice per day until 9/20/2019 62 tablet 0    diclofenac sodium 1 % GEL Apply 2 g topically 2 times daily. No current facility-administered medications for this visit. No current outpatient medications on file.      Facility-Administered Medications Ordered in Other Visits   Medication Dose Route Frequency Provider Last Rate Last Dose    sodium chloride (PF) 0.9 % injection 10 mL  10 mL CODE.  )  Induction: intravenous. MIPS: Postoperative opioids intended and Prophylactic antiemetics administered. Anesthetic plan and risks discussed with patient, healthcare power of  and child/children. Plan discussed with CRNA. This pre-anesthesia assessment may be used as a history and physical.    DOS STAFF ADDENDUM:    Pt seen and examined, chart reviewed (including anesthesia, drug and allergy history). No interval changes to history and physical examination. Anesthetic plan, risks, benefits, alternatives, and personnel involved discussed with patient. Patient verbalized an understanding and agrees to proceed.       Marianna Drummond MD  August 27, 2019  3:50 PM

## 2019-08-28 LAB
ANION GAP SERPL CALCULATED.3IONS-SCNC: 9 MMOL/L (ref 3–16)
BLOOD CULTURE, ROUTINE: NORMAL
BODY FLUID CULTURE, STERILE: ABNORMAL
BUN BLDV-MCNC: 11 MG/DL (ref 7–20)
CALCIUM SERPL-MCNC: 7.7 MG/DL (ref 8.3–10.6)
CHLORIDE BLD-SCNC: 111 MMOL/L (ref 99–110)
CO2: 21 MMOL/L (ref 21–32)
CREAT SERPL-MCNC: 0.6 MG/DL (ref 0.8–1.3)
CULTURE, BLOOD 2: NORMAL
GFR AFRICAN AMERICAN: >60
GFR NON-AFRICAN AMERICAN: >60
GLUCOSE BLD-MCNC: 117 MG/DL (ref 70–99)
GLUCOSE BLD-MCNC: 127 MG/DL (ref 70–99)
GRAM STAIN RESULT: ABNORMAL
HCT VFR BLD CALC: 27.1 % (ref 40.5–52.5)
HCT VFR BLD CALC: 27.2 % (ref 40.5–52.5)
HCT VFR BLD CALC: 29 % (ref 40.5–52.5)
HCT VFR BLD CALC: 29 % (ref 40.5–52.5)
HCT VFR BLD CALC: 29.3 % (ref 40.5–52.5)
HEMOGLOBIN: 9.1 G/DL (ref 13.5–17.5)
HEMOGLOBIN: 9.1 G/DL (ref 13.5–17.5)
HEMOGLOBIN: 9.5 G/DL (ref 13.5–17.5)
HEMOGLOBIN: 9.6 G/DL (ref 13.5–17.5)
HEMOGLOBIN: 9.7 G/DL (ref 13.5–17.5)
MCH RBC QN AUTO: 31 PG (ref 26–34)
MCHC RBC AUTO-ENTMCNC: 32.6 G/DL (ref 31–36)
MCV RBC AUTO: 95.2 FL (ref 80–100)
ORGANISM: ABNORMAL
PDW BLD-RTO: 15.4 % (ref 12.4–15.4)
PERFORMED ON: ABNORMAL
PLATELET # BLD: 217 K/UL (ref 135–450)
PMV BLD AUTO: 8 FL (ref 5–10.5)
POTASSIUM SERPL-SCNC: 3.4 MMOL/L (ref 3.5–5.1)
RBC # BLD: 3.05 M/UL (ref 4.2–5.9)
SODIUM BLD-SCNC: 141 MMOL/L (ref 136–145)
WBC # BLD: 6.3 K/UL (ref 4–11)

## 2019-08-28 PROCEDURE — 94640 AIRWAY INHALATION TREATMENT: CPT

## 2019-08-28 PROCEDURE — 2700000000 HC OXYGEN THERAPY PER DAY

## 2019-08-28 PROCEDURE — 6360000002 HC RX W HCPCS: Performed by: INTERNAL MEDICINE

## 2019-08-28 PROCEDURE — 6370000000 HC RX 637 (ALT 250 FOR IP): Performed by: INTERNAL MEDICINE

## 2019-08-28 PROCEDURE — 85018 HEMOGLOBIN: CPT

## 2019-08-28 PROCEDURE — 2580000003 HC RX 258: Performed by: INTERNAL MEDICINE

## 2019-08-28 PROCEDURE — 85014 HEMATOCRIT: CPT

## 2019-08-28 PROCEDURE — 1200000000 HC SEMI PRIVATE

## 2019-08-28 PROCEDURE — 94761 N-INVAS EAR/PLS OXIMETRY MLT: CPT

## 2019-08-28 PROCEDURE — 85027 COMPLETE CBC AUTOMATED: CPT

## 2019-08-28 PROCEDURE — 94669 MECHANICAL CHEST WALL OSCILL: CPT

## 2019-08-28 PROCEDURE — 99233 SBSQ HOSP IP/OBS HIGH 50: CPT | Performed by: INTERNAL MEDICINE

## 2019-08-28 PROCEDURE — 80048 BASIC METABOLIC PNL TOTAL CA: CPT

## 2019-08-28 RX ADMIN — THIAMINE HYDROCHLORIDE 500 MG: 100 INJECTION, SOLUTION INTRAMUSCULAR; INTRAVENOUS at 06:06

## 2019-08-28 RX ADMIN — Medication 10 ML: at 21:40

## 2019-08-28 RX ADMIN — POTASSIUM BICARBONATE 40 MEQ: 782 TABLET, EFFERVESCENT ORAL at 07:01

## 2019-08-28 RX ADMIN — MORPHINE SULFATE 2 MG: 2 INJECTION, SOLUTION INTRAMUSCULAR; INTRAVENOUS at 19:52

## 2019-08-28 RX ADMIN — IPRATROPIUM BROMIDE 0.5 MG: 0.5 SOLUTION RESPIRATORY (INHALATION) at 12:02

## 2019-08-28 RX ADMIN — MORPHINE SULFATE 2 MG: 2 INJECTION, SOLUTION INTRAMUSCULAR; INTRAVENOUS at 00:11

## 2019-08-28 RX ADMIN — Medication 10 ML: at 09:00

## 2019-08-28 RX ADMIN — MEROPENEM 1 G: 1 INJECTION, POWDER, FOR SOLUTION INTRAVENOUS at 04:54

## 2019-08-28 RX ADMIN — MORPHINE SULFATE 2 MG: 2 INJECTION, SOLUTION INTRAMUSCULAR; INTRAVENOUS at 15:49

## 2019-08-28 RX ADMIN — MEROPENEM 1 G: 1 INJECTION, POWDER, FOR SOLUTION INTRAVENOUS at 15:49

## 2019-08-28 RX ADMIN — LEVALBUTEROL HYDROCHLORIDE 1.25 MG: 1.25 SOLUTION RESPIRATORY (INHALATION) at 07:29

## 2019-08-28 RX ADMIN — LEVALBUTEROL HYDROCHLORIDE 1.25 MG: 1.25 SOLUTION RESPIRATORY (INHALATION) at 19:51

## 2019-08-28 RX ADMIN — THIAMINE HYDROCHLORIDE 500 MG: 100 INJECTION, SOLUTION INTRAMUSCULAR; INTRAVENOUS at 15:06

## 2019-08-28 RX ADMIN — LEVALBUTEROL HYDROCHLORIDE 1.25 MG: 1.25 SOLUTION RESPIRATORY (INHALATION) at 12:03

## 2019-08-28 RX ADMIN — IPRATROPIUM BROMIDE 0.5 MG: 0.5 SOLUTION RESPIRATORY (INHALATION) at 07:29

## 2019-08-28 RX ADMIN — IPRATROPIUM BROMIDE 0.5 MG: 0.5 SOLUTION RESPIRATORY (INHALATION) at 19:50

## 2019-08-28 RX ADMIN — THIAMINE HYDROCHLORIDE 500 MG: 100 INJECTION, SOLUTION INTRAMUSCULAR; INTRAVENOUS at 22:21

## 2019-08-28 RX ADMIN — Medication 10 ML: at 08:55

## 2019-08-28 ASSESSMENT — PAIN SCALES - PAIN ASSESSMENT IN ADVANCED DEMENTIA (PAINAD)
TOTALSCORE: 6
FACIALEXPRESSION: 0
CONSOLABILITY: 0
CONSOLABILITY: 0
TOTALSCORE: 0
BODYLANGUAGE: 1
BREATHING: 1
CONSOLABILITY: 0
CONSOLABILITY: 0
TOTALSCORE: 0
TOTALSCORE: 0
CONSOLABILITY: 0
BODYLANGUAGE: 0
CONSOLABILITY: 1
FACIALEXPRESSION: 0
BODYLANGUAGE: 0
BODYLANGUAGE: 1
FACIALEXPRESSION: 0
CONSOLABILITY: 0
CONSOLABILITY: 1
FACIALEXPRESSION: 0
FACIALEXPRESSION: 1
TOTALSCORE: 0
BREATHING: 0
NEGVOCALIZATION: 0
BODYLANGUAGE: 0
NEGVOCALIZATION: 0
TOTALSCORE: 0
BODYLANGUAGE: 1
TOTALSCORE: 4
TOTALSCORE: 1
TOTALSCORE: 0
CONSOLABILITY: 1
FACIALEXPRESSION: 0
NEGVOCALIZATION: 2
CONSOLABILITY: 1
NEGVOCALIZATION: 0
BREATHING: 1
BREATHING: 0
CONSOLABILITY: 0
CONSOLABILITY: 0
NEGVOCALIZATION: 0
NEGVOCALIZATION: 0
BREATHING: 0
CONSOLABILITY: 0
CONSOLABILITY: 0
BODYLANGUAGE: 0
TOTALSCORE: 0
BODYLANGUAGE: 0
FACIALEXPRESSION: 0
FACIALEXPRESSION: 2
BODYLANGUAGE: 0
NEGVOCALIZATION: 0
NEGVOCALIZATION: 0
BODYLANGUAGE: 0
TOTALSCORE: 5
TOTALSCORE: 0
TOTALSCORE: 0
FACIALEXPRESSION: 1
FACIALEXPRESSION: 0
FACIALEXPRESSION: 0
BODYLANGUAGE: 0
FACIALEXPRESSION: 2
BREATHING: 0
BREATHING: 0
BODYLANGUAGE: 1
NEGVOCALIZATION: 1
NEGVOCALIZATION: 1
BODYLANGUAGE: 0
BREATHING: 1
TOTALSCORE: 0
BREATHING: 0
NEGVOCALIZATION: 0
BREATHING: 1
FACIALEXPRESSION: 0
BREATHING: 0
BREATHING: 0
FACIALEXPRESSION: 0
BREATHING: 0
BODYLANGUAGE: 1
NEGVOCALIZATION: 0
BREATHING: 0
CONSOLABILITY: 0
BREATHING: 0
FACIALEXPRESSION: 0
NEGVOCALIZATION: 0
BODYLANGUAGE: 0
BODYLANGUAGE: 0
NEGVOCALIZATION: 0
CONSOLABILITY: 0
NEGVOCALIZATION: 0
NEGVOCALIZATION: 0
TOTALSCORE: 0
TOTALSCORE: 7
FACIALEXPRESSION: 0
BREATHING: 0

## 2019-08-28 ASSESSMENT — PAIN SCALES - GENERAL
PAINLEVEL_OUTOF10: 1
PAINLEVEL_OUTOF10: 1
PAINLEVEL_OUTOF10: 4
PAINLEVEL_OUTOF10: 0
PAINLEVEL_OUTOF10: 7
PAINLEVEL_OUTOF10: 8
PAINLEVEL_OUTOF10: 0
PAINLEVEL_OUTOF10: 5
PAINLEVEL_OUTOF10: 6

## 2019-08-28 ASSESSMENT — ENCOUNTER SYMPTOMS
CONSTIPATION: 0
DIARRHEA: 0
FACIAL SWELLING: 0
ABDOMINAL DISTENTION: 0
EYE DISCHARGE: 0
COUGH: 0
EYE REDNESS: 0
BLOOD IN STOOL: 0
CHEST TIGHTNESS: 0
PHOTOPHOBIA: 0
ABDOMINAL PAIN: 0
VOMITING: 0
SHORTNESS OF BREATH: 0
NAUSEA: 0

## 2019-08-28 ASSESSMENT — PAIN SCALES - WONG BAKER
WONGBAKER_NUMERICALRESPONSE: 0

## 2019-08-28 NOTE — PROGRESS NOTES
Previous G-tube removed and blood draining from ostomy into dry dressing from large rectus sheath hematoma. He is now stable after resuscitation. [place] Enedelia with antibiotics, NPO and ICU care. Surgery following. hgb stable at 9.6. S/p New PEG placement 8/27/2019 - tolerating TF's at goal rate with minimal residuals. PLAN   :  1) Continue NPO, Abx's, ICU care. 2) G-tube feeds as tolerated. 3) Dressing to old G-tube site. 4) Protonix d/c'ed.   5) Pulmonary rehab and PT/OT per ICU team.    Apryl Clayton MD  [address]  8/28/2019

## 2019-08-28 NOTE — PROGRESS NOTES
improved  Resolved, no longer in septic shock      Acute respiratory failure with hypoxia: Patient coughed blob of mucous plug this morning after extensive suctioning. He had improvement of his airway noises. As well as improvement of shortness of breath.    -Continue O2 supplementation and nebulizer therapy  -Continue periodic suctioning   - he pulls oxygen off at times. Currently restrained  Suspect aspiration, had bronch yesterday and       Infected hematoma of rectus sheath: In the setting of recently dislodged gastric tube. Likely the inciting factor. By general surgery with no plans for any intervention at this time. We will await the infection to improve before reconsidering PEG placement  -Continue antibiotics  -Timing of reinsertion of gastric tube to be determined. Following discussions with [facility] advocates for surgical PEG tube in placement rather than laparoscopic approach  -General surgery following- need to discuss with them as he is needing nutrition.  -Monitor H&H and transfuse as appropriate to keep hemoglobin> 7/  HG is 8.7 today  PEG replaced today. - now in use with tube feeds. Acute Posthemorrhagic anemia: the setting of dislodged gastric tube that was reinserted. Status post packed red blood cell transfusion. H&H stable  -We will monitor H&H transfuse as appropriate        Acute metabolic encephalopathy:   Slowly improving and following commands. We will continue to monitor and orientate daily  -continues to improve. Left lower extremity weakness: See neurology and not impressed for CV at this time. We will monitor  - discussed with neuro, suspect related to pain from hematoma. He is able to move the leg. Acute kidney injury [facility]): Resolved with hydration    Lactic acidosis: Resolved with hydration    FEN/ hypernatremia/ hypokalemia  - he is on D5   - PEG replaced today, Await being able to use. - resume tube feeds when cleared.   - replace K    Medications:

## 2019-08-28 NOTE — PROGRESS NOTES
Intravenous 2 times per day    levalbuterol  1.25 mg Nebulization Q6H    sodium chloride flush  10 mL Intravenous 2 times per day    ipratropium  0.5 mg Nebulization 4x daily    thiamine (VITAMIN B1) IVPB  500 mg Intravenous Q8H    meropenem  1 g Intravenous Q12H     PRN Meds: sodium chloride (PF), sodium chloride flush, ondansetron, potassium chloride **OR** potassium alternative oral replacement **OR** potassium chloride, potassium chloride, sodium chloride flush, morphine, sodium chloride flush      DVT Prophylaxis: SCD due to rectal sheath bleed  Diet: DIET TUBE FEED CONTINUOUS/CYCLIC NPO; STANDARD WITH FIBER; Gastrostomy; Continuous; 20; 60; 24  Code Status: Full Code    Dispo: Admitted to ICU    ____________________________________________________________________________    Subjective:   Overnight Events:   Uneventful overnight  Remains nonverbal, except of few on word responses here and there. Follow simple commands at times   Lot of upper airway noises. Physical Exam Performed:  /79   Pulse 127   Temp 98.4 °F (36.9 °C) (Temporal)   Resp 24   Ht 5' 7\" (1.702 m)   Wt 128 lb 9.6 oz (58.3 kg)   SpO2 98%   BMI 20.14 kg/m²   General appearance: No apparent distress, appears stated age and cooperative. HEENT: Normocephalic, atraumatic, MMM, No sclera icterus/conjuctival palor  Neck: Supple, no thyromegally. No jugular venous distention. Respiratory:  Normal respiratory effort. Lots of Rhonchi, diminished. Cardiovascular: S1/S2 without murmurs, rubs or gallops. RRR  Abdomen: Soft, has a stoma appliance over the old g-tube site. Musculoskeletal: No clubbing, cyanosis or edema bilaterally. Skin: Skin color, texture, turgor normal.  No rashes or lesions.   Neurologic:  Cranial nerves: II-XII intact, PAULA, able to grasp bilaterally, left lower extremity weakness, plantar withdrawal on the right lower extremity  Psychiatric: Awake and alert but nonresponsive  Capillary Refill: Brisk,< 3

## 2019-08-28 NOTE — PLAN OF CARE
Problem: Falls - Risk of:  Goal: Will remain free from falls  Description  Will remain free from falls  Outcome: Ongoing     Problem: Falls - Risk of:  Goal: Absence of physical injury  Description  Absence of physical injury  Outcome: Ongoing     Problem: Risk for Impaired Skin Integrity  Goal: Tissue integrity - skin and mucous membranes  Description  Structural intactness and normal physiological function of skin and  mucous membranes.   Outcome: Ongoing     Problem: Bleeding:  Goal: Will show no signs and symptoms of excessive bleeding  Description  Will show no signs and symptoms of excessive bleeding  Outcome: Ongoing     Problem: Pain:  Goal: Pain level will decrease  Description  Pain level will decrease  Outcome: Ongoing     Problem: Nutrition  Goal: Optimal nutrition therapy  Outcome: Ongoing     Problem: Restraint Use - Nonviolent/Non-Self-Destructive Behavior:  Goal: Absence of restraint indications  Description  Absence of restraint indications  Outcome: Ongoing     Problem: Restraint Use - Nonviolent/Non-Self-Destructive Behavior:  Goal: Absence of restraint-related injury  Description  Absence of restraint-related injury  Outcome: Ongoing

## 2019-08-28 NOTE — PROGRESS NOTES
Doing well status post PEG tube placement  Tolerating tube feeds at goal  Draining old blood from G-tube site  Will follow as needed.

## 2019-08-28 NOTE — PROGRESS NOTES
Reassessment complete. VSS, remains tachycardic 's. Abdominal dressing saturated, began oozing blood when pressure applied. Dressing removed and minimal bleeding noted from new PEG site. New dressing applied. Tolerating tube feed, increased per MD order. Will monitor.  Wisam Palma
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
## 2019-08-29 ENCOUNTER — APPOINTMENT (OUTPATIENT)
Dept: GENERAL RADIOLOGY | Age: 69
DRG: 871 | End: 2019-08-29
Payer: MEDICARE

## 2019-08-29 LAB
ANION GAP SERPL CALCULATED.3IONS-SCNC: 8 MMOL/L (ref 3–16)
BUN BLDV-MCNC: 12 MG/DL (ref 7–20)
CALCIUM SERPL-MCNC: 7.6 MG/DL (ref 8.3–10.6)
CHLORIDE BLD-SCNC: 109 MMOL/L (ref 99–110)
CO2: 25 MMOL/L (ref 21–32)
CREAT SERPL-MCNC: 0.6 MG/DL (ref 0.8–1.3)
CULTURE, RESPIRATORY: NORMAL
GFR AFRICAN AMERICAN: >60
GFR NON-AFRICAN AMERICAN: >60
GLUCOSE BLD-MCNC: 134 MG/DL (ref 70–99)
GRAM STAIN RESULT: NORMAL
HCT VFR BLD CALC: 28.3 % (ref 40.5–52.5)
HEMOGLOBIN: 9.2 G/DL (ref 13.5–17.5)
MCH RBC QN AUTO: 30.9 PG (ref 26–34)
MCHC RBC AUTO-ENTMCNC: 32.6 G/DL (ref 31–36)
MCV RBC AUTO: 94.6 FL (ref 80–100)
PDW BLD-RTO: 15.3 % (ref 12.4–15.4)
PHOSPHORUS: 1.9 MG/DL (ref 2.5–4.9)
PLATELET # BLD: 222 K/UL (ref 135–450)
PMV BLD AUTO: 8.6 FL (ref 5–10.5)
POTASSIUM SERPL-SCNC: 3.1 MMOL/L (ref 3.5–5.1)
RBC # BLD: 2.99 M/UL (ref 4.2–5.9)
SODIUM BLD-SCNC: 142 MMOL/L (ref 136–145)
WBC # BLD: 5.8 K/UL (ref 4–11)

## 2019-08-29 PROCEDURE — 97162 PT EVAL MOD COMPLEX 30 MIN: CPT

## 2019-08-29 PROCEDURE — 94761 N-INVAS EAR/PLS OXIMETRY MLT: CPT

## 2019-08-29 PROCEDURE — 80048 BASIC METABOLIC PNL TOTAL CA: CPT

## 2019-08-29 PROCEDURE — 97165 OT EVAL LOW COMPLEX 30 MIN: CPT

## 2019-08-29 PROCEDURE — 36415 COLL VENOUS BLD VENIPUNCTURE: CPT

## 2019-08-29 PROCEDURE — 6360000002 HC RX W HCPCS: Performed by: INTERNAL MEDICINE

## 2019-08-29 PROCEDURE — 97530 THERAPEUTIC ACTIVITIES: CPT

## 2019-08-29 PROCEDURE — 2580000003 HC RX 258: Performed by: INTERNAL MEDICINE

## 2019-08-29 PROCEDURE — 94640 AIRWAY INHALATION TREATMENT: CPT

## 2019-08-29 PROCEDURE — 36592 COLLECT BLOOD FROM PICC: CPT

## 2019-08-29 PROCEDURE — 84100 ASSAY OF PHOSPHORUS: CPT

## 2019-08-29 PROCEDURE — 94669 MECHANICAL CHEST WALL OSCILL: CPT

## 2019-08-29 PROCEDURE — 99233 SBSQ HOSP IP/OBS HIGH 50: CPT | Performed by: INTERNAL MEDICINE

## 2019-08-29 PROCEDURE — 1200000000 HC SEMI PRIVATE

## 2019-08-29 PROCEDURE — 85027 COMPLETE CBC AUTOMATED: CPT

## 2019-08-29 PROCEDURE — 71045 X-RAY EXAM CHEST 1 VIEW: CPT

## 2019-08-29 RX ORDER — LEVALBUTEROL INHALATION SOLUTION 1.25 MG/3ML
SOLUTION RESPIRATORY (INHALATION)
Status: DISPENSED
Start: 2019-08-29 | End: 2019-08-30

## 2019-08-29 RX ADMIN — LEVALBUTEROL HYDROCHLORIDE 1.25 MG: 1.25 SOLUTION RESPIRATORY (INHALATION) at 13:12

## 2019-08-29 RX ADMIN — IPRATROPIUM BROMIDE 0.5 MG: 0.5 SOLUTION RESPIRATORY (INHALATION) at 13:12

## 2019-08-29 RX ADMIN — LEVALBUTEROL HYDROCHLORIDE 1.25 MG: 1.25 SOLUTION RESPIRATORY (INHALATION) at 08:15

## 2019-08-29 RX ADMIN — Medication 10 ML: at 08:04

## 2019-08-29 RX ADMIN — MORPHINE SULFATE 2 MG: 2 INJECTION, SOLUTION INTRAMUSCULAR; INTRAVENOUS at 04:07

## 2019-08-29 RX ADMIN — MORPHINE SULFATE 2 MG: 2 INJECTION, SOLUTION INTRAMUSCULAR; INTRAVENOUS at 19:53

## 2019-08-29 RX ADMIN — MEROPENEM 1 G: 1 INJECTION, POWDER, FOR SOLUTION INTRAVENOUS at 04:07

## 2019-08-29 RX ADMIN — THIAMINE HYDROCHLORIDE 500 MG: 100 INJECTION, SOLUTION INTRAMUSCULAR; INTRAVENOUS at 06:30

## 2019-08-29 RX ADMIN — THIAMINE HYDROCHLORIDE 500 MG: 100 INJECTION, SOLUTION INTRAMUSCULAR; INTRAVENOUS at 21:10

## 2019-08-29 RX ADMIN — LEVALBUTEROL HYDROCHLORIDE 1.25 MG: 1.25 SOLUTION RESPIRATORY (INHALATION) at 20:29

## 2019-08-29 RX ADMIN — IPRATROPIUM BROMIDE 0.5 MG: 0.5 SOLUTION RESPIRATORY (INHALATION) at 20:29

## 2019-08-29 RX ADMIN — THIAMINE HYDROCHLORIDE 500 MG: 100 INJECTION, SOLUTION INTRAMUSCULAR; INTRAVENOUS at 15:08

## 2019-08-29 RX ADMIN — LEVALBUTEROL HYDROCHLORIDE 1.25 MG: 1.25 SOLUTION RESPIRATORY (INHALATION) at 02:37

## 2019-08-29 RX ADMIN — IPRATROPIUM BROMIDE 0.5 MG: 0.5 SOLUTION RESPIRATORY (INHALATION) at 08:15

## 2019-08-29 RX ADMIN — Medication 10 ML: at 19:52

## 2019-08-29 RX ADMIN — IPRATROPIUM BROMIDE 0.5 MG: 0.5 SOLUTION RESPIRATORY (INHALATION) at 16:24

## 2019-08-29 ASSESSMENT — PAIN SCALES - PAIN ASSESSMENT IN ADVANCED DEMENTIA (PAINAD)
BODYLANGUAGE: 2
NEGVOCALIZATION: 0
TOTALSCORE: 0
BODYLANGUAGE: 0
FACIALEXPRESSION: 0
FACIALEXPRESSION: 0
NEGVOCALIZATION: 1
CONSOLABILITY: 2
NEGVOCALIZATION: 0
BREATHING: 0
CONSOLABILITY: 0
BODYLANGUAGE: 0
NEGVOCALIZATION: 2
FACIALEXPRESSION: 0
BREATHING: 0
TOTALSCORE: 5
CONSOLABILITY: 0
BREATHING: 0
TOTALSCORE: 0
BODYLANGUAGE: 0
TOTALSCORE: 0
TOTALSCORE: 0
CONSOLABILITY: 1
BREATHING: 0
CONSOLABILITY: 0
BREATHING: 0
FACIALEXPRESSION: 0
CONSOLABILITY: 0
NEGVOCALIZATION: 0
TOTALSCORE: 6
BREATHING: 0
FACIALEXPRESSION: 1
BODYLANGUAGE: 0
TOTALSCORE: 0
CONSOLABILITY: 0
TOTALSCORE: 6
BODYLANGUAGE: 0
NEGVOCALIZATION: 0
BREATHING: 0
BREATHING: 0
CONSOLABILITY: 0
BODYLANGUAGE: 1
BREATHING: 0
TOTALSCORE: 0
BODYLANGUAGE: 2
BODYLANGUAGE: 0
FACIALEXPRESSION: 0
BREATHING: 0
CONSOLABILITY: 1
FACIALEXPRESSION: 1
FACIALEXPRESSION: 0
NEGVOCALIZATION: 0
NEGVOCALIZATION: 0
FACIALEXPRESSION: 0
BREATHING: 0
CONSOLABILITY: 0
TOTALSCORE: 0
BODYLANGUAGE: 0
NEGVOCALIZATION: 0
BREATHING: 0
TOTALSCORE: 0
NEGVOCALIZATION: 0
FACIALEXPRESSION: 0
CONSOLABILITY: 0
FACIALEXPRESSION: 0
BREATHING: 0
CONSOLABILITY: 0
BREATHING: 0
TOTALSCORE: 0
BODYLANGUAGE: 0
FACIALEXPRESSION: 1
CONSOLABILITY: 0
BODYLANGUAGE: 0
CONSOLABILITY: 0
TOTALSCORE: 0
NEGVOCALIZATION: 2
TOTALSCORE: 0
BODYLANGUAGE: 0
NEGVOCALIZATION: 0
FACIALEXPRESSION: 0
BODYLANGUAGE: 0
NEGVOCALIZATION: 0
FACIALEXPRESSION: 0
NEGVOCALIZATION: 0

## 2019-08-29 ASSESSMENT — PAIN SCALES - GENERAL
PAINLEVEL_OUTOF10: 0
PAINLEVEL_OUTOF10: 6
PAINLEVEL_OUTOF10: 5
PAINLEVEL_OUTOF10: 0
PAINLEVEL_OUTOF10: 6
PAINLEVEL_OUTOF10: 0

## 2019-08-29 ASSESSMENT — PAIN DESCRIPTION - ORIENTATION: ORIENTATION: LEFT

## 2019-08-29 ASSESSMENT — PAIN SCALES - WONG BAKER
WONGBAKER_NUMERICALRESPONSE: 2
WONGBAKER_NUMERICALRESPONSE: 2

## 2019-08-29 ASSESSMENT — PAIN DESCRIPTION - LOCATION
LOCATION: GENERALIZED
LOCATION: ABDOMEN

## 2019-08-29 ASSESSMENT — PAIN DESCRIPTION - PAIN TYPE: TYPE: ACUTE PAIN

## 2019-08-29 NOTE — PROGRESS NOTES
tube.  Required PEG tube replacement. .  Septic shock due to concerns from infected hematoma. Off vasopressors. All cultures are negative, discontinue meropenem. I personally feel hypotension was possibly related to hemorrhagic shock-required PRBC transfusion. Respiratory failure due to poor airway clearance. Recommend PT. Recent bronchoscopy showed no evidence of mucous plugging. History of alcohol abuse/withdrawal-requiring intubation in the past.  Currently patient is lethargic. No signs of withdrawal ?  Korsakoff syndrome. Continue with IV thiamine. CT head done on 8/23 was normal with no acute intracranial pathology. PEG tube replaced, in position and tolerating feeds well. Pulmonary will continue to follow.

## 2019-08-29 NOTE — PROGRESS NOTES
Bedside report received 0715. Patient is alert,following commands,but not answering questions. Assessment per flowsheet. Abd dressing changed by nightshift RN at shift change for dark,blood tinged drainage at PEG site. Dr. Dayanna Craig at bedside ,assessed the site with no concerns voiced. Abd binder in place. Patient seen by Dr. Rosario Unger and pulm at this time. Plan to change code status per paperwork on chart for Ivet Alvarez from [facility] PT/OT .

## 2019-08-29 NOTE — PROGRESS NOTES
Assessment complete, VSS, medications administered including 2mg Morphine PRN for pain. Pt appears comfortable, will continue to monitor.
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
## 2019-08-29 NOTE — PROGRESS NOTES
Hospitalist Progress Note      PCP: Daisy Antonio    Date of Admission: 8/23/2019    LOS: 6    Chief Complaint:   Chief Complaint   Patient presents with    Fall     [facility] [place] [facility] being down down by facility staff., states rolled out of bed, was found facedown with NC tubing wrapped aroudn neck, states pt was blue but had pulse. pt turned over, color regained. Pt hx of MRSA . pt taky, responds to yes and no per baseline.  Shortness of Breath       Case Summary:   50-year-old gentleman with history of DDD cervical spine, myofascial pain syndrome, hypertension, lumbar radiculopathy, BPH, hyperlipidemia, GERD, alcohol abuse who was recently discharged from [facility] for altered mental status with hypotension requiring ICU admission complicated by probable alcohol withdrawal.  He continued to have dysphagia during his stay at [facility] and failed swallow study necessitating G-tube placement. He returned to [facility] with altered mentation and severe hypotension. Patient is not able to provide any history and noted to be nonverbal.  On presentation he was tachycardic with confusion and severe hypotension and admitted to ICU concern for septic shock. CT abdomen was noted for interval dislodgment of the G-tube with development of markedly enlarged hemorrhagic infected collection along the left rectus sheath      Active Hospital Problems    Diagnosis Date Noted    Severe malnutrition ([address] [E43] 08/26/2019    Lactic acidosis [E87.2]     Hematoma of rectus sheath [S30.1XXA]     Left leg weakness [R29.898]     Severe sepsis with septic shock (CODE) ([facility]) [R65.21] 08/23/2019    Acute kidney injury ([address] [N17.9]          Active Problems:    Severe sepsis with septic shock: He received IV fluids and pressor support with antibiotics.   Off pressors this morning for more than 24 hours  -Continue antibiotics  -Continue hydration  -

## 2019-08-29 NOTE — PROGRESS NOTES
[facility]  Gastroenterology Progress Note  Sandra Benavidez is a 70 y.o. male patient. 1. Severe sepsis with septic shock (CODE) ([facility] [address]    2. Acute respiratory failure with hypoxia (HCC)    3. HCAP (healthcare-associated pneumonia)    4. Lactic acidosis    5. Acute kidney injury ([facility] [address]    6. Elevated troponin    7. Mass of right lung        SUBJECTIVE:  More alert today. Still non verbal with me. Tolerating TF's at goal rate. Physical    VITALS:  BP (!) 149/73   Pulse 115   Temp 98.4 °F (36.9 °C) (Temporal)   Resp 22   Ht 5' 7\" (1.702 m)   Wt 128 lb 14.4 oz (58.5 kg)   SpO2 94%   BMI 20.19 kg/m²   TEMPERATURE:  Current - Temp: 98.4 °F (36.9 °C); Max - Temp  Av.6 °F (37 °C)  Min: 98.3 °F (36.8 °C)  Max: 98.9 °F (37.2 °C)    Abdomen soft, ND,  along left flank, no HSM, Bowel sounds normal.  PEG site clean and dry. Previous gastrostomy site still draining sanguinous drainage soaking dressings. Data      Recent Labs     19  0635   WBC 7.6  --  6.3  --   --   --  5.8   HGB 8.4*   < > 9.5*   < > 9.1* 9.1* 9.2*   HCT 25.2*   < > 29.0*   < > 27.2* 27.1* 28.3*   MCV 94.5  --  95.2  --   --   --  94.6     --  217  --   --   --  222    < > = values in this interval not displayed. Recent Labs     19  0613 19  0635     --   --  141 142   K 2.9*   < > 4.0 3.4* 3.1*   *  --   --  111* 109   CO2 21  --   --  21 25   BUN 14  --   --  11 12   CREATININE 0.7*  --   --  0.6* 0.6*    < > = values in this interval not displayed. No results for input(s): AST, ALT, ALB, BILIDIR, BILITOT, ALKPHOS in the last 72 hours. No results for input(s): LIPASE, AMYLASE in the last 72 hours.       ASSESSMENT :     Hemorrhage from G-tube - due to dislodgement of G-tube from stomach - Previous G-tube removed and blood draining from ostomy into dry dressing from large rectus sheath hematoma.    He is now stable after resuscitation. Alley Svitlanat with antibiotics, NPO and ICU care. Surgery following. hgb stable at 9.6.     S/p New PEG placement 8/27/2019 - tolerating TF's at goal rate with minimal residuals.          PLAN   :  1) Continue NPO. 2) G-tube feeds as tolerated. 3) Dressing to old G-tube site - drainage should cease with time. 4) SLP therapy/reassessment regularly. Will sign off. Please call with questions.     Omari Suazo MD  [address]  8/29/2019

## 2019-08-30 LAB
ANION GAP SERPL CALCULATED.3IONS-SCNC: 9 MMOL/L (ref 3–16)
BUN BLDV-MCNC: 12 MG/DL (ref 7–20)
CALCIUM SERPL-MCNC: 7.8 MG/DL (ref 8.3–10.6)
CHLORIDE BLD-SCNC: 109 MMOL/L (ref 99–110)
CO2: 26 MMOL/L (ref 21–32)
CREAT SERPL-MCNC: 0.6 MG/DL (ref 0.8–1.3)
GFR AFRICAN AMERICAN: >60
GFR NON-AFRICAN AMERICAN: >60
GLUCOSE BLD-MCNC: 110 MG/DL (ref 70–99)
HCT VFR BLD CALC: 25.6 % (ref 40.5–52.5)
HEMOGLOBIN: 8.4 G/DL (ref 13.5–17.5)
MCH RBC QN AUTO: 31.1 PG (ref 26–34)
MCHC RBC AUTO-ENTMCNC: 33 G/DL (ref 31–36)
MCV RBC AUTO: 94.4 FL (ref 80–100)
PDW BLD-RTO: 15.3 % (ref 12.4–15.4)
PLATELET # BLD: 255 K/UL (ref 135–450)
PMV BLD AUTO: 8.9 FL (ref 5–10.5)
POTASSIUM SERPL-SCNC: 2.8 MMOL/L (ref 3.5–5.1)
RBC # BLD: 2.71 M/UL (ref 4.2–5.9)
SODIUM BLD-SCNC: 144 MMOL/L (ref 136–145)
WBC # BLD: 6.5 K/UL (ref 4–11)

## 2019-08-30 PROCEDURE — 94760 N-INVAS EAR/PLS OXIMETRY 1: CPT

## 2019-08-30 PROCEDURE — 85027 COMPLETE CBC AUTOMATED: CPT

## 2019-08-30 PROCEDURE — 6360000002 HC RX W HCPCS: Performed by: INTERNAL MEDICINE

## 2019-08-30 PROCEDURE — 2580000003 HC RX 258: Performed by: INTERNAL MEDICINE

## 2019-08-30 PROCEDURE — 1200000000 HC SEMI PRIVATE

## 2019-08-30 PROCEDURE — 80048 BASIC METABOLIC PNL TOTAL CA: CPT

## 2019-08-30 PROCEDURE — 94640 AIRWAY INHALATION TREATMENT: CPT

## 2019-08-30 PROCEDURE — 99231 SBSQ HOSP IP/OBS SF/LOW 25: CPT | Performed by: INTERNAL MEDICINE

## 2019-08-30 RX ORDER — POTASSIUM CHLORIDE 29.8 MG/ML
20 INJECTION INTRAVENOUS
Status: COMPLETED | OUTPATIENT
Start: 2019-08-30 | End: 2019-08-30

## 2019-08-30 RX ORDER — LEVALBUTEROL INHALATION SOLUTION 1.25 MG/3ML
SOLUTION RESPIRATORY (INHALATION)
Status: DISPENSED
Start: 2019-08-30 | End: 2019-08-31

## 2019-08-30 RX ORDER — LEVALBUTEROL 1.25 MG/.5ML
SOLUTION, CONCENTRATE RESPIRATORY (INHALATION)
Status: DISPENSED
Start: 2019-08-30 | End: 2019-08-30

## 2019-08-30 RX ORDER — LEVALBUTEROL INHALATION SOLUTION 0.63 MG/3ML
SOLUTION RESPIRATORY (INHALATION)
Status: DISPENSED
Start: 2019-08-30 | End: 2019-08-31

## 2019-08-30 RX ADMIN — THIAMINE HYDROCHLORIDE 500 MG: 100 INJECTION, SOLUTION INTRAMUSCULAR; INTRAVENOUS at 15:09

## 2019-08-30 RX ADMIN — LEVALBUTEROL HYDROCHLORIDE 1.25 MG: 1.25 SOLUTION RESPIRATORY (INHALATION) at 09:05

## 2019-08-30 RX ADMIN — THIAMINE HYDROCHLORIDE 500 MG: 100 INJECTION, SOLUTION INTRAMUSCULAR; INTRAVENOUS at 22:01

## 2019-08-30 RX ADMIN — IPRATROPIUM BROMIDE 0.5 MG: 0.5 SOLUTION RESPIRATORY (INHALATION) at 15:15

## 2019-08-30 RX ADMIN — IPRATROPIUM BROMIDE 0.5 MG: 0.5 SOLUTION RESPIRATORY (INHALATION) at 09:05

## 2019-08-30 RX ADMIN — POTASSIUM CHLORIDE 20 MEQ: 29.8 INJECTION, SOLUTION INTRAVENOUS at 07:15

## 2019-08-30 RX ADMIN — Medication 10 ML: at 21:41

## 2019-08-30 RX ADMIN — LEVALBUTEROL HYDROCHLORIDE 1.25 MG: 1.25 SOLUTION RESPIRATORY (INHALATION) at 15:07

## 2019-08-30 RX ADMIN — THIAMINE HYDROCHLORIDE 500 MG: 100 INJECTION, SOLUTION INTRAMUSCULAR; INTRAVENOUS at 05:02

## 2019-08-30 RX ADMIN — POTASSIUM CHLORIDE 20 MEQ: 29.8 INJECTION, SOLUTION INTRAVENOUS at 07:46

## 2019-08-30 RX ADMIN — POTASSIUM CHLORIDE 20 MEQ: 29.8 INJECTION, SOLUTION INTRAVENOUS at 06:08

## 2019-08-30 ASSESSMENT — PAIN SCALES - GENERAL: PAINLEVEL_OUTOF10: 0

## 2019-08-30 NOTE — PLAN OF CARE
Problem: Falls - Risk of:  Goal: Will remain free from falls  Description  Will remain free from falls  Outcome: Ongoing  Goal: Absence of physical injury  Description  Absence of physical injury  Outcome: Ongoing     Problem: Risk for Impaired Skin Integrity  Goal: Tissue integrity - skin and mucous membranes  Description  Structural intactness and normal physiological function of skin and  mucous membranes.   Outcome: Ongoing     Problem: Bleeding:  Goal: Will show no signs and symptoms of excessive bleeding  Description  Will show no signs and symptoms of excessive bleeding  Outcome: Ongoing     Problem: Pain:  Goal: Pain level will decrease  Description  Pain level will decrease  Outcome: Ongoing  Goal: Control of acute pain  Description  Control of acute pain  Outcome: Ongoing  Goal: Control of chronic pain  Description  Control of chronic pain  Outcome: Ongoing     Problem: Nutrition  Goal: Optimal nutrition therapy  Outcome: Ongoing     Problem: Restraint Use - Nonviolent/Non-Self-Destructive Behavior:  Goal: Absence of restraint indications  Description  Absence of restraint indications  Outcome: Ongoing  Goal: Absence of restraint-related injury  Description  Absence of restraint-related injury  Outcome: Ongoing     Problem: Musculor/Skeletal Functional Status  Goal: Highest potential functional level  Outcome: Ongoing  Goal: Absence of falls  Outcome: Ongoing     Problem: Confusion - Acute:  Goal: Absence of continued neurological deterioration signs and symptoms  Description  Absence of continued neurological deterioration signs and symptoms  Outcome: Ongoing  Goal: Mental status will be restored to baseline  Description  Mental status will be restored to baseline  Outcome: Ongoing     Problem: Discharge Planning:  Goal: Ability to perform activities of daily living will improve  Description  Ability to perform activities of daily living will improve  Outcome: Ongoing  Goal: Participates in care Ongoing

## 2019-08-30 NOTE — CARE COORDINATION
Informed Ashley Echavarria at  that pt has PT/OT evals have been entered. She is initiating precert this day.   Electronically signed by JOSH Amanda on 8/30/2019 at 12:22 PM

## 2019-08-30 NOTE — PROGRESS NOTES
AM assessment complete. Pt is awake still. Has not slept throughout the night. Still in restraints. All tubes in place as of now. Still restless. Call light in reach. The care plan and education has been reviewed and mutually agreed upon with the patient. Patient remains free from falls. All fall precautions in place. Yellow blanket at bedside, yellow bracelet on patient. SAFE sign on door. Bed and chair alarms being used. Bed in lowest position. Will monitor.

## 2019-08-30 NOTE — PROGRESS NOTES
Hospitalist Progress Note      PCP: Abad Tirado    Date of Admission: 8/23/2019    LOS: 7    Chief Complaint:   Chief Complaint   Patient presents with    Fall     [facility] [place] [facility] being down down by facility staff., states rolled out of bed, was found facedown with NC tubing wrapped aroudn neck, states pt was blue but had pulse. pt turned over, color regained. Pt hx of MRSA . pt taky, responds to yes and no per baseline.  Shortness of Breath       Case Summary:   41-year-old gentleman with history of DDD cervical spine, myofascial pain syndrome, hypertension, lumbar radiculopathy, BPH, hyperlipidemia, GERD, alcohol abuse who was recently discharged from [facility] for altered mental status with hypotension requiring ICU admission complicated by probable alcohol withdrawal.  He continued to have dysphagia during his stay at [facility] and failed swallow study necessitating G-tube placement. He returned to [facility] with altered mentation and severe hypotension. Patient is not able to provide any history and noted to be nonverbal.  On presentation he was tachycardic with confusion and severe hypotension and admitted to ICU concern for septic shock. CT abdomen was noted for interval dislodgment of the G-tube with development of markedly enlarged hemorrhagic infected collection along the left rectus sheath      Active Hospital Problems    Diagnosis Date Noted    Severe malnutrition ([address] [E43] 08/26/2019    Lactic acidosis [E87.2]     Hematoma of rectus sheath [S30.1XXA]     Left leg weakness [R29.898]     Severe sepsis with septic shock (CODE) ([facility]) [R65.21] 08/23/2019    Acute kidney injury ([address] [N17.9]          Active Problems:    Severe sepsis with septic shock: He received IV fluids and pressor support with antibiotics.   Off pressors this morning for more than 24 hours  -Continue antibiotics  -Continue hydration  - feeds when cleared. - replace K    Needs to consider palliative care    Medications:  Reviewed  Infusion Medications    norepinephrine Stopped (08/24/19 1219)     Scheduled Medications    levalbuterol        levalbuterol        levalbuterol        sodium chloride flush  10 mL Intravenous 2 times per day    levalbuterol  1.25 mg Nebulization Q6H    sodium chloride flush  10 mL Intravenous 2 times per day    ipratropium  0.5 mg Nebulization 4x daily    thiamine (VITAMIN B1) IVPB  500 mg Intravenous Q8H     PRN Meds: sodium chloride (PF), sodium chloride flush, ondansetron, potassium chloride **OR** potassium alternative oral replacement **OR** potassium chloride, potassium chloride, sodium chloride flush, morphine, sodium chloride flush      DVT Prophylaxis: SCD due to rectal sheath bleed  Diet: DIET TUBE FEED CONTINUOUS/CYCLIC NPO; STANDARD WITH FIBER; Gastrostomy; Continuous; 20; 60; 24  Code Status: DNR-CCA    Dispo: Admitted to ICU    ____________________________________________________________________________    Subjective:   Overnight Events:   Uneventful overnight  Remains nonverbal, except of few on word responses here and there. Follow simple commands at times   Lot of upper airway noises. Physical Exam Performed:  BP (!) 144/73   Pulse 111   Temp 98.1 °F (36.7 °C) (Oral)   Resp 16   Ht 5' 7\" (1.702 m)   Wt 128 lb 14.4 oz (58.5 kg)   SpO2 97%   BMI 20.19 kg/m²   General appearance: No apparent distress, appears stated age and cooperative. HEENT: Normocephalic, atraumatic, MMM, No sclera icterus/conjuctival palor  Neck: Supple, no thyromegally. No jugular venous distention. Respiratory:  Normal respiratory effort. Lots of Rhonchi, diminished. Cardiovascular: S1/S2 without murmurs, rubs or gallops. RRR  Abdomen: Soft, has a stoma appliance over the old g-tube site. Musculoskeletal: No clubbing, cyanosis or edema bilaterally.     Skin: Skin color, texture, turgor normal.  No rashes

## 2019-08-30 NOTE — PROGRESS NOTES
Pulmonary Medicine Progress Note      Name:  Jesse Kaye Date/Time of Admission: 2019  3:55 PM    CSN: [id number] Attending Provider: Gabe Donahue MD   Room/Bed: [address] [place] [id number] : 1950 Age: 70 y.o. SUBJECTIVE     Patient is seen for reevaluation of   []COPD/COPD exacerbation   []Asthma/asthma exacerbation   []Pulmonary nodule/lung mass and/or other abnormal chest imaging   []  Pneumonia   []SOB    []ILD and/or sarcoidosis  []  Respiratory failure  [] Pleural effusion  []  Pulmonary embolism/DVT  [x] other: Sepsis. Transferred from ICU. Very agitated and combative according to the nursing. On room air. Able to expectorate phlegm. OBJECTIVE         CURRENT MEDS:   levalbuterol        levalbuterol        levalbuterol        sodium chloride flush  10 mL Intravenous 2 times per day    levalbuterol  1.25 mg Nebulization Q6H    sodium chloride flush  10 mL Intravenous 2 times per day    ipratropium  0.5 mg Nebulization 4x daily    thiamine (VITAMIN B1) IVPB  500 mg Intravenous Q8H       INTAKE/OUTPUT:  I/O last 3 completed shifts: In: 3026 [I.V.:393; NG/GT:1334]  Out: 1645 [Urine:1550]  No intake/output data recorded. VITAL SIGNS:  Current Vital Signs  BP (!) 144/73   Pulse 111   Temp 98.1 °F (36.7 °C) (Oral)   Resp 16   Ht 5' 7\" (1.702 m)   Wt 128 lb 14.4 oz (58.5 kg)   SpO2 97%   BMI 20.19 kg/m²   FiO2 : 95 %          PHYSICAL EXAM:   GENERAL: Well developed, poorly nourished, No acute distress, appears to be acutely and chronically ill. Agitated and confused. NECK: No JVD, trachea midline  CARDIOVASCULAR: S1 and S2 normal.  No murmurs, rubs, or gallops. CHEST: Symmetrical chest expansion, no accessory muscle use. LUNGS: Somewhat coarse breath sounds to auscultation bilaterally. No wheezes. No rhonchi. No crackles. ABDOMEN: Soft, nontender, nondistended, normal bowel sounds  EXTREMITIES: No clubbing. No cyanosis.  No edema of lower extremities  NEUROLOGIC:

## 2019-08-31 LAB
ANION GAP SERPL CALCULATED.3IONS-SCNC: 9 MMOL/L (ref 3–16)
BUN BLDV-MCNC: 10 MG/DL (ref 7–20)
CALCIUM SERPL-MCNC: 7.8 MG/DL (ref 8.3–10.6)
CHLORIDE BLD-SCNC: 109 MMOL/L (ref 99–110)
CO2: 25 MMOL/L (ref 21–32)
CREAT SERPL-MCNC: 0.6 MG/DL (ref 0.8–1.3)
GFR AFRICAN AMERICAN: >60
GFR NON-AFRICAN AMERICAN: >60
GLUCOSE BLD-MCNC: 112 MG/DL (ref 70–99)
HCT VFR BLD CALC: 25.7 % (ref 40.5–52.5)
HEMOGLOBIN: 8.5 G/DL (ref 13.5–17.5)
MCH RBC QN AUTO: 30.5 PG (ref 26–34)
MCHC RBC AUTO-ENTMCNC: 32.8 G/DL (ref 31–36)
MCV RBC AUTO: 93 FL (ref 80–100)
PDW BLD-RTO: 15.2 % (ref 12.4–15.4)
PHOSPHORUS: 2.4 MG/DL (ref 2.5–4.9)
PLATELET # BLD: 324 K/UL (ref 135–450)
PMV BLD AUTO: 8.8 FL (ref 5–10.5)
POTASSIUM SERPL-SCNC: 2.7 MMOL/L (ref 3.5–5.1)
POTASSIUM SERPL-SCNC: 3.6 MMOL/L (ref 3.5–5.1)
RBC # BLD: 2.77 M/UL (ref 4.2–5.9)
SODIUM BLD-SCNC: 143 MMOL/L (ref 136–145)
WBC # BLD: 8.8 K/UL (ref 4–11)

## 2019-08-31 PROCEDURE — 80048 BASIC METABOLIC PNL TOTAL CA: CPT

## 2019-08-31 PROCEDURE — 6360000002 HC RX W HCPCS: Performed by: INTERNAL MEDICINE

## 2019-08-31 PROCEDURE — 84132 ASSAY OF SERUM POTASSIUM: CPT

## 2019-08-31 PROCEDURE — 2580000003 HC RX 258: Performed by: INTERNAL MEDICINE

## 2019-08-31 PROCEDURE — 1200000000 HC SEMI PRIVATE

## 2019-08-31 PROCEDURE — 84100 ASSAY OF PHOSPHORUS: CPT

## 2019-08-31 PROCEDURE — 94761 N-INVAS EAR/PLS OXIMETRY MLT: CPT

## 2019-08-31 PROCEDURE — 85027 COMPLETE CBC AUTOMATED: CPT

## 2019-08-31 PROCEDURE — 36415 COLL VENOUS BLD VENIPUNCTURE: CPT

## 2019-08-31 PROCEDURE — 2500000003 HC RX 250 WO HCPCS: Performed by: INTERNAL MEDICINE

## 2019-08-31 RX ADMIN — Medication 10 ML: at 10:55

## 2019-08-31 RX ADMIN — POTASSIUM CHLORIDE 20 MEQ: 29.8 INJECTION, SOLUTION INTRAVENOUS at 06:15

## 2019-08-31 RX ADMIN — Medication 10 ML: at 22:21

## 2019-08-31 RX ADMIN — Medication 10 ML: at 22:22

## 2019-08-31 RX ADMIN — SODIUM PHOSPHATE, MONOBASIC, MONOHYDRATE 10 MMOL: 276; 142 INJECTION, SOLUTION INTRAVENOUS at 16:59

## 2019-08-31 RX ADMIN — MORPHINE SULFATE 2 MG: 2 INJECTION, SOLUTION INTRAMUSCULAR; INTRAVENOUS at 10:49

## 2019-08-31 RX ADMIN — POTASSIUM CHLORIDE 20 MEQ: 29.8 INJECTION, SOLUTION INTRAVENOUS at 10:49

## 2019-08-31 RX ADMIN — POTASSIUM CHLORIDE 20 MEQ: 29.8 INJECTION, SOLUTION INTRAVENOUS at 13:00

## 2019-08-31 RX ADMIN — THIAMINE HYDROCHLORIDE 500 MG: 100 INJECTION, SOLUTION INTRAMUSCULAR; INTRAVENOUS at 22:41

## 2019-08-31 RX ADMIN — THIAMINE HYDROCHLORIDE 500 MG: 100 INJECTION, SOLUTION INTRAMUSCULAR; INTRAVENOUS at 17:40

## 2019-08-31 RX ADMIN — THIAMINE HYDROCHLORIDE 500 MG: 100 INJECTION, SOLUTION INTRAMUSCULAR; INTRAVENOUS at 05:17

## 2019-08-31 RX ADMIN — Medication 10 ML: at 10:54

## 2019-08-31 ASSESSMENT — PAIN SCALES - GENERAL: PAINLEVEL_OUTOF10: 6

## 2019-08-31 NOTE — PROGRESS NOTES
hydration  - improved  Resolved, no longer in septic shock      Acute respiratory failure with hypoxia: Patient coughed blob of mucous plug this morning after extensive suctioning. He had improvement of his airway noises. As well as improvement of shortness of breath.    -Continue O2 supplementation and nebulizer therapy  -Continue periodic suctioning   - he pulls oxygen off at times. Currently restrained  Suspect aspiration, had bronched and  it was clear   No abx  Continues to have upper airway sounds from fluid/congestion       Infected hematoma of rectus sheath: In the setting of recently dislodged gastric tube. Likely the inciting factor. By general surgery with no plans for any intervention at this time. We will await the infection to improve before reconsidering PEG placement  -Continue antibiotics  -Timing of reinsertion of gastric tube to be determined. Following discussions with [facility] advocates for surgical PEG tube in placement rather than laparoscopic approach  -General surgery following- need to discuss with them as he is needing nutrition.  -Monitor H&H and transfuse as appropriate to keep hemoglobin> 7/  HG is 8.7 today  PEG replaced today. - now in use with tube feeds. Off all abx       Acute Posthemorrhagic anemia: the setting of dislodged gastric tube that was reinserted. Status post packed red blood cell transfusion. H&H stable  -We will monitor H&H transfuse as appropriate  -stable         Acute metabolic encephalopathy:   Slowly improving and following commands. We will continue to monitor and orientate daily  -continues to improve.   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 We will monitor  - discussed with neuro, suspect related to pain from hematoma. He is able to move the leg.         Acute kidney injury [facility]): Resolved with hydration    Lactic

## 2019-09-01 LAB
ANION GAP SERPL CALCULATED.3IONS-SCNC: 10 MMOL/L (ref 3–16)
BUN BLDV-MCNC: 10 MG/DL (ref 7–20)
CALCIUM SERPL-MCNC: 8.1 MG/DL (ref 8.3–10.6)
CHLORIDE BLD-SCNC: 110 MMOL/L (ref 99–110)
CO2: 24 MMOL/L (ref 21–32)
CREAT SERPL-MCNC: 0.6 MG/DL (ref 0.8–1.3)
GFR AFRICAN AMERICAN: >60
GFR NON-AFRICAN AMERICAN: >60
GLUCOSE BLD-MCNC: 124 MG/DL (ref 70–99)
HCT VFR BLD CALC: 25.2 % (ref 40.5–52.5)
HEMOGLOBIN: 8.4 G/DL (ref 13.5–17.5)
MCH RBC QN AUTO: 31.3 PG (ref 26–34)
MCHC RBC AUTO-ENTMCNC: 33.3 G/DL (ref 31–36)
MCV RBC AUTO: 94 FL (ref 80–100)
PDW BLD-RTO: 15.5 % (ref 12.4–15.4)
PLATELET # BLD: 365 K/UL (ref 135–450)
PMV BLD AUTO: 9 FL (ref 5–10.5)
POTASSIUM SERPL-SCNC: 3.4 MMOL/L (ref 3.5–5.1)
RBC # BLD: 2.68 M/UL (ref 4.2–5.9)
SODIUM BLD-SCNC: 144 MMOL/L (ref 136–145)
WBC # BLD: 9.6 K/UL (ref 4–11)

## 2019-09-01 PROCEDURE — 6370000000 HC RX 637 (ALT 250 FOR IP): Performed by: INTERNAL MEDICINE

## 2019-09-01 PROCEDURE — 94761 N-INVAS EAR/PLS OXIMETRY MLT: CPT

## 2019-09-01 PROCEDURE — 2580000003 HC RX 258: Performed by: INTERNAL MEDICINE

## 2019-09-01 PROCEDURE — 6360000002 HC RX W HCPCS: Performed by: INTERNAL MEDICINE

## 2019-09-01 PROCEDURE — 94640 AIRWAY INHALATION TREATMENT: CPT

## 2019-09-01 PROCEDURE — 94669 MECHANICAL CHEST WALL OSCILL: CPT

## 2019-09-01 PROCEDURE — 1200000000 HC SEMI PRIVATE

## 2019-09-01 PROCEDURE — 85027 COMPLETE CBC AUTOMATED: CPT

## 2019-09-01 PROCEDURE — 6360000002 HC RX W HCPCS

## 2019-09-01 PROCEDURE — 80048 BASIC METABOLIC PNL TOTAL CA: CPT

## 2019-09-01 RX ORDER — LEVALBUTEROL INHALATION SOLUTION 1.25 MG/3ML
SOLUTION RESPIRATORY (INHALATION)
Status: COMPLETED
Start: 2019-09-01 | End: 2019-09-01

## 2019-09-01 RX ORDER — THIAMINE HCL 100 MG
100 TABLET ORAL DAILY
Status: DISCONTINUED | OUTPATIENT
Start: 2019-09-02 | End: 2019-09-17 | Stop reason: HOSPADM

## 2019-09-01 RX ORDER — ATORVASTATIN CALCIUM 20 MG/1
20 TABLET, FILM COATED ORAL NIGHTLY
Status: DISCONTINUED | OUTPATIENT
Start: 2019-09-01 | End: 2019-09-17 | Stop reason: HOSPADM

## 2019-09-01 RX ORDER — METOPROLOL TARTRATE 50 MG/1
50 TABLET, FILM COATED ORAL 2 TIMES DAILY
Status: DISCONTINUED | OUTPATIENT
Start: 2019-09-01 | End: 2019-09-17 | Stop reason: HOSPADM

## 2019-09-01 RX ORDER — HALOPERIDOL 5 MG
2.5 TABLET ORAL 3 TIMES DAILY
Status: DISCONTINUED | OUTPATIENT
Start: 2019-09-01 | End: 2019-09-04

## 2019-09-01 RX ORDER — LISINOPRIL 10 MG/1
10 TABLET ORAL DAILY
Status: DISCONTINUED | OUTPATIENT
Start: 2019-09-01 | End: 2019-09-17 | Stop reason: HOSPADM

## 2019-09-01 RX ORDER — FOLIC ACID 1 MG/1
1 TABLET ORAL DAILY
Status: DISCONTINUED | OUTPATIENT
Start: 2019-09-01 | End: 2019-09-17 | Stop reason: HOSPADM

## 2019-09-01 RX ADMIN — Medication 10 ML: at 10:49

## 2019-09-01 RX ADMIN — IPRATROPIUM BROMIDE 0.5 MG: 0.5 SOLUTION RESPIRATORY (INHALATION) at 12:15

## 2019-09-01 RX ADMIN — ATORVASTATIN CALCIUM 20 MG: 20 TABLET, FILM COATED ORAL at 22:23

## 2019-09-01 RX ADMIN — LEVALBUTEROL HYDROCHLORIDE 1.25 MG: 1.25 SOLUTION RESPIRATORY (INHALATION) at 08:13

## 2019-09-01 RX ADMIN — LEVALBUTEROL HYDROCHLORIDE 1.25 MG: 1.25 SOLUTION RESPIRATORY (INHALATION) at 12:15

## 2019-09-01 RX ADMIN — FOLIC ACID 1 MG: 1 TABLET ORAL at 13:07

## 2019-09-01 RX ADMIN — METOPROLOL TARTRATE 50 MG: 50 TABLET, FILM COATED ORAL at 22:23

## 2019-09-01 RX ADMIN — THIAMINE HYDROCHLORIDE 500 MG: 100 INJECTION, SOLUTION INTRAMUSCULAR; INTRAVENOUS at 05:00

## 2019-09-01 RX ADMIN — LEVALBUTEROL HYDROCHLORIDE 1.25 MG: 1.25 SOLUTION RESPIRATORY (INHALATION) at 21:06

## 2019-09-01 RX ADMIN — HALOPERIDOL 2.5 MG: 5 TABLET ORAL at 22:23

## 2019-09-01 RX ADMIN — METOPROLOL TARTRATE 50 MG: 50 TABLET, FILM COATED ORAL at 13:08

## 2019-09-01 RX ADMIN — IPRATROPIUM BROMIDE 0.5 MG: 0.5 SOLUTION RESPIRATORY (INHALATION) at 08:12

## 2019-09-01 RX ADMIN — POTASSIUM BICARBONATE 40 MEQ: 782 TABLET, EFFERVESCENT ORAL at 05:37

## 2019-09-01 RX ADMIN — IPRATROPIUM BROMIDE 0.5 MG: 0.5 SOLUTION RESPIRATORY (INHALATION) at 15:39

## 2019-09-01 RX ADMIN — LISINOPRIL 10 MG: 10 TABLET ORAL at 13:07

## 2019-09-01 RX ADMIN — IPRATROPIUM BROMIDE 0.5 MG: 0.5 SOLUTION RESPIRATORY (INHALATION) at 21:14

## 2019-09-01 RX ADMIN — HALOPERIDOL 2.5 MG: 5 TABLET ORAL at 13:07

## 2019-09-02 PROBLEM — R89.5 POSITIVE CULTURE FINDING: Status: ACTIVE | Noted: 2019-09-02

## 2019-09-02 PROBLEM — R53.81 DEBILITATED PATIENT: Status: ACTIVE | Noted: 2019-09-02

## 2019-09-02 LAB
ANION GAP SERPL CALCULATED.3IONS-SCNC: 10 MMOL/L (ref 3–16)
BUN BLDV-MCNC: 12 MG/DL (ref 7–20)
CALCIUM SERPL-MCNC: 8.2 MG/DL (ref 8.3–10.6)
CHLORIDE BLD-SCNC: 110 MMOL/L (ref 99–110)
CO2: 25 MMOL/L (ref 21–32)
CREAT SERPL-MCNC: 0.6 MG/DL (ref 0.8–1.3)
GFR AFRICAN AMERICAN: >60
GFR NON-AFRICAN AMERICAN: >60
GLUCOSE BLD-MCNC: 123 MG/DL (ref 70–99)
HCT VFR BLD CALC: 27.1 % (ref 40.5–52.5)
HEMOGLOBIN: 8.9 G/DL (ref 13.5–17.5)
MCH RBC QN AUTO: 31.1 PG (ref 26–34)
MCHC RBC AUTO-ENTMCNC: 32.9 G/DL (ref 31–36)
MCV RBC AUTO: 94.4 FL (ref 80–100)
PDW BLD-RTO: 15.6 % (ref 12.4–15.4)
PLATELET # BLD: 418 K/UL (ref 135–450)
PMV BLD AUTO: 9.1 FL (ref 5–10.5)
POTASSIUM SERPL-SCNC: 3.5 MMOL/L (ref 3.5–5.1)
RBC # BLD: 2.87 M/UL (ref 4.2–5.9)
SODIUM BLD-SCNC: 145 MMOL/L (ref 136–145)
WBC # BLD: 9.9 K/UL (ref 4–11)

## 2019-09-02 PROCEDURE — 94669 MECHANICAL CHEST WALL OSCILL: CPT

## 2019-09-02 PROCEDURE — 6360000002 HC RX W HCPCS

## 2019-09-02 PROCEDURE — 94640 AIRWAY INHALATION TREATMENT: CPT

## 2019-09-02 PROCEDURE — 80048 BASIC METABOLIC PNL TOTAL CA: CPT

## 2019-09-02 PROCEDURE — 6360000002 HC RX W HCPCS: Performed by: INTERNAL MEDICINE

## 2019-09-02 PROCEDURE — 85027 COMPLETE CBC AUTOMATED: CPT

## 2019-09-02 PROCEDURE — 6370000000 HC RX 637 (ALT 250 FOR IP): Performed by: INTERNAL MEDICINE

## 2019-09-02 PROCEDURE — 1200000000 HC SEMI PRIVATE

## 2019-09-02 PROCEDURE — 99223 1ST HOSP IP/OBS HIGH 75: CPT | Performed by: INTERNAL MEDICINE

## 2019-09-02 PROCEDURE — 36415 COLL VENOUS BLD VENIPUNCTURE: CPT

## 2019-09-02 RX ORDER — LEVALBUTEROL INHALATION SOLUTION 1.25 MG/3ML
SOLUTION RESPIRATORY (INHALATION)
Status: COMPLETED
Start: 2019-09-02 | End: 2019-09-02

## 2019-09-02 RX ORDER — LEVALBUTEROL 1.25 MG/.5ML
SOLUTION, CONCENTRATE RESPIRATORY (INHALATION)
Status: COMPLETED
Start: 2019-09-02 | End: 2019-09-02

## 2019-09-02 RX ORDER — LEVALBUTEROL INHALATION SOLUTION 1.25 MG/3ML
SOLUTION RESPIRATORY (INHALATION)
Status: DISPENSED
Start: 2019-09-02 | End: 2019-09-03

## 2019-09-02 RX ORDER — LORAZEPAM 2 MG/ML
1 INJECTION INTRAMUSCULAR EVERY 6 HOURS PRN
Status: DISCONTINUED | OUTPATIENT
Start: 2019-09-02 | End: 2019-09-02

## 2019-09-02 RX ORDER — LORAZEPAM 2 MG/ML
1 INJECTION INTRAMUSCULAR EVERY 6 HOURS PRN
Status: DISCONTINUED | OUTPATIENT
Start: 2019-09-02 | End: 2019-09-03

## 2019-09-02 RX ADMIN — IPRATROPIUM BROMIDE 0.5 MG: 0.5 SOLUTION RESPIRATORY (INHALATION) at 15:32

## 2019-09-02 RX ADMIN — LEVALBUTEROL HYDROCHLORIDE 1.25 MG: 1.25 SOLUTION RESPIRATORY (INHALATION) at 20:45

## 2019-09-02 RX ADMIN — LISINOPRIL 10 MG: 10 TABLET ORAL at 09:36

## 2019-09-02 RX ADMIN — IPRATROPIUM BROMIDE 0.5 MG: 0.5 SOLUTION RESPIRATORY (INHALATION) at 20:45

## 2019-09-02 RX ADMIN — FOLIC ACID 1 MG: 1 TABLET ORAL at 09:35

## 2019-09-02 RX ADMIN — METOPROLOL TARTRATE 50 MG: 50 TABLET, FILM COATED ORAL at 09:36

## 2019-09-02 RX ADMIN — LEVALBUTEROL HYDROCHLORIDE 1.25 MG: 1.25 SOLUTION RESPIRATORY (INHALATION) at 03:34

## 2019-09-02 RX ADMIN — LEVALBUTEROL 1.25 MG: 1.25 SOLUTION, CONCENTRATE RESPIRATORY (INHALATION) at 07:54

## 2019-09-02 RX ADMIN — HALOPERIDOL 2.5 MG: 5 TABLET ORAL at 22:47

## 2019-09-02 RX ADMIN — METOPROLOL TARTRATE 50 MG: 50 TABLET, FILM COATED ORAL at 22:46

## 2019-09-02 RX ADMIN — Medication 100 MG: at 09:37

## 2019-09-02 RX ADMIN — LEVALBUTEROL 1.25 MG: 1.25 SOLUTION RESPIRATORY (INHALATION) at 03:34

## 2019-09-02 RX ADMIN — LEVALBUTEROL HYDROCHLORIDE 1.25 MG: 1.25 SOLUTION RESPIRATORY (INHALATION) at 15:31

## 2019-09-02 RX ADMIN — ATORVASTATIN CALCIUM 20 MG: 20 TABLET, FILM COATED ORAL at 22:46

## 2019-09-02 RX ADMIN — LEVALBUTEROL HYDROCHLORIDE 1.25 MG: 1.25 SOLUTION RESPIRATORY (INHALATION) at 07:55

## 2019-09-02 RX ADMIN — HALOPERIDOL 2.5 MG: 5 TABLET ORAL at 09:36

## 2019-09-02 RX ADMIN — IPRATROPIUM BROMIDE 0.5 MG: 0.5 SOLUTION RESPIRATORY (INHALATION) at 07:54

## 2019-09-02 RX ADMIN — HALOPERIDOL 2.5 MG: 5 TABLET ORAL at 14:06

## 2019-09-02 ASSESSMENT — PAIN SCALES - GENERAL
PAINLEVEL_OUTOF10: 0

## 2019-09-02 ASSESSMENT — PAIN SCALES - WONG BAKER: WONGBAKER_NUMERICALRESPONSE: 2

## 2019-09-02 NOTE — PLAN OF CARE
Will remain free from falls. Skin and mucous membranes will remain free from signs and symptoms of tissue breakdown. Will show no signs and symptoms of excessive bleeding. Pain level will decrease. Optimal nutrition therapy is achieved. Absence of continued neurological deterioration signs and symptoms. Ability to perform activities of daily living will improve. Mood remains stable. Absence of psychomotor disturbance signs and symptoms. Demonstrations of improved sensory functioning will increase. Will appear well rested.  Absence of restraint- related injury

## 2019-09-02 NOTE — PROGRESS NOTES
above.  Additional scattered opacities appear to   resolved or decreased in size. Findings suggest resolving multifocal   infectious/inflammatory process. 4. Stable 1.4 cm slightly spiculated nodular opacity in the anterior upper   lobe, malignancy remains a diagnostic consideration   5. Persistent right basilar opacification which may represent atelectasis or   pneumonia. Persistent but interval decreased left retrocardiac atelectasis. 6. Redemonstration of indeterminate left adrenal nodule measured above. 7. Mild wall thickening along the gastric antrum and proximal to mid duodenum   suggesting gastritis/duodenitis. 8. Mild wall thickening throughout the descending and sigmoid colon   suggesting colitis. 9. Multiple incidental/chronic findings as detailed above including   redemonstration of infrarenal abdominal aortic aneurysm as measured above,   please see follow-up guidelines below. RECOMMENDATIONS:   3.3 cm abdominal aortic aneurysm. Recommend follow-up every 3 years. Reference: J Am Maribell Radiol 0191;42:172-694. CT CERVICAL SPINE WO CONTRAST   Final Result   No acute abnormality of the cervical spine. No change since the study   performed 1 day ago. CT HEAD WO CONTRAST   Final Result   No acute intracranial abnormality. Stable small polyp or retention cyst left sphenoid sinus and tiny amount of   fluid in the mastoid tips bilaterally. XR CHEST PORTABLE   Final Result   Persistent right upper lobe mass seen to better advantage on recent CT. Follow-up CT as was recommended on report from 08/09/2019 is warranted. No   new or acute finding is seen.                  Fish Tom MD

## 2019-09-03 LAB
ANION GAP SERPL CALCULATED.3IONS-SCNC: 10 MMOL/L (ref 3–16)
BUN BLDV-MCNC: 11 MG/DL (ref 7–20)
CALCIUM SERPL-MCNC: 8.8 MG/DL (ref 8.3–10.6)
CHLORIDE BLD-SCNC: 109 MMOL/L (ref 99–110)
CO2: 28 MMOL/L (ref 21–32)
CREAT SERPL-MCNC: 0.7 MG/DL (ref 0.8–1.3)
GFR AFRICAN AMERICAN: >60
GFR NON-AFRICAN AMERICAN: >60
GLUCOSE BLD-MCNC: 126 MG/DL (ref 70–99)
HCT VFR BLD CALC: 25.7 % (ref 40.5–52.5)
HEMOGLOBIN: 8.5 G/DL (ref 13.5–17.5)
MCH RBC QN AUTO: 31 PG (ref 26–34)
MCHC RBC AUTO-ENTMCNC: 33 G/DL (ref 31–36)
MCV RBC AUTO: 94.1 FL (ref 80–100)
PDW BLD-RTO: 15.7 % (ref 12.4–15.4)
PLATELET # BLD: 438 K/UL (ref 135–450)
PMV BLD AUTO: 8.9 FL (ref 5–10.5)
POTASSIUM SERPL-SCNC: 3.8 MMOL/L (ref 3.5–5.1)
RBC # BLD: 2.73 M/UL (ref 4.2–5.9)
SODIUM BLD-SCNC: 147 MMOL/L (ref 136–145)
WBC # BLD: 10.3 K/UL (ref 4–11)

## 2019-09-03 PROCEDURE — 94640 AIRWAY INHALATION TREATMENT: CPT

## 2019-09-03 PROCEDURE — 6370000000 HC RX 637 (ALT 250 FOR IP): Performed by: INTERNAL MEDICINE

## 2019-09-03 PROCEDURE — 36415 COLL VENOUS BLD VENIPUNCTURE: CPT

## 2019-09-03 PROCEDURE — 94669 MECHANICAL CHEST WALL OSCILL: CPT

## 2019-09-03 PROCEDURE — 94761 N-INVAS EAR/PLS OXIMETRY MLT: CPT

## 2019-09-03 PROCEDURE — 2580000003 HC RX 258: Performed by: INTERNAL MEDICINE

## 2019-09-03 PROCEDURE — 85027 COMPLETE CBC AUTOMATED: CPT

## 2019-09-03 PROCEDURE — 6360000002 HC RX W HCPCS: Performed by: INTERNAL MEDICINE

## 2019-09-03 PROCEDURE — 99232 SBSQ HOSP IP/OBS MODERATE 35: CPT | Performed by: INTERNAL MEDICINE

## 2019-09-03 PROCEDURE — 1200000000 HC SEMI PRIVATE

## 2019-09-03 PROCEDURE — 80048 BASIC METABOLIC PNL TOTAL CA: CPT

## 2019-09-03 PROCEDURE — 99221 1ST HOSP IP/OBS SF/LOW 40: CPT | Performed by: PSYCHIATRY & NEUROLOGY

## 2019-09-03 RX ORDER — LEVALBUTEROL INHALATION SOLUTION 1.25 MG/3ML
SOLUTION RESPIRATORY (INHALATION)
Status: DISPENSED
Start: 2019-09-03 | End: 2019-09-03

## 2019-09-03 RX ORDER — LEVALBUTEROL INHALATION SOLUTION 1.25 MG/3ML
SOLUTION RESPIRATORY (INHALATION)
Status: DISPENSED
Start: 2019-09-03 | End: 2019-09-04

## 2019-09-03 RX ADMIN — HALOPERIDOL 2.5 MG: 5 TABLET ORAL at 15:45

## 2019-09-03 RX ADMIN — ATORVASTATIN CALCIUM 20 MG: 20 TABLET, FILM COATED ORAL at 22:29

## 2019-09-03 RX ADMIN — IPRATROPIUM BROMIDE 0.5 MG: 0.5 SOLUTION RESPIRATORY (INHALATION) at 21:39

## 2019-09-03 RX ADMIN — METOPROLOL TARTRATE 50 MG: 50 TABLET, FILM COATED ORAL at 09:40

## 2019-09-03 RX ADMIN — Medication 10 ML: at 09:41

## 2019-09-03 RX ADMIN — HALOPERIDOL 2.5 MG: 5 TABLET ORAL at 22:29

## 2019-09-03 RX ADMIN — FOLIC ACID 1 MG: 1 TABLET ORAL at 09:41

## 2019-09-03 RX ADMIN — IPRATROPIUM BROMIDE 0.5 MG: 0.5 SOLUTION RESPIRATORY (INHALATION) at 12:34

## 2019-09-03 RX ADMIN — LISINOPRIL 10 MG: 10 TABLET ORAL at 09:40

## 2019-09-03 RX ADMIN — HALOPERIDOL 2.5 MG: 5 TABLET ORAL at 09:40

## 2019-09-03 RX ADMIN — LEVALBUTEROL HYDROCHLORIDE 1.25 MG: 1.25 SOLUTION RESPIRATORY (INHALATION) at 21:47

## 2019-09-03 RX ADMIN — Medication 100 MG: at 09:40

## 2019-09-03 RX ADMIN — IPRATROPIUM BROMIDE 0.5 MG: 0.5 SOLUTION RESPIRATORY (INHALATION) at 17:06

## 2019-09-03 RX ADMIN — LEVALBUTEROL HYDROCHLORIDE 1.25 MG: 1.25 SOLUTION RESPIRATORY (INHALATION) at 14:07

## 2019-09-03 RX ADMIN — IPRATROPIUM BROMIDE 0.5 MG: 0.5 SOLUTION RESPIRATORY (INHALATION) at 10:05

## 2019-09-03 RX ADMIN — LEVALBUTEROL HYDROCHLORIDE 1.25 MG: 1.25 SOLUTION RESPIRATORY (INHALATION) at 02:38

## 2019-09-03 RX ADMIN — METOPROLOL TARTRATE 50 MG: 50 TABLET, FILM COATED ORAL at 22:29

## 2019-09-03 RX ADMIN — LEVALBUTEROL HYDROCHLORIDE 1.25 MG: 1.25 SOLUTION RESPIRATORY (INHALATION) at 10:06

## 2019-09-03 ASSESSMENT — ENCOUNTER SYMPTOMS
COUGH: 0
WHEEZING: 0
SORE THROAT: 0
CONSTIPATION: 0
RHINORRHEA: 0
NAUSEA: 0
SHORTNESS OF BREATH: 0
ABDOMINAL PAIN: 0
EYE DISCHARGE: 0
EYE REDNESS: 0
BACK PAIN: 0
TROUBLE SWALLOWING: 0
DIARRHEA: 0

## 2019-09-03 NOTE — PROGRESS NOTES
assessment of the distal segmental and subsegmental vessels. No evidence of   central/proximal intraluminal filling defect to suggest pulmonary embolism. 3. Interval decrease size of pleural based cavitary opacity in the posterior   right upper lobe as measured above. Additional scattered opacities appear to   resolved or decreased in size. Findings suggest resolving multifocal   infectious/inflammatory process. 4. Stable 1.4 cm slightly spiculated nodular opacity in the anterior upper   lobe, malignancy remains a diagnostic consideration   5. Persistent right basilar opacification which may represent atelectasis or   pneumonia. Persistent but interval decreased left retrocardiac atelectasis. 6. Redemonstration of indeterminate left adrenal nodule measured above. 7. Mild wall thickening along the gastric antrum and proximal to mid duodenum   suggesting gastritis/duodenitis. 8. Mild wall thickening throughout the descending and sigmoid colon   suggesting colitis. 9. Multiple incidental/chronic findings as detailed above including   redemonstration of infrarenal abdominal aortic aneurysm as measured above,   please see follow-up guidelines below. RECOMMENDATIONS:   3.3 cm abdominal aortic aneurysm. Recommend follow-up every 3 years. Reference: J Am Maribell Radiol 7845;74:231-785. CTA ABDOMEN PELVIS W WO CONTRAST   Final Result   1. Interval dislodgement of the G-tube with development of markedly enlarged   hemorrhagic infected collection along the left rectus sheath as measured and   described above. G-tube is within the medial aspect of the collection. Collection becomes ill-defined along its inferior aspect in the pelvis with   areas of fluid containing superimposed foci of gas scattered within the   anterior lower abdomen and pelvis.   Finding was discussed with [facility] ABRAHAM BARILLAS at 3:24 am on 8/24/2019.   2. Pulmonary arteries are suboptimally opacified for evaluation with

## 2019-09-03 NOTE — CONSULTS
BRONCHOSCOPY DIAGNOSTIC OR CELL [address] ONLY performed by Abelardo Arellano MD at [facility] N/A 8/14/2019    EGD PEG TUBE PLACEMENT performed by Emilie Mckeon MD at [address] [facility] N/A 8/27/2019    EGD PEG TUBE PLACEMENT performed by Renu Milner MD at [facility]      left, reattached tendons    UPPER GASTROINTESTINAL ENDOSCOPY N/A 7/30/2019    EGD ESOPHAGOGASTRODUODENOSCOPY performed by Christ Hernandez MD at [facility]       Current Medications:     levalbuterol        atorvastatin  20 mg Oral Nightly    folic acid  1 mg Per G Tube Daily    haloperidol  2.5 mg Oral TID    lisinopril  10 mg Oral Daily    metoprolol tartrate  50 mg Oral BID    thiamine  100 mg Oral Daily    sodium chloride flush  10 mL Intravenous 2 times per day    levalbuterol  1.25 mg Nebulization Q6H    sodium chloride flush  10 mL Intravenous 2 times per day    ipratropium  0.5 mg Nebulization 4x daily       Allergies:  Patient has no known allergies. Social History:    TOBACCO:    Hx cig use  ETOH:    Hx EtOH use / abuse  DRUGS:   None   MARITAL STATUS:        Family History:   No immunodeficiency    REVIEW OF SYSTEMS:   Confused / limited ROS  No fever / chills / sweats. No weight loss. No visual change, eye pain, eye discharge. No oral lesion, sore throat, dysphagia. Denies cough / sputum. Denies chest pain, palpitations. Denies n / v / abd pain. No diarrhea. Denies dysuria or change in urinary function. Denies joint swelling or pain. No myalgia, arthralgia.   Denies skin changes, itching  Denies focal weakness, sensory change or other neurologic symptom    Denies new / worse depression, psychiatric symptoms  Denies any Endocrine or Hematologic symptoms    PHYSICAL EXAM:      Vitals:    /74   Pulse 110   Temp 98.2 °F (36.8 °C) (Oral)   Resp 16   Ht 5' 7\" (1.702  Diffuse pneumonia    Mucus plugging of bronchi    Weakness    Disorientation    Severe protein-calorie malnutrition (HCC)    SOB (shortness of breath)    DNR (do not resuscitate) discussion    Encounter for palliative care    Aspiration into airway    Severe sepsis with septic shock (CODE) (HCC)    Lactic acidosis    Hematoma of rectus sheath    Left leg weakness    Severe malnutrition (HCC)       Mult medical co-morbidities  Recent hosp [facility] - pneumonia, aspiration, G-tube placed    Admit [facility] 8/23 - falls, encephalopathy, G-tube displacement with   Bronch with yeast on fungal cult - yeast colonizes airway, 'overgrowth' with antibiotics    RECOMMENDATIONS:    No antibiotics / antifungal indicated  Aspiration precautions    Discussed with pt  Nisa Cummins MD

## 2019-09-03 NOTE — CONSULTS
collection. Collection becomes ill-defined along its inferior aspect in the pelvis with   areas of fluid containing superimposed foci of gas scattered within the   anterior lower abdomen and pelvis. Finding was discussed with [facility] ABRAHAM BARILLAS at 3:24 am on 8/24/2019.   2. Pulmonary arteries are suboptimally opacified for evaluation with limited   assessment of the distal segmental and subsegmental vessels. No evidence of   central/proximal intraluminal filling defect to suggest pulmonary embolism. 3. Interval decrease size of pleural based cavitary opacity in the posterior   right upper lobe as measured above. Additional scattered opacities appear to   resolved or decreased in size. Findings suggest resolving multifocal   infectious/inflammatory process. 4. Stable 1.4 cm slightly spiculated nodular opacity in the anterior upper   lobe, malignancy remains a diagnostic consideration   5. Persistent right basilar opacification which may represent atelectasis or   pneumonia. Persistent but interval decreased left retrocardiac atelectasis. 6. Redemonstration of indeterminate left adrenal nodule measured above. 7. Mild wall thickening along the gastric antrum and proximal to mid duodenum   suggesting gastritis/duodenitis. 8. Mild wall thickening throughout the descending and sigmoid colon   suggesting colitis. 9. Multiple incidental/chronic findings as detailed above including   redemonstration of infrarenal abdominal aortic aneurysm as measured above,   please see follow-up guidelines below.       RECOMMENDATIONS:   3.3 cm abdominal aortic aneurysm. Recommend follow-up every 3 years.       Reference: J Am Maribell Radiol 4263;93:475-471.           CT CERVICAL SPINE WO CONTRAST   Final Result   No acute abnormality of the cervical spine.   No change since the study   performed 1 day ago.           CT HEAD WO CONTRAST   Final Result   No acute intracranial abnormality.       Stable small polyp or

## 2019-09-03 NOTE — PROGRESS NOTES
Nutrition Assessment (Enteral Nutrition)    Type and Reason for Visit: Reassess    Nutrition Recommendations:   OBTAIN CURRENT BODY WEIGHT  Continue Jevity 1.2 @ 60 mL/hr  Water plus per orders: 200mL q4 hr (monitor Na+) for adjustment      Nutrition Assessment: Nutrition status is stable as evidenced by pt is shara tube feed at goal (Jevity 1.2 @ 60mL/hr with 200mL fluish q4 hr). Na+ at 147. RN reports no residuals. Will continue to monitor shara to tube feed at goal and Na+ level with water flush. Although pt meets criteria for severe malnutrition, he is currently not at risk for greater compromise as tube feed meets nutrition needs based on latest recorded wt of 128 lb. Need current wt to evaluate kcal/protein needs. Malnutrition Assessment:  · Malnutrition Status: Meets the criteria for severe malnutrition  · Context: Chronic illness  · Findings of the 6 clinical characteristics of malnutrition (Minimum of 2 out of 6 clinical characteristics is required to make the diagnosis of moderate or severe Protein Calorie Malnutrition based on AND/ASPEN Guidelines):  1. Energy Intake-Less than or equal to 50% of estimated energy requirement, (x 4 days during admission)    2. Weight Loss-Unable to assess,    3. Fat Loss-Severe subcutaneous fat loss, Orbital, Triceps  4. Muscle Loss-Severe muscle mass loss, Temples (temporalis muscle), Clavicles (pectoralis and deltoids), Thigh (quadriceps)  5. Fluid Accumulation-Moderate to severe fluid accumulation, Generalized  6.  Strength-Not measured    Nutrition Risk Level:  Moderate    Nutrition Needs:  · Estimated Daily Total Kcal: 1475 - 1770   · Estimated Daily Protein (g): 71 - 89  · Estimated Daily Fluid (ml/day): 1 mL/kcal    Nutrition Diagnosis:   · Problem: Severe malnutrition  · Etiology: related to Insufficient energy/nutrient consumption     Signs and symptoms:  as evidenced by Diet history of poor intake, Severe loss of subcutaneous fat, Severe muscle
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
## 2019-09-04 LAB
ANION GAP SERPL CALCULATED.3IONS-SCNC: 11 MMOL/L (ref 3–16)
BILIRUBIN URINE: NEGATIVE
BLOOD, URINE: NEGATIVE
BUN BLDV-MCNC: 13 MG/DL (ref 7–20)
CALCIUM SERPL-MCNC: 8.9 MG/DL (ref 8.3–10.6)
CHLORIDE BLD-SCNC: 104 MMOL/L (ref 99–110)
CLARITY: ABNORMAL
CO2: 25 MMOL/L (ref 21–32)
COLOR: YELLOW
CREAT SERPL-MCNC: 0.7 MG/DL (ref 0.8–1.3)
EKG ATRIAL RATE: 115 BPM
EKG DIAGNOSIS: NORMAL
EKG P AXIS: 30 DEGREES
EKG P-R INTERVAL: 96 MS
EKG Q-T INTERVAL: 354 MS
EKG QRS DURATION: 84 MS
EKG QTC CALCULATION (BAZETT): 489 MS
EKG R AXIS: -29 DEGREES
EKG T AXIS: 19 DEGREES
EKG VENTRICULAR RATE: 115 BPM
EPITHELIAL CELLS, UA: 1 /HPF (ref 0–5)
GFR AFRICAN AMERICAN: >60
GFR NON-AFRICAN AMERICAN: >60
GLUCOSE BLD-MCNC: 118 MG/DL (ref 70–99)
GLUCOSE URINE: NEGATIVE MG/DL
HCT VFR BLD CALC: 26.3 % (ref 40.5–52.5)
HEMOGLOBIN: 8.6 G/DL (ref 13.5–17.5)
HYALINE CASTS: 0 /LPF (ref 0–8)
KETONES, URINE: NEGATIVE MG/DL
LEUKOCYTE ESTERASE, URINE: NEGATIVE
MCH RBC QN AUTO: 30.5 PG (ref 26–34)
MCHC RBC AUTO-ENTMCNC: 32.6 G/DL (ref 31–36)
MCV RBC AUTO: 93.6 FL (ref 80–100)
MICROSCOPIC EXAMINATION: YES
NITRITE, URINE: NEGATIVE
PDW BLD-RTO: 15.8 % (ref 12.4–15.4)
PH UA: 7.5 (ref 5–8)
PLATELET # BLD: 460 K/UL (ref 135–450)
PMV BLD AUTO: 8.5 FL (ref 5–10.5)
POTASSIUM SERPL-SCNC: 3 MMOL/L (ref 3.5–5.1)
PROTEIN UA: 30 MG/DL
RBC # BLD: 2.81 M/UL (ref 4.2–5.9)
RBC UA: 3 /HPF (ref 0–4)
SODIUM BLD-SCNC: 140 MMOL/L (ref 136–145)
SPECIFIC GRAVITY UA: 1.01 (ref 1–1.03)
UROBILINOGEN, URINE: 1 E.U./DL
WBC # BLD: 10.6 K/UL (ref 4–11)
WBC UA: 2 /HPF (ref 0–5)

## 2019-09-04 PROCEDURE — 85027 COMPLETE CBC AUTOMATED: CPT

## 2019-09-04 PROCEDURE — 80048 BASIC METABOLIC PNL TOTAL CA: CPT

## 2019-09-04 PROCEDURE — 36415 COLL VENOUS BLD VENIPUNCTURE: CPT

## 2019-09-04 PROCEDURE — 94640 AIRWAY INHALATION TREATMENT: CPT

## 2019-09-04 PROCEDURE — 6370000000 HC RX 637 (ALT 250 FOR IP): Performed by: PSYCHIATRY & NEUROLOGY

## 2019-09-04 PROCEDURE — 93005 ELECTROCARDIOGRAM TRACING: CPT | Performed by: PSYCHIATRY & NEUROLOGY

## 2019-09-04 PROCEDURE — 93010 ELECTROCARDIOGRAM REPORT: CPT | Performed by: INTERNAL MEDICINE

## 2019-09-04 PROCEDURE — 6370000000 HC RX 637 (ALT 250 FOR IP): Performed by: INTERNAL MEDICINE

## 2019-09-04 PROCEDURE — 94669 MECHANICAL CHEST WALL OSCILL: CPT

## 2019-09-04 PROCEDURE — 1200000000 HC SEMI PRIVATE

## 2019-09-04 PROCEDURE — 6360000002 HC RX W HCPCS: Performed by: INTERNAL MEDICINE

## 2019-09-04 PROCEDURE — 94760 N-INVAS EAR/PLS OXIMETRY 1: CPT

## 2019-09-04 PROCEDURE — 94761 N-INVAS EAR/PLS OXIMETRY MLT: CPT

## 2019-09-04 PROCEDURE — 81001 URINALYSIS AUTO W/SCOPE: CPT

## 2019-09-04 RX ORDER — HALOPERIDOL 1 MG/1
2 TABLET ORAL DAILY PRN
Status: DISCONTINUED | OUTPATIENT
Start: 2019-09-04 | End: 2019-09-16

## 2019-09-04 RX ORDER — POTASSIUM CHLORIDE 20 MEQ/1
20 TABLET, EXTENDED RELEASE ORAL 2 TIMES DAILY WITH MEALS
Status: DISCONTINUED | OUTPATIENT
Start: 2019-09-04 | End: 2019-09-04

## 2019-09-04 RX ORDER — HALOPERIDOL 1 MG/1
2 TABLET ORAL 2 TIMES DAILY
Status: DISCONTINUED | OUTPATIENT
Start: 2019-09-04 | End: 2019-09-10

## 2019-09-04 RX ORDER — QUETIAPINE FUMARATE 25 MG/1
25 TABLET, FILM COATED ORAL 3 TIMES DAILY PRN
Qty: 60 TABLET | Refills: 0 | Status: SHIPPED | OUTPATIENT
Start: 2019-09-04

## 2019-09-04 RX ORDER — LEVALBUTEROL INHALATION SOLUTION 1.25 MG/3ML
SOLUTION RESPIRATORY (INHALATION)
Status: DISPENSED
Start: 2019-09-04 | End: 2019-09-04

## 2019-09-04 RX ADMIN — LISINOPRIL 10 MG: 10 TABLET ORAL at 10:25

## 2019-09-04 RX ADMIN — ATORVASTATIN CALCIUM 20 MG: 20 TABLET, FILM COATED ORAL at 22:36

## 2019-09-04 RX ADMIN — LEVALBUTEROL HYDROCHLORIDE 1.25 MG: 1.25 SOLUTION RESPIRATORY (INHALATION) at 20:34

## 2019-09-04 RX ADMIN — POTASSIUM BICARBONATE 20 MEQ: 782 TABLET, EFFERVESCENT ORAL at 22:40

## 2019-09-04 RX ADMIN — IPRATROPIUM BROMIDE 0.5 MG: 0.5 SOLUTION RESPIRATORY (INHALATION) at 16:49

## 2019-09-04 RX ADMIN — POTASSIUM BICARBONATE 20 MEQ: 782 TABLET, EFFERVESCENT ORAL at 10:25

## 2019-09-04 RX ADMIN — HALOPERIDOL 2 MG: 1 TABLET ORAL at 22:36

## 2019-09-04 RX ADMIN — Medication 100 MG: at 10:25

## 2019-09-04 RX ADMIN — LEVALBUTEROL HYDROCHLORIDE 1.25 MG: 1.25 SOLUTION RESPIRATORY (INHALATION) at 08:19

## 2019-09-04 RX ADMIN — IPRATROPIUM BROMIDE 0.5 MG: 0.5 SOLUTION RESPIRATORY (INHALATION) at 12:54

## 2019-09-04 RX ADMIN — METOPROLOL TARTRATE 50 MG: 50 TABLET, FILM COATED ORAL at 22:36

## 2019-09-04 RX ADMIN — IPRATROPIUM BROMIDE 0.5 MG: 0.5 SOLUTION RESPIRATORY (INHALATION) at 20:34

## 2019-09-04 RX ADMIN — IPRATROPIUM BROMIDE 0.5 MG: 0.5 SOLUTION RESPIRATORY (INHALATION) at 08:19

## 2019-09-04 RX ADMIN — LEVALBUTEROL HYDROCHLORIDE 1.25 MG: 1.25 SOLUTION RESPIRATORY (INHALATION) at 12:45

## 2019-09-04 RX ADMIN — LEVALBUTEROL HYDROCHLORIDE 1.25 MG: 1.25 SOLUTION RESPIRATORY (INHALATION) at 02:46

## 2019-09-04 RX ADMIN — METOPROLOL TARTRATE 50 MG: 50 TABLET, FILM COATED ORAL at 10:25

## 2019-09-04 RX ADMIN — HALOPERIDOL 2.5 MG: 5 TABLET ORAL at 10:25

## 2019-09-04 RX ADMIN — FOLIC ACID 1 MG: 1 TABLET ORAL at 10:26

## 2019-09-04 ASSESSMENT — PAIN SCALES - PAIN ASSESSMENT IN ADVANCED DEMENTIA (PAINAD)
CONSOLABILITY: 0
BREATHING: 0
BODYLANGUAGE: 0
BODYLANGUAGE: 0
TOTALSCORE: 0
CONSOLABILITY: 0
FACIALEXPRESSION: 0
TOTALSCORE: 0
NEGVOCALIZATION: 0
NEGVOCALIZATION: 0
BREATHING: 0
FACIALEXPRESSION: 0

## 2019-09-04 ASSESSMENT — PAIN SCALES - GENERAL: PAINLEVEL_OUTOF10: 0

## 2019-09-04 NOTE — PROGRESS NOTES
Brief Psychiatry Progress Note:    Case discussed with RN. Behavior has been well controlled without need for restraints since yesterday afternoon. He is planned for discharge to nursing facility today. EKG reviewed. Does have some more PVCs than last EKG and still showed elevated QTc around 489ms. Given his improved behavior, may be a good idea to de-escalate the haldol dose, particularly given borderline high QTc and PVCs. Recs:  - Reduce Haldol to 2mg BID  - keep additional 2mg prn daily for agitation  - would d/c the quetiapine 25mg TID prn agitation on his d/c med rec. Would want to stick to just one antipsychotic type and he didn't respond very well to quetiapine when he was at [place]. - if more aggressive pharmacotherapy is needed in the future for agitation/delirium, we may want to try olanzapine as this would have less QT and cardiac effects than the haldol, but for now seems he is stable.      Plan discussed with RN    Shashi Conway MD  Psychiatry

## 2019-09-04 NOTE — PLAN OF CARE
Problem: Falls - Risk of:  Goal: Will remain free from falls  Description  Will remain free from falls  Outcome: Ongoing  Goal: Absence of physical injury  Description  Absence of physical injury  Outcome: Ongoing  Assess risk factors for falls  Assess fall prevention measures  Assist ambulation and toileting    Problem: Risk for Impaired Skin Integrity  Goal: Tissue integrity - skin and mucous membranes  Description  Structural intactness and normal physiological function of skin and  mucous membranes.   Outcome: Ongoing  Assess signs of infection  Assist with repositioning  Provide skin care    Problem: Bleeding:  Goal: Will show no signs and symptoms of excessive bleeding  Description  Will show no signs and symptoms of excessive bleeding  Outcome: Ongoing     Problem: Nutrition  Goal: Optimal nutrition therapy  9/4/2019 0151 by Dahlia Hung RN  Outcome: Ongoing  9/3/2019 1636 by Lola Keating RD, LD  Outcome: Ongoing     Problem: Musculor/Skeletal Functional Status  Goal: Highest potential functional level  Outcome: Ongoing  Goal: Absence of falls  Outcome: Ongoing     Problem: Confusion - Acute:  Goal: Absence of continued neurological deterioration signs and symptoms  Description  Absence of continued neurological deterioration signs and symptoms  Outcome: Ongoing  Goal: Mental status will be restored to baseline  Description  Mental status will be restored to baseline  Outcome: Ongoing     Problem: Discharge Planning:  Goal: Ability to perform activities of daily living will improve  Description  Ability to perform activities of daily living will improve  Outcome: Ongoing  Goal: Participates in care planning  Description  Participates in care planning  Outcome: Ongoing     Problem: Injury - Risk of, Physical Injury:  Goal: Will remain free from falls  Description  Will remain free from falls  Outcome: Ongoing  Goal: Absence of physical injury  Description  Absence of physical injury  Outcome: Ongoing     Problem: Mood - Altered:  Goal: Mood stable  Description  Mood stable  Outcome: Ongoing     Problem: Sleep Pattern Disturbance:  Goal: Appears well-rested  Description  Appears well-rested  Outcome: Ongoing    The care plan and education has been reviewed and mutually agreed upon with the patient.

## 2019-09-04 NOTE — PLAN OF CARE
Problem: Falls - Risk of:  Goal: Will remain free from falls  Description  Will remain free from falls  9/4/2019 1945 by Manuel Maxwell RN  Outcome: Ongoing     Problem: Falls - Risk of:  Goal: Absence of physical injury  Description  Absence of physical injury  9/4/2019 1945 by Manuel Maxwell RN  Outcome: Ongoing     Problem: Risk for Impaired Skin Integrity  Goal: Tissue integrity - skin and mucous membranes  Description  Structural intactness and normal physiological function of skin and  mucous membranes.   Outcome: Ongoing     Problem: Bleeding:  Goal: Will show no signs and symptoms of excessive bleeding  Description  Will show no signs and symptoms of excessive bleeding  Outcome: Ongoing     Problem: Pain:  Goal: Pain level will decrease  Description  Pain level will decrease  9/4/2019 1945 by Manuel Maxwell RN  Outcome: Ongoing     Problem: Pain:  Goal: Control of acute pain  Description  Control of acute pain  9/4/2019 1945 by Manule Maxwell RN  Outcome: Ongoing     Problem: Confusion - Acute:  Goal: Mental status will be restored to baseline  Description  Mental status will be restored to baseline  Outcome: Ongoing     Problem: Injury - Risk of, Physical Injury:  Goal: Will remain free from falls  Description  Will remain free from falls  9/4/2019 1945 by Manuel Maxwell RN  Outcome: Ongoing     Problem: Injury - Risk of, Physical Injury:  Goal: Absence of physical injury  Description  Absence of physical injury  9/4/2019 1945 by Manuel Maxwell RN  Outcome: Ongoing

## 2019-09-04 NOTE — PLAN OF CARE
Problem: Falls - Risk of:  Goal: Will remain free from falls  Description  Will remain free from falls  9/4/2019 1127 by Carmen Pereira RN  Outcome: Ongoing     Problem: Falls - Risk of:  Goal: Absence of physical injury  Description  Absence of physical injury  9/4/2019 1127 by Carmen Pereira RN  Outcome: Ongoing     Problem: Pain:  Goal: Pain level will decrease  Description  Pain level will decrease  9/4/2019 1127 by Carmen Pereira RN  Outcome: Ongoing     Problem: Pain:  Goal: Control of acute pain  Description  Control of acute pain  9/4/2019 1127 by Carmen Pereira RN  Outcome: Ongoing
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
## 2019-09-05 LAB
ANION GAP SERPL CALCULATED.3IONS-SCNC: 12 MMOL/L (ref 3–16)
BUN BLDV-MCNC: 13 MG/DL (ref 7–20)
CALCIUM SERPL-MCNC: 9 MG/DL (ref 8.3–10.6)
CHLORIDE BLD-SCNC: 104 MMOL/L (ref 99–110)
CO2: 25 MMOL/L (ref 21–32)
CREAT SERPL-MCNC: 0.8 MG/DL (ref 0.8–1.3)
GFR AFRICAN AMERICAN: >60
GFR NON-AFRICAN AMERICAN: >60
GLUCOSE BLD-MCNC: 119 MG/DL (ref 70–99)
HCT VFR BLD CALC: 24.9 % (ref 40.5–52.5)
HEMOGLOBIN: 8.1 G/DL (ref 13.5–17.5)
MCH RBC QN AUTO: 30.1 PG (ref 26–34)
MCHC RBC AUTO-ENTMCNC: 32.5 G/DL (ref 31–36)
MCV RBC AUTO: 92.6 FL (ref 80–100)
PDW BLD-RTO: 15.7 % (ref 12.4–15.4)
PLATELET # BLD: 471 K/UL (ref 135–450)
PMV BLD AUTO: 8.4 FL (ref 5–10.5)
POTASSIUM SERPL-SCNC: 3.1 MMOL/L (ref 3.5–5.1)
RBC # BLD: 2.69 M/UL (ref 4.2–5.9)
SODIUM BLD-SCNC: 141 MMOL/L (ref 136–145)
WBC # BLD: 12.1 K/UL (ref 4–11)

## 2019-09-05 PROCEDURE — 80048 BASIC METABOLIC PNL TOTAL CA: CPT

## 2019-09-05 PROCEDURE — 94669 MECHANICAL CHEST WALL OSCILL: CPT

## 2019-09-05 PROCEDURE — 6370000000 HC RX 637 (ALT 250 FOR IP): Performed by: PSYCHIATRY & NEUROLOGY

## 2019-09-05 PROCEDURE — 85027 COMPLETE CBC AUTOMATED: CPT

## 2019-09-05 PROCEDURE — 6370000000 HC RX 637 (ALT 250 FOR IP): Performed by: INTERNAL MEDICINE

## 2019-09-05 PROCEDURE — 6360000002 HC RX W HCPCS: Performed by: INTERNAL MEDICINE

## 2019-09-05 PROCEDURE — 1200000000 HC SEMI PRIVATE

## 2019-09-05 PROCEDURE — 94761 N-INVAS EAR/PLS OXIMETRY MLT: CPT

## 2019-09-05 PROCEDURE — 97530 THERAPEUTIC ACTIVITIES: CPT

## 2019-09-05 PROCEDURE — 36415 COLL VENOUS BLD VENIPUNCTURE: CPT

## 2019-09-05 PROCEDURE — 94640 AIRWAY INHALATION TREATMENT: CPT

## 2019-09-05 PROCEDURE — 97535 SELF CARE MNGMENT TRAINING: CPT

## 2019-09-05 RX ORDER — HYDROCODONE BITARTRATE AND ACETAMINOPHEN 5; 325 MG/1; MG/1
1 TABLET ORAL EVERY 6 HOURS PRN
Status: DISCONTINUED | OUTPATIENT
Start: 2019-09-05 | End: 2019-09-17 | Stop reason: HOSPADM

## 2019-09-05 RX ORDER — LEVALBUTEROL INHALATION SOLUTION 1.25 MG/3ML
1.25 SOLUTION RESPIRATORY (INHALATION) 4 TIMES DAILY
Status: DISCONTINUED | OUTPATIENT
Start: 2019-09-05 | End: 2019-09-05

## 2019-09-05 RX ORDER — HYDROCODONE BITARTRATE AND ACETAMINOPHEN 5; 325 MG/1; MG/1
1 TABLET ORAL EVERY 6 HOURS PRN
Qty: 10 TABLET | Refills: 0 | Status: SHIPPED | OUTPATIENT
Start: 2019-09-05 | End: 2019-09-08

## 2019-09-05 RX ORDER — LEVALBUTEROL 1.25 MG/.5ML
1.25 SOLUTION, CONCENTRATE RESPIRATORY (INHALATION) 4 TIMES DAILY
Status: DISCONTINUED | OUTPATIENT
Start: 2019-09-05 | End: 2019-09-17 | Stop reason: HOSPADM

## 2019-09-05 RX ORDER — HALOPERIDOL 2 MG/1
2 TABLET ORAL 2 TIMES DAILY
Qty: 120 TABLET | Refills: 3 | Status: SHIPPED | OUTPATIENT
Start: 2019-09-05

## 2019-09-05 RX ADMIN — HYDROCODONE BITARTRATE AND ACETAMINOPHEN 1 TABLET: 5; 325 TABLET ORAL at 14:44

## 2019-09-05 RX ADMIN — POTASSIUM BICARBONATE 20 MEQ: 782 TABLET, EFFERVESCENT ORAL at 09:36

## 2019-09-05 RX ADMIN — FOLIC ACID 1 MG: 1 TABLET ORAL at 09:37

## 2019-09-05 RX ADMIN — LEVALBUTEROL HYDROCHLORIDE 1.25 MG: 1.25 SOLUTION RESPIRATORY (INHALATION) at 07:27

## 2019-09-05 RX ADMIN — LEVALBUTEROL 1.25 MG: 1.25 SOLUTION, CONCENTRATE RESPIRATORY (INHALATION) at 16:01

## 2019-09-05 RX ADMIN — HALOPERIDOL 2 MG: 1 TABLET ORAL at 21:18

## 2019-09-05 RX ADMIN — IPRATROPIUM BROMIDE 0.5 MG: 0.5 SOLUTION RESPIRATORY (INHALATION) at 19:55

## 2019-09-05 RX ADMIN — POTASSIUM BICARBONATE 20 MEQ: 782 TABLET, EFFERVESCENT ORAL at 21:19

## 2019-09-05 RX ADMIN — LEVALBUTEROL HYDROCHLORIDE 1.25 MG: 1.25 SOLUTION RESPIRATORY (INHALATION) at 03:02

## 2019-09-05 RX ADMIN — ATORVASTATIN CALCIUM 20 MG: 20 TABLET, FILM COATED ORAL at 21:18

## 2019-09-05 RX ADMIN — METOPROLOL TARTRATE 50 MG: 50 TABLET, FILM COATED ORAL at 09:37

## 2019-09-05 RX ADMIN — LISINOPRIL 10 MG: 10 TABLET ORAL at 09:37

## 2019-09-05 RX ADMIN — POTASSIUM BICARBONATE 40 MEQ: 782 TABLET, EFFERVESCENT ORAL at 09:37

## 2019-09-05 RX ADMIN — IPRATROPIUM BROMIDE 0.5 MG: 0.5 SOLUTION RESPIRATORY (INHALATION) at 12:05

## 2019-09-05 RX ADMIN — HALOPERIDOL 2 MG: 1 TABLET ORAL at 09:36

## 2019-09-05 RX ADMIN — IPRATROPIUM BROMIDE 0.5 MG: 0.5 SOLUTION RESPIRATORY (INHALATION) at 16:01

## 2019-09-05 RX ADMIN — Medication 100 MG: at 09:36

## 2019-09-05 RX ADMIN — LEVALBUTEROL 1.25 MG: 1.25 SOLUTION, CONCENTRATE RESPIRATORY (INHALATION) at 19:55

## 2019-09-05 RX ADMIN — IPRATROPIUM BROMIDE 0.5 MG: 0.5 SOLUTION RESPIRATORY (INHALATION) at 07:26

## 2019-09-05 RX ADMIN — METOPROLOL TARTRATE 50 MG: 50 TABLET, FILM COATED ORAL at 21:18

## 2019-09-05 ASSESSMENT — PAIN SCALES - PAIN ASSESSMENT IN ADVANCED DEMENTIA (PAINAD)
BREATHING: 0
TOTALSCORE: 0
NEGVOCALIZATION: 0
TOTALSCORE: 0
CONSOLABILITY: 0
NEGVOCALIZATION: 0
BREATHING: 0
NEGVOCALIZATION: 0
FACIALEXPRESSION: 0
TOTALSCORE: 0
TOTALSCORE: 0
FACIALEXPRESSION: 0
CONSOLABILITY: 0
BODYLANGUAGE: 0
BREATHING: 0
BODYLANGUAGE: 0
FACIALEXPRESSION: 0
FACIALEXPRESSION: 0
BODYLANGUAGE: 0
NEGVOCALIZATION: 0
FACIALEXPRESSION: 0
NEGVOCALIZATION: 0
BREATHING: 0
TOTALSCORE: 0
BREATHING: 0
CONSOLABILITY: 0

## 2019-09-05 ASSESSMENT — PAIN SCALES - GENERAL
PAINLEVEL_OUTOF10: 0
PAINLEVEL_OUTOF10: 0
PAINLEVEL_OUTOF10: 4
PAINLEVEL_OUTOF10: 0

## 2019-09-05 ASSESSMENT — PAIN SCALES - WONG BAKER: WONGBAKER_NUMERICALRESPONSE: 2

## 2019-09-05 NOTE — PROGRESS NOTES
Patient goals : pt did not state    Plan    Plan  Times per week: 3-5x  Times per day: Daily  Current Treatment Recommendations: Strengthening, Balance Training, Functional Mobility Training, Transfer Training, Endurance Training, Gait Training, Safety Education & Training, Positioning, Modalities, Equipment Evaluation, Education, & procurement, Patient/Caregiver Education & Training, Neuromuscular Re-education  Safety Devices  Type of devices: All fall risk precautions in place, Patient at risk for falls, Bed alarm in place, Call light within reach, Nurse notified, Left in bed  Restraints  Initially in place: Yes  Restraints: (not this date)     Therapy Time   Individual Concurrent Group Co-treatment   Time In       0843   Time Out       0912   Minutes       29   Timed Code Treatment Minutes: [address], [address]     I agree with the above note. Goals addressed by PT.   Thanks, Arvind Polanco, [address] [facility], DPT [id number]

## 2019-09-05 NOTE — PROGRESS NOTES
Intake/Output Summary (Last 24 hours) at 9/5/2019 1256  Last data filed at 9/5/2019 0942  Gross per 24 hour   Intake 525 ml   Output --   Net 525 ml    Wt Readings from Last 3 Encounters:   09/04/19 118 lb 9.6 oz (53.8 kg)   08/22/19 118 lb (53.5 kg)   08/19/19 118 lb 9.7 oz (53.8 kg)       General appearance:  Appears comfortable  Eyes: Sclera clear. Pupils equal.  ENT: Moist oral mucosa. Trachea midline, no adenopathy. Cardiovascular: Regular rhythm, normal S1, S2. No murmur. No edema in lower extremities  Respiratory: Not using accessory muscles. Good inspiratory effort. Clear to auscultation bilaterally, no wheeze or crackles. GI: Abdomen soft, no tenderness, not distended, normal bowel sounds status post PEG  Musculoskeletal: No cyanosis in digits, neck supple  Neurology: Confused  Skin: Warm, dry, normal turgor  Extremity exam shows brisk capillary refill. Peripheral pulses are palpable in lower extremities     Labs and Tests:  CBC:   Recent Labs     09/03/19  0610 09/04/19  0600 09/05/19  0550   WBC 10.3 10.6 12.1*   HGB 8.5* 8.6* 8.1*    460* 471*     BMP:  Recent Labs     09/03/19  0610 09/04/19  0600 09/05/19  0550   * 140 141   K 3.8 3.0* 3.1*    104 104   CO2 28 25 25   BUN 11 13 13   CREATININE 0.7* 0.7* 0.8   GLUCOSE 126* 118* 119*     Hepatic: No results for input(s): AST, ALT, ALB, BILITOT, ALKPHOS in the last 72 hours. XR CHEST PORTABLE   Final Result   Improved right basilar aeration. Unchanged left basilar disease. XR CHEST PORTABLE   Final Result   Findings of mild congestive failure. Suspect pleural effusion as well. Partial definition of the right upper lung nodule as best seen on prior CT   scan. XR CHEST PORTABLE   Final Result   Small left pleural effusion with associated basilar atelectasis/pneumonia,   new from 08/23/2019. Unchanged mild right basilar airspace disease. Right upper lobe nodule again noted.          CT CHEST CTA ABDOMEN PELVIS W WO CONTRAST   Final Result   1. Interval dislodgement of the G-tube with development of markedly enlarged   hemorrhagic infected collection along the left rectus sheath as measured and   described above. G-tube is within the medial aspect of the collection. Collection becomes ill-defined along its inferior aspect in the pelvis with   areas of fluid containing superimposed foci of gas scattered within the   anterior lower abdomen and pelvis. Finding was discussed with [facility] ABRAHAM BARILLAS at 3:24 am on 8/24/2019.   2. Pulmonary arteries are suboptimally opacified for evaluation with limited   assessment of the distal segmental and subsegmental vessels. No evidence of   central/proximal intraluminal filling defect to suggest pulmonary embolism. 3. Interval decrease size of pleural based cavitary opacity in the posterior   right upper lobe as measured above. Additional scattered opacities appear to   resolved or decreased in size. Findings suggest resolving multifocal   infectious/inflammatory process. 4. Stable 1.4 cm slightly spiculated nodular opacity in the anterior upper   lobe, malignancy remains a diagnostic consideration   5. Persistent right basilar opacification which may represent atelectasis or   pneumonia. Persistent but interval decreased left retrocardiac atelectasis. 6. Redemonstration of indeterminate left adrenal nodule measured above. 7. Mild wall thickening along the gastric antrum and proximal to mid duodenum   suggesting gastritis/duodenitis. 8. Mild wall thickening throughout the descending and sigmoid colon   suggesting colitis. 9. Multiple incidental/chronic findings as detailed above including   redemonstration of infrarenal abdominal aortic aneurysm as measured above,   please see follow-up guidelines below. RECOMMENDATIONS:   3.3 cm abdominal aortic aneurysm. Recommend follow-up every 3 years. Reference: J Am Maribell Radiol 5615;[id number].

## 2019-09-05 NOTE — PROGRESS NOTES
Hourly rounding performed on pt:  Pain: controlled, pt in bed and awake  Position: appears in no distress;  Pt assisted into new position to promote perfusion and skin integrity  Restroom: Pt clean and no need to void at this time  Proximity: call light, phone, bedside table in reach  Will continue to monitor

## 2019-09-05 NOTE — CARE COORDINATION
Still awaiting auth from ins for admit to [facility] rep was here to visit pt-stated pt needs to be sitter free for 24hrs prior to admit.   Electronically signed by JOSH No on 9/5/2019 at 3:01 PM
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
## 2019-09-05 NOTE — PLAN OF CARE
injury  Outcome: Ongoing     Problem: Mood - Altered:  Goal: Mood stable  Description  Mood stable  Outcome: Ongoing  Goal: Absence of abusive behavior  Description  Absence of abusive behavior  Outcome: Ongoing  Goal: Verbalizations of feeling emotionally comfortable while being cared for will increase  Description  Verbalizations of feeling emotionally comfortable while being cared for will increase  Outcome: Ongoing     Problem: Psychomotor Activity - Altered:  Goal: Absence of psychomotor disturbance signs and symptoms  Description  Absence of psychomotor disturbance signs and symptoms  Outcome: Ongoing     Problem: Sensory Perception - Impaired:  Goal: Demonstrations of improved sensory functioning will increase  Description  Demonstrations of improved sensory functioning will increase  Outcome: Ongoing  Goal: Decrease in sensory misperception frequency  Description  Decrease in sensory misperception frequency  Outcome: Ongoing  Goal: Able to refrain from responding to false sensory perceptions  Description  Able to refrain from responding to false sensory perceptions  Outcome: Ongoing  Goal: Demonstrates accurate environmental perceptions  Description  Demonstrates accurate environmental perceptions  Outcome: Ongoing  Goal: Able to distinguish between reality-based and nonreality-based thinking  Description  Able to distinguish between reality-based and nonreality-based thinking  Outcome: Ongoing  Goal: Able to interrupt nonreality-based thinking  Description  Able to interrupt nonreality-based thinking  Outcome: Ongoing     Problem: Sleep Pattern Disturbance:  Goal: Appears well-rested  Description  Appears well-rested  Outcome: Ongoing

## 2019-09-06 ENCOUNTER — APPOINTMENT (OUTPATIENT)
Dept: GENERAL RADIOLOGY | Age: 69
DRG: 871 | End: 2019-09-06
Payer: MEDICARE

## 2019-09-06 ENCOUNTER — APPOINTMENT (OUTPATIENT)
Dept: CT IMAGING | Age: 69
DRG: 871 | End: 2019-09-06
Payer: MEDICARE

## 2019-09-06 LAB
ANION GAP SERPL CALCULATED.3IONS-SCNC: 10 MMOL/L (ref 3–16)
BUN BLDV-MCNC: 14 MG/DL (ref 7–20)
CALCIUM SERPL-MCNC: 9.3 MG/DL (ref 8.3–10.6)
CHLORIDE BLD-SCNC: 106 MMOL/L (ref 99–110)
CO2: 26 MMOL/L (ref 21–32)
CREAT SERPL-MCNC: 0.8 MG/DL (ref 0.8–1.3)
EKG ATRIAL RATE: 114 BPM
EKG DIAGNOSIS: NORMAL
EKG P AXIS: 51 DEGREES
EKG P-R INTERVAL: 126 MS
EKG Q-T INTERVAL: 338 MS
EKG QRS DURATION: 78 MS
EKG QTC CALCULATION (BAZETT): 465 MS
EKG R AXIS: -21 DEGREES
EKG T AXIS: 10 DEGREES
EKG VENTRICULAR RATE: 114 BPM
GFR AFRICAN AMERICAN: >60
GFR NON-AFRICAN AMERICAN: >60
GLUCOSE BLD-MCNC: 114 MG/DL (ref 70–99)
HCT VFR BLD CALC: 26 % (ref 40.5–52.5)
HEMOGLOBIN: 8.5 G/DL (ref 13.5–17.5)
MCH RBC QN AUTO: 30.2 PG (ref 26–34)
MCHC RBC AUTO-ENTMCNC: 32.5 G/DL (ref 31–36)
MCV RBC AUTO: 92.9 FL (ref 80–100)
PDW BLD-RTO: 16 % (ref 12.4–15.4)
PLATELET # BLD: 466 K/UL (ref 135–450)
PMV BLD AUTO: 8.3 FL (ref 5–10.5)
POTASSIUM SERPL-SCNC: 4.2 MMOL/L (ref 3.5–5.1)
RBC # BLD: 2.8 M/UL (ref 4.2–5.9)
SODIUM BLD-SCNC: 142 MMOL/L (ref 136–145)
WBC # BLD: 13.6 K/UL (ref 4–11)

## 2019-09-06 PROCEDURE — 93010 ELECTROCARDIOGRAM REPORT: CPT | Performed by: INTERNAL MEDICINE

## 2019-09-06 PROCEDURE — 6370000000 HC RX 637 (ALT 250 FOR IP): Performed by: PSYCHIATRY & NEUROLOGY

## 2019-09-06 PROCEDURE — 6370000000 HC RX 637 (ALT 250 FOR IP): Performed by: INTERNAL MEDICINE

## 2019-09-06 PROCEDURE — 6360000002 HC RX W HCPCS: Performed by: INTERNAL MEDICINE

## 2019-09-06 PROCEDURE — 2580000003 HC RX 258: Performed by: INTERNAL MEDICINE

## 2019-09-06 PROCEDURE — 71045 X-RAY EXAM CHEST 1 VIEW: CPT

## 2019-09-06 PROCEDURE — 85027 COMPLETE CBC AUTOMATED: CPT

## 2019-09-06 PROCEDURE — 80048 BASIC METABOLIC PNL TOTAL CA: CPT

## 2019-09-06 PROCEDURE — 71260 CT THORAX DX C+: CPT

## 2019-09-06 PROCEDURE — 36415 COLL VENOUS BLD VENIPUNCTURE: CPT

## 2019-09-06 PROCEDURE — 2500000003 HC RX 250 WO HCPCS: Performed by: INTERNAL MEDICINE

## 2019-09-06 PROCEDURE — 94669 MECHANICAL CHEST WALL OSCILL: CPT

## 2019-09-06 PROCEDURE — 31720 CLEARANCE OF AIRWAYS: CPT

## 2019-09-06 PROCEDURE — 2700000000 HC OXYGEN THERAPY PER DAY

## 2019-09-06 PROCEDURE — 94761 N-INVAS EAR/PLS OXIMETRY MLT: CPT

## 2019-09-06 PROCEDURE — 1200000000 HC SEMI PRIVATE

## 2019-09-06 PROCEDURE — 94640 AIRWAY INHALATION TREATMENT: CPT

## 2019-09-06 PROCEDURE — 6360000004 HC RX CONTRAST MEDICATION: Performed by: INTERNAL MEDICINE

## 2019-09-06 PROCEDURE — 93005 ELECTROCARDIOGRAM TRACING: CPT | Performed by: INTERNAL MEDICINE

## 2019-09-06 RX ORDER — SODIUM CHLORIDE 9 MG/ML
INJECTION, SOLUTION INTRAVENOUS CONTINUOUS
Status: DISCONTINUED | OUTPATIENT
Start: 2019-09-06 | End: 2019-09-13

## 2019-09-06 RX ORDER — LEVOFLOXACIN 5 MG/ML
750 INJECTION, SOLUTION INTRAVENOUS EVERY 24 HOURS
Status: DISPENSED | OUTPATIENT
Start: 2019-09-06 | End: 2019-09-14

## 2019-09-06 RX ORDER — SCOLOPAMINE TRANSDERMAL SYSTEM 1 MG/1
1 PATCH, EXTENDED RELEASE TRANSDERMAL
Status: DISCONTINUED | OUTPATIENT
Start: 2019-09-06 | End: 2019-09-17 | Stop reason: HOSPADM

## 2019-09-06 RX ADMIN — ATORVASTATIN CALCIUM 20 MG: 20 TABLET, FILM COATED ORAL at 22:21

## 2019-09-06 RX ADMIN — IPRATROPIUM BROMIDE 0.5 MG: 0.5 SOLUTION RESPIRATORY (INHALATION) at 16:28

## 2019-09-06 RX ADMIN — LEVALBUTEROL 1.25 MG: 1.25 SOLUTION, CONCENTRATE RESPIRATORY (INHALATION) at 20:53

## 2019-09-06 RX ADMIN — HYDROCODONE BITARTRATE AND ACETAMINOPHEN 1 TABLET: 5; 325 TABLET ORAL at 16:16

## 2019-09-06 RX ADMIN — HALOPERIDOL 2 MG: 1 TABLET ORAL at 09:33

## 2019-09-06 RX ADMIN — POTASSIUM BICARBONATE 20 MEQ: 782 TABLET, EFFERVESCENT ORAL at 12:32

## 2019-09-06 RX ADMIN — SODIUM CHLORIDE: 9 INJECTION, SOLUTION INTRAVENOUS at 14:34

## 2019-09-06 RX ADMIN — LEVOFLOXACIN 750 MG: 5 INJECTION, SOLUTION INTRAVENOUS at 17:38

## 2019-09-06 RX ADMIN — IPRATROPIUM BROMIDE 0.5 MG: 0.5 SOLUTION RESPIRATORY (INHALATION) at 20:52

## 2019-09-06 RX ADMIN — METOPROLOL TARTRATE 50 MG: 50 TABLET, FILM COATED ORAL at 22:22

## 2019-09-06 RX ADMIN — LISINOPRIL 10 MG: 10 TABLET ORAL at 09:33

## 2019-09-06 RX ADMIN — METOPROLOL TARTRATE 50 MG: 50 TABLET, FILM COATED ORAL at 09:33

## 2019-09-06 RX ADMIN — Medication 10 ML: at 22:22

## 2019-09-06 RX ADMIN — LEVALBUTEROL 1.25 MG: 1.25 SOLUTION, CONCENTRATE RESPIRATORY (INHALATION) at 16:28

## 2019-09-06 RX ADMIN — POTASSIUM BICARBONATE 20 MEQ: 782 TABLET, EFFERVESCENT ORAL at 22:21

## 2019-09-06 RX ADMIN — POTASSIUM BICARBONATE 20 MEQ: 782 TABLET, EFFERVESCENT ORAL at 09:33

## 2019-09-06 RX ADMIN — Medication 100 MG: at 09:34

## 2019-09-06 RX ADMIN — TAZOBACTAM SODIUM AND PIPERACILLIN SODIUM 3.38 G: 375; 3 INJECTION, SOLUTION INTRAVENOUS at 16:22

## 2019-09-06 RX ADMIN — FOLIC ACID 1 MG: 1 TABLET ORAL at 09:34

## 2019-09-06 RX ADMIN — HALOPERIDOL 2 MG: 1 TABLET ORAL at 22:22

## 2019-09-06 RX ADMIN — LEVALBUTEROL 1.25 MG: 1.25 SOLUTION, CONCENTRATE RESPIRATORY (INHALATION) at 12:16

## 2019-09-06 RX ADMIN — IOPAMIDOL 75 ML: 755 INJECTION, SOLUTION INTRAVENOUS at 14:11

## 2019-09-06 RX ADMIN — IPRATROPIUM BROMIDE 0.5 MG: 0.5 SOLUTION RESPIRATORY (INHALATION) at 12:18

## 2019-09-06 RX ADMIN — LEVALBUTEROL 1.25 MG: 1.25 SOLUTION, CONCENTRATE RESPIRATORY (INHALATION) at 09:00

## 2019-09-06 RX ADMIN — IPRATROPIUM BROMIDE 0.5 MG: 0.5 SOLUTION RESPIRATORY (INHALATION) at 09:00

## 2019-09-06 RX ADMIN — TAZOBACTAM SODIUM AND PIPERACILLIN SODIUM 3.38 G: 375; 3 INJECTION, SOLUTION INTRAVENOUS at 22:42

## 2019-09-06 ASSESSMENT — PAIN SCALES - PAIN ASSESSMENT IN ADVANCED DEMENTIA (PAINAD)
BODYLANGUAGE: 0
TOTALSCORE: 0
CONSOLABILITY: 0
BREATHING: 0
FACIALEXPRESSION: 0
FACIALEXPRESSION: 0
CONSOLABILITY: 0
NEGVOCALIZATION: 0
BODYLANGUAGE: 0
NEGVOCALIZATION: 0
BREATHING: 0
TOTALSCORE: 0

## 2019-09-06 ASSESSMENT — PAIN SCALES - GENERAL
PAINLEVEL_OUTOF10: 0
PAINLEVEL_OUTOF10: 3
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
## 2019-09-06 NOTE — PLAN OF CARE
Ongoing     Problem: Confusion - Acute:  Goal: Absence of continued neurological deterioration signs and symptoms  Description  Absence of continued neurological deterioration signs and symptoms  9/6/2019 1908 by Malvin Grullon RN  Outcome: Ongoing  9/6/2019 0715 by Nina Abernathy RN  Outcome: Ongoing  Goal: Mental status will be restored to baseline  Description  Mental status will be restored to baseline  9/6/2019 1908 by Malvin Grullon RN  Outcome: Ongoing  9/6/2019 0715 by Nina Abernathy RN  Outcome: Ongoing     Problem: Discharge Planning:  Goal: Ability to perform activities of daily living will improve  Description  Ability to perform activities of daily living will improve  9/6/2019 1908 by Malvin Grullon RN  Outcome: Ongoing  9/6/2019 0715 by Nina Abernathy RN  Outcome: Ongoing  Goal: Participates in care planning  Description  Participates in care planning  9/6/2019 1908 by Malvin Grullon RN  Outcome: Ongoing  9/6/2019 0715 by Nina Abernathy RN  Outcome: Ongoing     Problem: Injury - Risk of, Physical Injury:  Goal: Will remain free from falls  Description  Will remain free from falls  9/6/2019 1908 by Malvin Grullon RN  Outcome: Ongoing  9/6/2019 0715 by Nina Abernathy RN  Outcome: Ongoing  Goal: Absence of physical injury  Description  Absence of physical injury  9/6/2019 1908 by Malvin Grullon RN  Outcome: Ongoing  9/6/2019 0715 by Nina Abernathy RN  Outcome: Ongoing     Problem: Mood - Altered:  Goal: Mood stable  Description  Mood stable  9/6/2019 1908 by Malvin Grullon RN  Outcome: Ongoing  9/6/2019 0715 by Nina Abernathy RN  Outcome: Ongoing  Goal: Absence of abusive behavior  Description  Absence of abusive behavior  9/6/2019 1908 by Malvin Grullon RN  Outcome: Ongoing  9/6/2019 0715 by Nina Abernathy RN  Outcome: Ongoing  Goal: Verbalizations of feeling emotionally comfortable while being cared for will increase  Description  Verbalizations of feeling emotionally comfortable while being cared for will increase  9/6/2019 1908 by Octavia Haddad

## 2019-09-07 LAB
ABO/RH: NORMAL
ANION GAP SERPL CALCULATED.3IONS-SCNC: 10 MMOL/L (ref 3–16)
ANTIBODY SCREEN: NORMAL
BLOOD BANK DISPENSE STATUS: NORMAL
BLOOD BANK PRODUCT CODE: NORMAL
BPU ID: NORMAL
BUN BLDV-MCNC: 17 MG/DL (ref 7–20)
CALCIUM SERPL-MCNC: 8.6 MG/DL (ref 8.3–10.6)
CHLORIDE BLD-SCNC: 106 MMOL/L (ref 99–110)
CO2: 25 MMOL/L (ref 21–32)
CREAT SERPL-MCNC: 0.9 MG/DL (ref 0.8–1.3)
DESCRIPTION BLOOD BANK: NORMAL
GFR AFRICAN AMERICAN: >60
GFR NON-AFRICAN AMERICAN: >60
GLUCOSE BLD-MCNC: 149 MG/DL (ref 70–99)
HCT VFR BLD CALC: 21.6 % (ref 40.5–52.5)
HEMOGLOBIN: 6.9 G/DL (ref 13.5–17.5)
MCH RBC QN AUTO: 29.8 PG (ref 26–34)
MCHC RBC AUTO-ENTMCNC: 32.1 G/DL (ref 31–36)
MCV RBC AUTO: 92.7 FL (ref 80–100)
PDW BLD-RTO: 15.8 % (ref 12.4–15.4)
PLATELET # BLD: 383 K/UL (ref 135–450)
PMV BLD AUTO: 8.2 FL (ref 5–10.5)
POTASSIUM SERPL-SCNC: 4 MMOL/L (ref 3.5–5.1)
RBC # BLD: 2.33 M/UL (ref 4.2–5.9)
SODIUM BLD-SCNC: 141 MMOL/L (ref 136–145)
WBC # BLD: 13.8 K/UL (ref 4–11)

## 2019-09-07 PROCEDURE — 31720 CLEARANCE OF AIRWAYS: CPT

## 2019-09-07 PROCEDURE — 6360000002 HC RX W HCPCS: Performed by: INTERNAL MEDICINE

## 2019-09-07 PROCEDURE — 6370000000 HC RX 637 (ALT 250 FOR IP): Performed by: INTERNAL MEDICINE

## 2019-09-07 PROCEDURE — 36415 COLL VENOUS BLD VENIPUNCTURE: CPT

## 2019-09-07 PROCEDURE — 6370000000 HC RX 637 (ALT 250 FOR IP): Performed by: PSYCHIATRY & NEUROLOGY

## 2019-09-07 PROCEDURE — 1200000000 HC SEMI PRIVATE

## 2019-09-07 PROCEDURE — 86923 COMPATIBILITY TEST ELECTRIC: CPT

## 2019-09-07 PROCEDURE — 94640 AIRWAY INHALATION TREATMENT: CPT

## 2019-09-07 PROCEDURE — 85027 COMPLETE CBC AUTOMATED: CPT

## 2019-09-07 PROCEDURE — 36430 TRANSFUSION BLD/BLD COMPNT: CPT

## 2019-09-07 PROCEDURE — 2580000003 HC RX 258: Performed by: INTERNAL MEDICINE

## 2019-09-07 PROCEDURE — 86901 BLOOD TYPING SEROLOGIC RH(D): CPT

## 2019-09-07 PROCEDURE — 86900 BLOOD TYPING SEROLOGIC ABO: CPT

## 2019-09-07 PROCEDURE — 86850 RBC ANTIBODY SCREEN: CPT

## 2019-09-07 PROCEDURE — 80048 BASIC METABOLIC PNL TOTAL CA: CPT

## 2019-09-07 PROCEDURE — P9016 RBC LEUKOCYTES REDUCED: HCPCS

## 2019-09-07 PROCEDURE — 99232 SBSQ HOSP IP/OBS MODERATE 35: CPT | Performed by: INTERNAL MEDICINE

## 2019-09-07 PROCEDURE — 2500000003 HC RX 250 WO HCPCS: Performed by: INTERNAL MEDICINE

## 2019-09-07 RX ORDER — 0.9 % SODIUM CHLORIDE 0.9 %
250 INTRAVENOUS SOLUTION INTRAVENOUS ONCE
Status: COMPLETED | OUTPATIENT
Start: 2019-09-07 | End: 2019-09-07

## 2019-09-07 RX ORDER — VANCOMYCIN HYDROCHLORIDE 1 G/200ML
1000 INJECTION, SOLUTION INTRAVENOUS EVERY 24 HOURS
Status: DISCONTINUED | OUTPATIENT
Start: 2019-09-07 | End: 2019-09-11

## 2019-09-07 RX ADMIN — HALOPERIDOL 2 MG: 1 TABLET ORAL at 22:03

## 2019-09-07 RX ADMIN — LEVALBUTEROL 1.25 MG: 1.25 SOLUTION, CONCENTRATE RESPIRATORY (INHALATION) at 16:02

## 2019-09-07 RX ADMIN — Medication 100 MG: at 13:47

## 2019-09-07 RX ADMIN — POTASSIUM BICARBONATE 20 MEQ: 782 TABLET, EFFERVESCENT ORAL at 13:48

## 2019-09-07 RX ADMIN — LEVALBUTEROL 1.25 MG: 1.25 SOLUTION, CONCENTRATE RESPIRATORY (INHALATION) at 07:35

## 2019-09-07 RX ADMIN — TAZOBACTAM SODIUM AND PIPERACILLIN SODIUM 3.38 G: 375; 3 INJECTION, SOLUTION INTRAVENOUS at 23:25

## 2019-09-07 RX ADMIN — LEVOFLOXACIN 750 MG: 5 INJECTION, SOLUTION INTRAVENOUS at 21:44

## 2019-09-07 RX ADMIN — HYDROCODONE BITARTRATE AND ACETAMINOPHEN 1 TABLET: 5; 325 TABLET ORAL at 14:35

## 2019-09-07 RX ADMIN — IPRATROPIUM BROMIDE 0.5 MG: 0.5 SOLUTION RESPIRATORY (INHALATION) at 16:02

## 2019-09-07 RX ADMIN — HYDROCODONE BITARTRATE AND ACETAMINOPHEN 1 TABLET: 5; 325 TABLET ORAL at 22:03

## 2019-09-07 RX ADMIN — FOLIC ACID 1 MG: 1 TABLET ORAL at 13:47

## 2019-09-07 RX ADMIN — HALOPERIDOL 2 MG: 1 TABLET ORAL at 13:48

## 2019-09-07 RX ADMIN — ATORVASTATIN CALCIUM 20 MG: 20 TABLET, FILM COATED ORAL at 22:03

## 2019-09-07 RX ADMIN — SODIUM CHLORIDE 250 ML: 9 INJECTION, SOLUTION INTRAVENOUS at 10:42

## 2019-09-07 RX ADMIN — IPRATROPIUM BROMIDE 0.5 MG: 0.5 SOLUTION RESPIRATORY (INHALATION) at 20:48

## 2019-09-07 RX ADMIN — LEVALBUTEROL 1.25 MG: 1.25 SOLUTION, CONCENTRATE RESPIRATORY (INHALATION) at 20:48

## 2019-09-07 RX ADMIN — IPRATROPIUM BROMIDE 0.5 MG: 0.5 SOLUTION RESPIRATORY (INHALATION) at 07:35

## 2019-09-07 RX ADMIN — LEVALBUTEROL 1.25 MG: 1.25 SOLUTION, CONCENTRATE RESPIRATORY (INHALATION) at 12:04

## 2019-09-07 RX ADMIN — METOPROLOL TARTRATE 50 MG: 50 TABLET, FILM COATED ORAL at 22:03

## 2019-09-07 RX ADMIN — Medication 10 ML: at 22:04

## 2019-09-07 RX ADMIN — METOPROLOL TARTRATE 50 MG: 50 TABLET, FILM COATED ORAL at 13:47

## 2019-09-07 RX ADMIN — TAZOBACTAM SODIUM AND PIPERACILLIN SODIUM 3.38 G: 375; 3 INJECTION, SOLUTION INTRAVENOUS at 16:38

## 2019-09-07 RX ADMIN — HYDROCODONE BITARTRATE AND ACETAMINOPHEN 1 TABLET: 5; 325 TABLET ORAL at 07:07

## 2019-09-07 RX ADMIN — LISINOPRIL 10 MG: 10 TABLET ORAL at 13:47

## 2019-09-07 RX ADMIN — TAZOBACTAM SODIUM AND PIPERACILLIN SODIUM 3.38 G: 375; 3 INJECTION, SOLUTION INTRAVENOUS at 06:30

## 2019-09-07 RX ADMIN — VANCOMYCIN HYDROCHLORIDE 1000 MG: 1 INJECTION, SOLUTION INTRAVENOUS at 16:38

## 2019-09-07 RX ADMIN — POTASSIUM BICARBONATE 20 MEQ: 782 TABLET, EFFERVESCENT ORAL at 22:04

## 2019-09-07 RX ADMIN — SODIUM CHLORIDE: 9 INJECTION, SOLUTION INTRAVENOUS at 01:43

## 2019-09-07 RX ADMIN — IPRATROPIUM BROMIDE 0.5 MG: 0.5 SOLUTION RESPIRATORY (INHALATION) at 12:04

## 2019-09-07 ASSESSMENT — PAIN SCALES - GENERAL
PAINLEVEL_OUTOF10: 0
PAINLEVEL_OUTOF10: 3
PAINLEVEL_OUTOF10: 3
PAINLEVEL_OUTOF10: 0
PAINLEVEL_OUTOF10: 0
PAINLEVEL_OUTOF10: 2

## 2019-09-07 ASSESSMENT — PAIN DESCRIPTION - PAIN TYPE
TYPE: ACUTE PAIN

## 2019-09-07 ASSESSMENT — PAIN DESCRIPTION - LOCATION
LOCATION: GENERALIZED

## 2019-09-07 NOTE — PROGRESS NOTES
[facility] PROGRESS NOTE    9/7/2019 12:10 PM        Name: Sheila Taylor .               Admitted: 8/23/2019  Primary Care Provider: Aashish Leija (Tel: [phone])        Subjective: Patient has increased respiratory secretions and developed hypoxia and tachycardia heart rate was in 130s Yesterday gurgling sound was heard from a distance    Diagnosed of aspiration pneumonia started on antibiotics received fluid NG tube feeding continued currently weaned off oxygen    Reviewed interval ancillary notes    Current Medications    0.9 % sodium chloride bolus Once   0.9 % sodium chloride infusion Continuous   scopolamine (TRANSDERM-SCOP) transdermal patch 1 patch Q72H   piperacillin-tazobactam (ZOSYN) 3.375 g in dextrose 50 mL IVPB extended infusion (premix) Q8H   levofloxacin (LEVAQUIN) 750 MG/150ML infusion 750 mg Q24H   HYDROcodone-acetaminophen (NORCO) 5-325 MG per tablet 1 tablet Q6H PRN   potassium bicarb-citric acid (EFFER-K) effervescent tablet 20 mEq TID   levalbuterol (XOPENEX) nebulizer solution 1.25 mg 4x daily   haloperidol (HALDOL) tablet 2 mg BID   haloperidol (HALDOL) tablet 2 mg Daily PRN   lip balm petroleum free (PHYTOPLEX) stick PRN   atorvastatin (LIPITOR) tablet 20 mg Nightly   folic acid (FOLVITE) tablet 1 mg Daily   lisinopril (PRINIVIL;ZESTRIL) tablet 10 mg Daily   metoprolol tartrate (LOPRESSOR) tablet 50 mg BID   vitamin B-1 (THIAMINE) tablet 100 mg Daily   sodium chloride (PF) 0.9 % injection 10 mL PRN   sodium chloride flush 0.9 % injection 10 mL 2 times per day   sodium chloride flush 0.9 % injection 10 mL PRN   ondansetron (ZOFRAN) injection 4 mg Q6H PRN   potassium chloride (KLOR-CON M) extended release tablet 40 mEq PRN   Or    potassium bicarb-citric acid (EFFER-K) effervescent tablet 40 mEq PRN   Or    potassium chloride 10 mEq/100 mL IVPB (Peripheral Line) PRN   potassium chloride 20 mEq/50 vessels. No evidence of   central/proximal intraluminal filling defect to suggest pulmonary embolism. 3. Interval decrease size of pleural based cavitary opacity in the posterior   right upper lobe as measured above. Additional scattered opacities appear to   resolved or decreased in size. Findings suggest resolving multifocal   infectious/inflammatory process. 4. Stable 1.4 cm slightly spiculated nodular opacity in the anterior upper   lobe, malignancy remains a diagnostic consideration   5. Persistent right basilar opacification which may represent atelectasis or   pneumonia. Persistent but interval decreased left retrocardiac atelectasis. 6. Redemonstration of indeterminate left adrenal nodule measured above. 7. Mild wall thickening along the gastric antrum and proximal to mid duodenum   suggesting gastritis/duodenitis. 8. Mild wall thickening throughout the descending and sigmoid colon   suggesting colitis. 9. Multiple incidental/chronic findings as detailed above including   redemonstration of infrarenal abdominal aortic aneurysm as measured above,   please see follow-up guidelines below. RECOMMENDATIONS:   3.3 cm abdominal aortic aneurysm. Recommend follow-up every 3 years. Reference: J Am Maribell Radiol 3455;66:[id number]. CT CERVICAL SPINE WO CONTRAST   Final Result   No acute abnormality of the cervical spine. No change since the study   performed 1 day ago. CT HEAD WO CONTRAST   Final Result   No acute intracranial abnormality. Stable small polyp or retention cyst left sphenoid sinus and tiny amount of   fluid in the mastoid tips bilaterally. XR CHEST PORTABLE   Final Result   Persistent right upper lobe mass seen to better advantage on recent CT. Follow-up CT as was recommended on report from 08/09/2019 is warranted. No   new or acute finding is seen.              Problem List  Principal Problem:    Severe sepsis with septic shock (CODE) (HCC)  Active  Continue Haldol       Left lower extremity weakness: See neurology and not impressed for CV at this time.  We will monitor  - discussed with neuro, suspect related to pain from hematoma.  He is able to move the leg.          Acute kidney injury (HCC): Resolved with hydration       Lactic acidosis: Resolved with hydration     FEN/ hypernatremia/ hypokalemia  - PEG in use    Tube feeds   - replace K     Blood pressure   Control improved     Bronchial washings positive for yeast Candida infectious disease consulted no plans for antifungal  .  Sinus tachycardia secondary to hypoxia and aspiration pneumonia resolved currently heart rate 100    Diet: DIET TUBE FEED CONTINUOUS/CYCLIC NPO; STANDARD WITH FIBER; Gastrostomy; Continuous; 20; 60; 24  Code:[id number]-CCA  DVT PPX SCD  Disposition acutely sick       Renato Robertson MD   9/7/2019 12:10 PM

## 2019-09-07 NOTE — PLAN OF CARE
Problem: Falls - Risk of:  Goal: Will remain free from falls  Description  Will remain free from falls  Outcome: Ongoing  Goal: Absence of physical injury  Description  Absence of physical injury  Outcome: Ongoing     Problem: Risk for Impaired Skin Integrity  Goal: Tissue integrity - skin and mucous membranes  Description  Structural intactness and normal physiological function of skin and  mucous membranes.   Outcome: Ongoing     Problem: Bleeding:  Goal: Will show no signs and symptoms of excessive bleeding  Description  Will show no signs and symptoms of excessive bleeding  Outcome: Ongoing     Problem: Pain:  Goal: Pain level will decrease  Description  Pain level will decrease  Outcome: Ongoing  Goal: Control of acute pain  Description  Control of acute pain  Outcome: Ongoing  Goal: Control of chronic pain  Description  Control of chronic pain  Outcome: Ongoing     Problem: Nutrition  Goal: Optimal nutrition therapy  Outcome: Ongoing     Problem: Musculor/Skeletal Functional Status  Goal: Highest potential functional level  Outcome: Ongoing  Goal: Absence of falls  Outcome: Ongoing     Problem: Confusion - Acute:  Goal: Absence of continued neurological deterioration signs and symptoms  Description  Absence of continued neurological deterioration signs and symptoms  Outcome: Ongoing  Goal: Mental status will be restored to baseline  Description  Mental status will be restored to baseline  Outcome: Ongoing     Problem: Discharge Planning:  Goal: Ability to perform activities of daily living will improve  Description  Ability to perform activities of daily living will improve  Outcome: Ongoing  Goal: Participates in care planning  Description  Participates in care planning  Outcome: Ongoing     Problem: Injury - Risk of, Physical Injury:  Goal: Will remain free from falls  Description  Will remain free from falls  Outcome: Ongoing  Goal: Absence of physical injury  Description  Absence of physical

## 2019-09-08 LAB
ANION GAP SERPL CALCULATED.3IONS-SCNC: 12 MMOL/L (ref 3–16)
BUN BLDV-MCNC: 14 MG/DL (ref 7–20)
CALCIUM SERPL-MCNC: 8.7 MG/DL (ref 8.3–10.6)
CHLORIDE BLD-SCNC: 106 MMOL/L (ref 99–110)
CO2: 22 MMOL/L (ref 21–32)
CREAT SERPL-MCNC: 0.9 MG/DL (ref 0.8–1.3)
GFR AFRICAN AMERICAN: >60
GFR NON-AFRICAN AMERICAN: >60
GLUCOSE BLD-MCNC: 107 MG/DL (ref 70–99)
HCT VFR BLD CALC: 26.6 % (ref 40.5–52.5)
HEMOGLOBIN: 8.6 G/DL (ref 13.5–17.5)
MCH RBC QN AUTO: 29.5 PG (ref 26–34)
MCHC RBC AUTO-ENTMCNC: 32.4 G/DL (ref 31–36)
MCV RBC AUTO: 91.1 FL (ref 80–100)
MRSA SCREEN RT-PCR: ABNORMAL
ORGANISM: ABNORMAL
PDW BLD-RTO: 15.5 % (ref 12.4–15.4)
PLATELET # BLD: 360 K/UL (ref 135–450)
PMV BLD AUTO: 7.9 FL (ref 5–10.5)
POTASSIUM SERPL-SCNC: 3.5 MMOL/L (ref 3.5–5.1)
RBC # BLD: 2.92 M/UL (ref 4.2–5.9)
SODIUM BLD-SCNC: 140 MMOL/L (ref 136–145)
WBC # BLD: 12.6 K/UL (ref 4–11)

## 2019-09-08 PROCEDURE — 2500000003 HC RX 250 WO HCPCS: Performed by: INTERNAL MEDICINE

## 2019-09-08 PROCEDURE — 2580000003 HC RX 258: Performed by: INTERNAL MEDICINE

## 2019-09-08 PROCEDURE — 2700000000 HC OXYGEN THERAPY PER DAY

## 2019-09-08 PROCEDURE — 6370000000 HC RX 637 (ALT 250 FOR IP): Performed by: INTERNAL MEDICINE

## 2019-09-08 PROCEDURE — 6360000002 HC RX W HCPCS: Performed by: INTERNAL MEDICINE

## 2019-09-08 PROCEDURE — 36415 COLL VENOUS BLD VENIPUNCTURE: CPT

## 2019-09-08 PROCEDURE — 1200000000 HC SEMI PRIVATE

## 2019-09-08 PROCEDURE — 94669 MECHANICAL CHEST WALL OSCILL: CPT

## 2019-09-08 PROCEDURE — 6370000000 HC RX 637 (ALT 250 FOR IP): Performed by: PSYCHIATRY & NEUROLOGY

## 2019-09-08 PROCEDURE — 94761 N-INVAS EAR/PLS OXIMETRY MLT: CPT

## 2019-09-08 PROCEDURE — 80048 BASIC METABOLIC PNL TOTAL CA: CPT

## 2019-09-08 PROCEDURE — 85027 COMPLETE CBC AUTOMATED: CPT

## 2019-09-08 PROCEDURE — 87641 MR-STAPH DNA AMP PROBE: CPT

## 2019-09-08 PROCEDURE — 94640 AIRWAY INHALATION TREATMENT: CPT

## 2019-09-08 RX ORDER — LEVALBUTEROL 1.25 MG/.5ML
SOLUTION, CONCENTRATE RESPIRATORY (INHALATION)
Status: DISPENSED
Start: 2019-09-08 | End: 2019-09-08

## 2019-09-08 RX ORDER — LORAZEPAM 2 MG/ML
2 INJECTION INTRAMUSCULAR ONCE
Status: COMPLETED | OUTPATIENT
Start: 2019-09-08 | End: 2019-09-08

## 2019-09-08 RX ORDER — LEVALBUTEROL 1.25 MG/.5ML
SOLUTION, CONCENTRATE RESPIRATORY (INHALATION)
Status: DISPENSED
Start: 2019-09-08 | End: 2019-09-09

## 2019-09-08 RX ADMIN — METOPROLOL TARTRATE 50 MG: 50 TABLET, FILM COATED ORAL at 10:56

## 2019-09-08 RX ADMIN — POTASSIUM BICARBONATE 20 MEQ: 782 TABLET, EFFERVESCENT ORAL at 20:27

## 2019-09-08 RX ADMIN — HYDROCODONE BITARTRATE AND ACETAMINOPHEN 1 TABLET: 5; 325 TABLET ORAL at 03:27

## 2019-09-08 RX ADMIN — IPRATROPIUM BROMIDE 0.5 MG: 0.5 SOLUTION RESPIRATORY (INHALATION) at 11:11

## 2019-09-08 RX ADMIN — IPRATROPIUM BROMIDE 0.5 MG: 0.5 SOLUTION RESPIRATORY (INHALATION) at 07:40

## 2019-09-08 RX ADMIN — METOPROLOL TARTRATE 50 MG: 50 TABLET, FILM COATED ORAL at 20:27

## 2019-09-08 RX ADMIN — TAZOBACTAM SODIUM AND PIPERACILLIN SODIUM 3.38 G: 375; 3 INJECTION, SOLUTION INTRAVENOUS at 06:30

## 2019-09-08 RX ADMIN — HALOPERIDOL 2 MG: 1 TABLET ORAL at 03:27

## 2019-09-08 RX ADMIN — LORAZEPAM 2 MG: 2 INJECTION INTRAMUSCULAR; INTRAVENOUS at 07:43

## 2019-09-08 RX ADMIN — SODIUM CHLORIDE: 9 INJECTION, SOLUTION INTRAVENOUS at 03:37

## 2019-09-08 RX ADMIN — LEVALBUTEROL 1.25 MG: 1.25 SOLUTION, CONCENTRATE RESPIRATORY (INHALATION) at 18:03

## 2019-09-08 RX ADMIN — VANCOMYCIN HYDROCHLORIDE 1000 MG: 1 INJECTION, SOLUTION INTRAVENOUS at 17:12

## 2019-09-08 RX ADMIN — HYDROCODONE BITARTRATE AND ACETAMINOPHEN 1 TABLET: 5; 325 TABLET ORAL at 20:27

## 2019-09-08 RX ADMIN — LEVALBUTEROL 1.25 MG: 1.25 SOLUTION, CONCENTRATE RESPIRATORY (INHALATION) at 15:21

## 2019-09-08 RX ADMIN — IPRATROPIUM BROMIDE 0.5 MG: 0.5 SOLUTION RESPIRATORY (INHALATION) at 18:03

## 2019-09-08 RX ADMIN — ATORVASTATIN CALCIUM 20 MG: 20 TABLET, FILM COATED ORAL at 20:27

## 2019-09-08 RX ADMIN — FOLIC ACID 1 MG: 1 TABLET ORAL at 10:56

## 2019-09-08 RX ADMIN — HYDROCODONE BITARTRATE AND ACETAMINOPHEN 1 TABLET: 5; 325 TABLET ORAL at 10:56

## 2019-09-08 RX ADMIN — POTASSIUM BICARBONATE 20 MEQ: 782 TABLET, EFFERVESCENT ORAL at 17:13

## 2019-09-08 RX ADMIN — HALOPERIDOL 2 MG: 1 TABLET ORAL at 10:55

## 2019-09-08 RX ADMIN — LISINOPRIL 10 MG: 10 TABLET ORAL at 10:56

## 2019-09-08 RX ADMIN — TAZOBACTAM SODIUM AND PIPERACILLIN SODIUM 3.38 G: 375; 3 INJECTION, SOLUTION INTRAVENOUS at 17:12

## 2019-09-08 RX ADMIN — Medication 100 MG: at 10:56

## 2019-09-08 RX ADMIN — IPRATROPIUM BROMIDE 0.5 MG: 0.5 SOLUTION RESPIRATORY (INHALATION) at 15:20

## 2019-09-08 RX ADMIN — HALOPERIDOL 2 MG: 1 TABLET ORAL at 20:26

## 2019-09-08 RX ADMIN — LEVOFLOXACIN 750 MG: 5 INJECTION, SOLUTION INTRAVENOUS at 18:45

## 2019-09-08 RX ADMIN — LEVALBUTEROL 1.25 MG: 1.25 SOLUTION, CONCENTRATE RESPIRATORY (INHALATION) at 11:14

## 2019-09-08 RX ADMIN — LEVALBUTEROL 1.25 MG: 1.25 SOLUTION, CONCENTRATE RESPIRATORY (INHALATION) at 07:41

## 2019-09-08 ASSESSMENT — PAIN SCALES - GENERAL
PAINLEVEL_OUTOF10: 0
PAINLEVEL_OUTOF10: 3
PAINLEVEL_OUTOF10: 0
PAINLEVEL_OUTOF10: 2
PAINLEVEL_OUTOF10: 0
PAINLEVEL_OUTOF10: 3

## 2019-09-08 ASSESSMENT — PAIN SCALES - PAIN ASSESSMENT IN ADVANCED DEMENTIA (PAINAD)
CONSOLABILITY: 0
CONSOLABILITY: 0
TOTALSCORE: 3
TOTALSCORE: 0
NEGVOCALIZATION: 0
NEGVOCALIZATION: 1
BREATHING: 0
BODYLANGUAGE: 0
FACIALEXPRESSION: 0
BODYLANGUAGE: 0
FACIALEXPRESSION: 0
BREATHING: 2

## 2019-09-08 ASSESSMENT — PAIN DESCRIPTION - DESCRIPTORS: DESCRIPTORS: PATIENT UNABLE TO DESCRIBE

## 2019-09-08 ASSESSMENT — PAIN DESCRIPTION - LOCATION
LOCATION: GENERALIZED
LOCATION: OTHER (COMMENT)
LOCATION: GENERALIZED

## 2019-09-08 ASSESSMENT — PAIN DESCRIPTION - PAIN TYPE
TYPE: ACUTE PAIN

## 2019-09-08 NOTE — PROGRESS NOTES
IVPB (Central Line) PRN   sodium chloride flush 0.9 % injection 10 mL PRN   sodium chloride flush 0.9 % injection 10 mL 2 times per day   ipratropium (ATROVENT) 0.02 % nebulizer solution 0.5 mg 4x daily   sodium chloride flush 0.9 % injection 10 mL PRN       Objective:  /64   Pulse 110   Temp 98.5 °F (36.9 °C) (Oral)   Resp 16   Ht 5' 7\" (1.702 m)   Wt 118 lb 9.6 oz (53.8 kg)   SpO2 95%   BMI 18.58 kg/m²     Intake/Output Summary (Last 24 hours) at 9/8/2019 1159  Last data filed at 9/8/2019 5233  Gross per 24 hour   Intake 3804 ml   Output --   Net 3804 ml      Wt Readings from Last 3 Encounters:   09/04/19 118 lb 9.6 oz (53.8 kg)   08/22/19 118 lb (53.5 kg)   08/19/19 118 lb 9.7 oz (53.8 kg)       General appearance:  Appears comfortable  Eyes: Sclera clear. Pupils equal.  ENT: Moist oral mucosa. Trachea midline, no adenopathy. Cardiovascular: Regular rhythm, normal S1, S2. No murmur. No edema in lower extremities  Respiratory: Bilateral few crepitations present  GI: Abdomen soft, no tenderness, not distended, normal bowel sounds status post PEG  Musculoskeletal: No cyanosis in digits, neck supple  Neurology: Confused  Skin: Warm, dry, normal turgor  Extremity exam shows brisk capillary refill. Peripheral pulses are palpable in lower extremities     Labs and Tests:  CBC:   Recent Labs     09/06/19  0532 09/07/19  0532 09/08/19  0526   WBC 13.6* 13.8* 12.6*   HGB 8.5* 6.9* 8.6*   * 383 360     BMP:    Recent Labs     09/06/19  0532 09/07/19  0532 09/08/19  0526    141 140   K 4.2 4.0 3.5    106 106   CO2 26 25 22   BUN 14 17 14   CREATININE 0.8 0.9 0.9   GLUCOSE 114* 149* 107*     Hepatic: No results for input(s): AST, ALT, ALB, BILITOT, ALKPHOS in the last 72 hours.   XR CHEST PORTABLE   Final Result   Bibasilar airspace disease, right greater than left, decreased compared to   prior study from August.      Indeterminate right upper lobe pulmonary nodule         CT CHEST PULMONARY evidence of   central/proximal intraluminal filling defect to suggest pulmonary embolism. 3. Interval decrease size of pleural based cavitary opacity in the posterior   right upper lobe as measured above. Additional scattered opacities appear to   resolved or decreased in size. Findings suggest resolving multifocal   infectious/inflammatory process. 4. Stable 1.4 cm slightly spiculated nodular opacity in the anterior upper   lobe, malignancy remains a diagnostic consideration   5. Persistent right basilar opacification which may represent atelectasis or   pneumonia. Persistent but interval decreased left retrocardiac atelectasis. 6. Redemonstration of indeterminate left adrenal nodule measured above. 7. Mild wall thickening along the gastric antrum and proximal to mid duodenum   suggesting gastritis/duodenitis. 8. Mild wall thickening throughout the descending and sigmoid colon   suggesting colitis. 9. Multiple incidental/chronic findings as detailed above including   redemonstration of infrarenal abdominal aortic aneurysm as measured above,   please see follow-up guidelines below. RECOMMENDATIONS:   3.3 cm abdominal aortic aneurysm. Recommend follow-up every 3 years. Reference: J Am Maribell Radiol 4299;10:005-481. CTA ABDOMEN PELVIS W WO CONTRAST   Final Result   1. Interval dislodgement of the G-tube with development of markedly enlarged   hemorrhagic infected collection along the left rectus sheath as measured and   described above. G-tube is within the medial aspect of the collection. Collection becomes ill-defined along its inferior aspect in the pelvis with   areas of fluid containing superimposed foci of gas scattered within the   anterior lower abdomen and pelvis.   Finding was discussed with [facility] ABRAHAM BARILLAS at 3:24 am on 8/24/2019.   2. Pulmonary arteries are suboptimally opacified for evaluation with limited   assessment of the distal segmental and subsegmental

## 2019-09-08 NOTE — PROGRESS NOTES
Patient kept pulling of breathing tx, he did get partial tx and attempt was done on the cough assist patient kept pulling on the tubing and would not let me place the mask on his face to attempt the treatment

## 2019-09-09 LAB
ANION GAP SERPL CALCULATED.3IONS-SCNC: 10 MMOL/L (ref 3–16)
BUN BLDV-MCNC: 11 MG/DL (ref 7–20)
CALCIUM SERPL-MCNC: 8.9 MG/DL (ref 8.3–10.6)
CHLORIDE BLD-SCNC: 110 MMOL/L (ref 99–110)
CO2: 24 MMOL/L (ref 21–32)
CREAT SERPL-MCNC: 0.8 MG/DL (ref 0.8–1.3)
GFR AFRICAN AMERICAN: >60
GFR NON-AFRICAN AMERICAN: >60
GLUCOSE BLD-MCNC: 107 MG/DL (ref 70–99)
HCT VFR BLD CALC: 26.4 % (ref 40.5–52.5)
HEMOGLOBIN: 8.5 G/DL (ref 13.5–17.5)
MCH RBC QN AUTO: 29.9 PG (ref 26–34)
MCHC RBC AUTO-ENTMCNC: 32.2 G/DL (ref 31–36)
MCV RBC AUTO: 92.9 FL (ref 80–100)
PDW BLD-RTO: 16.1 % (ref 12.4–15.4)
PLATELET # BLD: 323 K/UL (ref 135–450)
PMV BLD AUTO: 8.3 FL (ref 5–10.5)
POTASSIUM SERPL-SCNC: 3.4 MMOL/L (ref 3.5–5.1)
RBC # BLD: 2.84 M/UL (ref 4.2–5.9)
REPORT: NORMAL
REPORT: NORMAL
SODIUM BLD-SCNC: 144 MMOL/L (ref 136–145)
WBC # BLD: 9.1 K/UL (ref 4–11)

## 2019-09-09 PROCEDURE — 6370000000 HC RX 637 (ALT 250 FOR IP): Performed by: INTERNAL MEDICINE

## 2019-09-09 PROCEDURE — 6360000002 HC RX W HCPCS: Performed by: INTERNAL MEDICINE

## 2019-09-09 PROCEDURE — 1200000000 HC SEMI PRIVATE

## 2019-09-09 PROCEDURE — 2700000000 HC OXYGEN THERAPY PER DAY

## 2019-09-09 PROCEDURE — 6370000000 HC RX 637 (ALT 250 FOR IP): Performed by: PSYCHIATRY & NEUROLOGY

## 2019-09-09 PROCEDURE — 2500000003 HC RX 250 WO HCPCS: Performed by: INTERNAL MEDICINE

## 2019-09-09 PROCEDURE — 85027 COMPLETE CBC AUTOMATED: CPT

## 2019-09-09 PROCEDURE — 2580000003 HC RX 258: Performed by: INTERNAL MEDICINE

## 2019-09-09 PROCEDURE — 36415 COLL VENOUS BLD VENIPUNCTURE: CPT

## 2019-09-09 PROCEDURE — 31720 CLEARANCE OF AIRWAYS: CPT

## 2019-09-09 PROCEDURE — 94640 AIRWAY INHALATION TREATMENT: CPT

## 2019-09-09 PROCEDURE — 94761 N-INVAS EAR/PLS OXIMETRY MLT: CPT

## 2019-09-09 PROCEDURE — 80048 BASIC METABOLIC PNL TOTAL CA: CPT

## 2019-09-09 RX ORDER — LORAZEPAM 2 MG/ML
2 INJECTION INTRAMUSCULAR EVERY 6 HOURS PRN
Status: DISCONTINUED | OUTPATIENT
Start: 2019-09-09 | End: 2019-09-15

## 2019-09-09 RX ORDER — LORAZEPAM 2 MG/ML
1 INJECTION INTRAMUSCULAR ONCE
Status: DISCONTINUED | OUTPATIENT
Start: 2019-09-09 | End: 2019-09-09

## 2019-09-09 RX ORDER — LORAZEPAM 2 MG/ML
2 INJECTION INTRAMUSCULAR ONCE
Status: COMPLETED | OUTPATIENT
Start: 2019-09-09 | End: 2019-09-09

## 2019-09-09 RX ORDER — LORAZEPAM 2 MG/ML
INJECTION INTRAMUSCULAR
Status: DISPENSED
Start: 2019-09-09 | End: 2019-09-09

## 2019-09-09 RX ORDER — LORAZEPAM 2 MG/ML
2 INJECTION INTRAMUSCULAR ONCE
Status: DISCONTINUED | OUTPATIENT
Start: 2019-09-09 | End: 2019-09-09

## 2019-09-09 RX ADMIN — METOPROLOL TARTRATE 50 MG: 50 TABLET, FILM COATED ORAL at 09:28

## 2019-09-09 RX ADMIN — LISINOPRIL 10 MG: 10 TABLET ORAL at 09:28

## 2019-09-09 RX ADMIN — LEVOFLOXACIN 750 MG: 5 INJECTION, SOLUTION INTRAVENOUS at 18:43

## 2019-09-09 RX ADMIN — FOLIC ACID 1 MG: 1 TABLET ORAL at 09:28

## 2019-09-09 RX ADMIN — HYDROCODONE BITARTRATE AND ACETAMINOPHEN 1 TABLET: 5; 325 TABLET ORAL at 09:35

## 2019-09-09 RX ADMIN — LEVALBUTEROL 1.25 MG: 1.25 SOLUTION, CONCENTRATE RESPIRATORY (INHALATION) at 21:38

## 2019-09-09 RX ADMIN — POTASSIUM BICARBONATE 20 MEQ: 782 TABLET, EFFERVESCENT ORAL at 09:33

## 2019-09-09 RX ADMIN — LORAZEPAM 2 MG: 2 INJECTION INTRAMUSCULAR; INTRAVENOUS at 23:29

## 2019-09-09 RX ADMIN — Medication 10 ML: at 20:38

## 2019-09-09 RX ADMIN — SODIUM CHLORIDE: 9 INJECTION, SOLUTION INTRAVENOUS at 04:44

## 2019-09-09 RX ADMIN — TAZOBACTAM SODIUM AND PIPERACILLIN SODIUM 3.38 G: 375; 3 INJECTION, SOLUTION INTRAVENOUS at 15:10

## 2019-09-09 RX ADMIN — HYDROCODONE BITARTRATE AND ACETAMINOPHEN 1 TABLET: 5; 325 TABLET ORAL at 18:43

## 2019-09-09 RX ADMIN — LORAZEPAM 2 MG: 2 INJECTION INTRAMUSCULAR; INTRAVENOUS at 16:17

## 2019-09-09 RX ADMIN — IPRATROPIUM BROMIDE 0.5 MG: 0.5 SOLUTION RESPIRATORY (INHALATION) at 21:38

## 2019-09-09 RX ADMIN — IPRATROPIUM BROMIDE 0.5 MG: 0.5 SOLUTION RESPIRATORY (INHALATION) at 11:42

## 2019-09-09 RX ADMIN — ENOXAPARIN SODIUM 40 MG: 40 INJECTION SUBCUTANEOUS at 15:04

## 2019-09-09 RX ADMIN — METOPROLOL TARTRATE 50 MG: 50 TABLET, FILM COATED ORAL at 20:38

## 2019-09-09 RX ADMIN — POTASSIUM BICARBONATE 20 MEQ: 782 TABLET, EFFERVESCENT ORAL at 15:10

## 2019-09-09 RX ADMIN — LEVALBUTEROL 1.25 MG: 1.25 SOLUTION, CONCENTRATE RESPIRATORY (INHALATION) at 09:26

## 2019-09-09 RX ADMIN — HALOPERIDOL 2 MG: 1 TABLET ORAL at 09:29

## 2019-09-09 RX ADMIN — ATORVASTATIN CALCIUM 20 MG: 20 TABLET, FILM COATED ORAL at 20:38

## 2019-09-09 RX ADMIN — TAZOBACTAM SODIUM AND PIPERACILLIN SODIUM 3.38 G: 375; 3 INJECTION, SOLUTION INTRAVENOUS at 22:26

## 2019-09-09 RX ADMIN — LEVALBUTEROL 1.25 MG: 1.25 SOLUTION, CONCENTRATE RESPIRATORY (INHALATION) at 11:42

## 2019-09-09 RX ADMIN — IPRATROPIUM BROMIDE 0.5 MG: 0.5 SOLUTION RESPIRATORY (INHALATION) at 09:26

## 2019-09-09 RX ADMIN — TAZOBACTAM SODIUM AND PIPERACILLIN SODIUM 3.38 G: 375; 3 INJECTION, SOLUTION INTRAVENOUS at 01:36

## 2019-09-09 RX ADMIN — LORAZEPAM 2 MG: 2 INJECTION INTRAMUSCULAR; INTRAVENOUS at 07:26

## 2019-09-09 RX ADMIN — Medication 100 MG: at 09:28

## 2019-09-09 RX ADMIN — HALOPERIDOL 2 MG: 1 TABLET ORAL at 20:53

## 2019-09-09 RX ADMIN — HALOPERIDOL 2 MG: 1 TABLET ORAL at 18:44

## 2019-09-09 RX ADMIN — Medication 10 ML: at 20:50

## 2019-09-09 RX ADMIN — IPRATROPIUM BROMIDE 0.5 MG: 0.5 SOLUTION RESPIRATORY (INHALATION) at 16:00

## 2019-09-09 RX ADMIN — VANCOMYCIN HYDROCHLORIDE 1000 MG: 1 INJECTION, SOLUTION INTRAVENOUS at 15:25

## 2019-09-09 RX ADMIN — TAZOBACTAM SODIUM AND PIPERACILLIN SODIUM 3.38 G: 375; 3 INJECTION, SOLUTION INTRAVENOUS at 09:33

## 2019-09-09 RX ADMIN — LEVALBUTEROL 1.25 MG: 1.25 SOLUTION, CONCENTRATE RESPIRATORY (INHALATION) at 16:00

## 2019-09-09 RX ADMIN — POTASSIUM BICARBONATE 20 MEQ: 782 TABLET, EFFERVESCENT ORAL at 20:37

## 2019-09-09 ASSESSMENT — PAIN SCALES - PAIN ASSESSMENT IN ADVANCED DEMENTIA (PAINAD)
TOTALSCORE: 0
BREATHING: 0
BREATHING: 0
CONSOLABILITY: 0
FACIALEXPRESSION: 0
BODYLANGUAGE: 0
BREATHING: 0
TOTALSCORE: 0
BODYLANGUAGE: 0
NEGVOCALIZATION: 0
BODYLANGUAGE: 0
FACIALEXPRESSION: 0
FACIALEXPRESSION: 0
NEGVOCALIZATION: 0
NEGVOCALIZATION: 0
CONSOLABILITY: 0
TOTALSCORE: 0
CONSOLABILITY: 0

## 2019-09-09 ASSESSMENT — PAIN SCALES - WONG BAKER: WONGBAKER_NUMERICALRESPONSE: 0

## 2019-09-09 ASSESSMENT — PAIN SCALES - GENERAL
PAINLEVEL_OUTOF10: 0

## 2019-09-09 NOTE — PLAN OF CARE
Nutrition Problem: Severe malnutrition  Intervention: Food and/or Nutrient Delivery: Continue current Tube Feeding  Nutritional Goals: Pt will tolerate EN at goal

## 2019-09-09 NOTE — PROGRESS NOTES
Nutrition Assessment (Enteral Nutrition)    Type and Reason for Visit: Reassess    Nutrition Recommendations:   1.  Con't EN:  Jevity 1.2 @ 60 ml/hr. Water bolus per MD  2. Monitor for wt changes    Nutrition Assessment: Nutrition status remains stable as evidenced by pt is tolerating tube feeding @ goal rate (Jev 1.2 @ 60 ml/hr). Na+ at 144. New wt of 118 lb reveals 10 lb wt loss since 8/29, however 118 lb was pt's UBW prior to admission (Likely related to fluid fluctuations). Will con't to monitor EN tolerance & weights for significant changes. Malnutrition Assessment:  · Malnutrition Status: Meets the criteria for severe malnutrition  · Context: Chronic illness  · Findings of the 6 clinical characteristics of malnutrition (Minimum of 2 out of 6 clinical characteristics is required to make the diagnosis of moderate or severe Protein Calorie Malnutrition based on AND/ASPEN Guidelines):  1. Energy Intake-Greater than 75% of estimated energy requirement, (with EN @ goal)    2. Weight Loss-7.5% loss or greater(likely related to fluid losses), (2 weeks)  3. Fat Loss-Severe subcutaneous fat loss, Orbital, Triceps  4. Muscle Loss-Severe muscle mass loss, Temples (temporalis muscle), Clavicles (pectoralis and deltoids)  5. Fluid Accumulation-No significant fluid accumulation, Generalized  6.  Strength-Not measured    Nutrition Risk Level:  Moderate    Nutrition Needs:  · Estimated Daily Total Kcal: 4517-6730  · Estimated Daily Protein (g): 65- 81 g  · Estimated Daily Fluid (ml/day): 1 ml/kcal or defer to MD    Nutrition Diagnosis:   · Problem: Severe malnutrition  · Etiology: related to Insufficient energy/nutrient consumption     Signs and symptoms:  as evidenced by Diet history of poor intake, Severe loss of subcutaneous fat, Severe muscle loss    Objective Information:  · Nutrition-Focused Physical Findings: + BS; Na+ 144; no edema noted  · Wound Type: None  · Current Nutrition

## 2019-09-09 NOTE — PROGRESS NOTES
Patient restless, continuesly pulling at IV site and attempting to pull off abdominal binder. Protective sleeve applied to Left arm. Sitter remains at bedside. Will continue to monitor.

## 2019-09-10 ENCOUNTER — ANESTHESIA EVENT (OUTPATIENT)
Dept: ENDOSCOPY | Age: 69
DRG: 871 | End: 2019-09-10
Payer: MEDICARE

## 2019-09-10 PROBLEM — D64.9 NORMOCYTIC NORMOCHROMIC ANEMIA: Status: ACTIVE | Noted: 2019-09-10

## 2019-09-10 PROBLEM — R09.89 CHEST CONGESTION: Status: ACTIVE | Noted: 2019-09-10

## 2019-09-10 PROBLEM — I27.20 PULMONARY HTN (HCC): Status: ACTIVE | Noted: 2019-09-10

## 2019-09-10 PROBLEM — I51.89 DIASTOLIC DYSFUNCTION: Status: ACTIVE | Noted: 2019-09-10

## 2019-09-10 PROBLEM — R91.8 PULMONARY INFILTRATES: Status: ACTIVE | Noted: 2019-09-10

## 2019-09-10 LAB
ANION GAP SERPL CALCULATED.3IONS-SCNC: 9 MMOL/L (ref 3–16)
BUN BLDV-MCNC: 9 MG/DL (ref 7–20)
CALCIUM SERPL-MCNC: 8.6 MG/DL (ref 8.3–10.6)
CHLORIDE BLD-SCNC: 112 MMOL/L (ref 99–110)
CO2: 24 MMOL/L (ref 21–32)
CREAT SERPL-MCNC: 0.7 MG/DL (ref 0.8–1.3)
GFR AFRICAN AMERICAN: >60
GFR NON-AFRICAN AMERICAN: >60
GLUCOSE BLD-MCNC: 112 MG/DL (ref 70–99)
HCT VFR BLD CALC: 28.2 % (ref 40.5–52.5)
HEMOGLOBIN: 9 G/DL (ref 13.5–17.5)
MCH RBC QN AUTO: 29.8 PG (ref 26–34)
MCHC RBC AUTO-ENTMCNC: 31.8 G/DL (ref 31–36)
MCV RBC AUTO: 93.8 FL (ref 80–100)
PDW BLD-RTO: 15.7 % (ref 12.4–15.4)
PLATELET # BLD: 313 K/UL (ref 135–450)
PMV BLD AUTO: 8.1 FL (ref 5–10.5)
POTASSIUM SERPL-SCNC: 3.1 MMOL/L (ref 3.5–5.1)
RBC # BLD: 3.01 M/UL (ref 4.2–5.9)
SODIUM BLD-SCNC: 145 MMOL/L (ref 136–145)
VANCOMYCIN TROUGH: 12.9 UG/ML (ref 10–20)
WBC # BLD: 8.9 K/UL (ref 4–11)

## 2019-09-10 PROCEDURE — 6370000000 HC RX 637 (ALT 250 FOR IP): Performed by: PSYCHIATRY & NEUROLOGY

## 2019-09-10 PROCEDURE — 2580000003 HC RX 258: Performed by: INTERNAL MEDICINE

## 2019-09-10 PROCEDURE — 6370000000 HC RX 637 (ALT 250 FOR IP): Performed by: INTERNAL MEDICINE

## 2019-09-10 PROCEDURE — 99223 1ST HOSP IP/OBS HIGH 75: CPT | Performed by: INTERNAL MEDICINE

## 2019-09-10 PROCEDURE — 6360000002 HC RX W HCPCS: Performed by: INTERNAL MEDICINE

## 2019-09-10 PROCEDURE — 2700000000 HC OXYGEN THERAPY PER DAY

## 2019-09-10 PROCEDURE — 2500000003 HC RX 250 WO HCPCS: Performed by: INTERNAL MEDICINE

## 2019-09-10 PROCEDURE — 80048 BASIC METABOLIC PNL TOTAL CA: CPT

## 2019-09-10 PROCEDURE — 36415 COLL VENOUS BLD VENIPUNCTURE: CPT

## 2019-09-10 PROCEDURE — 1200000000 HC SEMI PRIVATE

## 2019-09-10 PROCEDURE — 80202 ASSAY OF VANCOMYCIN: CPT

## 2019-09-10 PROCEDURE — 94640 AIRWAY INHALATION TREATMENT: CPT

## 2019-09-10 PROCEDURE — 94761 N-INVAS EAR/PLS OXIMETRY MLT: CPT

## 2019-09-10 PROCEDURE — 94669 MECHANICAL CHEST WALL OSCILL: CPT

## 2019-09-10 PROCEDURE — 85027 COMPLETE CBC AUTOMATED: CPT

## 2019-09-10 RX ORDER — HALOPERIDOL 1 MG/1
2 TABLET ORAL 3 TIMES DAILY
Status: DISCONTINUED | OUTPATIENT
Start: 2019-09-10 | End: 2019-09-16

## 2019-09-10 RX ADMIN — LISINOPRIL 10 MG: 10 TABLET ORAL at 08:51

## 2019-09-10 RX ADMIN — LORAZEPAM 2 MG: 2 INJECTION INTRAMUSCULAR; INTRAVENOUS at 15:13

## 2019-09-10 RX ADMIN — TAZOBACTAM SODIUM AND PIPERACILLIN SODIUM 3.38 G: 375; 3 INJECTION, SOLUTION INTRAVENOUS at 06:07

## 2019-09-10 RX ADMIN — LORAZEPAM 2 MG: 2 INJECTION INTRAMUSCULAR; INTRAVENOUS at 06:03

## 2019-09-10 RX ADMIN — HYDROCODONE BITARTRATE AND ACETAMINOPHEN 1 TABLET: 5; 325 TABLET ORAL at 18:09

## 2019-09-10 RX ADMIN — POTASSIUM CHLORIDE 40 MEQ: 1500 TABLET, EXTENDED RELEASE ORAL at 08:51

## 2019-09-10 RX ADMIN — METOPROLOL TARTRATE 50 MG: 50 TABLET, FILM COATED ORAL at 08:51

## 2019-09-10 RX ADMIN — IPRATROPIUM BROMIDE 0.5 MG: 0.5 SOLUTION RESPIRATORY (INHALATION) at 15:07

## 2019-09-10 RX ADMIN — HALOPERIDOL 2 MG: 1 TABLET ORAL at 15:13

## 2019-09-10 RX ADMIN — TAZOBACTAM SODIUM AND PIPERACILLIN SODIUM 3.38 G: 375; 3 INJECTION, SOLUTION INTRAVENOUS at 15:13

## 2019-09-10 RX ADMIN — HYDROCODONE BITARTRATE AND ACETAMINOPHEN 1 TABLET: 5; 325 TABLET ORAL at 08:51

## 2019-09-10 RX ADMIN — LEVALBUTEROL 1.25 MG: 1.25 SOLUTION, CONCENTRATE RESPIRATORY (INHALATION) at 11:05

## 2019-09-10 RX ADMIN — LEVALBUTEROL 1.25 MG: 1.25 SOLUTION, CONCENTRATE RESPIRATORY (INHALATION) at 07:36

## 2019-09-10 RX ADMIN — POTASSIUM BICARBONATE 20 MEQ: 782 TABLET, EFFERVESCENT ORAL at 20:30

## 2019-09-10 RX ADMIN — HALOPERIDOL 2 MG: 1 TABLET ORAL at 20:30

## 2019-09-10 RX ADMIN — METOPROLOL TARTRATE 50 MG: 50 TABLET, FILM COATED ORAL at 20:29

## 2019-09-10 RX ADMIN — IPRATROPIUM BROMIDE 0.5 MG: 0.5 SOLUTION RESPIRATORY (INHALATION) at 11:05

## 2019-09-10 RX ADMIN — IPRATROPIUM BROMIDE 0.5 MG: 0.5 SOLUTION RESPIRATORY (INHALATION) at 07:25

## 2019-09-10 RX ADMIN — IPRATROPIUM BROMIDE 0.5 MG: 0.5 SOLUTION RESPIRATORY (INHALATION) at 19:32

## 2019-09-10 RX ADMIN — LEVOFLOXACIN 750 MG: 5 INJECTION, SOLUTION INTRAVENOUS at 17:55

## 2019-09-10 RX ADMIN — LEVALBUTEROL 1.25 MG: 1.25 SOLUTION, CONCENTRATE RESPIRATORY (INHALATION) at 19:33

## 2019-09-10 RX ADMIN — Medication 10 ML: at 20:40

## 2019-09-10 RX ADMIN — TAZOBACTAM SODIUM AND PIPERACILLIN SODIUM 3.38 G: 375; 3 INJECTION, SOLUTION INTRAVENOUS at 22:42

## 2019-09-10 RX ADMIN — POTASSIUM BICARBONATE 20 MEQ: 782 TABLET, EFFERVESCENT ORAL at 08:51

## 2019-09-10 RX ADMIN — LORAZEPAM 2 MG: 2 INJECTION INTRAMUSCULAR; INTRAVENOUS at 22:54

## 2019-09-10 RX ADMIN — HALOPERIDOL 2 MG: 1 TABLET ORAL at 08:51

## 2019-09-10 RX ADMIN — ENOXAPARIN SODIUM 40 MG: 40 INJECTION SUBCUTANEOUS at 08:52

## 2019-09-10 RX ADMIN — SODIUM CHLORIDE: 9 INJECTION, SOLUTION INTRAVENOUS at 03:36

## 2019-09-10 RX ADMIN — ATORVASTATIN CALCIUM 20 MG: 20 TABLET, FILM COATED ORAL at 20:30

## 2019-09-10 RX ADMIN — FOLIC ACID 1 MG: 1 TABLET ORAL at 08:50

## 2019-09-10 RX ADMIN — HALOPERIDOL 2 MG: 1 TABLET ORAL at 18:09

## 2019-09-10 RX ADMIN — LEVALBUTEROL 1.25 MG: 1.25 SOLUTION, CONCENTRATE RESPIRATORY (INHALATION) at 15:30

## 2019-09-10 RX ADMIN — VANCOMYCIN HYDROCHLORIDE 1000 MG: 1 INJECTION, SOLUTION INTRAVENOUS at 17:55

## 2019-09-10 RX ADMIN — POTASSIUM BICARBONATE 20 MEQ: 782 TABLET, EFFERVESCENT ORAL at 15:13

## 2019-09-10 RX ADMIN — Medication 100 MG: at 08:51

## 2019-09-10 ASSESSMENT — ENCOUNTER SYMPTOMS: SHORTNESS OF BREATH: 1

## 2019-09-10 ASSESSMENT — PAIN SCALES - GENERAL
PAINLEVEL_OUTOF10: 0

## 2019-09-10 ASSESSMENT — PAIN SCALES - WONG BAKER: WONGBAKER_NUMERICALRESPONSE: 0

## 2019-09-10 NOTE — PROGRESS NOTES
mEq PRN   Or    potassium chloride 10 mEq/100 mL IVPB (Peripheral Line) PRN   potassium chloride 20 mEq/50 mL IVPB (Central Line) PRN   sodium chloride flush 0.9 % injection 10 mL PRN   sodium chloride flush 0.9 % injection 10 mL 2 times per day   ipratropium (ATROVENT) 0.02 % nebulizer solution 0.5 mg 4x daily   sodium chloride flush 0.9 % injection 10 mL PRN       Objective:  BP (!) 161/74   Pulse 120   Temp 98 °F (36.7 °C) (Axillary)   Resp 20   Ht 5' 7\" (1.702 m)   Wt 118 lb 9.6 oz (53.8 kg)   SpO2 93%   BMI 18.58 kg/m²     Intake/Output Summary (Last 24 hours) at 9/10/2019 1134  Last data filed at 9/10/2019 0721  Gross per 24 hour   Intake 2821 ml   Output --   Net 2821 ml      Wt Readings from Last 3 Encounters:   09/04/19 118 lb 9.6 oz (53.8 kg)   08/22/19 118 lb (53.5 kg)   08/19/19 118 lb 9.7 oz (53.8 kg)       General appearance:  Appears comfortable  Eyes: Sclera clear. Pupils equal.  ENT: Moist oral mucosa. Trachea midline, no adenopathy. Cardiovascular: Regular rhythm, normal S1, S2. No murmur. No edema in lower extremities  Respiratory: Bilateral few crepitations present  GI: Abdomen soft, no tenderness, not distended, normal bowel sounds status post PEG  Musculoskeletal: No cyanosis in digits, neck supple  Neurology: Confused  Skin: Warm, dry, normal turgor  Extremity exam shows brisk capillary refill. Peripheral pulses are palpable in lower extremities     Labs and Tests:  CBC:   Recent Labs     09/08/19 0526 09/09/19  0529 09/10/19  0535   WBC 12.6* 9.1 8.9   HGB 8.6* 8.5* 9.0*    323 313     BMP:    Recent Labs     09/08/19  0526 09/09/19  0529 09/10/19  0535    144 145   K 3.5 3.4* 3.1*    110 112*   CO2 22 24 24   BUN 14 11 9   CREATININE 0.9 0.8 0.7*   GLUCOSE 107* 107* 112*     Hepatic: No results for input(s): AST, ALT, ALB, BILITOT, ALKPHOS in the last 72 hours.   XR CHEST PORTABLE   Final Result   Bibasilar airspace disease, right greater than left, decreased suboptimally opacified for evaluation with limited   assessment of the distal segmental and subsegmental vessels. No evidence of   central/proximal intraluminal filling defect to suggest pulmonary embolism. 3. Interval decrease size of pleural based cavitary opacity in the posterior   right upper lobe as measured above. Additional scattered opacities appear to   resolved or decreased in size. Findings suggest resolving multifocal   infectious/inflammatory process. 4. Stable 1.4 cm slightly spiculated nodular opacity in the anterior upper   lobe, malignancy remains a diagnostic consideration   5. Persistent right basilar opacification which may represent atelectasis or   pneumonia. Persistent but interval decreased left retrocardiac atelectasis. 6. Redemonstration of indeterminate left adrenal nodule measured above. 7. Mild wall thickening along the gastric antrum and proximal to mid duodenum   suggesting gastritis/duodenitis. 8. Mild wall thickening throughout the descending and sigmoid colon   suggesting colitis. 9. Multiple incidental/chronic findings as detailed above including   redemonstration of infrarenal abdominal aortic aneurysm as measured above,   please see follow-up guidelines below. RECOMMENDATIONS:   3.3 cm abdominal aortic aneurysm. Recommend follow-up every 3 years. Reference: J Am Maribell Radiol 2790;02:864-770. CTA ABDOMEN PELVIS W WO CONTRAST   Final Result   1. Interval dislodgement of the G-tube with development of markedly enlarged   hemorrhagic infected collection along the left rectus sheath as measured and   described above. G-tube is within the medial aspect of the collection. Collection becomes ill-defined along its inferior aspect in the pelvis with   areas of fluid containing superimposed foci of gas scattered within the   anterior lower abdomen and pelvis.   Finding was discussed with [facility] ABRAHAM BARILLAS at 3:24 am on 8/24/2019.   2. Pulmonary
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
## 2019-09-10 NOTE — CONSULTS
proximal to mid duodenum   suggesting gastritis/duodenitis. 8. Mild wall thickening throughout the descending and sigmoid colon   suggesting colitis. 9. Multiple incidental/chronic findings as detailed above including   redemonstration of infrarenal abdominal aortic aneurysm as measured above,   please see follow-up guidelines below. RECOMMENDATIONS:   3.3 cm abdominal aortic aneurysm. Recommend follow-up every 3 years. Reference: J Am Maribell Radiol 5914;79:352-485. CTA ABDOMEN PELVIS W WO CONTRAST   Final Result   1. Interval dislodgement of the G-tube with development of markedly enlarged   hemorrhagic infected collection along the left rectus sheath as measured and   described above. G-tube is within the medial aspect of the collection. Collection becomes ill-defined along its inferior aspect in the pelvis with   areas of fluid containing superimposed foci of gas scattered within the   anterior lower abdomen and pelvis. Finding was discussed with [facility] ABRAHAM BARILLAS at 3:24 am on 8/24/2019.   2. Pulmonary arteries are suboptimally opacified for evaluation with limited   assessment of the distal segmental and subsegmental vessels. No evidence of   central/proximal intraluminal filling defect to suggest pulmonary embolism. 3. Interval decrease size of pleural based cavitary opacity in the posterior   right upper lobe as measured above. Additional scattered opacities appear to   resolved or decreased in size. Findings suggest resolving multifocal   infectious/inflammatory process. 4. Stable 1.4 cm slightly spiculated nodular opacity in the anterior upper   lobe, malignancy remains a diagnostic consideration   5. Persistent right basilar opacification which may represent atelectasis or   pneumonia. Persistent but interval decreased left retrocardiac atelectasis. 6. Redemonstration of indeterminate left adrenal nodule measured above.    7. Mild wall thickening along the gastric antrum and proximal to mid duodenum   suggesting gastritis/duodenitis. 8. Mild wall thickening throughout the descending and sigmoid colon   suggesting colitis. 9. Multiple incidental/chronic findings as detailed above including   redemonstration of infrarenal abdominal aortic aneurysm as measured above,   please see follow-up guidelines below. RECOMMENDATIONS:   3.3 cm abdominal aortic aneurysm. Recommend follow-up every 3 years. Reference: J Am Maribell Radiol 6358;60:429-104. CT CERVICAL SPINE WO CONTRAST   Final Result   No acute abnormality of the cervical spine. No change since the study   performed 1 day ago. CT HEAD WO CONTRAST   Final Result   No acute intracranial abnormality. Stable small polyp or retention cyst left sphenoid sinus and tiny amount of   fluid in the mastoid tips bilaterally. XR CHEST PORTABLE   Final Result   Persistent right upper lobe mass seen to better advantage on recent CT. Follow-up CT as was recommended on report from 08/09/2019 is warranted. No   new or acute finding is seen. Results for Nely Bojorquez (MRN [id number]) as of 9/10/2019 16:28   Ref.  Range 9/8/2019 05:26 9/9/2019 05:29 9/10/2019 05:35   Sodium Latest Ref Range: 136 - 145 mmol/L 140 144 145   Potassium Latest Ref Range: 3.5 - 5.1 mmol/L 3.5 3.4 (L) 3.1 (L)   Chloride Latest Ref Range: 99 - 110 mmol/L 106 110 112 (H)   CO2 Latest Ref Range: 21 - 32 mmol/L 22 24 24   BUN Latest Ref Range: 7 - 20 mg/dL 14 11 9   Creatinine Latest Ref Range: 0.8 - 1.3 mg/dL 0.9 0.8 0.7 (L)   Anion Gap Latest Ref Range: 3 - 16  12 10 9   GFR Non- Latest Ref Range: >60  >60 >60 >60   GFR  Latest Ref Range: >60  >60 >60 >60   Glucose Latest Ref Range: 70 - 99 mg/dL 107 (H) 107 (H) 112 (H)   Calcium Latest Ref Range: 8.3 - 10.6 mg/dL 8.7 8.9 8.6   WBC Latest Ref Range: 4.0 - 11.0 K/uL 12.6 (H) 9.1 8.9   RBC Latest Ref Range: 4.20 - 5.90 M/uL 2.92 (L) 2.84 and cultures  · Bronchodilators  · Patient also has a lung nodule in place on the imaging which may need to be evaluated down the line if definitive work-up is to be done but patient's quality of life seems to be not great-biopsies and further management in those directions may be futile and may need to be discussed with patient's family, palliative care/ethics consult to be contemplated if need be  · Will not give any IV steroids for now  · Cardiac medications as per IM  · Internal medicine to consider CIWA protocol/sedation intermittently for agitation, if deemed appropriate  · PUD and DVT prophylaxis    Patient's further treatment depending on patient's clinical status and the bronchoscopy findings    Case discussed with nursing team        Electronically signed by:  Sigifredo Garcia MD    9/10/2019    4:35 PM.

## 2019-09-11 ENCOUNTER — ANESTHESIA (OUTPATIENT)
Dept: ENDOSCOPY | Age: 69
DRG: 871 | End: 2019-09-11
Payer: MEDICARE

## 2019-09-11 VITALS
OXYGEN SATURATION: 98 % | RESPIRATION RATE: 3 BRPM | SYSTOLIC BLOOD PRESSURE: 102 MMHG | DIASTOLIC BLOOD PRESSURE: 58 MMHG

## 2019-09-11 LAB
ANION GAP SERPL CALCULATED.3IONS-SCNC: 11 MMOL/L (ref 3–16)
BUN BLDV-MCNC: 9 MG/DL (ref 7–20)
CALCIUM SERPL-MCNC: 8.9 MG/DL (ref 8.3–10.6)
CHLORIDE BLD-SCNC: 110 MMOL/L (ref 99–110)
CO2: 22 MMOL/L (ref 21–32)
CREAT SERPL-MCNC: 0.7 MG/DL (ref 0.8–1.3)
EKG ATRIAL RATE: 107 BPM
EKG DIAGNOSIS: NORMAL
EKG P AXIS: 53 DEGREES
EKG P-R INTERVAL: 120 MS
EKG Q-T INTERVAL: 352 MS
EKG QRS DURATION: 82 MS
EKG QTC CALCULATION (BAZETT): 469 MS
EKG R AXIS: -30 DEGREES
EKG T AXIS: 12 DEGREES
EKG VENTRICULAR RATE: 107 BPM
GFR AFRICAN AMERICAN: >60
GFR NON-AFRICAN AMERICAN: >60
GLUCOSE BLD-MCNC: 91 MG/DL (ref 70–99)
HCT VFR BLD CALC: 27 % (ref 40.5–52.5)
HEMOGLOBIN: 8.7 G/DL (ref 13.5–17.5)
MCH RBC QN AUTO: 29.9 PG (ref 26–34)
MCHC RBC AUTO-ENTMCNC: 32.2 G/DL (ref 31–36)
MCV RBC AUTO: 92.8 FL (ref 80–100)
PDW BLD-RTO: 16.1 % (ref 12.4–15.4)
PLATELET # BLD: 318 K/UL (ref 135–450)
PMV BLD AUTO: 7.6 FL (ref 5–10.5)
POTASSIUM SERPL-SCNC: 4.1 MMOL/L (ref 3.5–5.1)
RBC # BLD: 2.91 M/UL (ref 4.2–5.9)
SODIUM BLD-SCNC: 143 MMOL/L (ref 136–145)
WBC # BLD: 9.3 K/UL (ref 4–11)

## 2019-09-11 PROCEDURE — 31624 DX BRONCHOSCOPE/LAVAGE: CPT | Performed by: INTERNAL MEDICINE

## 2019-09-11 PROCEDURE — 3700000000 HC ANESTHESIA ATTENDED CARE: Performed by: INTERNAL MEDICINE

## 2019-09-11 PROCEDURE — 3609010800 HC BRONCHOSCOPY ALVEOLAR LAVAGE: Performed by: INTERNAL MEDICINE

## 2019-09-11 PROCEDURE — 6370000000 HC RX 637 (ALT 250 FOR IP): Performed by: INTERNAL MEDICINE

## 2019-09-11 PROCEDURE — 87070 CULTURE OTHR SPECIMN AEROBIC: CPT

## 2019-09-11 PROCEDURE — 6360000002 HC RX W HCPCS: Performed by: INTERNAL MEDICINE

## 2019-09-11 PROCEDURE — 87116 MYCOBACTERIA CULTURE: CPT

## 2019-09-11 PROCEDURE — 36415 COLL VENOUS BLD VENIPUNCTURE: CPT

## 2019-09-11 PROCEDURE — 3700000001 HC ADD 15 MINUTES (ANESTHESIA): Performed by: INTERNAL MEDICINE

## 2019-09-11 PROCEDURE — 2500000003 HC RX 250 WO HCPCS: Performed by: INTERNAL MEDICINE

## 2019-09-11 PROCEDURE — 2580000003 HC RX 258: Performed by: NURSE ANESTHETIST, CERTIFIED REGISTERED

## 2019-09-11 PROCEDURE — 2580000003 HC RX 258: Performed by: INTERNAL MEDICINE

## 2019-09-11 PROCEDURE — 31645 BRNCHSC W/THER ASPIR 1ST: CPT | Performed by: INTERNAL MEDICINE

## 2019-09-11 PROCEDURE — 94640 AIRWAY INHALATION TREATMENT: CPT

## 2019-09-11 PROCEDURE — 0B9F8ZX DRAINAGE OF RIGHT LOWER LUNG LOBE, VIA NATURAL OR ARTIFICIAL OPENING ENDOSCOPIC, DIAGNOSTIC: ICD-10-PCS | Performed by: INTERNAL MEDICINE

## 2019-09-11 PROCEDURE — 6360000002 HC RX W HCPCS: Performed by: NURSE ANESTHETIST, CERTIFIED REGISTERED

## 2019-09-11 PROCEDURE — 88305 TISSUE EXAM BY PATHOLOGIST: CPT

## 2019-09-11 PROCEDURE — 94660 CPAP INITIATION&MGMT: CPT

## 2019-09-11 PROCEDURE — 2500000003 HC RX 250 WO HCPCS: Performed by: NURSE ANESTHETIST, CERTIFIED REGISTERED

## 2019-09-11 PROCEDURE — 93005 ELECTROCARDIOGRAM TRACING: CPT | Performed by: INTERNAL MEDICINE

## 2019-09-11 PROCEDURE — 99233 SBSQ HOSP IP/OBS HIGH 50: CPT | Performed by: INTERNAL MEDICINE

## 2019-09-11 PROCEDURE — 87015 SPECIMEN INFECT AGNT CONCNTJ: CPT

## 2019-09-11 PROCEDURE — 87077 CULTURE AEROBIC IDENTIFY: CPT

## 2019-09-11 PROCEDURE — 94761 N-INVAS EAR/PLS OXIMETRY MLT: CPT

## 2019-09-11 PROCEDURE — 2060000000 HC ICU INTERMEDIATE R&B

## 2019-09-11 PROCEDURE — 87102 FUNGUS ISOLATION CULTURE: CPT

## 2019-09-11 PROCEDURE — 2700000000 HC OXYGEN THERAPY PER DAY

## 2019-09-11 PROCEDURE — 88112 CYTOPATH CELL ENHANCE TECH: CPT

## 2019-09-11 PROCEDURE — 7100000000 HC PACU RECOVERY - FIRST 15 MIN: Performed by: INTERNAL MEDICINE

## 2019-09-11 PROCEDURE — 93010 ELECTROCARDIOGRAM REPORT: CPT | Performed by: INTERNAL MEDICINE

## 2019-09-11 PROCEDURE — 85027 COMPLETE CBC AUTOMATED: CPT

## 2019-09-11 PROCEDURE — 87186 SC STD MICRODIL/AGAR DIL: CPT

## 2019-09-11 PROCEDURE — 80048 BASIC METABOLIC PNL TOTAL CA: CPT

## 2019-09-11 PROCEDURE — 87205 SMEAR GRAM STAIN: CPT

## 2019-09-11 PROCEDURE — 7100000001 HC PACU RECOVERY - ADDTL 15 MIN: Performed by: INTERNAL MEDICINE

## 2019-09-11 PROCEDURE — 86403 PARTICLE AGGLUT ANTBDY SCRN: CPT

## 2019-09-11 PROCEDURE — 87206 SMEAR FLUORESCENT/ACID STAI: CPT

## 2019-09-11 PROCEDURE — 2709999900 HC NON-CHARGEABLE SUPPLY: Performed by: INTERNAL MEDICINE

## 2019-09-11 RX ORDER — PROPOFOL 10 MG/ML
INJECTION, EMULSION INTRAVENOUS CONTINUOUS PRN
Status: DISCONTINUED | OUTPATIENT
Start: 2019-09-11 | End: 2019-09-11 | Stop reason: SDUPTHER

## 2019-09-11 RX ORDER — SODIUM CHLORIDE 9 MG/ML
INJECTION, SOLUTION INTRAVENOUS CONTINUOUS PRN
Status: DISCONTINUED | OUTPATIENT
Start: 2019-09-11 | End: 2019-09-11 | Stop reason: SDUPTHER

## 2019-09-11 RX ORDER — PROPOFOL 10 MG/ML
INJECTION, EMULSION INTRAVENOUS PRN
Status: DISCONTINUED | OUTPATIENT
Start: 2019-09-11 | End: 2019-09-11 | Stop reason: SDUPTHER

## 2019-09-11 RX ORDER — KETAMINE HYDROCHLORIDE 10 MG/ML
INJECTION, SOLUTION INTRAMUSCULAR; INTRAVENOUS PRN
Status: DISCONTINUED | OUTPATIENT
Start: 2019-09-11 | End: 2019-09-11 | Stop reason: SDUPTHER

## 2019-09-11 RX ORDER — LIDOCAINE HYDROCHLORIDE 20 MG/ML
INJECTION, SOLUTION INFILTRATION; PERINEURAL PRN
Status: DISCONTINUED | OUTPATIENT
Start: 2019-09-11 | End: 2019-09-11 | Stop reason: SDUPTHER

## 2019-09-11 RX ADMIN — HALOPERIDOL 2 MG: 1 TABLET ORAL at 10:56

## 2019-09-11 RX ADMIN — HYDROCODONE BITARTRATE AND ACETAMINOPHEN 1 TABLET: 5; 325 TABLET ORAL at 03:00

## 2019-09-11 RX ADMIN — POTASSIUM BICARBONATE 20 MEQ: 782 TABLET, EFFERVESCENT ORAL at 18:32

## 2019-09-11 RX ADMIN — ENOXAPARIN SODIUM 40 MG: 40 INJECTION SUBCUTANEOUS at 10:39

## 2019-09-11 RX ADMIN — IPRATROPIUM BROMIDE 0.5 MG: 0.5 SOLUTION RESPIRATORY (INHALATION) at 20:39

## 2019-09-11 RX ADMIN — LEVOFLOXACIN 750 MG: 5 INJECTION, SOLUTION INTRAVENOUS at 18:45

## 2019-09-11 RX ADMIN — METOPROLOL TARTRATE 50 MG: 50 TABLET, FILM COATED ORAL at 22:00

## 2019-09-11 RX ADMIN — LORAZEPAM 2 MG: 2 INJECTION INTRAMUSCULAR; INTRAVENOUS at 22:00

## 2019-09-11 RX ADMIN — TAZOBACTAM SODIUM AND PIPERACILLIN SODIUM 3.38 G: 375; 3 INJECTION, SOLUTION INTRAVENOUS at 06:05

## 2019-09-11 RX ADMIN — FOLIC ACID 1 MG: 1 TABLET ORAL at 10:39

## 2019-09-11 RX ADMIN — ATORVASTATIN CALCIUM 20 MG: 20 TABLET, FILM COATED ORAL at 22:00

## 2019-09-11 RX ADMIN — IPRATROPIUM BROMIDE 0.5 MG: 0.5 SOLUTION RESPIRATORY (INHALATION) at 11:44

## 2019-09-11 RX ADMIN — LEVALBUTEROL 1.25 MG: 1.25 SOLUTION, CONCENTRATE RESPIRATORY (INHALATION) at 11:44

## 2019-09-11 RX ADMIN — VANCOMYCIN HYDROCHLORIDE 750 MG: 750 INJECTION, POWDER, LYOPHILIZED, FOR SOLUTION INTRAVENOUS at 13:12

## 2019-09-11 RX ADMIN — LORAZEPAM 2 MG: 2 INJECTION INTRAMUSCULAR; INTRAVENOUS at 08:50

## 2019-09-11 RX ADMIN — PROPOFOL 100 MCG/KG/MIN: 10 INJECTION, EMULSION INTRAVENOUS at 07:50

## 2019-09-11 RX ADMIN — PROPOFOL 20 MG: 10 INJECTION, EMULSION INTRAVENOUS at 07:53

## 2019-09-11 RX ADMIN — LEVALBUTEROL 1.25 MG: 1.25 SOLUTION, CONCENTRATE RESPIRATORY (INHALATION) at 20:39

## 2019-09-11 RX ADMIN — PROPOFOL 40 MG: 10 INJECTION, EMULSION INTRAVENOUS at 07:48

## 2019-09-11 RX ADMIN — HALOPERIDOL 2 MG: 1 TABLET ORAL at 18:33

## 2019-09-11 RX ADMIN — LORAZEPAM 2 MG: 2 INJECTION INTRAMUSCULAR; INTRAVENOUS at 14:31

## 2019-09-11 RX ADMIN — LEVALBUTEROL 1.25 MG: 1.25 SOLUTION, CONCENTRATE RESPIRATORY (INHALATION) at 16:49

## 2019-09-11 RX ADMIN — METOPROLOL TARTRATE 50 MG: 50 TABLET, FILM COATED ORAL at 10:39

## 2019-09-11 RX ADMIN — KETAMINE HYDROCHLORIDE 10 MG: 10 INJECTION, SOLUTION INTRAMUSCULAR; INTRAVENOUS at 07:49

## 2019-09-11 RX ADMIN — TAZOBACTAM SODIUM AND PIPERACILLIN SODIUM 3.38 G: 375; 3 INJECTION, SOLUTION INTRAVENOUS at 22:29

## 2019-09-11 RX ADMIN — TAZOBACTAM SODIUM AND PIPERACILLIN SODIUM 3.38 G: 375; 3 INJECTION, SOLUTION INTRAVENOUS at 14:37

## 2019-09-11 RX ADMIN — IPRATROPIUM BROMIDE 0.5 MG: 0.5 SOLUTION RESPIRATORY (INHALATION) at 16:48

## 2019-09-11 RX ADMIN — POTASSIUM BICARBONATE 20 MEQ: 782 TABLET, EFFERVESCENT ORAL at 10:39

## 2019-09-11 RX ADMIN — Medication 10 ML: at 22:00

## 2019-09-11 RX ADMIN — LIDOCAINE HYDROCHLORIDE 100 MG: 20 INJECTION, SOLUTION INFILTRATION; PERINEURAL at 07:48

## 2019-09-11 RX ADMIN — SODIUM CHLORIDE: 9 INJECTION, SOLUTION INTRAVENOUS at 07:48

## 2019-09-11 RX ADMIN — Medication 100 MG: at 10:39

## 2019-09-11 RX ADMIN — POTASSIUM BICARBONATE 20 MEQ: 782 TABLET, EFFERVESCENT ORAL at 22:28

## 2019-09-11 RX ADMIN — LISINOPRIL 10 MG: 10 TABLET ORAL at 10:39

## 2019-09-11 RX ADMIN — HALOPERIDOL 2 MG: 1 TABLET ORAL at 22:59

## 2019-09-11 ASSESSMENT — PAIN SCALES - PAIN ASSESSMENT IN ADVANCED DEMENTIA (PAINAD)
BREATHING: 0
CONSOLABILITY: 0
CONSOLABILITY: 0
FACIALEXPRESSION: 0
BODYLANGUAGE: 1
FACIALEXPRESSION: 0
TOTALSCORE: 1
FACIALEXPRESSION: 0
FACIALEXPRESSION: 0
CONSOLABILITY: 0
BODYLANGUAGE: 1
BODYLANGUAGE: 1
BREATHING: 0
CONSOLABILITY: 0
TOTALSCORE: 1
FACIALEXPRESSION: 0
TOTALSCORE: 1
BREATHING: 0
BREATHING: 0
TOTALSCORE: 1
NEGVOCALIZATION: 0
FACIALEXPRESSION: 0
TOTALSCORE: 3
NEGVOCALIZATION: 0
NEGVOCALIZATION: 0
CONSOLABILITY: 0
BODYLANGUAGE: 1
TOTALSCORE: 0
BODYLANGUAGE: 0
NEGVOCALIZATION: 1
NEGVOCALIZATION: 0
BREATHING: 0
NEGVOCALIZATION: 0
CONSOLABILITY: 0
BODYLANGUAGE: 1
BREATHING: 1

## 2019-09-11 ASSESSMENT — PAIN SCALES - WONG BAKER: WONGBAKER_NUMERICALRESPONSE: 4

## 2019-09-11 ASSESSMENT — PULMONARY FUNCTION TESTS
PIF_VALUE: 6
PIF_VALUE: 6
PIF_VALUE: 2
PIF_VALUE: 1
PIF_VALUE: 2
PIF_VALUE: 1
PIF_VALUE: 5
PIF_VALUE: 5
PIF_VALUE: 9
PIF_VALUE: 2
PIF_VALUE: 8
PIF_VALUE: 1
PIF_VALUE: 6
PIF_VALUE: 1
PIF_VALUE: 8
PIF_VALUE: 6
PIF_VALUE: 2
PIF_VALUE: 1
PIF_VALUE: 8
PIF_VALUE: 11
PIF_VALUE: 2
PIF_VALUE: 1
PIF_VALUE: 6
PIF_VALUE: 10
PIF_VALUE: 2
PIF_VALUE: 0
PIF_VALUE: 2
PIF_VALUE: 3
PIF_VALUE: 2
PIF_VALUE: 1
PIF_VALUE: 2

## 2019-09-11 ASSESSMENT — PAIN DESCRIPTION - DESCRIPTORS: DESCRIPTORS: PATIENT UNABLE TO DESCRIBE

## 2019-09-11 ASSESSMENT — ENCOUNTER SYMPTOMS: SHORTNESS OF BREATH: 1

## 2019-09-11 NOTE — PROGRESS NOTES
Spoke with son Dereck Spear via phone and received verbal consent for patient's Bronchoscopy this am.

## 2019-09-11 NOTE — PROGRESS NOTES
PULMONARY EMBOLISM W CONTRAST   Final Result   Dense right lower lobe consolidation has increased as compared to prior and   there has been progressive micronodular infiltrate bilaterally. Findings are   suggestive of pneumonia or aspiration. Filling defects within basilar   airways can reflect mucoid impaction or aspiration. 1.5 cm right upper lobe pulmonary nodule appears larger than the prior   examination, for which lung malignancy should be considered until proven   otherwise. Left adrenal nodule is also seen which is indeterminate. In the   nonacute setting, PET-CT could be performed for further evaluation. No findings to suggest large central pulmonary embolism as above. XR CHEST PORTABLE   Final Result   Improved right basilar aeration. Unchanged left basilar disease. XR CHEST PORTABLE   Final Result   Findings of mild congestive failure. Suspect pleural effusion as well. Partial definition of the right upper lung nodule as best seen on prior CT   scan. XR CHEST PORTABLE   Final Result   Small left pleural effusion with associated basilar atelectasis/pneumonia,   new from 08/23/2019. Unchanged mild right basilar airspace disease. Right upper lobe nodule again noted. CT CHEST PULMONARY EMBOLISM W CONTRAST   Final Result   1. Interval dislodgement of the G-tube with development of markedly enlarged   hemorrhagic infected collection along the left rectus sheath as measured and   described above. G-tube is within the medial aspect of the collection. Collection becomes ill-defined along its inferior aspect in the pelvis with   areas of fluid containing superimposed foci of gas scattered within the   anterior lower abdomen and pelvis. Finding was discussed with [facility] ABRAAHM BARILLAS at 3:24 am on 8/24/2019.   2. Pulmonary arteries are suboptimally opacified for evaluation with limited   assessment of the distal segmental and subsegmental vessels. No evidence of   central/proximal intraluminal filling defect to suggest pulmonary embolism. 3. Interval decrease size of pleural based cavitary opacity in the posterior   right upper lobe as measured above. Additional scattered opacities appear to   resolved or decreased in size. Findings suggest resolving multifocal   infectious/inflammatory process. 4. Stable 1.4 cm slightly spiculated nodular opacity in the anterior upper   lobe, malignancy remains a diagnostic consideration   5. Persistent right basilar opacification which may represent atelectasis or   pneumonia. Persistent but interval decreased left retrocardiac atelectasis. 6. Redemonstration of indeterminate left adrenal nodule measured above. 7. Mild wall thickening along the gastric antrum and proximal to mid duodenum   suggesting gastritis/duodenitis. 8. Mild wall thickening throughout the descending and sigmoid colon   suggesting colitis. 9. Multiple incidental/chronic findings as detailed above including   redemonstration of infrarenal abdominal aortic aneurysm as measured above,   please see follow-up guidelines below. RECOMMENDATIONS:   3.3 cm abdominal aortic aneurysm. Recommend follow-up every 3 years. Reference: J Am Maribell Radiol 1176;01:077-652. CTA ABDOMEN PELVIS W WO CONTRAST   Final Result   1. Interval dislodgement of the G-tube with development of markedly enlarged   hemorrhagic infected collection along the left rectus sheath as measured and   described above. G-tube is within the medial aspect of the collection. Collection becomes ill-defined along its inferior aspect in the pelvis with   areas of fluid containing superimposed foci of gas scattered within the   anterior lower abdomen and pelvis.   Finding was discussed with [facility] ABRAHAM BARILLAS at 3:24 am on 8/24/2019.   2. Pulmonary arteries are suboptimally opacified for evaluation with limited   assessment of the distal segmental and subsegmental 8.5      Acute metabolic encephalopathy:  Resolved at baseline.  Continue Haldol agitation this morning received Ativan       Left lower extremity weakness: See neurology and not impressed for CV at this time.  We will monitor  - discussed with neuro, suspect related to pain from hematoma. He is able to move the leg.          Acute kidney injury (HCC): Resolved with hydration       Lactic acidosis: Resolved with hydration     FEN/ hypernatremia/ hypokalemia supplement  - PEG in use    Tube feeds   - replace K     Blood pressure   Control improved     Bronchial washings positive for yeast Candida infectious disease consulted no plans for antifungal  .  Sinus tachycardia secondary to agitation    Resume Lovenox since no evidence of bleeding    Hospice  has been consulted will need family today.       diet: Diet NPO, After Midnight  Code:[id number]  DVT PPX Lovenox  Disposition acutely sick       MD Ayanna   9/11/2019 11:28 AM
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
## 2019-09-11 NOTE — PROGRESS NOTES
INPATIENT PULMONARY CRITICAL CARE PROGRESS NOTE      Reason for visit    Aspiration in airways        SUBJECTIVE: Patient continues to have increased chest congestion and increased work of breathing, patient has audible crackles and congestion, patient continues to be unresponsive but with intermittent agitation, patient has sinus tachycardia with slight increase in blood pressure, patient required high flow oxygen in order to maintain saturation, patient has been n.p.o. after midnight and the tube feeds have been on hold for the bronchoscopy, patient urine output has not been recorded in the epic for review, patient's glycemic control was acceptable, no other pertinent review of system could be obtained because of patient's clinical status       Physical Exam:  Blood pressure (!) 159/92, pulse 105, temperature 97.8 °F (36.6 °C), temperature source Temporal, resp. rate 22, height 5' 7\" (1.702 m), weight 118 lb 9.6 oz (53.8 kg), SpO2 99 %.'       Constitutional: In profound respiratory distress when seen with increased chest congestion and increased work of breathing  HENT:  Oropharynx has oral secretions in the posterior pharynx which were being suctioned, no thyromegaly. Eyes:  Conjunctivae are normal. Pupils equal, round, and reactive to light. No scleral icterus. Neck: . No tracheal deviation present. No obvious thyroid mass. Cardiovascular: Sinus tachycardia, normal heart sounds. No right ventricular heave. No lower extremity edema. Pulmonary/Chest: No wheezes. Bilateral diffuse rales. Bilateral extensive chest congestion chest wall is not dull to percussion. Some tachycardia with use of accessory muscle respiration when seen, decreased breath sound intensity  Abdominal: Soft. Bowel sounds present. No distension or hernia. No tenderness. PEG tube present  Musculoskeletal: No cyanosis. No clubbing. No obvious joint deformity. Lymphadenopathy: No cervical or supraclavicular adenopathy.    Skin: Skin is follow-up every 3 years. Reference: J Am Maribell Radiol 0134;54:001-903. CTA ABDOMEN PELVIS W WO CONTRAST   Final Result   1. Interval dislodgement of the G-tube with development of markedly enlarged   hemorrhagic infected collection along the left rectus sheath as measured and   described above. G-tube is within the medial aspect of the collection. Collection becomes ill-defined along its inferior aspect in the pelvis with   areas of fluid containing superimposed foci of gas scattered within the   anterior lower abdomen and pelvis. Finding was discussed with [facility] ABRAHAM BARILLAS at 3:24 am on 8/24/2019.   2. Pulmonary arteries are suboptimally opacified for evaluation with limited   assessment of the distal segmental and subsegmental vessels. No evidence of   central/proximal intraluminal filling defect to suggest pulmonary embolism. 3. Interval decrease size of pleural based cavitary opacity in the posterior   right upper lobe as measured above. Additional scattered opacities appear to   resolved or decreased in size. Findings suggest resolving multifocal   infectious/inflammatory process. 4. Stable 1.4 cm slightly spiculated nodular opacity in the anterior upper   lobe, malignancy remains a diagnostic consideration   5. Persistent right basilar opacification which may represent atelectasis or   pneumonia. Persistent but interval decreased left retrocardiac atelectasis. 6. Redemonstration of indeterminate left adrenal nodule measured above. 7. Mild wall thickening along the gastric antrum and proximal to mid duodenum   suggesting gastritis/duodenitis. 8. Mild wall thickening throughout the descending and sigmoid colon   suggesting colitis. 9. Multiple incidental/chronic findings as detailed above including   redemonstration of infrarenal abdominal aortic aneurysm as measured above,   please see follow-up guidelines below. RECOMMENDATIONS:   3.3 cm abdominal aortic aneurysm. Recommend follow-up every 3 years. Reference: J Am Maribell Radiol 0862;72:890-217. CT CERVICAL SPINE WO CONTRAST   Final Result   No acute abnormality of the cervical spine. No change since the study   performed 1 day ago. CT HEAD WO CONTRAST   Final Result   No acute intracranial abnormality. Stable small polyp or retention cyst left sphenoid sinus and tiny amount of   fluid in the mastoid tips bilaterally. XR CHEST PORTABLE   Final Result   Persistent right upper lobe mass seen to better advantage on recent CT. Follow-up CT as was recommended on report from 08/09/2019 is warranted. No   new or acute finding is seen. Ct Head Wo Contrast    Result Date: 8/23/2019  EXAMINATION: CT OF THE HEAD WITHOUT CONTRAST,  8/23/2019 6:53 pm TECHNIQUE: CT of the head was performed without the administration of intravenous contrast. Dose modulation, iterative reconstruction, and/or weight based adjustment of the mA/kV was utilized to reduce the radiation dose to as low as reasonably achievable. COMPARISON: 08/22/2019 HISTORY: ORDERING SYSTEM PROVIDED HISTORY: AMS TECHNOLOGIST PROVIDED HISTORY: Has a \"code stroke\" or \"stroke alert\" been called? ->No Reason for Exam: AMS Acuity: Unknown Type of Exam: Unknown FINDINGS: BRAIN/VENTRICLES: There is prominence of the sulci and ventricles commensurate with the patient's age underlying brain parenchymal atrophy. No mass or hemorrhage is seen intracranially. No midline shift is noted. Atherosclerotic vascular calcifications are present. ORBITS: The visualized portion of the orbits demonstrate no acute abnormality. SINUSES: Tiny polyp retention cyst left sphenoid sinus is present. Tiny amount of fluid is seen in the tips of the mastoid air cells. These findings are unchanged from 1 day earlier. SOFT TISSUES/SKULL:  No acute abnormality of the visualized skull or soft tissues. No acute intracranial abnormality.  Stable small polyp or suggested right upper lung at the area of the anterior second rib. No evidence of pneumothorax or pleural effusion identified. Ct Chest Pulmonary Embolism W Contrast    Result Date: 9/6/2019  EXAMINATION: CTA OF THE CHEST 9/6/2019 2:11 pm TECHNIQUE: CTA of the chest was performed after the administration of intravenous contrast.  Multiplanar reformatted images are provided for review. MIP images are provided for review. Dose modulation, iterative reconstruction, and/or weight based adjustment of the mA/kV was utilized to reduce the radiation dose to as low as reasonably achievable. COMPARISON: 08/24/2019 HISTORY: ORDERING SYSTEM PROVIDED HISTORY: hYPOXIA AND TACHYCARDIA TECHNOLOGIST PROVIDED HISTORY: Reason for Exam: r/o PE Acuity: Unknown Type of Exam: Unknown FINDINGS: Pulmonary Arteries: Within the main, central most right, central most left pulmonary arteries, no evidence of filling defect to suggest large central pulmonary embolism. Optimal peripheral branch evaluation is limited by artifact. Mediastinum: Calcification of the thoracic aorta. Coronary artery calcification. Within the aorta, no gross intraluminal flap. 1.1 cm short axis precarinal lymph node. Asymmetric prominence of right hilar sultana tissue. Lungs/pleura: Dense consolidation is demonstrated within the right lower lobe with air bronchograms, increased as compared to the prior. Right greater than left background micronodular infiltrate is demonstrated bilaterally, increased since the prior. 1.5 x 1.2 cm noncalcified right upper lobe pulmonary nodule had measured 1.3 x 1.0 cm on prior. Respiratory motion artifact is seen. Biapical pleuroparenchymal thickening. Scattered bandlike opacity, likely atelectasis. Filling defects are seen within bibasilar airways. No pneumothorax. No significant pleural effusion. Upper Abdomen: Calcified granulomas within the spleen. 1.8 cm indeterminate left adrenal nodule, Hounsfield units 38.  Soft primarily hyperdense consistent with hemorrhage with numerous superimposed foci of gas consistent with superimposed infection. Collection becomes ill-defined along its inferior aspect in the pelvis with areas of fluid containing superimposed foci of gas scattered within the anterior lower abdomen and pelvis. 1. Interval dislodgement of the G-tube with development of markedly enlarged hemorrhagic infected collection along the left rectus sheath as measured and described above. G-tube is within the medial aspect of the collection. Collection becomes ill-defined along its inferior aspect in the pelvis with areas of fluid containing superimposed foci of gas scattered within the anterior lower abdomen and pelvis. Finding was discussed with [facility] ABRAHAM BARILLAS at 3:24 am on 8/24/2019. 2. Pulmonary arteries are suboptimally opacified for evaluation with limited assessment of the distal segmental and subsegmental vessels. No evidence of central/proximal intraluminal filling defect to suggest pulmonary embolism. 3. Interval decrease size of pleural based cavitary opacity in the posterior right upper lobe as measured above. Additional scattered opacities appear to resolved or decreased in size. Findings suggest resolving multifocal infectious/inflammatory process. 4. Stable 1.4 cm slightly spiculated nodular opacity in the anterior upper lobe, malignancy remains a diagnostic consideration 5. Persistent right basilar opacification which may represent atelectasis or pneumonia. Persistent but interval decreased left retrocardiac atelectasis. 6. Redemonstration of indeterminate left adrenal nodule measured above. 7. Mild wall thickening along the gastric antrum and proximal to mid duodenum suggesting gastritis/duodenitis. 8. Mild wall thickening throughout the descending and sigmoid colon suggesting colitis.  9. Multiple incidental/chronic findings as detailed above including redemonstration of infrarenal abdominal aortic aneurysm as measured above, please see follow-up guidelines below. RECOMMENDATIONS: 3.3 cm abdominal aortic aneurysm. Recommend follow-up every 3 years. Reference: J Am Maribell Radiol 4177;40:363-916. Xr Inj Contrast Gastric Tube Perc    Result Date: 8/22/2019  EXAMINATION: ONE SUPINE XRAY VIEW(S) OF THE ABDOMEN 8/22/2019 10:55 pm COMPARISON: Abdominal radiograph earlier same date. HISTORY: ORDERING SYSTEM PROVIDED HISTORY: confirm replacement TECHNOLOGIST PROVIDED HISTORY: Reason for exam:->confirm replacement Reason for Exam: Replacement of G-tube x 2. Acuity: Acute Type of Exam: Initial FINDINGS: Contrast from prior injection is seen within the left lower mid abdomen. Cranial to this is the percutaneous gastrostomy catheter. Contrast is seen surrounding the balloon and within what appear to be removal folds. Evaluation is somewhat limited due to the overlying earlier contrast.     Contrast from earlier extraluminal injection somewhat limits evaluation. On the current study there does appear to be contrast within the stomach. Xr Inj Contrast Gastric Tube Perc    Result Date: 8/22/2019  EXAMINATION: ONE SUPINE XRAY VIEW(S) OF THE ABDOMEN 8/22/2019 9:58 pm COMPARISON: CT abdomen and pelvis earlier same date. HISTORY: ORDERING SYSTEM PROVIDED HISTORY: confirm placement TECHNOLOGIST PROVIDED HISTORY: Reason for exam:->confirm placement Reason for Exam: Confirm placement Acuity: Acute Type of Exam: Initial FINDINGS: Initial image demonstrates a percutaneous gastrostomy tube over the left upper quadrant. Subsequent images demonstrate contrast injection which is since and irregular shape but is not seen within the gastric body, even out to 2 minute images. Visualized lung bases are clear. Visualized bowel loops are without acute process. Contrast is extraluminal with no contrast in the stomach. Repositioning and reinjection recommended.      Fl Modified Barium Swallow W Video    Result Date:

## 2019-09-12 LAB
ANION GAP SERPL CALCULATED.3IONS-SCNC: 10 MMOL/L (ref 3–16)
BUN BLDV-MCNC: 8 MG/DL (ref 7–20)
CALCIUM SERPL-MCNC: 8.8 MG/DL (ref 8.3–10.6)
CHLORIDE BLD-SCNC: 109 MMOL/L (ref 99–110)
CO2: 25 MMOL/L (ref 21–32)
CREAT SERPL-MCNC: 0.7 MG/DL (ref 0.8–1.3)
GFR AFRICAN AMERICAN: >60
GFR NON-AFRICAN AMERICAN: >60
GLUCOSE BLD-MCNC: 145 MG/DL (ref 70–99)
HCT VFR BLD CALC: 27 % (ref 40.5–52.5)
HEMOGLOBIN: 8.8 G/DL (ref 13.5–17.5)
MCH RBC QN AUTO: 29.6 PG (ref 26–34)
MCHC RBC AUTO-ENTMCNC: 32.6 G/DL (ref 31–36)
MCV RBC AUTO: 90.7 FL (ref 80–100)
PDW BLD-RTO: 16 % (ref 12.4–15.4)
PLATELET # BLD: 336 K/UL (ref 135–450)
PMV BLD AUTO: 7.8 FL (ref 5–10.5)
POTASSIUM SERPL-SCNC: 3.5 MMOL/L (ref 3.5–5.1)
RBC # BLD: 2.97 M/UL (ref 4.2–5.9)
SODIUM BLD-SCNC: 144 MMOL/L (ref 136–145)
WBC # BLD: 8.7 K/UL (ref 4–11)

## 2019-09-12 PROCEDURE — 2500000003 HC RX 250 WO HCPCS: Performed by: INTERNAL MEDICINE

## 2019-09-12 PROCEDURE — 2580000003 HC RX 258: Performed by: INTERNAL MEDICINE

## 2019-09-12 PROCEDURE — 36415 COLL VENOUS BLD VENIPUNCTURE: CPT

## 2019-09-12 PROCEDURE — 94761 N-INVAS EAR/PLS OXIMETRY MLT: CPT

## 2019-09-12 PROCEDURE — 2060000000 HC ICU INTERMEDIATE R&B

## 2019-09-12 PROCEDURE — 85027 COMPLETE CBC AUTOMATED: CPT

## 2019-09-12 PROCEDURE — 6360000002 HC RX W HCPCS: Performed by: INTERNAL MEDICINE

## 2019-09-12 PROCEDURE — 80048 BASIC METABOLIC PNL TOTAL CA: CPT

## 2019-09-12 PROCEDURE — 89220 SPUTUM SPECIMEN COLLECTION: CPT

## 2019-09-12 PROCEDURE — 2700000000 HC OXYGEN THERAPY PER DAY

## 2019-09-12 PROCEDURE — 97530 THERAPEUTIC ACTIVITIES: CPT

## 2019-09-12 PROCEDURE — 6370000000 HC RX 637 (ALT 250 FOR IP): Performed by: INTERNAL MEDICINE

## 2019-09-12 PROCEDURE — 94640 AIRWAY INHALATION TREATMENT: CPT

## 2019-09-12 PROCEDURE — 99233 SBSQ HOSP IP/OBS HIGH 50: CPT | Performed by: INTERNAL MEDICINE

## 2019-09-12 RX ADMIN — TAZOBACTAM SODIUM AND PIPERACILLIN SODIUM 3.38 G: 375; 3 INJECTION, SOLUTION INTRAVENOUS at 06:45

## 2019-09-12 RX ADMIN — HALOPERIDOL 2 MG: 1 TABLET ORAL at 15:07

## 2019-09-12 RX ADMIN — LORAZEPAM 2 MG: 2 INJECTION INTRAMUSCULAR; INTRAVENOUS at 23:39

## 2019-09-12 RX ADMIN — ENOXAPARIN SODIUM 40 MG: 40 INJECTION SUBCUTANEOUS at 09:22

## 2019-09-12 RX ADMIN — METOPROLOL TARTRATE 50 MG: 50 TABLET, FILM COATED ORAL at 23:19

## 2019-09-12 RX ADMIN — POTASSIUM BICARBONATE 20 MEQ: 782 TABLET, EFFERVESCENT ORAL at 09:37

## 2019-09-12 RX ADMIN — POTASSIUM BICARBONATE 20 MEQ: 782 TABLET, EFFERVESCENT ORAL at 15:06

## 2019-09-12 RX ADMIN — LORAZEPAM 2 MG: 2 INJECTION INTRAMUSCULAR; INTRAVENOUS at 15:42

## 2019-09-12 RX ADMIN — VANCOMYCIN HYDROCHLORIDE 750 MG: 750 INJECTION, POWDER, LYOPHILIZED, FOR SOLUTION INTRAVENOUS at 10:53

## 2019-09-12 RX ADMIN — VANCOMYCIN HYDROCHLORIDE 750 MG: 750 INJECTION, POWDER, LYOPHILIZED, FOR SOLUTION INTRAVENOUS at 02:54

## 2019-09-12 RX ADMIN — HALOPERIDOL 2 MG: 1 TABLET ORAL at 09:22

## 2019-09-12 RX ADMIN — IPRATROPIUM BROMIDE 0.5 MG: 0.5 SOLUTION RESPIRATORY (INHALATION) at 15:04

## 2019-09-12 RX ADMIN — Medication 10 ML: at 23:22

## 2019-09-12 RX ADMIN — LEVALBUTEROL 1.25 MG: 1.25 SOLUTION, CONCENTRATE RESPIRATORY (INHALATION) at 11:06

## 2019-09-12 RX ADMIN — TAZOBACTAM SODIUM AND PIPERACILLIN SODIUM 3.38 G: 375; 3 INJECTION, SOLUTION INTRAVENOUS at 15:07

## 2019-09-12 RX ADMIN — LEVOFLOXACIN 750 MG: 5 INJECTION, SOLUTION INTRAVENOUS at 19:18

## 2019-09-12 RX ADMIN — IPRATROPIUM BROMIDE 0.5 MG: 0.5 SOLUTION RESPIRATORY (INHALATION) at 07:30

## 2019-09-12 RX ADMIN — Medication 100 MG: at 09:22

## 2019-09-12 RX ADMIN — ATORVASTATIN CALCIUM 20 MG: 20 TABLET, FILM COATED ORAL at 23:20

## 2019-09-12 RX ADMIN — LEVALBUTEROL 1.25 MG: 1.25 SOLUTION, CONCENTRATE RESPIRATORY (INHALATION) at 15:04

## 2019-09-12 RX ADMIN — Medication 10 ML: at 23:40

## 2019-09-12 RX ADMIN — LEVALBUTEROL 1.25 MG: 1.25 SOLUTION, CONCENTRATE RESPIRATORY (INHALATION) at 07:30

## 2019-09-12 RX ADMIN — FOLIC ACID 1 MG: 1 TABLET ORAL at 09:22

## 2019-09-12 RX ADMIN — MICONAZOLE NITRATE: 20 POWDER TOPICAL at 23:21

## 2019-09-12 RX ADMIN — LORAZEPAM 2 MG: 2 INJECTION INTRAMUSCULAR; INTRAVENOUS at 08:13

## 2019-09-12 RX ADMIN — POTASSIUM BICARBONATE 20 MEQ: 782 TABLET, EFFERVESCENT ORAL at 23:19

## 2019-09-12 RX ADMIN — IPRATROPIUM BROMIDE 0.5 MG: 0.5 SOLUTION RESPIRATORY (INHALATION) at 11:07

## 2019-09-12 RX ADMIN — METOPROLOL TARTRATE 50 MG: 50 TABLET, FILM COATED ORAL at 09:22

## 2019-09-12 RX ADMIN — HALOPERIDOL 2 MG: 1 TABLET ORAL at 23:20

## 2019-09-12 RX ADMIN — LISINOPRIL 10 MG: 10 TABLET ORAL at 09:22

## 2019-09-12 ASSESSMENT — PAIN SCALES - GENERAL
PAINLEVEL_OUTOF10: 0
PAINLEVEL_OUTOF10: 0
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
## 2019-09-12 NOTE — PLAN OF CARE
Problem: Risk for Impaired Skin Integrity  Goal: Tissue integrity - skin and mucous membranes  Description  Structural intactness and normal physiological function of skin and  mucous membranes. Note:   Incontinent of stool and urine,rosibel care done.  Skin pink, skin care done

## 2019-09-12 NOTE — PROGRESS NOTES
Patient's potassium 3.4 and potassium bicarb-citric acid (EFFER-K) effervescent tablet 40 mEq given through PEG tube. Prescription e-prescribed  Suki Chavarria, DO

## 2019-09-12 NOTE — PROGRESS NOTES
Ok to switch patient from vapotherm to NC per Dr. Kriss Campos. RT notified. Will continue to monitor.

## 2019-09-12 NOTE — PLAN OF CARE
Problem: Falls - Risk of:  Goal: Will remain free from falls  Description  Will remain free from falls  9/11/2019 2038 by Rhett Juarez RN  Outcome: Ongoing     Problem: Sleep Pattern Disturbance:  Goal: Appears well-rested  Description  Appears well-rested  Outcome: Ongoing     Problem: Cardiovascular  Goal: Hemodynamic stability  Outcome: Ongoing     Vitals:    09/12/19 0452   BP: 136/68   Pulse: 106   Resp: 22   Temp: 98.7 °F (37.1 °C)   SpO2: 91%     Pt mostly asleep overnight. VSS. See all flowsheets. Pt is a high fall risk. Pt remains free from falls, throughout night. Bed alarm remains in place, door open. Pt encouraged to use call light for needs throughout night; call light is within reach. Bed lock is in lowest position.  at bedside for safety monitoring. Will continue to monitor.
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
## 2019-09-12 NOTE — ADT AUTH CERT
Activity    ( ) * Ambulatory    9/3/2019 1:50 PM EDT by Sabi Echavarria      strict bedrest  restraints    Routes    ( ) * Oral hydration, medications [K]    9/3/2019 1:50 PM EDT by Daisy Rivera      tube feeds contiuous , npo    (X) Usual diet    Interventions    (X) WBC    9/9/2019 8:00 AM EDT by Daisy Rivera      wbc 13.8    9/3/2019 1:50 PM EDT by Daisy Rivera      wbc 10.3    Medications    (X) * Antimicrobial treatment not necessary or treatment at next level of care arranged [E]    * Milestone   Additional Notes   9/11: INPATIENT PULMONARY CRITICAL CARE PROGRESS NOTE              Reason for visit         Aspiration in airways               SUBJECTIVE: Patient continues to have increased chest congestion and increased work of breathing, patient has audible crackles and congestion, patient continues to be unresponsive but with intermittent agitation, patient has sinus tachycardia with slight increase in blood pressure, patient required high flow oxygen in order to maintain saturation, patient has been n.p.o. after midnight and the tube feeds have been on hold for the bronchoscopy, patient urine output has not been recorded in the epic for review, patient's glycemic control was acceptable, no other pertinent review of system could be obtained because of patient's clinical status              Physical Exam:    Blood pressure (!) 159/92, pulse 105, temperature 97.8 °F (36.6 °C), temperature source Temporal, resp. rate 22, height 5' 7\" (1.702 m), weight 118 lb 9.6 oz (53.8 kg), SpO2 99 %.'              Constitutional: In profound respiratory distress when seen with increased chest congestion and increased work of breathing    HENT:  Oropharynx has oral secretions in the posterior pharynx which were being suctioned, no thyromegaly. Eyes:  Conjunctivae are normal. Pupils equal, round, and reactive to light. No scleral icterus. Neck: . No tracheal deviation present. No obvious thyroid mass. done today.  Currently needing 15 L oxygen to maintain saturation.  Tube feeding restarted         No agitation dose of Haldol increased yesterday    BP (!) 157/84   Pulse 108   Temp 98.2 °F (36.8 °C) (Temporal)   Resp 26   Ht 5' 7\" (1.702 m)   Wt 118 lb 9.6 oz (53.8 kg)   SpO2 92%   BMI 18.58 kg/m²    Assessment & Plan:         Severe sepsis with septic shock: He received IV fluids and pressor support with antibiotics. -Resolved, no longer in septic shock. The patient has been recently treated with IV meropenem for an aspiration pneumonia.  Last dose of antibiotic on 8/28/2019.           Acute respiratory failure with hypoxia: Currently needing 15  Lit Of  oxygen to maintain saturation endoscopy done today shows massive aspiration suctioning done.  Postprocedure developed hypoxia needing 15 L of oxygen.  Patient currently on Levaquin, Zosyn and vancomycin. patient placed on BiPAP transferred to the [address] [place] [address].           Infected hematoma of rectus sheath: In the setting of recently dislodged gastric tube. Susana Rosado the inciting factor.  By general surgery with no plans for any intervention at this time.  PEG replaced    - now in use with tube feeds.           Acute Posthemorrhagic anemia: the setting of dislodged gastric tube that was reinserted.  Status post packed red blood cell transfusion.  H&H stable    -Hemoglobin 6.9 yesterday received 1 unit of packed red blood cells current hemoglobin 8.5           Acute metabolic encephalopathy:    Resolved at baseline.  Continue Haldol agitation this morning received Ativan           Left lower extremity weakness: See neurology and not impressed for CV at this time.  We will monitor    - discussed with neuro, suspect related to pain from hematoma.  He is able to move the leg.                Acute kidney injury (HCC): Resolved with hydration           Lactic acidosis: Resolved with hydration         FEN/ hypernatremia/ hypokalemia supplement    - PEG in use      Tube feeds - replace K         Blood pressure   Control improved         Bronchial washings positive for yeast Candida infectious disease consulted no plans for antifungal    .    Sinus tachycardia secondary to agitation         Resume Lovenox since no evidence of bleeding         Hospice  has been consulted will need family today.              diet: Diet NPO, After Midnight    Code:DNR-CCA    DVT PPX Lovenox    Disposition acutely sick    Bronchoscopy Note         Patient admitted to the hospital with dislodged PEG tube along with sepsis and shock, and the course of hospitalization patient also developed acute respite failure with hypoxemia secondary to aspiration into the airways and aspiration pneumonia, patient continues to be symptomatic, patient has profound chest congestion and mucus plugging and is not able to expectorate, given the patient's clinical status which seems to be deteriorating it was decided to do a bronchoscopy for diagnostic and therapeutic purposes and for that reason after informed consent, patient was taken to the endoscopy suite, patient was given anesthesia by the anesthesiologist after timeout, the bronchoscope was introduced through the mouth using a bite block, patient was found to have extensive purulent mucous plugs in the posterior pharynx which were partially blocking the vocal cords, patient's vocal cords were normal, patient's entire tracheobronchial tree was full of thick pus which was quite thick and tenacious, and also it seems that patient had some food particles microscopically, the bronchoscope was wedged into the right lower lobe and BAL was done from the area which was sent for various cultures and cytology, the rest of the tracheobronchial tree was therapeutically aspirated out using Mucomyst and saline, patient did not have any endobronchial lesion, patient tolerated the procedure well and did not have any apparent immediate complications, patient being transferred to PACU for recovery    Patient's further treatment depending on patient's clinical status and the bronchoscopy results         Results for Wade Paulino (MRN [id number]) as of 9/12/2019 07:27    9/11/2019 05:42    Sodium: 143    Potassium: 4.1    Chloride: 110    CO2: 22    BUN: 9    Creatinine: 0.7 (L)    Anion Gap: 11    GFR Non-: >60    GFR African American: >60    Glucose: 91    Calcium: 8.9    WBC: 9.3    RBC: 2.91 (L)    Hemoglobin Quant: 8.7 (L)    Hematocrit: 27.0 (L)    MCV: 92.8    MCH: 29.9    MCHC: 32.2    MPV: 7.6    RDW: 16.1 (H)    Platelet Count: 586       Sepsis and Other Febrile Illness, without Focal Infection - Care Day 19 (9/10/2019) by Darlyn Rush RN         Review Status Review Entered   Completed 9/12/2019 07:24       Criteria Review      Care Day: 19 Care Date: 9/10/2019 Level of Care:    Guideline Day 4    Level Of Care    (X) Floor to discharge [J]    8/28/2019 1:11 PM EDT by Katty Cast remains in ICU    8/28/2019 1:11 PM EDT by Katty Cast remains in ICU    Clinical Status    (X) * Hemodynamic stability    9/12/2019 7:25 AM EDT by Kash Castle      09/11/19 0816  99.6 (37.6)  36Abnormal  105  129/88    9/12/2019 7:24 AM EDT by Kash Castle      09/10/19 0846  98 (36.7)  20  120  161/74Abnormal    9/12/2019 7:22 AM EDT by Kash Castle      09/09/19 0430  98.8 (37.1)  20  114  139/70 3L  94  Nasal cannula    9/9/2019 8:00 AM EDT by Kash Castle      09/07/19 1112  98.6 (37)  16  109  99/56Abnormal  SpO2 100% on O2 @ 3L/nc    9/3/2019 1:50 PM EDT by Daisy Watson      98.5, 17, 97, 157/88, 100% room air    (X) * Fever absent or acceptable for next level of care    8/28/2019 1:11 PM EDT by Daisy Watson      temp 99.0    ( ) * Hypoxemia absent    9/12/2019 7:25 AM EDT by Daisy Watson      10L  99%  Simple Mask    9/12/2019 7:24 AM EDT by Kash Castle      9/10/19 0846                                                    3L93% nc  09/10/19

## 2019-09-12 NOTE — PLAN OF CARE
Problem: Falls - Risk of:  Goal: Will remain free from falls  Description  Will remain free from falls  Outcome: Ongoing  Note:   Pt received in 383,bipap in place,sats at 100. Rails up X3. CO at bedside. Pt pulling at bipap occasionally but unable to remove. Call light in reach,however pt not reponding to command.
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
## 2019-09-13 LAB
ANION GAP SERPL CALCULATED.3IONS-SCNC: 9 MMOL/L (ref 3–16)
BUN BLDV-MCNC: 8 MG/DL (ref 7–20)
CALCIUM SERPL-MCNC: 8.9 MG/DL (ref 8.3–10.6)
CHLORIDE BLD-SCNC: 110 MMOL/L (ref 99–110)
CO2: 26 MMOL/L (ref 21–32)
CREAT SERPL-MCNC: 0.7 MG/DL (ref 0.8–1.3)
GFR AFRICAN AMERICAN: >60
GFR NON-AFRICAN AMERICAN: >60
GLUCOSE BLD-MCNC: 113 MG/DL (ref 70–99)
HCT VFR BLD CALC: 27.7 % (ref 40.5–52.5)
HEMOGLOBIN: 9 G/DL (ref 13.5–17.5)
MCH RBC QN AUTO: 30 PG (ref 26–34)
MCHC RBC AUTO-ENTMCNC: 32.4 G/DL (ref 31–36)
MCV RBC AUTO: 92.8 FL (ref 80–100)
PDW BLD-RTO: 16.4 % (ref 12.4–15.4)
PLATELET # BLD: 322 K/UL (ref 135–450)
PMV BLD AUTO: 8.2 FL (ref 5–10.5)
POTASSIUM SERPL-SCNC: 3.7 MMOL/L (ref 3.5–5.1)
RBC # BLD: 2.99 M/UL (ref 4.2–5.9)
SODIUM BLD-SCNC: 145 MMOL/L (ref 136–145)
WBC # BLD: 8.3 K/UL (ref 4–11)

## 2019-09-13 PROCEDURE — 99232 SBSQ HOSP IP/OBS MODERATE 35: CPT | Performed by: INTERNAL MEDICINE

## 2019-09-13 PROCEDURE — 36415 COLL VENOUS BLD VENIPUNCTURE: CPT

## 2019-09-13 PROCEDURE — 2580000003 HC RX 258: Performed by: INTERNAL MEDICINE

## 2019-09-13 PROCEDURE — 6370000000 HC RX 637 (ALT 250 FOR IP): Performed by: INTERNAL MEDICINE

## 2019-09-13 PROCEDURE — 2060000000 HC ICU INTERMEDIATE R&B

## 2019-09-13 PROCEDURE — 6360000002 HC RX W HCPCS: Performed by: INTERNAL MEDICINE

## 2019-09-13 PROCEDURE — 94761 N-INVAS EAR/PLS OXIMETRY MLT: CPT

## 2019-09-13 PROCEDURE — 94640 AIRWAY INHALATION TREATMENT: CPT

## 2019-09-13 PROCEDURE — 85027 COMPLETE CBC AUTOMATED: CPT

## 2019-09-13 PROCEDURE — 2500000003 HC RX 250 WO HCPCS: Performed by: INTERNAL MEDICINE

## 2019-09-13 PROCEDURE — 31720 CLEARANCE OF AIRWAYS: CPT

## 2019-09-13 PROCEDURE — 80048 BASIC METABOLIC PNL TOTAL CA: CPT

## 2019-09-13 PROCEDURE — 2700000000 HC OXYGEN THERAPY PER DAY

## 2019-09-13 RX ADMIN — ATORVASTATIN CALCIUM 20 MG: 20 TABLET, FILM COATED ORAL at 21:39

## 2019-09-13 RX ADMIN — HALOPERIDOL 2 MG: 1 TABLET ORAL at 14:29

## 2019-09-13 RX ADMIN — ENOXAPARIN SODIUM 40 MG: 40 INJECTION SUBCUTANEOUS at 09:24

## 2019-09-13 RX ADMIN — VANCOMYCIN HYDROCHLORIDE 750 MG: 750 INJECTION, POWDER, LYOPHILIZED, FOR SOLUTION INTRAVENOUS at 01:08

## 2019-09-13 RX ADMIN — IPRATROPIUM BROMIDE 0.5 MG: 0.5 SOLUTION RESPIRATORY (INHALATION) at 12:39

## 2019-09-13 RX ADMIN — LISINOPRIL 10 MG: 10 TABLET ORAL at 09:24

## 2019-09-13 RX ADMIN — HYDROCODONE BITARTRATE AND ACETAMINOPHEN 1 TABLET: 5; 325 TABLET ORAL at 21:39

## 2019-09-13 RX ADMIN — METOPROLOL TARTRATE 50 MG: 50 TABLET, FILM COATED ORAL at 21:40

## 2019-09-13 RX ADMIN — POTASSIUM BICARBONATE 20 MEQ: 782 TABLET, EFFERVESCENT ORAL at 21:40

## 2019-09-13 RX ADMIN — TAZOBACTAM SODIUM AND PIPERACILLIN SODIUM 3.38 G: 375; 3 INJECTION, SOLUTION INTRAVENOUS at 02:28

## 2019-09-13 RX ADMIN — TAZOBACTAM SODIUM AND PIPERACILLIN SODIUM 3.38 G: 375; 3 INJECTION, SOLUTION INTRAVENOUS at 17:27

## 2019-09-13 RX ADMIN — HALOPERIDOL 2 MG: 1 TABLET ORAL at 21:39

## 2019-09-13 RX ADMIN — MICONAZOLE NITRATE: 20 POWDER TOPICAL at 21:50

## 2019-09-13 RX ADMIN — MICONAZOLE NITRATE: 20 POWDER TOPICAL at 09:26

## 2019-09-13 RX ADMIN — VANCOMYCIN HYDROCHLORIDE 750 MG: 750 INJECTION, POWDER, LYOPHILIZED, FOR SOLUTION INTRAVENOUS at 10:08

## 2019-09-13 RX ADMIN — HYDROCODONE BITARTRATE AND ACETAMINOPHEN 1 TABLET: 5; 325 TABLET ORAL at 09:25

## 2019-09-13 RX ADMIN — LEVOFLOXACIN 750 MG: 5 INJECTION, SOLUTION INTRAVENOUS at 20:27

## 2019-09-13 RX ADMIN — POTASSIUM BICARBONATE 20 MEQ: 782 TABLET, EFFERVESCENT ORAL at 14:29

## 2019-09-13 RX ADMIN — Medication 100 MG: at 09:24

## 2019-09-13 RX ADMIN — IPRATROPIUM BROMIDE 0.5 MG: 0.5 SOLUTION RESPIRATORY (INHALATION) at 20:57

## 2019-09-13 RX ADMIN — LEVALBUTEROL 1.25 MG: 1.25 SOLUTION, CONCENTRATE RESPIRATORY (INHALATION) at 20:56

## 2019-09-13 RX ADMIN — IPRATROPIUM BROMIDE 0.5 MG: 0.5 SOLUTION RESPIRATORY (INHALATION) at 07:56

## 2019-09-13 RX ADMIN — FOLIC ACID 1 MG: 1 TABLET ORAL at 09:24

## 2019-09-13 RX ADMIN — TAZOBACTAM SODIUM AND PIPERACILLIN SODIUM 3.38 G: 375; 3 INJECTION, SOLUTION INTRAVENOUS at 12:16

## 2019-09-13 RX ADMIN — LEVALBUTEROL 1.25 MG: 1.25 SOLUTION, CONCENTRATE RESPIRATORY (INHALATION) at 12:42

## 2019-09-13 RX ADMIN — POTASSIUM BICARBONATE 20 MEQ: 782 TABLET, EFFERVESCENT ORAL at 09:31

## 2019-09-13 RX ADMIN — LEVALBUTEROL 1.25 MG: 1.25 SOLUTION, CONCENTRATE RESPIRATORY (INHALATION) at 07:55

## 2019-09-13 RX ADMIN — METOPROLOL TARTRATE 50 MG: 50 TABLET, FILM COATED ORAL at 09:24

## 2019-09-13 ASSESSMENT — PAIN SCALES - WONG BAKER
WONGBAKER_NUMERICALRESPONSE: 4
WONGBAKER_NUMERICALRESPONSE: 2

## 2019-09-13 ASSESSMENT — PAIN SCALES - GENERAL
PAINLEVEL_OUTOF10: 0
PAINLEVEL_OUTOF10: 4
PAINLEVEL_OUTOF10: 7

## 2019-09-13 NOTE — PROGRESS NOTES
CONTRAST   Final Result   1. Interval dislodgement of the G-tube with development of markedly enlarged   hemorrhagic infected collection along the left rectus sheath as measured and   described above. G-tube is within the medial aspect of the collection. Collection becomes ill-defined along its inferior aspect in the pelvis with   areas of fluid containing superimposed foci of gas scattered within the   anterior lower abdomen and pelvis. Finding was discussed with [facility] ABRAHAM BARILLAS at 3:24 am on 8/24/2019.   2. Pulmonary arteries are suboptimally opacified for evaluation with limited   assessment of the distal segmental and subsegmental vessels. No evidence of   central/proximal intraluminal filling defect to suggest pulmonary embolism. 3. Interval decrease size of pleural based cavitary opacity in the posterior   right upper lobe as measured above. Additional scattered opacities appear to   resolved or decreased in size. Findings suggest resolving multifocal   infectious/inflammatory process. 4. Stable 1.4 cm slightly spiculated nodular opacity in the anterior upper   lobe, malignancy remains a diagnostic consideration   5. Persistent right basilar opacification which may represent atelectasis or   pneumonia. Persistent but interval decreased left retrocardiac atelectasis. 6. Redemonstration of indeterminate left adrenal nodule measured above. 7. Mild wall thickening along the gastric antrum and proximal to mid duodenum   suggesting gastritis/duodenitis. 8. Mild wall thickening throughout the descending and sigmoid colon   suggesting colitis. 9. Multiple incidental/chronic findings as detailed above including   redemonstration of infrarenal abdominal aortic aneurysm as measured above,   please see follow-up guidelines below. RECOMMENDATIONS:   3.3 cm abdominal aortic aneurysm. Recommend follow-up every 3 years. Reference: J Am Maribell Radiol 8873;62:553-705.          CTA ABDOMEN PELVIS SPINE WO CONTRAST   Final Result   No acute abnormality of the cervical spine. No change since the study   performed 1 day ago. CT HEAD WO CONTRAST   Final Result   No acute intracranial abnormality. Stable small polyp or retention cyst left sphenoid sinus and tiny amount of   fluid in the mastoid tips bilaterally. XR CHEST PORTABLE   Final Result   Persistent right upper lobe mass seen to better advantage on recent CT. Follow-up CT as was recommended on report from 08/09/2019 is warranted. No   new or acute finding is seen. Results for Jl Reis (MRN [id number]) as of 9/13/2019 10:25   Ref.  Range 9/11/2019 05:42 9/11/2019 06:51 9/11/2019 08:05 9/12/2019 04:36 9/13/2019 04:42   Sodium Latest Ref Range: 136 - 145 mmol/L 143   144 145   Potassium Latest Ref Range: 3.5 - 5.1 mmol/L 4.1   3.5 3.7   Chloride Latest Ref Range: 99 - 110 mmol/L 110   109 110   CO2 Latest Ref Range: 21 - 32 mmol/L 22   25 26   BUN Latest Ref Range: 7 - 20 mg/dL 9   8 8   Creatinine Latest Ref Range: 0.8 - 1.3 mg/dL 0.7 (L)   0.7 (L) 0.7 (L)   Anion Gap Latest Ref Range: 3 - 16  11   10 9   GFR Non- Latest Ref Range: >60  >60   >60 >60   GFR  Latest Ref Range: >60  >60   >60 >60   Glucose Latest Ref Range: 70 - 99 mg/dL 91   145 (H) 113 (H)   Calcium Latest Ref Range: 8.3 - 10.6 mg/dL 8.9   8.8 8.9   WBC Latest Ref Range: 4.0 - 11.0 K/uL 9.3   8.7 8.3   RBC Latest Ref Range: 4.20 - 5.90 M/uL 2.91 (L)   2.97 (L) 2.99 (L)   Hemoglobin Quant Latest Ref Range: 13.5 - 17.5 g/dL 8.7 (L)   8.8 (L) 9.0 (L)   Hematocrit Latest Ref Range: 40.5 - 52.5 % 27.0 (L)   27.0 (L) 27.7 (L)   MCV Latest Ref Range: 80.0 - 100.0 fL 92.8   90.7 92.8   MCH Latest Ref Range: 26.0 - 34.0 pg 29.9   29.6 30.0   MCHC Latest Ref Range: 31.0 - 36.0 g/dL 32.2   32.6 32.4   MPV Latest Ref Range: 5.0 - 10.5 fL 7.6   7.8 8.2   RDW Latest Ref Range: 12.4 - 15.4 % 16.1 (H)   16.0 (H) 16.4 (H)   Platelet

## 2019-09-13 NOTE — PROGRESS NOTES
vanc trough timing messed up with vanc being retimed and was drawn while vanc infusing- discussed with pharmacy and a trough will be added tonight before next dose.

## 2019-09-13 NOTE — PROGRESS NOTES
Pt's RR 29-31 and sounds junky- RT called for possible suctioning.  Rest of VSS  Vitals:    09/13/19 0758 09/13/19 0800 09/13/19 0809 09/13/19 0845   BP:  137/75 137/75    Pulse:  103 103    Resp: 20 20 29 30   Temp:  98.3 °F (36.8 °C) 98.3 °F (36.8 °C)    TempSrc:  Oral Axillary    SpO2: 100% 98% 98%    Weight:       Height:

## 2019-09-13 NOTE — PROGRESS NOTES
Lymph: No cervical LAD. No supraclavicular LAD. M/S: No joint deformity. Neuro: Moves all four extremities. Psych: AA    Data    LABS  CBC:   Recent Labs     09/11/19  0542 09/12/19  0436 09/13/19  0442   WBC 9.3 8.7 8.3   HGB 8.7* 8.8* 9.0*   HCT 27.0* 27.0* 27.7*   MCV 92.8 90.7 92.8    336 322     BMP:   Recent Labs     09/11/19  0542 09/12/19  0436 09/13/19  0442    144 145   K 4.1 3.5 3.7    109 110   CO2 22 25 26   BUN 9 8 8   CREATININE 0.7* 0.7* 0.7*       POC GLUCOSE:  No results for input(s): POCGLU in the last 72 hours. LIVER PROFILE: No results for input(s): AST, ALT, LIPASE, AMYLASE, LABALBU, BILIDIR, BILITOT, ALKPHOS in the last 72 hours. PT/INR: @LABRCNT(protime:3,in      Microbiology:  Nasal Culture: )No results for input(s): ORG, MRSAPCR in the last 72 hours. Blood Culture: No results for input(s): BC, BLOODCULT2 in the last 72 hours. UA:No results for input(s): NITRITE, COLORU, PHUR, LABCAST, WBCUA, RBCUA, MUCUS, TRICHOMONAS, YEAST, BACTERIA, CLARITYU, SPECGRAV, LEUKOCYTESUR, UROBILINOGEN, BILIRUBINUR, BLOODU, GLUCOSEU, KETUA, AMORPHOUS in the last 72 hours. Urine Culture:No results for input(s): Theone Sergeant in the last 72 hours. RADIOLOGY:    XR CHEST PORTABLE   Final Result   Bibasilar airspace disease, right greater than left, decreased compared to   prior study from [place].      Indeterminate right upper lobe pulmonary nodule         CT CHEST PULMONARY EMBOLISM W CONTRAST   Final Result   Dense right lower lobe consolidation has increased as compared to prior and   there has been progressive micronodular infiltrate bilaterally. Findings are   suggestive of pneumonia or aspiration. Filling defects within basilar   airways can reflect mucoid impaction or aspiration. 1.5 cm right upper lobe pulmonary nodule appears larger than the prior   examination, for which lung malignancy should be considered until proven   otherwise.   Left adrenal nodule is basilar opacification which may represent atelectasis or   pneumonia. Persistent but interval decreased left retrocardiac atelectasis. 6. Redemonstration of indeterminate left adrenal nodule measured above. 7. Mild wall thickening along the gastric antrum and proximal to mid duodenum   suggesting gastritis/duodenitis. 8. Mild wall thickening throughout the descending and sigmoid colon   suggesting colitis. 9. Multiple incidental/chronic findings as detailed above including   redemonstration of infrarenal abdominal aortic aneurysm as measured above,   please see follow-up guidelines below. RECOMMENDATIONS:   3.3 cm abdominal aortic aneurysm. Recommend follow-up every 3 years. Reference: J Am Maribell Radiol 4808;41:070-346. CTA ABDOMEN PELVIS W WO CONTRAST   Final Result   1. Interval dislodgement of the G-tube with development of markedly enlarged   hemorrhagic infected collection along the left rectus sheath as measured and   described above. G-tube is within the medial aspect of the collection. Collection becomes ill-defined along its inferior aspect in the pelvis with   areas of fluid containing superimposed foci of gas scattered within the   anterior lower abdomen and pelvis. Finding was discussed with [facility] ABRAHAM BARILLAS at 3:24 am on 8/24/2019.   2. Pulmonary arteries are suboptimally opacified for evaluation with limited   assessment of the distal segmental and subsegmental vessels. No evidence of   central/proximal intraluminal filling defect to suggest pulmonary embolism. 3. Interval decrease size of pleural based cavitary opacity in the posterior   right upper lobe as measured above. Additional scattered opacities appear to   resolved or decreased in size. Findings suggest resolving multifocal   infectious/inflammatory process. 4. Stable 1.4 cm slightly spiculated nodular opacity in the anterior upper   lobe, malignancy remains a diagnostic consideration   5.  Persistent

## 2019-09-14 LAB
ANION GAP SERPL CALCULATED.3IONS-SCNC: 8 MMOL/L (ref 3–16)
BUN BLDV-MCNC: 9 MG/DL (ref 7–20)
CALCIUM SERPL-MCNC: 9.3 MG/DL (ref 8.3–10.6)
CHLORIDE BLD-SCNC: 106 MMOL/L (ref 99–110)
CO2: 29 MMOL/L (ref 21–32)
CREAT SERPL-MCNC: 0.7 MG/DL (ref 0.8–1.3)
GFR AFRICAN AMERICAN: >60
GFR NON-AFRICAN AMERICAN: >60
GLUCOSE BLD-MCNC: 100 MG/DL (ref 70–99)
HCT VFR BLD CALC: 28.2 % (ref 40.5–52.5)
HEMOGLOBIN: 9 G/DL (ref 13.5–17.5)
MCH RBC QN AUTO: 29.3 PG (ref 26–34)
MCHC RBC AUTO-ENTMCNC: 32.1 G/DL (ref 31–36)
MCV RBC AUTO: 91.2 FL (ref 80–100)
PDW BLD-RTO: 16.1 % (ref 12.4–15.4)
PLATELET # BLD: 347 K/UL (ref 135–450)
PMV BLD AUTO: 7.9 FL (ref 5–10.5)
POTASSIUM SERPL-SCNC: 3.9 MMOL/L (ref 3.5–5.1)
RBC # BLD: 3.09 M/UL (ref 4.2–5.9)
SODIUM BLD-SCNC: 143 MMOL/L (ref 136–145)
VANCOMYCIN TROUGH: 19.7 UG/ML (ref 10–20)
WBC # BLD: 7.3 K/UL (ref 4–11)

## 2019-09-14 PROCEDURE — 2500000003 HC RX 250 WO HCPCS: Performed by: INTERNAL MEDICINE

## 2019-09-14 PROCEDURE — 36415 COLL VENOUS BLD VENIPUNCTURE: CPT

## 2019-09-14 PROCEDURE — 2580000003 HC RX 258: Performed by: INTERNAL MEDICINE

## 2019-09-14 PROCEDURE — 94761 N-INVAS EAR/PLS OXIMETRY MLT: CPT

## 2019-09-14 PROCEDURE — 2700000000 HC OXYGEN THERAPY PER DAY

## 2019-09-14 PROCEDURE — 6370000000 HC RX 637 (ALT 250 FOR IP): Performed by: INTERNAL MEDICINE

## 2019-09-14 PROCEDURE — 80048 BASIC METABOLIC PNL TOTAL CA: CPT

## 2019-09-14 PROCEDURE — 6360000002 HC RX W HCPCS: Performed by: INTERNAL MEDICINE

## 2019-09-14 PROCEDURE — 85027 COMPLETE CBC AUTOMATED: CPT

## 2019-09-14 PROCEDURE — 80202 ASSAY OF VANCOMYCIN: CPT

## 2019-09-14 PROCEDURE — 94640 AIRWAY INHALATION TREATMENT: CPT

## 2019-09-14 PROCEDURE — 2060000000 HC ICU INTERMEDIATE R&B

## 2019-09-14 RX ADMIN — TAZOBACTAM SODIUM AND PIPERACILLIN SODIUM 3.38 G: 375; 3 INJECTION, SOLUTION INTRAVENOUS at 10:12

## 2019-09-14 RX ADMIN — MICONAZOLE NITRATE: 20 POWDER TOPICAL at 10:12

## 2019-09-14 RX ADMIN — Medication 10 ML: at 04:05

## 2019-09-14 RX ADMIN — HALOPERIDOL 2 MG: 1 TABLET ORAL at 21:32

## 2019-09-14 RX ADMIN — LEVALBUTEROL 1.25 MG: 1.25 SOLUTION, CONCENTRATE RESPIRATORY (INHALATION) at 15:03

## 2019-09-14 RX ADMIN — HALOPERIDOL 2 MG: 1 TABLET ORAL at 10:11

## 2019-09-14 RX ADMIN — IPRATROPIUM BROMIDE 0.5 MG: 0.5 SOLUTION RESPIRATORY (INHALATION) at 19:26

## 2019-09-14 RX ADMIN — HALOPERIDOL 2 MG: 1 TABLET ORAL at 15:05

## 2019-09-14 RX ADMIN — Medication 10 ML: at 23:31

## 2019-09-14 RX ADMIN — LEVALBUTEROL 1.25 MG: 1.25 SOLUTION, CONCENTRATE RESPIRATORY (INHALATION) at 11:36

## 2019-09-14 RX ADMIN — ATORVASTATIN CALCIUM 20 MG: 20 TABLET, FILM COATED ORAL at 21:23

## 2019-09-14 RX ADMIN — LEVALBUTEROL 1.25 MG: 1.25 SOLUTION, CONCENTRATE RESPIRATORY (INHALATION) at 19:27

## 2019-09-14 RX ADMIN — POTASSIUM BICARBONATE 20 MEQ: 782 TABLET, EFFERVESCENT ORAL at 15:05

## 2019-09-14 RX ADMIN — IPRATROPIUM BROMIDE 0.5 MG: 0.5 SOLUTION RESPIRATORY (INHALATION) at 15:02

## 2019-09-14 RX ADMIN — IPRATROPIUM BROMIDE 0.5 MG: 0.5 SOLUTION RESPIRATORY (INHALATION) at 07:03

## 2019-09-14 RX ADMIN — HYDROCODONE BITARTRATE AND ACETAMINOPHEN 1 TABLET: 5; 325 TABLET ORAL at 21:23

## 2019-09-14 RX ADMIN — LORAZEPAM 2 MG: 2 INJECTION INTRAMUSCULAR; INTRAVENOUS at 04:09

## 2019-09-14 RX ADMIN — IPRATROPIUM BROMIDE 0.5 MG: 0.5 SOLUTION RESPIRATORY (INHALATION) at 11:36

## 2019-09-14 RX ADMIN — FOLIC ACID 1 MG: 1 TABLET ORAL at 10:11

## 2019-09-14 RX ADMIN — ENOXAPARIN SODIUM 40 MG: 40 INJECTION SUBCUTANEOUS at 10:11

## 2019-09-14 RX ADMIN — TAZOBACTAM SODIUM AND PIPERACILLIN SODIUM 3.38 G: 375; 3 INJECTION, SOLUTION INTRAVENOUS at 03:27

## 2019-09-14 RX ADMIN — MICONAZOLE NITRATE: 20 POWDER TOPICAL at 23:31

## 2019-09-14 RX ADMIN — Medication 10 ML: at 10:12

## 2019-09-14 RX ADMIN — TAZOBACTAM SODIUM AND PIPERACILLIN SODIUM 3.38 G: 375; 3 INJECTION, SOLUTION INTRAVENOUS at 18:16

## 2019-09-14 RX ADMIN — Medication 100 MG: at 10:11

## 2019-09-14 RX ADMIN — POTASSIUM BICARBONATE 20 MEQ: 782 TABLET, EFFERVESCENT ORAL at 10:11

## 2019-09-14 RX ADMIN — VANCOMYCIN HYDROCHLORIDE 750 MG: 750 INJECTION, POWDER, LYOPHILIZED, FOR SOLUTION INTRAVENOUS at 02:15

## 2019-09-14 RX ADMIN — POTASSIUM BICARBONATE 20 MEQ: 782 TABLET, EFFERVESCENT ORAL at 21:43

## 2019-09-14 RX ADMIN — METOPROLOL TARTRATE 50 MG: 50 TABLET, FILM COATED ORAL at 10:11

## 2019-09-14 RX ADMIN — METOPROLOL TARTRATE 50 MG: 50 TABLET, FILM COATED ORAL at 21:23

## 2019-09-14 RX ADMIN — VANCOMYCIN HYDROCHLORIDE 500 MG: 500 INJECTION, POWDER, LYOPHILIZED, FOR SOLUTION INTRAVENOUS at 15:04

## 2019-09-14 RX ADMIN — LISINOPRIL 10 MG: 10 TABLET ORAL at 10:11

## 2019-09-14 RX ADMIN — LEVALBUTEROL 1.25 MG: 1.25 SOLUTION, CONCENTRATE RESPIRATORY (INHALATION) at 07:03

## 2019-09-14 RX ADMIN — LEVOFLOXACIN 750 MG: 5 INJECTION, SOLUTION INTRAVENOUS at 21:23

## 2019-09-14 ASSESSMENT — PAIN SCALES - GENERAL
PAINLEVEL_OUTOF10: 6
PAINLEVEL_OUTOF10: 6

## 2019-09-14 NOTE — PROGRESS NOTES
Pharmacy to dose vancomycin:    Vt 19.7 but was drawn late. Therefore, pharmacy will decrease dose to 500mg q12. Next vancomycin trough ordered for 9/16 @ 1330.      Jeremy Espinoza, PharmD, [id number]   [id number]

## 2019-09-14 NOTE — PLAN OF CARE
restraints. Pt pulling at peg tube and IV. Pt asked for a knife and for a smoke.   Goal: Absence of restraint-related injury  Description  Absence of restraint-related injury  Outcome: Ongoing

## 2019-09-15 LAB
ANION GAP SERPL CALCULATED.3IONS-SCNC: 12 MMOL/L (ref 3–16)
BUN BLDV-MCNC: 11 MG/DL (ref 7–20)
CALCIUM SERPL-MCNC: 9.5 MG/DL (ref 8.3–10.6)
CHLORIDE BLD-SCNC: 107 MMOL/L (ref 99–110)
CO2: 26 MMOL/L (ref 21–32)
CREAT SERPL-MCNC: 0.7 MG/DL (ref 0.8–1.3)
CULTURE, RESPIRATORY: ABNORMAL
CULTURE, RESPIRATORY: ABNORMAL
GFR AFRICAN AMERICAN: >60
GFR NON-AFRICAN AMERICAN: >60
GLUCOSE BLD-MCNC: 91 MG/DL (ref 70–99)
GRAM STAIN RESULT: ABNORMAL
HCT VFR BLD CALC: 28.5 % (ref 40.5–52.5)
HEMOGLOBIN: 9.2 G/DL (ref 13.5–17.5)
MCH RBC QN AUTO: 29.6 PG (ref 26–34)
MCHC RBC AUTO-ENTMCNC: 32.2 G/DL (ref 31–36)
MCV RBC AUTO: 92 FL (ref 80–100)
ORGANISM: ABNORMAL
PDW BLD-RTO: 15.9 % (ref 12.4–15.4)
PLATELET # BLD: 340 K/UL (ref 135–450)
PMV BLD AUTO: 8.4 FL (ref 5–10.5)
POTASSIUM SERPL-SCNC: 3.6 MMOL/L (ref 3.5–5.1)
RBC # BLD: 3.09 M/UL (ref 4.2–5.9)
SODIUM BLD-SCNC: 145 MMOL/L (ref 136–145)
WBC # BLD: 7.2 K/UL (ref 4–11)

## 2019-09-15 PROCEDURE — 94669 MECHANICAL CHEST WALL OSCILL: CPT

## 2019-09-15 PROCEDURE — 2060000000 HC ICU INTERMEDIATE R&B

## 2019-09-15 PROCEDURE — 80048 BASIC METABOLIC PNL TOTAL CA: CPT

## 2019-09-15 PROCEDURE — 94640 AIRWAY INHALATION TREATMENT: CPT

## 2019-09-15 PROCEDURE — 6360000002 HC RX W HCPCS: Performed by: INTERNAL MEDICINE

## 2019-09-15 PROCEDURE — 2580000003 HC RX 258: Performed by: INTERNAL MEDICINE

## 2019-09-15 PROCEDURE — 85027 COMPLETE CBC AUTOMATED: CPT

## 2019-09-15 PROCEDURE — 6370000000 HC RX 637 (ALT 250 FOR IP): Performed by: INTERNAL MEDICINE

## 2019-09-15 PROCEDURE — 94761 N-INVAS EAR/PLS OXIMETRY MLT: CPT

## 2019-09-15 PROCEDURE — 36415 COLL VENOUS BLD VENIPUNCTURE: CPT

## 2019-09-15 RX ORDER — LORAZEPAM 0.5 MG/1
0.5 TABLET ORAL EVERY 6 HOURS PRN
Status: DISCONTINUED | OUTPATIENT
Start: 2019-09-15 | End: 2019-09-16

## 2019-09-15 RX ADMIN — LORAZEPAM 0.5 MG: 0.5 TABLET ORAL at 14:46

## 2019-09-15 RX ADMIN — LEVALBUTEROL 1.25 MG: 1.25 SOLUTION, CONCENTRATE RESPIRATORY (INHALATION) at 11:29

## 2019-09-15 RX ADMIN — POTASSIUM BICARBONATE 20 MEQ: 782 TABLET, EFFERVESCENT ORAL at 14:46

## 2019-09-15 RX ADMIN — METOPROLOL TARTRATE 50 MG: 50 TABLET, FILM COATED ORAL at 08:45

## 2019-09-15 RX ADMIN — LEVALBUTEROL 1.25 MG: 1.25 SOLUTION, CONCENTRATE RESPIRATORY (INHALATION) at 07:30

## 2019-09-15 RX ADMIN — IPRATROPIUM BROMIDE 0.5 MG: 0.5 SOLUTION RESPIRATORY (INHALATION) at 11:28

## 2019-09-15 RX ADMIN — ENOXAPARIN SODIUM 40 MG: 40 INJECTION SUBCUTANEOUS at 08:45

## 2019-09-15 RX ADMIN — MICONAZOLE NITRATE: 20 POWDER TOPICAL at 08:45

## 2019-09-15 RX ADMIN — Medication 10 ML: at 08:45

## 2019-09-15 RX ADMIN — HALOPERIDOL 2 MG: 1 TABLET ORAL at 19:49

## 2019-09-15 RX ADMIN — LEVALBUTEROL 1.25 MG: 1.25 SOLUTION, CONCENTRATE RESPIRATORY (INHALATION) at 20:06

## 2019-09-15 RX ADMIN — HALOPERIDOL 2 MG: 1 TABLET ORAL at 08:45

## 2019-09-15 RX ADMIN — IPRATROPIUM BROMIDE 0.5 MG: 0.5 SOLUTION RESPIRATORY (INHALATION) at 20:06

## 2019-09-15 RX ADMIN — LEVALBUTEROL 1.25 MG: 1.25 SOLUTION, CONCENTRATE RESPIRATORY (INHALATION) at 15:23

## 2019-09-15 RX ADMIN — POTASSIUM BICARBONATE 20 MEQ: 782 TABLET, EFFERVESCENT ORAL at 19:49

## 2019-09-15 RX ADMIN — Medication 100 MG: at 08:45

## 2019-09-15 RX ADMIN — MICONAZOLE NITRATE: 20 POWDER TOPICAL at 19:50

## 2019-09-15 RX ADMIN — IPRATROPIUM BROMIDE 0.5 MG: 0.5 SOLUTION RESPIRATORY (INHALATION) at 15:22

## 2019-09-15 RX ADMIN — METOPROLOL TARTRATE 50 MG: 50 TABLET, FILM COATED ORAL at 19:49

## 2019-09-15 RX ADMIN — POTASSIUM BICARBONATE 20 MEQ: 782 TABLET, EFFERVESCENT ORAL at 08:45

## 2019-09-15 RX ADMIN — HALOPERIDOL 2 MG: 1 TABLET ORAL at 14:46

## 2019-09-15 RX ADMIN — LISINOPRIL 10 MG: 10 TABLET ORAL at 08:45

## 2019-09-15 RX ADMIN — IPRATROPIUM BROMIDE 0.5 MG: 0.5 SOLUTION RESPIRATORY (INHALATION) at 07:30

## 2019-09-15 RX ADMIN — ATORVASTATIN CALCIUM 20 MG: 20 TABLET, FILM COATED ORAL at 19:49

## 2019-09-15 RX ADMIN — FOLIC ACID 1 MG: 1 TABLET ORAL at 08:45

## 2019-09-15 NOTE — PLAN OF CARE
nonreality-based thinking  Outcome: Ongoing     Problem: Sleep Pattern Disturbance:  Goal: Appears well-rested  Description  Appears well-rested  Outcome: Ongoing     Problem: Physical Regulation:  Goal: Prevent transmision of infection  Description  Prevent transmision of infection  Outcome: Ongoing  Note:   Pt in isolation with a cart, supplies and signs outside of room.

## 2019-09-15 NOTE — PROGRESS NOTES
Pt has been out of restraints since [address] . He did okay following directions until about 1800. At [address] multiple attempts to get out of the bed and attempted to punch this RN. Multiple attempts used to re-direct the pt and help him express what he needed. Pt is unable to follow commands at this time and not safe. Pt was assisted up into the bed, soft bilateral wrist restraints were applied for safety. On-call MD Dr. Alfred Samuels notified for a new order.   Lc Kaba RN

## 2019-09-15 NOTE — PROGRESS NOTES
atelectasis or   pneumonia. Persistent but interval decreased left retrocardiac atelectasis. 6. Redemonstration of indeterminate left adrenal nodule measured above. 7. Mild wall thickening along the gastric antrum and proximal to mid duodenum   suggesting gastritis/duodenitis. 8. Mild wall thickening throughout the descending and sigmoid colon   suggesting colitis. 9. Multiple incidental/chronic findings as detailed above including   redemonstration of infrarenal abdominal aortic aneurysm as measured above,   please see follow-up guidelines below. RECOMMENDATIONS:   3.3 cm abdominal aortic aneurysm. Recommend follow-up every 3 years. Reference: J Am Maribell Radiol 8107;22:921-060. CTA ABDOMEN PELVIS W WO CONTRAST   Final Result   1. Interval dislodgement of the G-tube with development of markedly enlarged   hemorrhagic infected collection along the left rectus sheath as measured and   described above. G-tube is within the medial aspect of the collection. Collection becomes ill-defined along its inferior aspect in the pelvis with   areas of fluid containing superimposed foci of gas scattered within the   anterior lower abdomen and pelvis. Finding was discussed with [facility] ABRAHAM BARILLAS at 3:24 am on 8/24/2019.   2. Pulmonary arteries are suboptimally opacified for evaluation with limited   assessment of the distal segmental and subsegmental vessels. No evidence of   central/proximal intraluminal filling defect to suggest pulmonary embolism. 3. Interval decrease size of pleural based cavitary opacity in the posterior   right upper lobe as measured above. Additional scattered opacities appear to   resolved or decreased in size. Findings suggest resolving multifocal   infectious/inflammatory process. 4. Stable 1.4 cm slightly spiculated nodular opacity in the anterior upper   lobe, malignancy remains a diagnostic consideration   5.  Persistent right basilar opacification which may

## 2019-09-16 LAB
ANION GAP SERPL CALCULATED.3IONS-SCNC: 13 MMOL/L (ref 3–16)
BUN BLDV-MCNC: 11 MG/DL (ref 7–20)
CALCIUM SERPL-MCNC: 9.7 MG/DL (ref 8.3–10.6)
CHLORIDE BLD-SCNC: 109 MMOL/L (ref 99–110)
CO2: 25 MMOL/L (ref 21–32)
CREAT SERPL-MCNC: 0.7 MG/DL (ref 0.8–1.3)
GFR AFRICAN AMERICAN: >60
GFR NON-AFRICAN AMERICAN: >60
GLUCOSE BLD-MCNC: 119 MG/DL (ref 70–99)
HCT VFR BLD CALC: 30.4 % (ref 40.5–52.5)
HEMOGLOBIN: 9.9 G/DL (ref 13.5–17.5)
MCH RBC QN AUTO: 29.2 PG (ref 26–34)
MCHC RBC AUTO-ENTMCNC: 32.4 G/DL (ref 31–36)
MCV RBC AUTO: 90.2 FL (ref 80–100)
PDW BLD-RTO: 16.3 % (ref 12.4–15.4)
PLATELET # BLD: 372 K/UL (ref 135–450)
PMV BLD AUTO: 7.7 FL (ref 5–10.5)
POTASSIUM SERPL-SCNC: 3.2 MMOL/L (ref 3.5–5.1)
RBC # BLD: 3.37 M/UL (ref 4.2–5.9)
SODIUM BLD-SCNC: 147 MMOL/L (ref 136–145)
WBC # BLD: 8.2 K/UL (ref 4–11)

## 2019-09-16 PROCEDURE — 6360000002 HC RX W HCPCS: Performed by: INTERNAL MEDICINE

## 2019-09-16 PROCEDURE — 85027 COMPLETE CBC AUTOMATED: CPT

## 2019-09-16 PROCEDURE — 6370000000 HC RX 637 (ALT 250 FOR IP): Performed by: INTERNAL MEDICINE

## 2019-09-16 PROCEDURE — 2060000000 HC ICU INTERMEDIATE R&B

## 2019-09-16 PROCEDURE — 36415 COLL VENOUS BLD VENIPUNCTURE: CPT

## 2019-09-16 PROCEDURE — 80048 BASIC METABOLIC PNL TOTAL CA: CPT

## 2019-09-16 PROCEDURE — 6370000000 HC RX 637 (ALT 250 FOR IP): Performed by: HOSPITALIST

## 2019-09-16 PROCEDURE — 94761 N-INVAS EAR/PLS OXIMETRY MLT: CPT

## 2019-09-16 PROCEDURE — 94640 AIRWAY INHALATION TREATMENT: CPT

## 2019-09-16 RX ORDER — NICOTINE 21 MG/24HR
1 PATCH, TRANSDERMAL 24 HOURS TRANSDERMAL DAILY
Status: DISCONTINUED | OUTPATIENT
Start: 2019-09-16 | End: 2019-09-17 | Stop reason: HOSPADM

## 2019-09-16 RX ORDER — LORAZEPAM 0.5 MG/1
0.5 TABLET ORAL ONCE
Status: COMPLETED | OUTPATIENT
Start: 2019-09-16 | End: 2019-09-16

## 2019-09-16 RX ORDER — LORAZEPAM 2 MG/ML
1 INJECTION INTRAMUSCULAR ONCE
Status: DISCONTINUED | OUTPATIENT
Start: 2019-09-16 | End: 2019-09-17 | Stop reason: HOSPADM

## 2019-09-16 RX ORDER — HALOPERIDOL 5 MG
5 TABLET ORAL EVERY 6 HOURS PRN
Status: DISCONTINUED | OUTPATIENT
Start: 2019-09-16 | End: 2019-09-17 | Stop reason: HOSPADM

## 2019-09-16 RX ADMIN — LISINOPRIL 10 MG: 10 TABLET ORAL at 09:15

## 2019-09-16 RX ADMIN — LEVALBUTEROL 1.25 MG: 1.25 SOLUTION, CONCENTRATE RESPIRATORY (INHALATION) at 17:36

## 2019-09-16 RX ADMIN — IPRATROPIUM BROMIDE 0.5 MG: 0.5 SOLUTION RESPIRATORY (INHALATION) at 11:44

## 2019-09-16 RX ADMIN — METOPROLOL TARTRATE 50 MG: 50 TABLET, FILM COATED ORAL at 09:13

## 2019-09-16 RX ADMIN — POTASSIUM BICARBONATE 20 MEQ: 782 TABLET, EFFERVESCENT ORAL at 09:25

## 2019-09-16 RX ADMIN — METOPROLOL TARTRATE 50 MG: 50 TABLET, FILM COATED ORAL at 20:47

## 2019-09-16 RX ADMIN — HALOPERIDOL 5 MG: 5 TABLET ORAL at 16:53

## 2019-09-16 RX ADMIN — LORAZEPAM 0.5 MG: 0.5 TABLET ORAL at 10:20

## 2019-09-16 RX ADMIN — LEVALBUTEROL 1.25 MG: 1.25 SOLUTION, CONCENTRATE RESPIRATORY (INHALATION) at 08:22

## 2019-09-16 RX ADMIN — FOLIC ACID 1 MG: 1 TABLET ORAL at 09:13

## 2019-09-16 RX ADMIN — MICONAZOLE NITRATE: 20 POWDER TOPICAL at 10:20

## 2019-09-16 RX ADMIN — POTASSIUM BICARBONATE 20 MEQ: 782 TABLET, EFFERVESCENT ORAL at 13:53

## 2019-09-16 RX ADMIN — IPRATROPIUM BROMIDE 0.5 MG: 0.5 SOLUTION RESPIRATORY (INHALATION) at 08:21

## 2019-09-16 RX ADMIN — LORAZEPAM 0.5 MG: 0.5 TABLET ORAL at 00:14

## 2019-09-16 RX ADMIN — POTASSIUM BICARBONATE 20 MEQ: 782 TABLET, EFFERVESCENT ORAL at 20:47

## 2019-09-16 RX ADMIN — HYDROCODONE BITARTRATE AND ACETAMINOPHEN 1 TABLET: 5; 325 TABLET ORAL at 20:47

## 2019-09-16 RX ADMIN — HALOPERIDOL 2 MG: 1 TABLET ORAL at 09:13

## 2019-09-16 RX ADMIN — Medication 100 MG: at 09:14

## 2019-09-16 RX ADMIN — LEVALBUTEROL 1.25 MG: 1.25 SOLUTION, CONCENTRATE RESPIRATORY (INHALATION) at 19:29

## 2019-09-16 RX ADMIN — LEVALBUTEROL 1.25 MG: 1.25 SOLUTION, CONCENTRATE RESPIRATORY (INHALATION) at 11:44

## 2019-09-16 RX ADMIN — IPRATROPIUM BROMIDE 0.5 MG: 0.5 SOLUTION RESPIRATORY (INHALATION) at 19:29

## 2019-09-16 RX ADMIN — HYDROCODONE BITARTRATE AND ACETAMINOPHEN 1 TABLET: 5; 325 TABLET ORAL at 11:42

## 2019-09-16 RX ADMIN — HALOPERIDOL 5 MG: 5 TABLET ORAL at 21:49

## 2019-09-16 RX ADMIN — LORAZEPAM 0.5 MG: 0.5 TABLET ORAL at 13:54

## 2019-09-16 RX ADMIN — IPRATROPIUM BROMIDE 0.5 MG: 0.5 SOLUTION RESPIRATORY (INHALATION) at 17:34

## 2019-09-16 RX ADMIN — MICONAZOLE NITRATE: 20 POWDER TOPICAL at 20:48

## 2019-09-16 RX ADMIN — HALOPERIDOL 2 MG: 1 TABLET ORAL at 13:54

## 2019-09-16 RX ADMIN — ENOXAPARIN SODIUM 40 MG: 40 INJECTION SUBCUTANEOUS at 09:15

## 2019-09-16 RX ADMIN — ATORVASTATIN CALCIUM 20 MG: 20 TABLET, FILM COATED ORAL at 20:47

## 2019-09-16 ASSESSMENT — PAIN SCALES - GENERAL
PAINLEVEL_OUTOF10: 0
PAINLEVEL_OUTOF10: 5
PAINLEVEL_OUTOF10: 4
PAINLEVEL_OUTOF10: 5
PAINLEVEL_OUTOF10: 0
PAINLEVEL_OUTOF10: 0

## 2019-09-16 ASSESSMENT — PAIN SCALES - WONG BAKER: WONGBAKER_NUMERICALRESPONSE: 0

## 2019-09-16 NOTE — PLAN OF CARE
Problem: OXYGENATION/RESPIRATORY FUNCTION  Goal: Patient will achieve/maintain normal respiratory rate/effort  Description  Respiratory rate and effort will be within normal limits for the patient  Intervention: ASSESS OXYGENATION AS ORDERED  Note:   Assessment complete, VSS. Pt is alert and oriented to self, confused at times but answers questions appropriately at times. Pt congested and secretions suctioned to help removed thicker secretions that he is unable to clear. Denies SOB O2 sats 91-93% on RA. Pt denies needs any other needs at this time.

## 2019-09-16 NOTE — PROGRESS NOTES
[facility]    9/16/2019 9:15 AM        Name: Tim Kam . Admitted: 8/23/2019  Primary Care Provider: Dory Mora (Tel: [phone])                        Hospital course: The patient apparently was recently seen at [facility] and had a PEG tube placed 10 days before admission,  was  discharged to the SNF, went there, was having frequent falls, and a as consequence, presented back us to the [facility] Emergency Room for dislodgement of the PET tube, had PET tube repositioned, and was sent back, and today presents with increasing tachycardia, confusion, and not acting right with severe hypotension. CT abdomen and pelvis with contrast  did show evidence of large 5.1 x 14.0 x  20.6 cm left rectus sheath hematoma with numerous superimposed foci of infection and gas. The patient admitted to initially ICU. Subjective: The patient off restraints this morning, however still agitated on and off  No acute events overnight. Resting well. Pain control. Diet ok. Labs reviewed  Denies any chest pain sob.      Reviewed interval ancillary notes    Current Medications    LORazepam (ATIVAN) tablet 0.5 mg Q6H PRN   miconazole (MICOTIN) 2 % powder BID   haloperidol (HALDOL) tablet 2 mg TID   enoxaparin (LOVENOX) injection 40 mg Daily   scopolamine (TRANSDERM-SCOP) transdermal patch 1 patch Q72H   HYDROcodone-acetaminophen (NORCO) 5-325 MG per tablet 1 tablet Q6H PRN   potassium bicarb-citric acid (EFFER-K) effervescent tablet 20 mEq TID   levalbuterol (XOPENEX) nebulizer solution 1.25 mg 4x daily   haloperidol (HALDOL) tablet 2 mg Daily PRN   lip balm petroleum free (PHYTOPLEX) stick PRN   atorvastatin (LIPITOR) tablet 20 mg Nightly   folic acid (FOLVITE) tablet 1 mg Daily   lisinopril (PRINIVIL;ZESTRIL) tablet 10 mg Daily   metoprolol tartrate anemia:   - Status post PRBCl transfusion.  H&H stable     4. Acute metabolic encephalopathy: monitor, improving : I will change to Haldol dosing today 5 mg every 6 hours as needed, I will order 1 dose of Ativan her PEG tube.      5.Left lower extremity weakness:  - discussed with neuro, suspect related to pain from hematoma. He is able to move the leg.  -monitor   6. Disposition  Precert to LTAC in progress,  Prognosis poor, patient is Limited code status with no intubation, hospice consulted.        Diet: DIET TUBE FEED CONTINUOUS/CYCLIC NPO; STANDARD WITH FIBER; Gastrostomy; Continuous  Code:Limited  DVT PPX lovenox       Janey Tamayo MD   9/16/2019 9:15 AM

## 2019-09-16 NOTE — CARE COORDINATION
CM spoke with [facility], plan to meet with family at 1500. CM was advised patient needs to be off restraints 24 hours for hospice to accept. Patient's nurse is aware. CM will follow to facilitate discharge needs.     SABRA Lew, CCM, RN  [facility]  [phone]

## 2019-09-17 VITALS
WEIGHT: 117.8 LBS | DIASTOLIC BLOOD PRESSURE: 89 MMHG | BODY MASS INDEX: 18.49 KG/M2 | OXYGEN SATURATION: 92 % | TEMPERATURE: 97.4 F | HEIGHT: 67 IN | HEART RATE: 130 BPM | SYSTOLIC BLOOD PRESSURE: 158 MMHG | RESPIRATION RATE: 20 BRPM

## 2019-09-17 LAB
ANION GAP SERPL CALCULATED.3IONS-SCNC: 9 MMOL/L (ref 3–16)
BUN BLDV-MCNC: 15 MG/DL (ref 7–20)
CALCIUM SERPL-MCNC: 10.1 MG/DL (ref 8.3–10.6)
CHLORIDE BLD-SCNC: 108 MMOL/L (ref 99–110)
CO2: 27 MMOL/L (ref 21–32)
CREAT SERPL-MCNC: 0.8 MG/DL (ref 0.8–1.3)
GFR AFRICAN AMERICAN: >60
GFR NON-AFRICAN AMERICAN: >60
GLUCOSE BLD-MCNC: 100 MG/DL (ref 70–99)
GLUCOSE BLD-MCNC: 116 MG/DL (ref 70–99)
HCT VFR BLD CALC: 30.5 % (ref 40.5–52.5)
HEMOGLOBIN: 9.9 G/DL (ref 13.5–17.5)
MCH RBC QN AUTO: 29.6 PG (ref 26–34)
MCHC RBC AUTO-ENTMCNC: 32.4 G/DL (ref 31–36)
MCV RBC AUTO: 91.4 FL (ref 80–100)
PDW BLD-RTO: 16.9 % (ref 12.4–15.4)
PERFORMED ON: ABNORMAL
PLATELET # BLD: 392 K/UL (ref 135–450)
PMV BLD AUTO: 8.1 FL (ref 5–10.5)
POTASSIUM SERPL-SCNC: 4.6 MMOL/L (ref 3.5–5.1)
RBC # BLD: 3.34 M/UL (ref 4.2–5.9)
SODIUM BLD-SCNC: 144 MMOL/L (ref 136–145)
WBC # BLD: 14.6 K/UL (ref 4–11)

## 2019-09-17 PROCEDURE — 80048 BASIC METABOLIC PNL TOTAL CA: CPT

## 2019-09-17 PROCEDURE — 94640 AIRWAY INHALATION TREATMENT: CPT

## 2019-09-17 PROCEDURE — 6360000002 HC RX W HCPCS: Performed by: INTERNAL MEDICINE

## 2019-09-17 PROCEDURE — 36415 COLL VENOUS BLD VENIPUNCTURE: CPT

## 2019-09-17 PROCEDURE — 85027 COMPLETE CBC AUTOMATED: CPT

## 2019-09-17 PROCEDURE — 2700000000 HC OXYGEN THERAPY PER DAY

## 2019-09-17 PROCEDURE — 6370000000 HC RX 637 (ALT 250 FOR IP): Performed by: HOSPITALIST

## 2019-09-17 PROCEDURE — 31720 CLEARANCE OF AIRWAYS: CPT

## 2019-09-17 PROCEDURE — 6370000000 HC RX 637 (ALT 250 FOR IP): Performed by: INTERNAL MEDICINE

## 2019-09-17 PROCEDURE — 94761 N-INVAS EAR/PLS OXIMETRY MLT: CPT

## 2019-09-17 RX ADMIN — LEVALBUTEROL 1.25 MG: 1.25 SOLUTION, CONCENTRATE RESPIRATORY (INHALATION) at 16:10

## 2019-09-17 RX ADMIN — HALOPERIDOL 5 MG: 5 TABLET ORAL at 14:33

## 2019-09-17 RX ADMIN — IPRATROPIUM BROMIDE 0.5 MG: 0.5 SOLUTION RESPIRATORY (INHALATION) at 12:09

## 2019-09-17 RX ADMIN — LEVALBUTEROL 1.25 MG: 1.25 SOLUTION, CONCENTRATE RESPIRATORY (INHALATION) at 12:09

## 2019-09-17 RX ADMIN — IPRATROPIUM BROMIDE 0.5 MG: 0.5 SOLUTION RESPIRATORY (INHALATION) at 16:10

## 2019-09-17 RX ADMIN — POTASSIUM BICARBONATE 20 MEQ: 782 TABLET, EFFERVESCENT ORAL at 14:33

## 2019-09-17 RX ADMIN — Medication 100 MG: at 08:28

## 2019-09-17 RX ADMIN — METOPROLOL TARTRATE 50 MG: 50 TABLET, FILM COATED ORAL at 08:28

## 2019-09-17 RX ADMIN — LISINOPRIL 10 MG: 10 TABLET ORAL at 08:28

## 2019-09-17 RX ADMIN — HALOPERIDOL 5 MG: 5 TABLET ORAL at 08:28

## 2019-09-17 RX ADMIN — POTASSIUM BICARBONATE 20 MEQ: 782 TABLET, EFFERVESCENT ORAL at 08:28

## 2019-09-17 RX ADMIN — LEVALBUTEROL 1.25 MG: 1.25 SOLUTION, CONCENTRATE RESPIRATORY (INHALATION) at 08:51

## 2019-09-17 RX ADMIN — IPRATROPIUM BROMIDE 0.5 MG: 0.5 SOLUTION RESPIRATORY (INHALATION) at 08:51

## 2019-09-17 RX ADMIN — FOLIC ACID 1 MG: 1 TABLET ORAL at 08:27

## 2019-09-17 RX ADMIN — MICONAZOLE NITRATE: 20 POWDER TOPICAL at 10:15

## 2019-09-17 RX ADMIN — ENOXAPARIN SODIUM 40 MG: 40 INJECTION SUBCUTANEOUS at 08:28

## 2019-09-17 ASSESSMENT — PAIN SCALES - GENERAL
PAINLEVEL_OUTOF10: 0
PAINLEVEL_OUTOF10: 0

## 2019-09-17 NOTE — DISCHARGE SUMMARY
without any focal sensory/motor deficits. Cranial nerves: II-XII intact, grossly non-focal.  Psychiatric:  Not oriented to date and Not oriented to place        Labs: For convenience and continuity at follow-up the following most recent labs are provided:    Lab Results   Component Value Date    WBC 14.6 09/17/2019    HGB 9.9 09/17/2019    HCT 30.5 09/17/2019    MCV 91.4 09/17/2019     09/17/2019     09/17/2019    K 4.6 09/17/2019    K 3.9 08/23/2019     09/17/2019    CO2 27 09/17/2019    BUN 15 09/17/2019    CREATININE 0.8 09/17/2019    CALCIUM 10.1 09/17/2019    PHOS 2.4 08/31/2019    ALKPHOS 130 08/23/2019    ALT 34 08/23/2019    AST 28 08/23/2019    BILITOT 0.7 08/23/2019    BILIDIR <0.2 07/22/2019    LABALBU 3.1 08/23/2019    LDLCALC 137 07/28/2010    TRIG 87 07/28/2010     Lab Results   Component Value Date    INR 1.19 (H) 08/23/2019    INR 1.25 (H) 08/02/2019    INR 1.04 07/24/2019       Radiology:  Ct Head Wo Contrast    Result Date: 8/23/2019  EXAMINATION: CT OF THE HEAD WITHOUT CONTRAST,  8/23/2019 6:53 pm TECHNIQUE: CT of the head was performed without the administration of intravenous contrast. Dose modulation, iterative reconstruction, and/or weight based adjustment of the mA/kV was utilized to reduce the radiation dose to as low as reasonably achievable. COMPARISON: 08/22/2019 HISTORY: ORDERING SYSTEM PROVIDED HISTORY: AMS TECHNOLOGIST PROVIDED HISTORY: Has a \"code stroke\" or \"stroke alert\" been called? ->No Reason for Exam: AMS Acuity: Unknown Type of Exam: Unknown FINDINGS: BRAIN/VENTRICLES: There is prominence of the sulci and ventricles commensurate with the patient's age underlying brain parenchymal atrophy. No mass or hemorrhage is seen intracranially. No midline shift is noted. Atherosclerotic vascular calcifications are present. ORBITS: The visualized portion of the orbits demonstrate no acute abnormality. SINUSES: Tiny polyp retention cyst left sphenoid sinus is present. Time Out: 02:01 Exam(s): CT ABDOMEN + PELVIS With Contrast  History:  Reason for exam: 10 < PSA < 20 . Additional Contrast?->None. Exam: CT ABDOMEN + PELVIS With Contrast Technique more: CTDI is 9.05 mGy and DLP is 426.64 mGy-cm. Technique more: All CT scans at this facility use dose modulation, iterative reconstruction, and/or weight based dosing when appropriate to reduce radiation dose to as low as reasonably achievable. Comparison: 8/3/2019  FINDINGS:  Patchy areas of right greater than left basilar opacity may represent developing infiltrate, pneumonia. Coronary calcifications. 2 cm indeterminate left adrenal nodule not significantly changed. Other abdominal solid organs, and gallbladder appear within limits. Percutaneous gastrostomy tube in place. Infrarenal fusiform abdominal aortic aneurysm measuring 3.6 cm again noted. No evidence of retroperitoneal hemorrhage. Aortoiliac atherosclerotic calcifications. No bowel dilation or free air. Tiny focus of air in the bladder may be from recent instrumentation. No free fluid. Diverticulosis. Lumbar spondylosis/discogenic change. T9 and T12 compression deformities appear unchanged. Patchy areas of right greater than left basilar opacity may represent developing infiltrate, pneumonia. Coronary calcifications. 2 cm indeterminate left adrenal nodule not significantly changed. Percutaneous gastrostomy tube in place. Infrarenal fusiform abdominal aortic aneurysm measuring 3.6 cm again noted. No evidence of retroperitoneal hemorrhage. Aortoiliac atherosclerotic calcifications. Tiny focus of air in the bladder may be from recent instrumentation.       Xr Chest Portable    Result Date: 9/6/2019  EXAMINATION: ONE XRAY VIEW OF THE CHEST 9/6/2019 3:26 pm COMPARISON: 08/29/2019 HISTORY: ORDERING SYSTEM PROVIDED HISTORY: Increased respiratory secretion TECHNOLOGIST PROVIDED HISTORY: Reason for exam:->Increased respiratory secretion Reason for Exam: Increased respiratory secretion Acuity: Unknown Type of Exam: Unknown FINDINGS: Lung volumes are low accentuating heart size and bronchovascular markings at the lung bases. Patient is rotated There is persistent but decreased bibasilar opacity, right greater than left. Nodular density right lung apex appears similar. Bibasilar airspace disease, right greater than left, decreased compared to prior study from August. Indeterminate right upper lobe pulmonary nodule     Xr Chest Portable    Result Date: 8/29/2019  EXAMINATION: ONE XRAY VIEW OF THE CHEST 8/29/2019 5:54 am COMPARISON: Portable chest 08/27/2019 HISTORY: ORDERING SYSTEM PROVIDED HISTORY: increasing rhonchi and O2 demands TECHNOLOGIST PROVIDED HISTORY: Reason for exam:->increasing rhonchi and O2 demands Reason for Exam: increasing rhonchi and O2 demands Acuity: Unknown Type of Exam: Unknown FINDINGS: Left upper extremity PICC unchanged position. Heart size not substantially changed. Left basilar opacity not substantially changed. Right basilar aeration improved. Right upper lobe nodule again noted. Improved right basilar aeration. Unchanged left basilar disease. Xr Chest Portable    Result Date: 8/27/2019  EXAMINATION: ONE XRAY VIEW OF THE CHEST 8/27/2019 11:36 am COMPARISON: Prior study(s) most recent 08/25/2019. HISTORY: ORDERING SYSTEM PROVIDED HISTORY: dyspnea TECHNOLOGIST PROVIDED HISTORY: Reason for exam:->dyspnea FINDINGS: The heart is enlarged. There is central vascular congestion and bilateral lower lobe airspace disease. Diaphragms are obscured bilaterally from disease and possible pleural effusion. Left arm PICC line extends to the lower most superior vena cava. Right upper lung nodule is faintly demonstrated. This is best noted on CT scan, 07/28/2019     Findings of mild congestive failure. Suspect pleural effusion as well. Partial definition of the right upper lung nodule as best seen on prior CT scan.      Xr Chest identified. The cardiac and mediastinal contours appear within limits for technique. Visualized osseous structures appear within limits. Mild streaky opacity at the bases may represent atelectasis or evolving infiltrate not excluded. 1 cm nodular density suggested right upper lung at the area of the anterior second rib. No evidence of pneumothorax or pleural effusion identified. Ct Chest Pulmonary Embolism W Contrast    Result Date: 9/6/2019  EXAMINATION: CTA OF THE CHEST 9/6/2019 2:11 pm TECHNIQUE: CTA of the chest was performed after the administration of intravenous contrast.  Multiplanar reformatted images are provided for review. MIP images are provided for review. Dose modulation, iterative reconstruction, and/or weight based adjustment of the mA/kV was utilized to reduce the radiation dose to as low as reasonably achievable. COMPARISON: 08/24/2019 HISTORY: ORDERING SYSTEM PROVIDED HISTORY: hYPOXIA AND TACHYCARDIA TECHNOLOGIST PROVIDED HISTORY: Reason for Exam: r/o PE Acuity: Unknown Type of Exam: Unknown FINDINGS: Pulmonary Arteries: Within the main, central most right, central most left pulmonary arteries, no evidence of filling defect to suggest large central pulmonary embolism. Optimal peripheral branch evaluation is limited by artifact. Mediastinum: Calcification of the thoracic aorta. Coronary artery calcification. Within the aorta, no gross intraluminal flap. 1.1 cm short axis precarinal lymph node. Asymmetric prominence of right hilar sultana tissue. Lungs/pleura: Dense consolidation is demonstrated within the right lower lobe with air bronchograms, increased as compared to the prior. Right greater than left background micronodular infiltrate is demonstrated bilaterally, increased since the prior. 1.5 x 1.2 cm noncalcified right upper lobe pulmonary nodule had measured 1.3 x 1.0 cm on prior. Respiratory motion artifact is seen. Biapical pleuroparenchymal thickening.   Scattered chest 08/9/2019 HISTORY: ORDERING SYSTEM PROVIDED HISTORY: severe sepsis with SOB. FINDINGS: Pulmonary Arteries: Pulmonary arteries are suboptimally opacified for evaluation with limited assessment of the distal segmental and subsegmental vessels. No evidence of central/proximal intraluminal filling defect to suggest pulmonary embolism. Main pulmonary artery is normal in caliber. Mediastinum: No evidence of mediastinal lymphadenopathy. Normal caliber thoracic aorta with mild atherosclerosis. Lungs/pleura: Interval decrease size of pleural based cavitary opacity in the posterior right upper lobe now measuring approximately 1.5 cm in AP dimension compared to 2.3 cm on prior examination. Additional scattered opacities appear to resolved or decreased in size. Stable 1.4 cm slightly spiculated nodular opacity in the anterior upper lobe. Persistent right basilar opacification. Persistent but interval decreased left retrocardiac atelectasis. Organs: The liver, pancreas and kidneys appear grossly unremarkable. Redemonstration of 2.6 cm indeterminate left adrenal nodule. Slight thickening of the right adrenal gland. Spleen is mildly atrophic with scattered calcified granulomas. Hyperdensity throughout the gallbladder likely vicarious excretion of contrast. GI/Bowel: No evidence of bowel obstruction. Mild wall thickening along the gastric antrum and proximal to mid duodenum. Sigmoid diverticulosis. Mild wall thickening throughout the descending and sigmoid colon. Pelvis: Bladder is collapsed with Choi catheter in place. Peritoneum/Retroperitoneum: Stable extensive aortic atherosclerosis with redemonstration of infrarenal fusiform abdominal aortic aneurysm measuring approximately 3.3 cm. No suspicious lymphadenopathy. No ascites or free air. Bones/Soft Tissues: Stable severe T12 and mild to moderate T9 compression fractures.   Multilevel degenerative change of the lumbar spine including severe L5-S1 disc space images. Visualized lung bases are clear. Visualized bowel loops are without acute process. Contrast is extraluminal with no contrast in the stomach. Repositioning and reinjection recommended. Cta Abdomen Pelvis W Wo Contrast    Result Date: 8/24/2019  EXAMINATION: CTA OF THE CHEST; CTA OF THE ABDOMEN AND PELVIS WITH AND WITHOUT CONTRAST 8/24/2019 12:52 am TECHNIQUE: CTA of the chest was performed after the administration of intravenous contrast.  Multiplanar reformatted images are provided for review. MIP images are provided for review. Dose modulation, iterative reconstruction, and/or weight based adjustment of the mA/kV was utilized to reduce the radiation dose to as low as reasonably achievable.; CTA of the abdomen and pelvis was performed without and with the administration of intravenous contrast. Multiplanar reformatted images are provided for review. MIP images are provided for review. Dose modulation, iterative reconstruction, and/or weight based adjustment of the mA/kV was utilized to reduce the radiation dose to as low as reasonably achievable. COMPARISON: CT abdomen pelvis 08/22/2019, CT chest 08/9/2019 HISTORY: ORDERING SYSTEM PROVIDED HISTORY: severe sepsis with SOB. FINDINGS: Pulmonary Arteries: Pulmonary arteries are suboptimally opacified for evaluation with limited assessment of the distal segmental and subsegmental vessels. No evidence of central/proximal intraluminal filling defect to suggest pulmonary embolism. Main pulmonary artery is normal in caliber. Mediastinum: No evidence of mediastinal lymphadenopathy. Normal caliber thoracic aorta with mild atherosclerosis. Lungs/pleura: Interval decrease size of pleural based cavitary opacity in the posterior right upper lobe now measuring approximately 1.5 cm in AP dimension compared to 2.3 cm on prior examination. Additional scattered opacities appear to resolved or decreased in size.   Stable 1.4 cm slightly spiculated nodular opacity

## 2019-09-17 NOTE — PROGRESS NOTES
Jevity 1.2 infusing continuously as ordered, HOB elevated. Increased restlessness and congestion noted at this time. Patient is not always able to bring up the secretions. PS02 88% on 4 liters 02, Increased 02 to 5 liters and ps02 at 93%. Frequent reposition and redirection from staff. WCTM.

## 2019-09-17 NOTE — PROGRESS NOTES
09/17/19 0851   Cough/Sputum   Sputum How Obtained Oral tracheal   Cough Congested;Productive   Frequency Frequent   Sputum Amount Moderate   Sputum Color Yellow   Tenacity Thick

## 2019-09-17 NOTE — CARE COORDINATION
Patient discharged 9/17/19 to [address] [facility] [address]  All discharge needs met per case management.   SUZANNE TavarezN, CCM, RN  [facility]  [phone]

## 2019-09-30 LAB
FUNGUS (MYCOLOGY) CULTURE: ABNORMAL
FUNGUS STAIN: ABNORMAL
ORGANISM: ABNORMAL

## 2019-10-14 LAB
FUNGUS (MYCOLOGY) CULTURE: ABNORMAL
FUNGUS STAIN: ABNORMAL
ORGANISM: ABNORMAL

## 2019-10-29 LAB
AFB CULTURE (MYCOBACTERIA): NORMAL
AFB SMEAR: NORMAL

## 2020-11-03 PROBLEM — N17.9 ACUTE KIDNEY FAILURE (HCC): Status: RESOLVED | Noted: 2019-07-23 | Resolved: 2020-11-03

## 2021-10-28 NOTE — PROGRESS NOTES
Left message through [facility] for a return call back for FABIAN eyetok Cristian NP. ativan IV, increase thiamine to high dose to see if helps  Getting free water with NG tube, has high insensible losses  Off heparin due to hematuria  Ischemic eval once neurologically stable            DVT Prophylaxis: SCD  Diet: DIET TUBE FEED CONTINUOUS/CYCLIC NPO; STANDARD WITH FIBER (Jevity 1.2);  Nasogastric; Continuous; 20; 60; 24  Code Status: Full Code    PT/OT Eval Status:once more stable    Faith Gamble MD

## 2022-01-24 NOTE — PROGRESS NOTES
----- Message from Elizabeth Louis sent at 1/24/2022  8:15 AM CST -----  Contact: pt  Type: Needs Medical Advice    Who Called:  Pt  Best Call Back Number: [phone]    Additional Information: Requesting a call back regarding order for rapid covid  Please Advise ---Thank you         Progress Note  Admit Date: 8/23/2019      PCP: Aashish Leija     CC: F/U for SOB     SUBJECTIVE / Interval History:     Pt feeling better this am , off o2 NC     Allergies   Patient has no known allergies. Medications    Scheduled Meds:   vancomycin  500 mg Intravenous Q12H    miconazole   Topical BID    haloperidol  2 mg Oral TID    enoxaparin  40 mg Subcutaneous Daily    scopolamine  1 patch Transdermal Q72H    piperacillin-tazobactam  3.375 g Intravenous Q8H    levofloxacin  750 mg Intravenous Q24H    potassium bicarb-citric acid  20 mEq Oral TID    levalbuterol  1.25 mg Nebulization 4x daily    atorvastatin  20 mg Oral Nightly    folic acid  1 mg Per G Tube Daily    lisinopril  10 mg Oral Daily    metoprolol tartrate  50 mg Oral BID    thiamine  100 mg Oral Daily    sodium chloride flush  10 mL Intravenous 2 times per day    ipratropium  0.5 mg Nebulization 4x daily       Continuous Infusions:    PRN Meds:  LORazepam, HYDROcodone 5 mg - acetaminophen, haloperidol, lip balm petroleum free, sodium chloride flush, ondansetron, potassium chloride **OR** potassium alternative oral replacement **OR** potassium chloride, potassium chloride    Vitals       Vitals:    09/14/19 1057   BP: 114/74   Pulse: 68   Resp: 20   Temp:    SpO2: 96%         24HR INTAKE/OUTPUT:      Intake/Output Summary (Last 24 hours) at 9/14/2019 1231  Last data filed at 9/14/2019 1227  Gross per 24 hour   Intake 1718 ml   Output 1050 ml   Net 668 ml       Exam:    Gen: No distress. On room air   Eyes: PERRL. No sclera icterus. No conjunctival injection. ENT: No discharge. Pharynx clear. External appearance of ears and nose normal.  Neck: supple   Resp: Clear to auscultation. No crackles / Rhonchi. No wheezes. CV: Regular rate. Regular rhythm. No murmur or rub  GI: soft, Non-tender. Non-distended. No hernia. BS +  Skin: Warm, dry, normal texture. No rashes   Lymph: No cervical LAD. No supraclavicular LAD.    M/S: No -Patient currently on Levaquin, Zosyn and vancomycin, Will dc abx after today dose. -Bronchial washings positive for yeast Candida infectious disease consulted no plans for antifungal  -pulmonary toilet   -appreciate pulm input   -Lung nodule: to dw with family for further work up in view of poor quality of life      Infected hematoma of rectus sheath: In the setting of recently dislodged gastric tube. Marialuisa Moran the inciting factor.    -Per general surgery no plans for any intervention at this time.  PEG replaced   - now in use with tube feeds. Tolerating TFs     Acute Posthemorrhagic anemia:   - Status post packed red blood cell transfusion.  H&H stable  -Monitor H&H     Acute metabolic encephalopathy: monitor    Continue Haldol agitation      Left lower extremity weakness:   - discussed with neuro, suspect related to pain from hematoma. He is able to move the leg.  -monitor      Code:Limited  DVT PPX Lovenox  Disposition acutely sick : Precert to LTAC in progress   Prognosis poor patient is Limited code status with no intubation        The patient and / or the family were informed of the results of any tests, a time was given to answer questions, a plan was proposed and they agreed with plan. Discussed with consulting physicians, nursing and social work     The note was completed using EMR. Every effort was made to ensure accuracy; however, inadvertent computerized transcription errors may be present.        Blair Sarmiento MD

## 2022-08-08 NOTE — PROGRESS NOTES
Physician has arrived. Hypokalemia   Leukocytosis is improving. New small bullae on the left arm probably traumatic in the setting of increased swelling.       Continue merrem and vanc  Continue TF  Change central line  Appreciate neurology's input. Continue PPI  Continue vanc and merrem   Start precedex and do sedation holiday daily   Lasix one dose and reassess am     Pt has a high probability of imminent or life-threatening deterioration requiring close monitoring, and highly complex decision-making and/or interventions of high intensity to assess, manipulate, and support his critical organ systems to prevent a likely inevitable decline which could occur if left untreated.      A total critical care time 35 minutes was used. This includes but not limited to examining patient, collaborating with other physicians, monitoring vital signs, telemetry, continuous pulse oximetry, and clinical response to IV medications, documentation time, review and interpretation of laboratory and radiological data, review of nursing notes and old record review. This time excludes any time that may have been spent performing procedures for life threatening organ failure. Nick Morrissey

## 2024-05-23 NOTE — PROGRESS NOTES
Consult Orders   Inpatient consult to Palliative Care [[id number]] ordered by Loly Saldana MD at 09/02/19 9774                          Palliative Care:    Palliative care consult for Goals of Care by Dr. Tim Wilks.       21-year-old gentleman with history of DDD cervical spine, myofascial pain syndrome, hypertension, lumbar radiculopathy, BPH, hyperlipidemia, GERD, alcohol abuse who was recently discharged from [facility] for altered mental status with hypotension requiring ICU admission complicated by probable alcohol withdrawal. Tavo Durán continued to have dysphagia during his stay at [facility] and failed swallow study necessitating G-tube placement. Tavo Durán returned to [facility] with altered mentation and severe hypotension.  Patient is not able to provide any history and noted to be nonverbal.  On presentation he was tachycardic with confusion and severe hypotension and admitted to ICU concern for septic shock.  CT abdomen was noted for interval dislodgment of the G-tube with development of markedly enlarged hemorrhagic infected collection along the left rectus sheath. He is off abx.     Patient remains confused soft restraint was started home medication, improving Haldol was introduced.     Radiology:  XR CHEST PORTABLE   Final Result   Improved right basilar aeration.  Unchanged left basilar disease.           XR CHEST PORTABLE   Final Result   Findings of mild congestive failure.  Suspect pleural effusion as well.       Partial definition of the right upper lung nodule as best seen on prior CT   scan.           XR CHEST PORTABLE   Final Result   Small left pleural effusion with associated basilar atelectasis/pneumonia,   new from 08/23/2019.       Unchanged mild right basilar airspace disease.       Right upper lobe nodule again noted.           CT CHEST PULMONARY EMBOLISM W CONTRAST   Final Result   1.  Interval dislodgement of the G-tube with development of markedly enlarged   hemorrhagic performed 1 day ago.           CT HEAD WO CONTRAST   Final Result   No acute intracranial abnormality.       Stable small polyp or retention cyst left sphenoid sinus and tiny amount of   fluid in the mastoid tips bilaterally.           XR CHEST PORTABLE   Final Result   Persistent right upper lobe mass seen to better advantage on recent CT. Follow-up CT as was recommended on report from 08/09/2019 is warranted.  No   new or acute finding is seen.      Bronch 9/11/20019  FINAL DIAGNOSIS:    Lung, right lower lobe, bronchial alveolar lavage:  -  No malignant cells identified       Active Problems:    Severe sepsis with septic shock: He received IV fluids and pressor support with antibiotics. -Resolved, no longer in septic shock antibiotic stopped       Acute respiratory failure with hypoxia: Resolved not on oxygen       Infected hematoma of rectus sheath: In the setting of recently dislodged gastric tube. Alyssa Locke the inciting factor.  By general surgery with no plans for any intervention at this time.  PEG replaced   - now in use with tube feeds. Off all abx        Acute Posthemorrhagic anemia: the setting of dislodged gastric tube that was reinserted.  Status post packed red blood cell transfusion.  H&H stable  -We will monitor H&H transfuse as appropriate hnb 8.9  -stable        Acute metabolic encephalopathy:  Resolved at baseline.  Stop high-dose IV thiamine.  Continue Haldol       Left lower extremity weakness: See neurology and not impressed for CV at this time.  We will monitor  - discussed with neuro, suspect related to pain from hematoma.  He is able to move the leg.          Acute kidney injury (HCC): Resolved with hydration    Lactic acidosis: Resolved with hydration     FEN/ hypernatremia/ hypokalemia  - PEG in use    Tube feeds   - replace K     Blood pressure not controlled Home medication resume please see orders     Bronchial washings positive for yeast Candida infectious disease consulted     Per Home

## (undated) DEVICE — GOWN AURORA NONREINF LG: Brand: MEDLINE INDUSTRIES, INC.

## (undated) DEVICE — CONMED CHANNEL MASTER PULMONARY AND PEDIATRIC CLEANING BRUSH, 160 CM X 2.0 MM: Brand: CONMED

## (undated) DEVICE — SOLUTION IV IRRIG WATER 500ML POUR BRL ST 2F7113

## (undated) DEVICE — SET VLV 3 PC AWS DISPOSABLE GRDIAN SCOPEVALET

## (undated) DEVICE — SINGLE USE BIOPSY VALVE MAJ-210: Brand: SINGLE USE BIOPSY VALVE (STERILE)

## (undated) DEVICE — MOUTHPIECE ENDOSCP L CTRL OPN AND SIDE PORTS DISP

## (undated) DEVICE — PROCEDURE KIT ENDOSCP CUST

## (undated) DEVICE — BW-412T DISP COMBO CLEANING BRUSH: Brand: SINGLE USE COMBINATION CLEANING BRUSH

## (undated) DEVICE — SPECIMEN TRAP: Brand: ARGYLE

## (undated) DEVICE — KIT PEG 20FR STD PUL EN ACCS DEV ENDOVIVE

## (undated) DEVICE — ADAPTER TBNG DIA15MM SWVL FBROPT BRONCHSCP TERM 2 AXIS PEEP

## (undated) DEVICE — 60 ML SYRINGE,REGULAR TIP: Brand: MONOJECT

## (undated) DEVICE — Device

## (undated) DEVICE — SYRINGE MED 10ML POLYPR LUERSLIP TIP FLAT TOP W/O SFTY DISP

## (undated) DEVICE — SINGLE USE SUCTION VALVE MAJ-209: Brand: SINGLE USE SUCTION VALVE (STERILE)